# Patient Record
Sex: FEMALE | Race: AMERICAN INDIAN OR ALASKA NATIVE | NOT HISPANIC OR LATINO | Employment: OTHER | URBAN - METROPOLITAN AREA
[De-identification: names, ages, dates, MRNs, and addresses within clinical notes are randomized per-mention and may not be internally consistent; named-entity substitution may affect disease eponyms.]

---

## 2017-11-17 ENCOUNTER — HOSPITAL ENCOUNTER (OUTPATIENT)
Dept: RADIOLOGY | Facility: MEDICAL CENTER | Age: 62
End: 2017-11-17
Attending: INTERNAL MEDICINE
Payer: MEDICAID

## 2017-11-17 DIAGNOSIS — R13.14 DYSPHAGIA, PHARYNGOESOPHAGEAL PHASE: ICD-10-CM

## 2017-11-17 PROCEDURE — 76700 US EXAM ABDOM COMPLETE: CPT

## 2018-01-17 DIAGNOSIS — Z01.812 PRE-OPERATIVE LABORATORY EXAMINATION: ICD-10-CM

## 2018-01-17 DIAGNOSIS — Z01.810 PRE-OPERATIVE CARDIOVASCULAR EXAMINATION: ICD-10-CM

## 2018-01-17 LAB
ANION GAP SERPL CALC-SCNC: 8 MMOL/L (ref 0–11.9)
BASOPHILS # BLD AUTO: 0.8 % (ref 0–1.8)
BASOPHILS # BLD: 0.05 K/UL (ref 0–0.12)
BUN SERPL-MCNC: 11 MG/DL (ref 8–22)
CALCIUM SERPL-MCNC: 9.6 MG/DL (ref 8.5–10.5)
CHLORIDE SERPL-SCNC: 102 MMOL/L (ref 96–112)
CO2 SERPL-SCNC: 26 MMOL/L (ref 20–33)
CREAT SERPL-MCNC: 0.71 MG/DL (ref 0.5–1.4)
EKG IMPRESSION: NORMAL
EOSINOPHIL # BLD AUTO: 0.38 K/UL (ref 0–0.51)
EOSINOPHIL NFR BLD: 5.9 % (ref 0–6.9)
ERYTHROCYTE [DISTWIDTH] IN BLOOD BY AUTOMATED COUNT: 42.6 FL (ref 35.9–50)
GLUCOSE SERPL-MCNC: 104 MG/DL (ref 65–99)
HCT VFR BLD AUTO: 43.8 % (ref 37–47)
HGB BLD-MCNC: 14.5 G/DL (ref 12–16)
IMM GRANULOCYTES # BLD AUTO: 0.02 K/UL (ref 0–0.11)
IMM GRANULOCYTES NFR BLD AUTO: 0.3 % (ref 0–0.9)
LYMPHOCYTES # BLD AUTO: 2.88 K/UL (ref 1–4.8)
LYMPHOCYTES NFR BLD: 44.9 % (ref 22–41)
MCH RBC QN AUTO: 30.7 PG (ref 27–33)
MCHC RBC AUTO-ENTMCNC: 33.1 G/DL (ref 33.6–35)
MCV RBC AUTO: 92.6 FL (ref 81.4–97.8)
MONOCYTES # BLD AUTO: 0.49 K/UL (ref 0–0.85)
MONOCYTES NFR BLD AUTO: 7.6 % (ref 0–13.4)
NEUTROPHILS # BLD AUTO: 2.6 K/UL (ref 2–7.15)
NEUTROPHILS NFR BLD: 40.5 % (ref 44–72)
NRBC # BLD AUTO: 0 K/UL
NRBC BLD-RTO: 0 /100 WBC
PLATELET # BLD AUTO: 303 K/UL (ref 164–446)
PMV BLD AUTO: 9.1 FL (ref 9–12.9)
POTASSIUM SERPL-SCNC: 4.5 MMOL/L (ref 3.6–5.5)
RBC # BLD AUTO: 4.73 M/UL (ref 4.2–5.4)
SODIUM SERPL-SCNC: 136 MMOL/L (ref 135–145)
WBC # BLD AUTO: 6.4 K/UL (ref 4.8–10.8)

## 2018-01-17 PROCEDURE — 93010 ELECTROCARDIOGRAM REPORT: CPT | Performed by: INTERNAL MEDICINE

## 2018-01-17 PROCEDURE — 36415 COLL VENOUS BLD VENIPUNCTURE: CPT

## 2018-01-17 PROCEDURE — 85025 COMPLETE CBC W/AUTO DIFF WBC: CPT

## 2018-01-17 PROCEDURE — 80048 BASIC METABOLIC PNL TOTAL CA: CPT

## 2018-01-17 PROCEDURE — 93005 ELECTROCARDIOGRAM TRACING: CPT

## 2018-01-17 RX ORDER — HYDROCODONE BITARTRATE AND ACETAMINOPHEN 5; 325 MG/1; MG/1
1-2 TABLET ORAL 2 TIMES DAILY
Status: ON HOLD | COMMUNITY
End: 2018-01-19

## 2018-01-19 ENCOUNTER — HOSPITAL ENCOUNTER (OUTPATIENT)
Facility: MEDICAL CENTER | Age: 63
End: 2018-01-19
Attending: SURGERY | Admitting: SURGERY
Payer: MEDICAID

## 2018-01-19 VITALS
DIASTOLIC BLOOD PRESSURE: 70 MMHG | BODY MASS INDEX: 25.83 KG/M2 | TEMPERATURE: 97.2 F | WEIGHT: 170.42 LBS | OXYGEN SATURATION: 91 % | HEART RATE: 80 BPM | SYSTOLIC BLOOD PRESSURE: 106 MMHG | RESPIRATION RATE: 16 BRPM | HEIGHT: 68 IN

## 2018-01-19 DIAGNOSIS — G89.18 POST-OPERATIVE PAIN: ICD-10-CM

## 2018-01-19 PROCEDURE — 160046 HCHG PACU - 1ST 60 MINS PHASE II: Performed by: SURGERY

## 2018-01-19 PROCEDURE — 160028 HCHG SURGERY MINUTES - 1ST 30 MINS LEVEL 3: Performed by: SURGERY

## 2018-01-19 PROCEDURE — A9270 NON-COVERED ITEM OR SERVICE: HCPCS

## 2018-01-19 PROCEDURE — 502571 HCHG PACK, LAP CHOLE: Performed by: SURGERY

## 2018-01-19 PROCEDURE — 501583 HCHG TROCAR, THRD CAN&SEAL 5X100: Performed by: SURGERY

## 2018-01-19 PROCEDURE — A6402 STERILE GAUZE <= 16 SQ IN: HCPCS | Performed by: SURGERY

## 2018-01-19 PROCEDURE — 700102 HCHG RX REV CODE 250 W/ 637 OVERRIDE(OP)

## 2018-01-19 PROCEDURE — 160025 RECOVERY II MINUTES (STATS): Performed by: SURGERY

## 2018-01-19 PROCEDURE — 160048 HCHG OR STATISTICAL LEVEL 1-5: Performed by: SURGERY

## 2018-01-19 PROCEDURE — 500697 HCHG HEMOCLIP, LARGE (ORANGE): Performed by: SURGERY

## 2018-01-19 PROCEDURE — 700111 HCHG RX REV CODE 636 W/ 250 OVERRIDE (IP)

## 2018-01-19 PROCEDURE — 160047 HCHG PACU  - EA ADDL 30 MINS PHASE II: Performed by: SURGERY

## 2018-01-19 PROCEDURE — 88304 TISSUE EXAM BY PATHOLOGIST: CPT

## 2018-01-19 PROCEDURE — 500854 HCHG NEEDLE, INSUFFLATION FOR STEP: Performed by: SURGERY

## 2018-01-19 PROCEDURE — 501582 HCHG TROCAR, THRD BLADED: Performed by: SURGERY

## 2018-01-19 PROCEDURE — 501399 HCHG SPECIMAN BAG, ENDO CATC: Performed by: SURGERY

## 2018-01-19 PROCEDURE — 160036 HCHG PACU - EA ADDL 30 MINS PHASE I: Performed by: SURGERY

## 2018-01-19 PROCEDURE — 700101 HCHG RX REV CODE 250

## 2018-01-19 PROCEDURE — 160009 HCHG ANES TIME/MIN: Performed by: SURGERY

## 2018-01-19 PROCEDURE — 160039 HCHG SURGERY MINUTES - EA ADDL 1 MIN LEVEL 3: Performed by: SURGERY

## 2018-01-19 PROCEDURE — 501838 HCHG SUTURE GENERAL: Performed by: SURGERY

## 2018-01-19 PROCEDURE — 500868 HCHG NEEDLE, SURGI(VARES): Performed by: SURGERY

## 2018-01-19 PROCEDURE — 501338 HCHG SHEARS, ENDO: Performed by: SURGERY

## 2018-01-19 PROCEDURE — 501572 HCHG TROCAR, SHIELD OBTU 5X100: Performed by: SURGERY

## 2018-01-19 PROCEDURE — 160035 HCHG PACU - 1ST 60 MINS PHASE I: Performed by: SURGERY

## 2018-01-19 PROCEDURE — 160002 HCHG RECOVERY MINUTES (STAT): Performed by: SURGERY

## 2018-01-19 RX ORDER — ESCITALOPRAM OXALATE 20 MG/1
20 TABLET ORAL DAILY
COMMUNITY
End: 2019-01-30

## 2018-01-19 RX ORDER — HYDROMORPHONE HYDROCHLORIDE 2 MG/ML
INJECTION, SOLUTION INTRAMUSCULAR; INTRAVENOUS; SUBCUTANEOUS
Status: COMPLETED
Start: 2018-01-19 | End: 2018-01-19

## 2018-01-19 RX ORDER — OXYCODONE HCL 5 MG/5 ML
SOLUTION, ORAL ORAL
Status: COMPLETED
Start: 2018-01-19 | End: 2018-01-19

## 2018-01-19 RX ORDER — SODIUM CHLORIDE, SODIUM LACTATE, POTASSIUM CHLORIDE, CALCIUM CHLORIDE 600; 310; 30; 20 MG/100ML; MG/100ML; MG/100ML; MG/100ML
INJECTION, SOLUTION INTRAVENOUS CONTINUOUS
Status: DISCONTINUED | OUTPATIENT
Start: 2018-01-19 | End: 2018-01-19 | Stop reason: HOSPADM

## 2018-01-19 RX ORDER — FAMOTIDINE 10 MG
10 TABLET ORAL DAILY
COMMUNITY
End: 2019-01-30

## 2018-01-19 RX ORDER — LIDOCAINE AND PRILOCAINE 25; 25 MG/G; MG/G
1 CREAM TOPICAL
Status: DISCONTINUED | OUTPATIENT
Start: 2018-01-19 | End: 2018-01-19 | Stop reason: HOSPADM

## 2018-01-19 RX ORDER — MEPERIDINE HYDROCHLORIDE 25 MG/ML
INJECTION INTRAMUSCULAR; INTRAVENOUS; SUBCUTANEOUS
Status: COMPLETED
Start: 2018-01-19 | End: 2018-01-19

## 2018-01-19 RX ORDER — QUETIAPINE FUMARATE 25 MG/1
25 TABLET, FILM COATED ORAL DAILY
COMMUNITY
End: 2019-01-30

## 2018-01-19 RX ORDER — HYDROCODONE BITARTRATE AND ACETAMINOPHEN 5; 325 MG/1; MG/1
1-2 TABLET ORAL EVERY 4 HOURS PRN
Qty: 30 TAB | Refills: 0 | Status: SHIPPED | OUTPATIENT
Start: 2018-01-19 | End: 2018-01-26

## 2018-01-19 RX ORDER — LIDOCAINE HYDROCHLORIDE 10 MG/ML
0.5 INJECTION, SOLUTION INFILTRATION; PERINEURAL
Status: DISCONTINUED | OUTPATIENT
Start: 2018-01-19 | End: 2018-01-19 | Stop reason: HOSPADM

## 2018-01-19 RX ORDER — LIDOCAINE HYDROCHLORIDE 10 MG/ML
INJECTION, SOLUTION INFILTRATION; PERINEURAL
Status: COMPLETED
Start: 2018-01-19 | End: 2018-01-19

## 2018-01-19 RX ORDER — BUPIVACAINE HYDROCHLORIDE AND EPINEPHRINE 5; 5 MG/ML; UG/ML
INJECTION, SOLUTION EPIDURAL; INTRACAUDAL; PERINEURAL
Status: DISCONTINUED | OUTPATIENT
Start: 2018-01-19 | End: 2018-01-19 | Stop reason: HOSPADM

## 2018-01-19 RX ADMIN — MEPERIDINE HYDROCHLORIDE 12.5 MG: 25 INJECTION INTRAMUSCULAR; INTRAVENOUS; SUBCUTANEOUS at 12:54

## 2018-01-19 RX ADMIN — HYDROMORPHONE HYDROCHLORIDE 0.5 MG: 2 INJECTION INTRAMUSCULAR; INTRAVENOUS; SUBCUTANEOUS at 13:02

## 2018-01-19 RX ADMIN — HYDROMORPHONE HYDROCHLORIDE 0.2 MG: 2 INJECTION INTRAMUSCULAR; INTRAVENOUS; SUBCUTANEOUS at 13:58

## 2018-01-19 RX ADMIN — FENTANYL CITRATE 50 MCG: 50 INJECTION, SOLUTION INTRAMUSCULAR; INTRAVENOUS at 12:50

## 2018-01-19 RX ADMIN — FENTANYL CITRATE 50 MCG: 50 INJECTION, SOLUTION INTRAMUSCULAR; INTRAVENOUS at 12:57

## 2018-01-19 RX ADMIN — OXYCODONE HYDROCHLORIDE 10 MG: 5 SOLUTION ORAL at 12:48

## 2018-01-19 RX ADMIN — FENTANYL CITRATE 50 MCG: 50 INJECTION, SOLUTION INTRAMUSCULAR; INTRAVENOUS at 13:07

## 2018-01-19 RX ADMIN — LIDOCAINE HYDROCHLORIDE 0.5 ML: 10 INJECTION, SOLUTION INFILTRATION; PERINEURAL at 10:15

## 2018-01-19 RX ADMIN — SODIUM CHLORIDE, SODIUM LACTATE, POTASSIUM CHLORIDE, CALCIUM CHLORIDE: 600; 310; 30; 20 INJECTION, SOLUTION INTRAVENOUS at 10:30

## 2018-01-19 RX ADMIN — FENTANYL CITRATE 50 MCG: 50 INJECTION, SOLUTION INTRAMUSCULAR; INTRAVENOUS at 13:20

## 2018-01-19 RX ADMIN — HYDROMORPHONE HYDROCHLORIDE 0.2 MG: 2 INJECTION INTRAMUSCULAR; INTRAVENOUS; SUBCUTANEOUS at 12:55

## 2018-01-19 RX ADMIN — FENTANYL CITRATE 50 MCG: 50 INJECTION, SOLUTION INTRAMUSCULAR; INTRAVENOUS at 12:45

## 2018-01-19 ASSESSMENT — PAIN SCALES - GENERAL
PAINLEVEL_OUTOF10: 8
PAINLEVEL_OUTOF10: 7
PAINLEVEL_OUTOF10: 10
PAINLEVEL_OUTOF10: 7
PAINLEVEL_OUTOF10: 8
PAINLEVEL_OUTOF10: 6
PAINLEVEL_OUTOF10: 10
PAINLEVEL_OUTOF10: 8
PAINLEVEL_OUTOF10: 10
PAINLEVEL_OUTOF10: 7
PAINLEVEL_OUTOF10: 3
PAINLEVEL_OUTOF10: 10
PAINLEVEL_OUTOF10: 6
PAINLEVEL_OUTOF10: 8
PAINLEVEL_OUTOF10: 10

## 2018-01-19 NOTE — DISCHARGE INSTRUCTIONS
ACTIVITY: Rest and take it easy for the first 24 hours.  A responsible adult is recommended to remain with you during that time.  It is normal to feel sleepy.  We encourage you to not do anything that requires balance, judgment or coordination.    MILD FLU-LIKE SYMPTOMS ARE NORMAL. YOU MAY EXPERIENCE GENERALIZED MUSCLE ACHES, THROAT IRRITATION, HEADACHE AND/OR SOME NAUSEA.    FOR 24 HOURS DO NOT:  Drive, operate machinery or run household appliances.  Drink beer or alcoholic beverages.   Make important decisions or sign legal documents.    SPECIAL INSTRUCTIONS:   *Laparoscopic Cholecystectomy D/C instructions:     1. DIET: Upon discharge from the hospital you may resume your normal preoperative diet. Depending on how you are feeling and whether you have nausea or not, you may wish to stay with a bland diet for the first few days. However, you can advance this as quickly as you feel ready.     2. ACTIVITIES: After discharge from the hospital, you may resume full routine activities. However, there should be no heavy lifting (greater than 15 pounds) and no strenuous activities until after your follow-up visit. Otherwise, routine activities of daily living are acceptable.     3. DRIVING: You may drive whenever you are off pain medications and are able to perform the activities needed to drive, i.e. turning, bending, twisting, etc.     4. BATHING: You may get the wound wet at any time after leaving the hospital. You may shower, but do not submerge in a bath for at least a week. Dressings may come off after 48 hours.     5. BOWEL FUNCTION: A few patients, after this operation, will develop either frequent or loose stools after meals. This usually corrects itself after a few days, to a few weeks. If this occurs, do not worry; it is not unusual and will resolve. Much more common than loose stools, is constipation. The combination of pain medication and decreased activity level can cause constipation in otherwise normal  patients. If you feel this is occurring, take a laxative (Milk of Magnesia, Ex-Lax, Senokot, etc.) until the problem has resolved.     6. PAIN MEDICATION: You will be given a prescription for pain medication at discharge. Please take these as directed. It is important to remember not to take medications on an empty stomach as this may cause nausea.      It is also helpful to take other non-narcotic over the counter medications to help with pain control and to decrease the need for narcotic medications. I recommend ibuprofen, taken every 8 hours, dosing as directed on the medication bottle.     It is useful to slowly decrease the number of narcotic pain medications you take starting the first day after surgery, as you are able. If you need a refill, Dr. Henderson's office will provide you with one refill at your post-operative visit. After this, no more narcotic pain medications will be given.      7.CALL IF YOU HAVE: (1) Fevers to more than 1010 F, (2) Unusual chest or leg pain, (3) Drainage or fluid from incision that may be foul smelling, increased tenderness or soreness at the wound or the wound edges are no longer together, redness or swelling at the incision site. Please do not hesitate to call with any other questions.      8. APPOINTMENT: Contact our office at [phone] for a follow-up appointment in 1 to 2 weeks following your procedure.     If you have any additional questions, please do not hesitate to call the office and speak to either myself or the physician on call.     Office address:  [address] [place] [address] [place] [address]     Keren Henderson M.D.  [facility]  [phone]   **    DIET: To avoid nausea, slowly advance diet as tolerated, avoiding spicy or greasy foods for the first day.  Add more substantial food to your diet according to your physician's instructions.  Babies can be fed formula or breast milk as soon as they are hungry.  INCREASE FLUIDS AND FIBER TO AVOID  CONSTIPATION.    SURGICAL DRESSING/BATHING: **see above instructions    FOLLOW-UP APPOINTMENT:  A follow-up appointment should be arranged with your doctor.  Call to schedule.    You should CALL YOUR PHYSICIAN if you develop:  Fever greater than 101 degrees F.  Pain not relieved by medication, or persistent nausea or vomiting.  Excessive bleeding (blood soaking through dressing) or unexpected drainage from the wound.  Extreme redness or swelling around the incision site, drainage of pus or foul smelling drainage.  Inability to urinate or empty your bladder within 8 hours.  Problems with breathing or chest pain.    You should call 911 if you develop problems with breathing or chest pain.  If you are unable to contact your doctor or surgical center, you should go to the nearest emergency room or urgent care center.  Physician's telephone #: **Dr. Henderson [phone]*    If any questions arise, call your doctor.  If your doctor is not available, please feel free to call the Surgical Center at [phone].  The Center is open Monday through Friday from 7AM to 7PM.  You can also call the [facility] HOTLINE open 24 hours/day, 7 days/week and speak to a nurse at [phone], or toll free at [phone].    A registered nurse may call you a few days after your surgery to see how you are doing after your procedure.    MEDICATIONS: Resume taking daily medication.  Take prescribed pain medication with food.  If no medication is prescribed, you may take non-aspirin pain medication if needed.  PAIN MEDICATION CAN BE VERY CONSTIPATING.  Take a stool softener or laxative such as senokot, pericolace, or milk of magnesia if needed.    Prescription given for Norco.  Last pain medication given at *12:48 pm.    If your physician has prescribed pain medication that includes Acetaminophen (Tylenol), do not take additional Acetaminophen (Tylenol) while taking the prescribed medication.    Depression / Suicide Risk    As you are  discharged from this [facility] facility, it is important to learn how to keep safe from harming yourself.    Recognize the warning signs:  · Abrupt changes in personality, positive or negative- including increase in energy   · Giving away possessions  · Change in eating patterns- significant weight changes-  positive or negative  · Change in sleeping patterns- unable to sleep or sleeping all the time   · Unwillingness or inability to communicate  · Depression  · Unusual sadness, discouragement and loneliness  · Talk of wanting to die  · Neglect of personal appearance   · Rebelliousness- reckless behavior  · Withdrawal from people/activities they love  · Confusion- inability to concentrate     If you or a loved one observes any of these behaviors or has concerns about self-harm, here's what you can do:  · Talk about it- your feelings and reasons for harming yourself  · Remove any means that you might use to hurt yourself (examples: pills, rope, extension cords, firearm)  · Get professional help from the community (Mental Health, Substance Abuse, psychological counseling)  · Do not be alone:Call your Safe Contact- someone whom you trust who will be there for you.  · Call your local CRISIS HOTLINE 679-7030 or 880-973-0966  · Call your local Children's Mobile Crisis Response Team [place] (056) 173-6153 or www.[employer]  · Call the toll free National Suicide Prevention Hotlines   · National Suicide Prevention Lifeline 472-680-JWXY (6545)  · National Hope Line Network 800-SUICIDE (534-7090)

## 2018-01-19 NOTE — OR NURSING
Multiple attempts to call pt's . No answer. Voicemail left with update.   Pt's  not in lobby either, per .

## 2018-01-19 NOTE — PROGRESS NOTES
An extensive informed consent was undertaken with the patient, in regard to the risks and benefits of the use of narcotics for post-operative pain. The patient was counseled regarding the benefits of narcotics for post-operative pain in the short term period. The patient was made aware of the alternatives, including heating pads, ice, and NSAID medication.    The risks of addiction, overdose, respiratory depression, and risks during pregnancy (if applicable), were discussed.  We discussed taking the medications as prescribed. We discussed other medications the patient is taking, and how these can interact. The patient was notified not to share the medications with others. We discussed warning signs of addiction.    She has a rx for norco from Dr. Tapia for elbow pain. She states she is out. I notified her I will give her an rx for same for post-operative pain. I am not able to access her  as the website is done.    The patient understands that these medications are intended to be used in the short term for post-operative pain only.    The patient was given a chance to ask questions, and all her questions were answered. Discussion was undertaken with Layman's terms. The patient demonstrated adequate understanding. Consent was given in clear state of mind.  Consent was signed.     Keren Henderson M.D.  [facility]  [phone]

## 2018-01-19 NOTE — OP REPORT
Operative Report    Date: 1/19/2018    Surgeon: Keren Henderson M.D.    Assistant: BRIAN Snow    Anesthesiologist: Jhony LOZA    Preoperative Diagnosis: K80.10 Calculus of gallbladder with chronic cholecystitis without obstruction    Postoperative Diagnosis: same    Procedure Performed: 48149 Laparoscopy, surgical; cholecystectomy    Indications: This is a 62 y.o. female who presented with abdominal pain, nausea, vomiting, heartburn. History, physical and diagnostic studies are consistent with chronic cholecystitis.    An extensive procedures, alternative, risks and questions conference was held with the patient and her family, in regard to the surgical treatment of cholecystitis. The patient was counseled regarding the benefits of the operation, namely, removal of gallbladder and alleviation of her infection and symptoms. The patient was made aware of the alternatives, including operative and non-operative management. The risks of bleeding, infection, damage to surrounding structures, need for reoperation, need for open operation, bile duct injury, stroke, MI, and death were discussed with the patient. The patient was given a chance to ask questions, and all her questions were answered. Discussion was undertaken with Layman's terms. The patient and family demonstrated adequate understanding, seemed pleased with the plan, and wish to proceed. Consent was given in clear state of mind.  Consent was signed.     Findings: chronic cholecystitis.    Procedure in detail: The patient was brought to the operating room and was placed in the supine position where general endotracheal anesthesia was induced. SCDs were in place and functioning. Preoperative antibiotics of ancef were given before incision time. The patient's abdomen was prepped and draped in the usual sterile fashion.    After infiltration of local anesthetic, a skin incision was made to accommodate a 5 mm port infra-umbilically. We then used the Veress  needle to access the peritoneum, and after saline drop test, we insufflated to 15 mmHg. We then placed the 5mm 30 degree camera and inspected the abdomen for evidence of trauma secondary to Veress needle or port placement, and found none.    Utilizing the 30-degree laparoscope with the patient in the reverse Trendelenburg position, and after infiltration of local anesthetic, an additional 11-mm port was inserted into the epigastrium just to the right of the midline. Then two 5-mm ports were inserted in the midclavicular and anterior axillary lines, in the right upper quadrant, all under direct visualization.    The gallbladder was identified, it appeared chronically inflamed with stones. The gallbladder was retracted cephalad and caudally using gallbladder graspers. Using the [place], we were able to peel the overlying peritoneum off the cystic duct, and circumferentially dissect it. We used electrocautery to futher remove the peritoneum off the gallbladder. We then were able to further dissect Calot's triangle until we identified the cystic artery, which was circumferrentially dissected.     We then doubly clipped the cystic duct distally and divided it. We then doubly clipped the cystic artery  and divided it.Then, using electrocautery, we removed the gallbladder from the liver bed. After ensuring gallbladder bed hemostasis with cautery, we removed the gallbladder and placed it in an endocatch bag, and removed it from the epigastric port site.    The right upper quadrant was irrigated copiously with normal saline solution. We inspected the cystic duct and artery stumps, and found them to be intact. The liver bed was hemostatic.    The trocars were removed under direct visualization.    The fascia on the all port sites >10 mm were closed with Vicryl sutures. The skin of all incisions was closed with running subcuticular suture.    All sponge, needle, and instrument counts were reported as correct at the end of the  procedure. The patient tolerated the procedure well and left the operating room for the recovery room in stable and satisfactory condition.    Estimated Blood Loss: minimal     Specimens: gallbladder for permanent pathology    Complications: none apparent     Drains: none     Disposition: stable, extubated, to PACU    Keren Henderson M.D.  [facility]  [phone]

## 2018-01-19 NOTE — OR NURSING
Call back from pt's , David. David given update and pt's script for Norco. David will drop off script at pt's pharmacy and return to pick her up. Pt aware.

## 2018-01-19 NOTE — PROGRESS NOTES
Laparoscopic Cholecystectomy D/C instructions:    1. DIET: Upon discharge from the hospital you may resume your normal preoperative diet. Depending on how you are feeling and whether you have nausea or not, you may wish to stay with a bland diet for the first few days. However, you can advance this as quickly as you feel ready.    2. ACTIVITIES: After discharge from the hospital, you may resume full routine activities. However, there should be no heavy lifting (greater than 15 pounds) and no strenuous activities until after your follow-up visit. Otherwise, routine activities of daily living are acceptable.    3. DRIVING: You may drive whenever you are off pain medications and are able to perform the activities needed to drive, i.e. turning, bending, twisting, etc.    4. BATHING: You may get the wound wet at any time after leaving the hospital. You may shower, but do not submerge in a bath for at least a week. Dressings may come off after 48 hours.    5. BOWEL FUNCTION: A few patients, after this operation, will develop either frequent or loose stools after meals. This usually corrects itself after a few days, to a few weeks. If this occurs, do not worry; it is not unusual and will resolve. Much more common than loose stools, is constipation. The combination of pain medication and decreased activity level can cause constipation in otherwise normal patients. If you feel this is occurring, take a laxative (Milk of Magnesia, Ex-Lax, Senokot, etc.) until the problem has resolved.    6. PAIN MEDICATION: You will be given a prescription for pain medication at discharge. Please take these as directed. It is important to remember not to take medications on an empty stomach as this may cause nausea.     It is also helpful to take other non-narcotic over the counter medications to help with pain control and to decrease the need for narcotic medications. I recommend ibuprofen, taken every 8 hours, dosing as directed on the  medication bottle.    It is useful to slowly decrease the number of narcotic pain medications you take starting the first day after surgery, as you are able. If you need a refill, Dr. Henderson's office will provide you with one refill at your post-operative visit. After this, no more narcotic pain medications will be given.     7.CALL IF YOU HAVE: (1) Fevers to more than 1010 F, (2) Unusual chest or leg pain, (3) Drainage or fluid from incision that may be foul smelling, increased tenderness or soreness at the wound or the wound edges are no longer together, redness or swelling at the incision site. Please do not hesitate to call with any other questions.     8. APPOINTMENT: Contact our office at [phone] for a follow-up appointment in 1 to 2 weeks following your procedure.    If you have any additional questions, please do not hesitate to call the office and speak to either myself or the physician on call.    Office address:  [facility] [address] [place] [address]    Keren Henderson M.D.  [facility]  [phone]

## 2018-01-20 NOTE — OR NURSING
Pt A&OX4. VSS. Unable to wean pt off oxygen without dropping oxygen saturation into the 80's. Titrated oxygen to 1 L via nasal cannula and pt sitting up in bed, able to use IS correctly with strong effort and self motivated. Oxygen sat's in the 90's on 1 L and trevon Lieberman RN notified. Pt states pain tolerable 6/10 post pain medication administration. Pt denies nausea, tolerated sips of water and ginger ale. Four lap sites to abd, dressings CDI. Ice pack in place. Report called to SARA Lieberman. Pt transported on 1 L of oxygen to phase II via [place].

## 2019-01-30 ENCOUNTER — OFFICE VISIT (OUTPATIENT)
Dept: INTERNAL MEDICINE | Facility: MEDICAL CENTER | Age: 64
End: 2019-01-30
Payer: MEDICAID

## 2019-01-30 VITALS
TEMPERATURE: 99 F | SYSTOLIC BLOOD PRESSURE: 120 MMHG | OXYGEN SATURATION: 94 % | HEIGHT: 67 IN | WEIGHT: 172.8 LBS | BODY MASS INDEX: 27.12 KG/M2 | DIASTOLIC BLOOD PRESSURE: 60 MMHG | HEART RATE: 88 BPM

## 2019-01-30 DIAGNOSIS — F41.9 ANXIETY: ICD-10-CM

## 2019-01-30 DIAGNOSIS — Z12.31 ENCOUNTER FOR SCREENING MAMMOGRAM FOR BREAST CANCER: ICD-10-CM

## 2019-01-30 DIAGNOSIS — F41.9 ANXIETY AND DEPRESSION: ICD-10-CM

## 2019-01-30 DIAGNOSIS — E78.5 DYSLIPIDEMIA: ICD-10-CM

## 2019-01-30 DIAGNOSIS — F32.A ANXIETY AND DEPRESSION: ICD-10-CM

## 2019-01-30 DIAGNOSIS — K22.2 SCHATZKI'S RING: ICD-10-CM

## 2019-01-30 DIAGNOSIS — Z00.00 HEALTH CARE MAINTENANCE: ICD-10-CM

## 2019-01-30 DIAGNOSIS — G47.09 OTHER INSOMNIA: ICD-10-CM

## 2019-01-30 PROCEDURE — 99204 OFFICE O/P NEW MOD 45 MIN: CPT | Mod: GC | Performed by: INTERNAL MEDICINE

## 2019-01-30 RX ORDER — ATENOLOL 50 MG/1
50 TABLET ORAL
Refills: 2 | COMMUNITY
Start: 2018-12-28 | End: 2019-01-30

## 2019-01-30 RX ORDER — NICOTINE POLACRILEX 4 MG/1
GUM, CHEWING ORAL
Refills: 5 | COMMUNITY
Start: 2018-12-27 | End: 2019-04-10 | Stop reason: SDUPTHER

## 2019-01-30 RX ORDER — ALPRAZOLAM 0.25 MG/1
0.25 TABLET ORAL 3 TIMES DAILY PRN
Qty: 40 TAB | Refills: 0 | Status: SHIPPED | OUTPATIENT
Start: 2019-01-30 | End: 2019-02-13

## 2019-01-30 RX ORDER — HYDROXYZINE HYDROCHLORIDE 25 MG/1
25 TABLET, FILM COATED ORAL 3 TIMES DAILY PRN
Qty: 60 TAB | Refills: 3 | Status: SHIPPED | OUTPATIENT
Start: 2019-01-30 | End: 2019-04-10 | Stop reason: SDUPTHER

## 2019-01-30 RX ORDER — NORTRIPTYLINE HYDROCHLORIDE 25 MG/1
CAPSULE ORAL
Refills: 5 | COMMUNITY
Start: 2018-12-29 | End: 2019-01-30

## 2019-01-30 RX ORDER — FAMOTIDINE 40 MG/1
40 TABLET, FILM COATED ORAL
Refills: 3 | COMMUNITY
Start: 2018-12-29 | End: 2019-01-30

## 2019-01-30 RX ORDER — PAROXETINE 10 MG/1
10 TABLET, FILM COATED ORAL DAILY
Qty: 60 TAB | Refills: 1 | Status: SHIPPED | OUTPATIENT
Start: 2019-01-30 | End: 2019-03-06

## 2019-01-30 ASSESSMENT — ENCOUNTER SYMPTOMS
BLURRED VISION: 0
WEIGHT LOSS: 0
CONSTIPATION: 0
FOCAL WEAKNESS: 0
ORTHOPNEA: 0
MYALGIAS: 0
DEPRESSION: 1
ABDOMINAL PAIN: 0
DIARRHEA: 0
WEAKNESS: 0
NERVOUS/ANXIOUS: 1
LOSS OF CONSCIOUSNESS: 0
CHILLS: 0
COUGH: 0
HALLUCINATIONS: 0
SHORTNESS OF BREATH: 0
INSOMNIA: 1
SEIZURES: 0
NAUSEA: 0
HEADACHES: 1
DIZZINESS: 0
PALPITATIONS: 0
FEVER: 0
SENSORY CHANGE: 0
HEARTBURN: 0
MEMORY LOSS: 0

## 2019-01-30 ASSESSMENT — PATIENT HEALTH QUESTIONNAIRE - PHQ9
5. POOR APPETITE OR OVEREATING: 1 - SEVERAL DAYS
CLINICAL INTERPRETATION OF PHQ2 SCORE: 5
SUM OF ALL RESPONSES TO PHQ QUESTIONS 1-9: 16

## 2019-01-30 ASSESSMENT — LIFESTYLE VARIABLES: SUBSTANCE_ABUSE: 0

## 2019-01-30 NOTE — PROGRESS NOTES
New Patient to Establish    Reason to establish: New patient to establish    CC: wean off xanax    HPI: Patient is a pleasant 63-year-old lady who comes in to establish care.  She is also wishing to wean off of her Xanax.  She states that she tried to wean off a couple weeks ago and stopped her Xanax for 3 days, but restarted it due increased anxiety.    Anxiety/insomnia  Xanax 1.5 mg started for anxiety and insomnia approx 20 yrs ago >> now on 0.5mg bid    Wants to wean off   She has also been taking nortriptyline.     GERD: Has history of Schatzki's ring.  Stable on omeprazole.    Patient Active Problem List    Diagnosis Date Noted   • Impaired fasting blood sugar 07/09/2014   • Cigarette smoker 01/11/2013   • Allergic rhinitis 05/18/2009   • Schatzki's ring    • Hiatal hernia    • Dyslipidemia    • Fear of travel with panic attacks        Past Medical History:   Diagnosis Date   • Agoraphobia with panic disorder     anxiety   • Allergic rhinitis    • Arthritis     all joints   • Bowel habit changes    • Dental disorder    • Dyslipidemia    • Fear of travel with panic attacks    • Heart burn    • Hiatal hernia    • Schatzki's ring        Current Outpatient Prescriptions   Medication Sig Dispense Refill   • nortriptyline (PAMELOR) 25 MG Cap TAKE 1 CAPSULE BY MOUTH EVERY DAY AT NIGHT  5   • omeprazole (PRILOSEC) 20 MG Tablet Delayed Response delayed-release tablet TAKE 1 TABLET BY MOUTH ONCE A DAY 30 MIN BEFORE FIRST MEAL FOR 30 DAYS  5   • atenolol (TENORMIN) 50 MG Tab Take 50 mg by mouth every day.  2   • famotidine (PEPCID) 40 MG Tab Take 40 mg by mouth every day.  3   • escitalopram (LEXAPRO) 20 MG tablet Take 20 mg by mouth every day.     • famotidine (PEPCID) 10 MG tablet Take 10 mg by mouth every day.     • quetiapine (SEROQUEL) 25 MG Tab Take 25 mg by mouth every day.     • alprazolam (XANAX) 1 MG Tab TAKE 1 TABLET BY MOUTH EVERY DAY AT BEDTIME AS NEEDED FOR SLEEP (Patient not taking: Reported on 1/30/2019)  30 Tab 0     No current facility-administered medications for this visit.        Allergies as of 01/30/2019 - Reviewed 01/30/2019   Allergen Reaction Noted   • Other environmental  01/17/2018       Social History     Social History   • Marital status: Single     Spouse name: N/A   • Number of children: N/A   • Years of education: N/A     Occupational History   •  Other     Social History Main Topics   • Smoking status: Former Smoker     Packs/day: 0.10     Years: 44.00     Types: Cigarettes     Quit date: 3/17/2014   • Smokeless tobacco: Current User     Last attempt to quit: 3/1/2011   • Alcohol use No      Comment: 10 drinks/week quit 11/11, quit 2/13   • Drug use: No      Comment: used marijuana and cocaine in the past, no IVDU   • Sexual activity: Yes     Birth control/ protection: Surgical     Other Topics Concern   • Not on file     Social History Narrative   • No narrative on file       Family History   Problem Relation Age of Onset   • Cancer Brother         pancreatic cancer   • Arthritis Mother    • Cancer Mother         lung cancer with mets    • Arthritis Father    • Alcohol/Drug Brother         drug abuse       Past Surgical History:   Procedure Laterality Date   • ALEX BY LAPAROSCOPY  1/19/2018    Procedure: ALEX BY LAPAROSCOPY/SURGICAL;  Surgeon: Keren Henderson M.D.;  Location: SURGERY [facility];  Service: General   • EGD WITH ASP/BX  11/26/12    distal esophageal stricture secondary to reflux status post dilation to 51 Chinese, mild erosive esophagitis, mild gastric erythema, small sliding hiatal hernia, mild chronic gastritis, negative H. pylori, no dysplasia or malignancy   • COLONOSCOPY WITH BIOPSY  7/08    hemorrhoids, no malignancy   • EGD WITH ASP/BX  7/08    hiatal hernia, schatzki's ring   • ABDOMINAL HYSTERECTOMY TOTAL      with BSO due to infection   • OTHER       I &D rectal abscess       ROS: As per HPI. Additional pertinent symptoms as noted below.    Review of  "Systems   Constitutional: Positive for malaise/fatigue. Negative for chills, fever and weight loss.   HENT: Negative for hearing loss.    Eyes: Negative for blurred vision.   Respiratory: Negative for cough and shortness of breath.    Cardiovascular: Negative for chest pain, palpitations, orthopnea and leg swelling.   Gastrointestinal: Negative for abdominal pain, constipation, diarrhea, heartburn and nausea.   Genitourinary: Negative for dysuria and frequency.   Musculoskeletal: Negative for joint pain and myalgias.   Skin: Negative for rash.   Neurological: Positive for headaches. Negative for dizziness, sensory change, focal weakness, seizures, loss of consciousness and weakness.   Psychiatric/Behavioral: Positive for depression. Negative for hallucinations, memory loss, substance abuse and suicidal ideas. The patient is nervous/anxious and has insomnia.        /60 (BP Location: Right arm, Patient Position: Sitting, BP Cuff Size: Adult)   Pulse 88   Temp 37.2 °C (99 °F) (Temporal)   Ht 1.702 m (5' 7\")   Wt 78.4 kg (172 lb 12.8 oz)   SpO2 94%   BMI 27.06 kg/m²      Physical Exam  General:  Alert and oriented, No apparent distress.  Healthy-appearing, well-groomed.    Eyes: Pupils equal and reactive. No scleral icterus.    Throat: Clear no erythema or exudates noted.    Neck: Supple. No lymphadenopathy noted. Thyroid not enlarged.    Lungs: Clear to auscultation, no crackles or wheezes.    Cardiovascular: Regular rate and rhythm. No murmurs, rubs or gallops.    Abdomen:  Benign. No rebound or guarding noted.    Extremities: No clubbing, cyanosis, edema.    Skin: Clear. No rash or suspicious skin lesions noted.    Psych: Anxious.  Normal affect.  Normal speech and thought process.      Assessment and Plan    Anxiety/depression/insomnia  PHQ 9 of 16  Stop nortriptyline and start paroxetine - increase in about 2 weeks  Gave patient a plan on how to wean off Xanax in a few weeks  Hydroxyzine PRN " anxiety  Discussed sleep hygiene  RTC 5 weeks    Hyperlipidemia  4/15 , HDL 49, triglycerides 160, total cholesterol 271  Not on meds  No history of heart disease or strokes.  Repeat lipid panel    GERD  -Continue omeprazole    Preventive care  Pending CBC, CMP, lipid panel, TSH  TDaP - discuss on next visit  Colonoscopy -discuss on next visit  Shingrix - advised to go to pharmacy  Pap - Hysterectomy  Mammogram - ordered today    Followup: Return in about 5 weeks (around 3/6/2019).    Signed by: Lelia Blackwell M.D.

## 2019-01-30 NOTE — PATIENT INSTRUCTIONS
Get shingles vaccine at pharmacy.   Stop nortriptyline.     XANAX wean off:  0.5 mg once a day for 1 week then  0.5 mg every other day for 1 week.   Then 0.25mg every other day for 1 week.  Then 0.25 mg every 2 days 1week - then stop.    OK to take hydroxyzine  When feeling anxious.

## 2019-02-12 ENCOUNTER — HOSPITAL ENCOUNTER (OUTPATIENT)
Dept: RADIOLOGY | Facility: MEDICAL CENTER | Age: 64
End: 2019-02-12
Attending: STUDENT IN AN ORGANIZED HEALTH CARE EDUCATION/TRAINING PROGRAM
Payer: MEDICAID

## 2019-02-12 ENCOUNTER — HOSPITAL ENCOUNTER (OUTPATIENT)
Dept: LAB | Facility: MEDICAL CENTER | Age: 64
End: 2019-02-12
Attending: STUDENT IN AN ORGANIZED HEALTH CARE EDUCATION/TRAINING PROGRAM
Payer: MEDICAID

## 2019-02-12 DIAGNOSIS — Z00.00 HEALTH CARE MAINTENANCE: ICD-10-CM

## 2019-02-12 DIAGNOSIS — F41.9 ANXIETY AND DEPRESSION: ICD-10-CM

## 2019-02-12 DIAGNOSIS — F32.A ANXIETY AND DEPRESSION: ICD-10-CM

## 2019-02-12 LAB
ALBUMIN SERPL BCP-MCNC: 4.3 G/DL (ref 3.2–4.9)
ALBUMIN/GLOB SERPL: 1.3 G/DL
ALP SERPL-CCNC: 61 U/L (ref 30–99)
ALT SERPL-CCNC: 23 U/L (ref 2–50)
ANION GAP SERPL CALC-SCNC: 8 MMOL/L (ref 0–11.9)
AST SERPL-CCNC: 23 U/L (ref 12–45)
BASOPHILS # BLD AUTO: 0.6 % (ref 0–1.8)
BASOPHILS # BLD: 0.05 K/UL (ref 0–0.12)
BILIRUB SERPL-MCNC: 0.7 MG/DL (ref 0.1–1.5)
BUN SERPL-MCNC: 16 MG/DL (ref 8–22)
CALCIUM SERPL-MCNC: 10.6 MG/DL (ref 8.5–10.5)
CHLORIDE SERPL-SCNC: 103 MMOL/L (ref 96–112)
CO2 SERPL-SCNC: 27 MMOL/L (ref 20–33)
CREAT SERPL-MCNC: 0.75 MG/DL (ref 0.5–1.4)
EOSINOPHIL # BLD AUTO: 0.22 K/UL (ref 0–0.51)
EOSINOPHIL NFR BLD: 2.7 % (ref 0–6.9)
ERYTHROCYTE [DISTWIDTH] IN BLOOD BY AUTOMATED COUNT: 44.7 FL (ref 35.9–50)
GLOBULIN SER CALC-MCNC: 3.4 G/DL (ref 1.9–3.5)
GLUCOSE SERPL-MCNC: 91 MG/DL (ref 65–99)
HCT VFR BLD AUTO: 46.9 % (ref 37–47)
HGB BLD-MCNC: 15.2 G/DL (ref 12–16)
IMM GRANULOCYTES # BLD AUTO: 0.02 K/UL (ref 0–0.11)
IMM GRANULOCYTES NFR BLD AUTO: 0.2 % (ref 0–0.9)
LYMPHOCYTES # BLD AUTO: 3.59 K/UL (ref 1–4.8)
LYMPHOCYTES NFR BLD: 43.7 % (ref 22–41)
MCH RBC QN AUTO: 30.3 PG (ref 27–33)
MCHC RBC AUTO-ENTMCNC: 32.4 G/DL (ref 33.6–35)
MCV RBC AUTO: 93.6 FL (ref 81.4–97.8)
MONOCYTES # BLD AUTO: 0.48 K/UL (ref 0–0.85)
MONOCYTES NFR BLD AUTO: 5.8 % (ref 0–13.4)
NEUTROPHILS # BLD AUTO: 3.85 K/UL (ref 2–7.15)
NEUTROPHILS NFR BLD: 47 % (ref 44–72)
NRBC # BLD AUTO: 0 K/UL
NRBC BLD-RTO: 0 /100 WBC
PLATELET # BLD AUTO: 352 K/UL (ref 164–446)
PMV BLD AUTO: 9.2 FL (ref 9–12.9)
POTASSIUM SERPL-SCNC: 3.9 MMOL/L (ref 3.6–5.5)
PROT SERPL-MCNC: 7.7 G/DL (ref 6–8.2)
RBC # BLD AUTO: 5.01 M/UL (ref 4.2–5.4)
SODIUM SERPL-SCNC: 138 MMOL/L (ref 135–145)
TSH SERPL DL<=0.005 MIU/L-ACNC: 2.68 UIU/ML (ref 0.38–5.33)
WBC # BLD AUTO: 8.2 K/UL (ref 4.8–10.8)

## 2019-02-12 PROCEDURE — 80053 COMPREHEN METABOLIC PANEL: CPT

## 2019-02-12 PROCEDURE — 36415 COLL VENOUS BLD VENIPUNCTURE: CPT

## 2019-02-12 PROCEDURE — 77063 BREAST TOMOSYNTHESIS BI: CPT

## 2019-02-12 PROCEDURE — 85025 COMPLETE CBC W/AUTO DIFF WBC: CPT

## 2019-02-12 PROCEDURE — 84443 ASSAY THYROID STIM HORMONE: CPT

## 2019-02-25 ENCOUNTER — OFFICE VISIT (OUTPATIENT)
Dept: INTERNAL MEDICINE | Facility: MEDICAL CENTER | Age: 64
End: 2019-02-25
Payer: MEDICAID

## 2019-02-25 VITALS
BODY MASS INDEX: 27.78 KG/M2 | SYSTOLIC BLOOD PRESSURE: 156 MMHG | DIASTOLIC BLOOD PRESSURE: 86 MMHG | TEMPERATURE: 97.3 F | HEART RATE: 92 BPM | OXYGEN SATURATION: 96 % | WEIGHT: 177 LBS | HEIGHT: 67 IN

## 2019-02-25 DIAGNOSIS — F32.A ANXIETY AND DEPRESSION: ICD-10-CM

## 2019-02-25 DIAGNOSIS — F41.9 ANXIETY AND DEPRESSION: ICD-10-CM

## 2019-02-25 DIAGNOSIS — E83.52 HYPERCALCEMIA: ICD-10-CM

## 2019-02-25 DIAGNOSIS — Z81.1 FAMILY HISTORY OF ALCOHOLISM: ICD-10-CM

## 2019-02-25 PROCEDURE — 99214 OFFICE O/P EST MOD 30 MIN: CPT | Mod: GC | Performed by: INTERNAL MEDICINE

## 2019-02-25 RX ORDER — ALPRAZOLAM 0.25 MG/1
0.25 TABLET ORAL 2 TIMES DAILY PRN
Qty: 20 TAB | Refills: 0 | Status: SHIPPED | OUTPATIENT
Start: 2019-02-25 | End: 2019-03-06 | Stop reason: SDUPTHER

## 2019-02-25 RX ORDER — NORTRIPTYLINE HYDROCHLORIDE 25 MG/1
CAPSULE ORAL
Refills: 5 | COMMUNITY
Start: 2019-01-30 | End: 2019-03-06

## 2019-02-25 RX ORDER — ATENOLOL 50 MG/1
50 TABLET ORAL
Refills: 2 | COMMUNITY
Start: 2019-01-31 | End: 2019-03-06

## 2019-02-25 ASSESSMENT — PATIENT HEALTH QUESTIONNAIRE - PHQ9: CLINICAL INTERPRETATION OF PHQ2 SCORE: 0

## 2019-02-25 ASSESSMENT — PAIN SCALES - GENERAL: PAINLEVEL: NO PAIN

## 2019-02-25 NOTE — PATIENT INSTRUCTIONS
Panic Attacks  Panic attacks are sudden, short feelings of great fear or discomfort. You may have them for no reason when you are relaxed, when you are uneasy (anxious), or when you are sleeping.  Follow these instructions at home:  · Take all your medicines as told.  · Check with your doctor before starting new medicines.  · Keep all doctor visits.  Contact a doctor if:  · You are not able to take your medicines as told.  · Your symptoms do not get better.  · Your symptoms get worse.  Get help right away if:  · Your attacks seem different than your normal attacks.  · You have thoughts about hurting yourself or others.  · You take panic attack medicine and you have a side effect.  This information is not intended to replace advice given to you by your health care provider. Make sure you discuss any questions you have with your health care provider.  Document Released: 01/20/2012 Document Revised: 05/25/2017 Document Reviewed: 08/01/2014  [employer] Interactive Patient Education © 2017 [employer]

## 2019-02-25 NOTE — PROGRESS NOTES
Established Patient    García presents today with the following:    CC:   Chief Complaint   Patient presents with   • Anxiety     folow up, pt seems to be having an anxiety attack at the start           HPI:   This is 63-year-old female anxiety, depression, insomnia who is here for follow-up of anxiety.  Patient has been on for over 20 years, used to be about 1.5 mg daily, but now on 0.5 mg twice a day.  Patient decided to sulfa wound of Xanax, was seen in clinic last month, and was instructed on how to wean off medication.  She decided to stop medication, but about 2 weeks ago she became more anxious, and could not do anything.  Decided to restart.  During last visit she was given 14 days worth of medication.  She presents today with severe anxiety with CR 7 of 21.  Denies suicidality.  Unable to function  Last time she took Xanax was about 3 days ago.  She is anxious, feeling of impending doom, palpitations.  Denies chest pain, shortness of breath,  Patient was started on hydroxyzine previously, but she states she gets a lot of dry mouth hence will not take it anymore.  She is currently on Paxil which she states does not work      Patient Active Problem List    Diagnosis Date Noted   • Hypercalcemia 02/25/2019   • Anxiety and depression 01/30/2019   • Other insomnia 01/30/2019   • Impaired fasting blood sugar 07/09/2014   • Cigarette smoker 01/11/2013   • Allergic rhinitis 05/18/2009   • Schatzki's ring    • Hiatal hernia    • Dyslipidemia    • Fear of travel with panic attacks        Current Outpatient Prescriptions   Medication Sig Dispense Refill   • ALPRAZolam (XANAX) 0.25 MG Tab Take 1 Tab by mouth 2 times a day as needed for Sleep for up to 14 days. 20 Tab 0   • hydrOXYzine HCl (ATARAX) 25 MG Tab Take 1 Tab by mouth 3 times a day as needed for Anxiety. 60 Tab 3   • atenolol (TENORMIN) 50 MG Tab Take 50 mg by mouth every day.  2   • nortriptyline (PAMELOR) 25 MG Cap TAKE 1 CAPSULE BY MOUTH EVERY DAY  "AT NIGHT  5   • omeprazole (PRILOSEC) 20 MG Tablet Delayed Response delayed-release tablet TAKE 1 TABLET BY MOUTH ONCE A DAY 30 MIN BEFORE FIRST MEAL FOR 30 DAYS  5   • PARoxetine (PAXIL) 10 MG Tab Take 1 Tab by mouth every day. (Patient not taking: Reported on 2/25/2019) 60 Tab 1     No current facility-administered medications for this visit.        ROS: As per HPI.   /86 (BP Location: Right arm, Patient Position: Sitting, BP Cuff Size: Adult)   Pulse 92   Temp 36.3 °C (97.3 °F) (Temporal)   Ht 1.702 m (5' 7\")   Wt 80.3 kg (177 lb)   SpO2 96%   Breastfeeding? No   BMI 27.72 kg/m²     Physical Exam   Constitutional:  oriented to person, place, and time. No distress.   Eyes: Pupils are equal, round, and reactive to light. No scleral icterus.  Neck: Neck supple. No thyromegaly present.   Cardiovascular: Normal rate, regular rhythm and normal heart sounds.  Exam reveals no gallop and no friction rub.  No murmur heard.  Pulmonary/Chest: Breath sounds normal. Chest wall is not dull to percussion.   Musculoskeletal:   no edema.   Lymphadenopathy: no cervical adenopathy  Neurological: alert and oriented to person, place, and time.   Skin: No cyanosis. Nails show no clubbing.      Note: I have reviewed all pertinent labs and diagnostic tests associated with this visit with specific comments listed under the assessment and plan below    Assessment and Plan    1.  Generalized anxiety disorder   2. Depression  Anxious, palpitations, feeling of impending doom,   Denies suicidal ideations  Psychotherapy in the past, no  benefit   reviewed, last Xanax prescription was on 1/30  Will do urine drug screen today  Consent for controlled substance signed  Xanax 0.25 mg twice daily as needed, 20 pills given with no refill  Patient has an appointment with PCP in 2 weeks  Referral to psychiatry given    2. Hypercalcemia  Serum calcium level noted to be 10.6 on 2/12 baseline 9.6 in January 2018.  She has had calcium " level of 10.6 in 2014.  Denies any urinary symptoms, no bone pain,   PCP follow-up    Followup: No Follow-up on file.  Has an appointment with PCP on 3/6      Signed by: Braden Pringle M.D.

## 2019-03-03 NOTE — PROGRESS NOTES
Established Patient    García presents today with the following:    CC: Anxiety    HPI: Patient is a pleasant 63-year-old lady who comes in for follow-up of her anxiety and lab work.    Anxiety/insomnia  Xanax 1.5 mg started for anxiety and insomnia approx 20 yrs ago >> now on lower dose, but wishing to wean off.    -We stopped her nortriptyline and started paxil.   -She failed a 3 week attempt at weaning off (GAD7 of 21) and was restarted on xanax on last visit, as well as referred to psychiatry (appt is soon).  Of note, she states that she stopped the Paxil after 4 weeks, because of unseen benefits.  -Today: patient is feeling much better now that she is back on Xanax 0.25 mg twice daily.  She does well with hydroxyzine PRN nightly.      GERD: Has history of Schatzki's ring.  Stable on omeprazole.       Patient Active Problem List    Diagnosis Date Noted   • Hypercalcemia 02/25/2019   • Family history of alcoholism 02/25/2019   • Anxiety and depression 01/30/2019   • Other insomnia 01/30/2019   • Impaired fasting blood sugar 07/09/2014   • Cigarette smoker 01/11/2013   • Allergic rhinitis 05/18/2009   • Schatzki's ring    • Hiatal hernia    • Dyslipidemia    • Fear of travel with panic attacks        Current Outpatient Prescriptions   Medication Sig Dispense Refill   • atenolol (TENORMIN) 50 MG Tab Take 50 mg by mouth every day.  2   • nortriptyline (PAMELOR) 25 MG Cap TAKE 1 CAPSULE BY MOUTH EVERY DAY AT NIGHT  5   • ALPRAZolam (XANAX) 0.25 MG Tab Take 1 Tab by mouth 2 times a day as needed for Sleep for up to 14 days. 20 Tab 0   • omeprazole (PRILOSEC) 20 MG Tablet Delayed Response delayed-release tablet TAKE 1 TABLET BY MOUTH ONCE A DAY 30 MIN BEFORE FIRST MEAL FOR 30 DAYS  5   • PARoxetine (PAXIL) 10 MG Tab Take 1 Tab by mouth every day. (Patient not taking: Reported on 2/25/2019) 60 Tab 1   • hydrOXYzine HCl (ATARAX) 25 MG Tab Take 1 Tab by mouth 3 times a day as needed for Anxiety. 60 Tab 3     No current  "facility-administered medications for this visit.        ROS: As per HPI. Additional pertinent symptoms as noted below.    Review of Systems   Constitutional: Negative for chills, fever and malaise/fatigue.   HENT: Negative for hearing loss.    Eyes: Negative for blurred vision.   Respiratory: Negative for cough and shortness of breath.    Cardiovascular: Negative for chest pain.   Gastrointestinal: Negative for abdominal pain and nausea.        Oral ulcers and dry mouth that have been improving now back on Xanax   Genitourinary: Negative for dysuria and frequency.   Musculoskeletal: Negative for joint pain and myalgias.   Skin: Negative for rash.   Neurological: Negative for dizziness, weakness and headaches.   Psychiatric/Behavioral: Negative for depression, hallucinations, memory loss, substance abuse and suicidal ideas. The patient is nervous/anxious and has insomnia.      /88 (BP Location: Left arm, Patient Position: Sitting, BP Cuff Size: Adult)   Pulse 78   Temp 36.6 °C (97.9 °F) (Temporal)   Ht 1.702 m (5' 7\")   Wt 81.6 kg (180 lb)   SpO2 95%   BMI 28.19 kg/m²     Physical Exam   Constitutional:  oriented to person, place, and time. No distress.   Eyes: Pupils are equal, round, and reactive to light. No scleral icterus.  Neck: Neck supple. No thyromegaly present.   Cardiovascular: Normal rate, regular rhythm and normal heart sounds.  Exam reveals no gallop and no friction rub.  No murmur heard.  Pulmonary/Chest: Breath sounds normal. Chest wall is not dull to percussion.   Musculoskeletal:   no edema.   Lymphadenopathy: no cervical adenopathy  Neurological: alert and oriented to person, place, and time.  Ambulatory.  Grossly nonfocal.  Skin: No cyanosis. Nails show no clubbing.  Psych: Anxious mood and affect      Assessment and Plan    Anxiety/depression/insomnia  PHQ 9 of 16, CR 7 21>> now 15  TSH 2.6  Stop nortriptyline and restart paroxetine - increase in about 2 weeks  Has appointment with " psychiatry soon  -I feel patient needs a longer period of time to wean off.  She is highly motivated to wean off, but feels extreme anxiety with thoughts of weaning off.  -Hydroxyzine PRN anxiety/sleep  -Discussed sleep hygiene    -Refilled Xanax - ORT 1 - patient signed consent for for controlled substance - new [employer] screen ordered.      Hyperlipidemia  4/15 , HDL 49, triglycerides 160, total cholesterol 271  Not on meds  No history of heart disease or strokes.  Repeat lipid panel still pending    Hypercalcemia  H/o increased levels in between normal values on labs over the years.  Denies any urinary symptoms, no bone pain or fractures.   Pending Vit D and PTH     GERD  -Continue omeprazole    Elevated blood pressure  /88  Advised to keep a blood pressure log at home and bring on next visit  She has never been told she has high blood pressure in the past     Preventive care  2/2019 CBC wnl, CMP with slightly elevated ca, lipid panel pending, TSH 2.6  TDaP - on next visit  Colonoscopy - Dr. Wray - obtain records   Shingrix - pharmacy  Pap - Hysterectomy  Mammogram - 2/2019 wnl    Signed by: Lelia Blackwell M.D.

## 2019-03-06 ENCOUNTER — OFFICE VISIT (OUTPATIENT)
Dept: INTERNAL MEDICINE | Facility: MEDICAL CENTER | Age: 64
End: 2019-03-06
Payer: MEDICAID

## 2019-03-06 VITALS
TEMPERATURE: 97.9 F | BODY MASS INDEX: 28.25 KG/M2 | HEIGHT: 67 IN | WEIGHT: 180 LBS | HEART RATE: 78 BPM | DIASTOLIC BLOOD PRESSURE: 88 MMHG | OXYGEN SATURATION: 95 % | SYSTOLIC BLOOD PRESSURE: 141 MMHG

## 2019-03-06 DIAGNOSIS — Z12.11 ENCOUNTER FOR SCREENING COLONOSCOPY: ICD-10-CM

## 2019-03-06 DIAGNOSIS — F32.A ANXIETY AND DEPRESSION: ICD-10-CM

## 2019-03-06 DIAGNOSIS — F41.9 ANXIETY AND DEPRESSION: ICD-10-CM

## 2019-03-06 DIAGNOSIS — Z23 ENCOUNTER FOR VACCINATION: ICD-10-CM

## 2019-03-06 DIAGNOSIS — E83.52 HYPERCALCEMIA: ICD-10-CM

## 2019-03-06 DIAGNOSIS — R03.0 ELEVATED BLOOD PRESSURE READING: ICD-10-CM

## 2019-03-06 DIAGNOSIS — E78.5 DYSLIPIDEMIA: ICD-10-CM

## 2019-03-06 PROCEDURE — 99214 OFFICE O/P EST MOD 30 MIN: CPT | Mod: GC | Performed by: INTERNAL MEDICINE

## 2019-03-06 RX ORDER — ALPRAZOLAM 0.25 MG/1
0.25 TABLET ORAL 2 TIMES DAILY PRN
Qty: 35 TAB | Refills: 0 | Status: SHIPPED | OUTPATIENT
Start: 2019-03-06 | End: 2019-04-10 | Stop reason: SDUPTHER

## 2019-03-06 RX ORDER — PAROXETINE 10 MG/1
10 TABLET, FILM COATED ORAL DAILY
Qty: 35 TAB | Refills: 0 | Status: SHIPPED | OUTPATIENT
Start: 2019-03-06 | End: 2019-04-10 | Stop reason: SDUPTHER

## 2019-03-06 ASSESSMENT — ENCOUNTER SYMPTOMS
ABDOMINAL PAIN: 0
DIZZINESS: 0
SHORTNESS OF BREATH: 0
COUGH: 0
HALLUCINATIONS: 0
DEPRESSION: 0
INSOMNIA: 1
CHILLS: 0
MYALGIAS: 0
NAUSEA: 0
NERVOUS/ANXIOUS: 1
FEVER: 0
HEADACHES: 0
BLURRED VISION: 0
MEMORY LOSS: 0
WEAKNESS: 0

## 2019-03-06 ASSESSMENT — LIFESTYLE VARIABLES: SUBSTANCE_ABUSE: 0

## 2019-03-06 NOTE — ADDENDUM NOTE
Addended by: SALIMA MICHELE on: 3/6/2019 01:26 PM     Modules accepted: Orders, Level of Service

## 2019-03-06 NOTE — PATIENT INSTRUCTIONS
Generalized Anxiety Disorder  Generalized anxiety disorder (CR) is a mental disorder. It interferes with life functions, including relationships, work, and school.  CR is different from normal anxiety, which everyone experiences at some point in their lives in response to specific life events and activities. Normal anxiety actually helps us prepare for and get through these life events and activities. Normal anxiety goes away after the event or activity is over.   CR causes anxiety that is not necessarily related to specific events or activities. It also causes excess anxiety in proportion to specific events or activities. The anxiety associated with CR is also difficult to control. CR can vary from mild to severe. People with severe CR can have intense waves of anxiety with physical symptoms (panic attacks).   SYMPTOMS  The anxiety and worry associated with CR are difficult to control. This anxiety and worry are related to many life events and activities and also occur more days than not for 6 months or longer. People with CR also have three or more of the following symptoms (one or more in children):  · Restlessness.    · Fatigue.  · Difficulty concentrating.    · Irritability.  · Muscle tension.  · Difficulty sleeping or unsatisfying sleep.  DIAGNOSIS  CR is diagnosed through an assessment by your health care provider. Your health care provider will ask you questions about your mood, physical symptoms, and events in your life. Your health care provider may ask you about your medical history and use of alcohol or drugs, including prescription medicines. Your health care provider may also do a physical exam and blood tests. Certain medical conditions and the use of certain substances can cause symptoms similar to those associated with CR. Your health care provider may refer you to a mental health specialist for further evaluation.  TREATMENT  The following therapies are usually used to treat CR:    · Medication. Antidepressant medication usually is prescribed for long-term daily control. Antianxiety medicines may be added in severe cases, especially when panic attacks occur.    · Talk therapy (psychotherapy). Certain types of talk therapy can be helpful in treating CR by providing support, education, and guidance. A form of talk therapy called cognitive behavioral therapy can teach you healthy ways to think about and react to daily life events and activities.  · Stress management techniques. These include yoga, meditation, and exercise and can be very helpful when they are practiced regularly.  A mental health specialist can help determine which treatment is best for you. Some people see improvement with one therapy. However, other people require a combination of therapies.  This information is not intended to replace advice given to you by your health care provider. Make sure you discuss any questions you have with your health care provider.  Document Released: 04/14/2014 Document Revised: 01/08/2016 Document Reviewed: 04/14/2014  [employer] Interactive Patient Education © 2017 [employer]    Take blood pressure once every day or every other day until you see me again.

## 2019-03-15 DIAGNOSIS — F41.9 ANXIETY AND DEPRESSION: ICD-10-CM

## 2019-03-15 DIAGNOSIS — F32.A ANXIETY AND DEPRESSION: ICD-10-CM

## 2019-04-09 NOTE — PROGRESS NOTES
Established Patient    García presents today with the following:    CC: med refills    HPI: Patient is a 64 yo F here for med refills.    Anxiety/insomnia  Xanax 1.5 mg started for anxiety and insomnia approx 20 yrs ago >> now on lower dose, but wishing to wean off.    -We stopped her nortriptyline and started Paxil a few visits ago.   -She failed a 3 week attempt at weaning off (GAD7 of 21) and was restarted on xanax, as well as referred to psychology.   -Today: Patient is feeling much better. Still on Xanax 0.25 mg twice daily.  She does well with hydroxyzine PRN nightly a few days a month.   -She is now seeing a therapist and feels she is doing much better     GERD: Has history of Schatzki's ring.  Stable on omeprazole.    Patient Active Problem List    Diagnosis Date Noted   • Hypercalcemia 02/25/2019   • Family history of alcoholism 02/25/2019   • Anxiety and depression 01/30/2019   • Other insomnia 01/30/2019   • Impaired fasting blood sugar 07/09/2014   • Cigarette smoker 01/11/2013   • Allergic rhinitis 05/18/2009   • Schatzki's ring    • Hiatal hernia    • Dyslipidemia    • Fear of travel with panic attacks        Current Outpatient Prescriptions   Medication Sig Dispense Refill   • ALPRAZolam (XANAX) 0.25 MG Tab Take 1 Tab by mouth 2 times a day as needed for Sleep for up to 35 days. 35 Tab 0   • PARoxetine (PAXIL) 10 MG Tab Take 1 Tab by mouth every day. 35 Tab 0   • omeprazole (PRILOSEC) 20 MG Tablet Delayed Response delayed-release tablet TAKE 1 TABLET BY MOUTH ONCE A DAY 30 MIN BEFORE FIRST MEAL FOR 30 DAYS  5   • hydrOXYzine HCl (ATARAX) 25 MG Tab Take 1 Tab by mouth 3 times a day as needed for Anxiety. 60 Tab 3     No current facility-administered medications for this visit.        ROS: As per HPI. Additional pertinent symptoms as noted below.    Review of Systems   Constitutional: Negative for chills, fever and malaise/fatigue.   HENT: Negative for hearing loss.    Eyes: Negative for blurred  "vision.   Respiratory: Negative for cough and shortness of breath.    Cardiovascular: Negative for chest pain, palpitations and leg swelling.   Gastrointestinal: Negative for abdominal pain, heartburn and nausea.   Genitourinary: Negative.    Musculoskeletal: Negative.    Skin: Negative for rash.   Neurological: Negative for dizziness, sensory change, focal weakness, weakness and headaches.   Psychiatric/Behavioral: Negative for depression, memory loss and suicidal ideas. The patient is nervous/anxious.        /60 (BP Location: Left arm, Patient Position: Sitting, BP Cuff Size: Adult)   Pulse 78   Temp 36.3 °C (97.3 °F) (Temporal)   Ht 1.702 m (5' 7\")   Wt 80.7 kg (178 lb)   SpO2 97%   BMI 27.88 kg/m²     Physical Exam   Constitutional:  oriented to person, place, and time. No distress.   Eyes: Pupils are equal, round, and reactive to light. No scleral icterus.  Neck: Neck supple. No thyromegaly present.   Cardiovascular: Normal rate, regular rhythm and normal heart sounds.  Exam reveals no gallop and no friction rub.  No murmur heard.  Pulmonary/Chest: Breath sounds normal. Chest wall is not dull to percussion.   Musculoskeletal:   no edema.   Neurological: alert and oriented to person, place, and time.  Grossly nonfocal.  Skin: No cyanosis. Nails show no clubbing.  Psych: mildly anxious mood, normal affect.       Assessment and Plan    #Anxiety/depression/insomnia  PHQ 9 of 16 >> 17 , CR 7 21>> now 17   TSH 2.6  Increase paroxetine from 10 mg to 20 mg  Continue to see psychology/behavioral health - she is doing much better now that she has someone to talk to as well.   -I feel patient needs a longer period of time to wean off.  She is highly motivated to wean off, but feels extreme anxiety with thoughts of weaning off.  -I had lengthy discussion with patient today about taking only one a day if possible.  Patient said she would try to do that as much as she could this month.  -Hydroxyzine PRN " anxiety/sleep  -Discussed sleep hygiene     -Refilled Xanax - ORT 1 - patient signed consent for controlled substance - [employer] screen from last visit was negative (only showing Xanax).      #Hyperlipidemia  4/15 , HDL 49, triglycerides 160, total cholesterol 271  Not on meds  No history of heart disease or strokes.  Repeat lipid panel still pending     #Hypercalcemia  H/o increased levels in between normal values on labs over the years.  Denies any urinary symptoms, no bone pain or fractures.   Pending Vit D and PTH     #GERD  -Continue omeprazole     Preventive care  2/2019 CBC wnl, CMP with slightly elevated ca, lipid panel pending, TSH 2.6  TDaP - 2019  Colonoscopy - Dr. Wray - obtain records   Shingrix - pharmacy  Pap - Hysterectomy  Mammogram - 2/2019 wnl    Signed by: Lelia Blackwell M.D.

## 2019-04-10 ENCOUNTER — OFFICE VISIT (OUTPATIENT)
Dept: INTERNAL MEDICINE | Facility: MEDICAL CENTER | Age: 64
End: 2019-04-10
Payer: MEDICAID

## 2019-04-10 VITALS
TEMPERATURE: 97.3 F | DIASTOLIC BLOOD PRESSURE: 60 MMHG | WEIGHT: 178 LBS | SYSTOLIC BLOOD PRESSURE: 110 MMHG | OXYGEN SATURATION: 97 % | BODY MASS INDEX: 27.94 KG/M2 | HEIGHT: 67 IN | HEART RATE: 78 BPM

## 2019-04-10 DIAGNOSIS — F41.9 ANXIETY: ICD-10-CM

## 2019-04-10 DIAGNOSIS — F32.A ANXIETY AND DEPRESSION: ICD-10-CM

## 2019-04-10 DIAGNOSIS — F41.9 ANXIETY AND DEPRESSION: ICD-10-CM

## 2019-04-10 DIAGNOSIS — E78.5 DYSLIPIDEMIA: ICD-10-CM

## 2019-04-10 DIAGNOSIS — E83.52 HYPERCALCEMIA: ICD-10-CM

## 2019-04-10 DIAGNOSIS — Z23 ENCOUNTER FOR VACCINATION: ICD-10-CM

## 2019-04-10 DIAGNOSIS — K22.2 SCHATZKI'S RING: ICD-10-CM

## 2019-04-10 PROCEDURE — 90715 TDAP VACCINE 7 YRS/> IM: CPT | Performed by: INTERNAL MEDICINE

## 2019-04-10 PROCEDURE — 90471 IMMUNIZATION ADMIN: CPT | Performed by: INTERNAL MEDICINE

## 2019-04-10 PROCEDURE — 99213 OFFICE O/P EST LOW 20 MIN: CPT | Mod: GE,25 | Performed by: INTERNAL MEDICINE

## 2019-04-10 RX ORDER — NICOTINE POLACRILEX 4 MG/1
GUM, CHEWING ORAL
Qty: 90 TAB | Refills: 3 | Status: SHIPPED | OUTPATIENT
Start: 2019-04-10 | End: 2022-01-20

## 2019-04-10 RX ORDER — PAROXETINE HYDROCHLORIDE 20 MG/1
20 TABLET, FILM COATED ORAL DAILY
Qty: 40 TAB | Refills: 0 | Status: SHIPPED | OUTPATIENT
Start: 2019-04-10 | End: 2019-05-10 | Stop reason: SDUPTHER

## 2019-04-10 RX ORDER — HYDROXYZINE HYDROCHLORIDE 25 MG/1
25 TABLET, FILM COATED ORAL 3 TIMES DAILY PRN
Qty: 60 TAB | Refills: 3 | Status: SHIPPED | OUTPATIENT
Start: 2019-04-10 | End: 2022-01-20

## 2019-04-10 RX ORDER — ALPRAZOLAM 0.25 MG/1
0.25 TABLET ORAL 2 TIMES DAILY PRN
Qty: 60 TAB | Refills: 0 | Status: SHIPPED | OUTPATIENT
Start: 2019-04-10 | End: 2019-05-15 | Stop reason: SDUPTHER

## 2019-04-10 ASSESSMENT — ENCOUNTER SYMPTOMS
ABDOMINAL PAIN: 0
MEMORY LOSS: 0
WEAKNESS: 0
FEVER: 0
BLURRED VISION: 0
HEARTBURN: 0
SHORTNESS OF BREATH: 0
PALPITATIONS: 0
HEADACHES: 0
DEPRESSION: 0
NERVOUS/ANXIOUS: 1
NAUSEA: 0
CHILLS: 0
MUSCULOSKELETAL NEGATIVE: 1
COUGH: 0
FOCAL WEAKNESS: 0
DIZZINESS: 0
SENSORY CHANGE: 0

## 2019-04-10 NOTE — NON-PROVIDER
"García Cho is a 63 y.o. female here for a non-provider visit for:   TDAP    Reason for immunization: Overdue/Provider Recommended  Immunization records indicate need for vaccine: Yes, confirmed with Epic  Minimum interval has been met for this vaccine: Yes  ABN completed: Not Indicated    Order and dose verified by: Leonie Mobley  VIS Dated  02/24/19 was given to patient: Yes  All IAC Questionnaire questions were answered \"No.\"    Patient tolerated injection and no adverse effects were observed or reported: Yes    Pt scheduled for next dose in series: Not Indicated  "

## 2019-04-10 NOTE — PATIENT INSTRUCTIONS
PLEASE TRY TO TAKE XANAX JUST 1 A DAY WHEN POSSIBLE. THANK YOU    Generalized Anxiety Disorder  Generalized anxiety disorder (CR) is a mental disorder. It interferes with life functions, including relationships, work, and school.  CR is different from normal anxiety, which everyone experiences at some point in their lives in response to specific life events and activities. Normal anxiety actually helps us prepare for and get through these life events and activities. Normal anxiety goes away after the event or activity is over.   CR causes anxiety that is not necessarily related to specific events or activities. It also causes excess anxiety in proportion to specific events or activities. The anxiety associated with CR is also difficult to control. CR can vary from mild to severe. People with severe CR can have intense waves of anxiety with physical symptoms (panic attacks).   SYMPTOMS  The anxiety and worry associated with CR are difficult to control. This anxiety and worry are related to many life events and activities and also occur more days than not for 6 months or longer. People with CR also have three or more of the following symptoms (one or more in children):  · Restlessness.    · Fatigue.  · Difficulty concentrating.    · Irritability.  · Muscle tension.  · Difficulty sleeping or unsatisfying sleep.  DIAGNOSIS  CR is diagnosed through an assessment by your health care provider. Your health care provider will ask you questions about your mood, physical symptoms, and events in your life. Your health care provider may ask you about your medical history and use of alcohol or drugs, including prescription medicines. Your health care provider may also do a physical exam and blood tests. Certain medical conditions and the use of certain substances can cause symptoms similar to those associated with CR. Your health care provider may refer you to a mental health specialist for further  evaluation.  TREATMENT  The following therapies are usually used to treat CR:   · Medication. Antidepressant medication usually is prescribed for long-term daily control. Antianxiety medicines may be added in severe cases, especially when panic attacks occur.    · Talk therapy (psychotherapy). Certain types of talk therapy can be helpful in treating CR by providing support, education, and guidance. A form of talk therapy called cognitive behavioral therapy can teach you healthy ways to think about and react to daily life events and activities.  · Stress management techniques. These include yoga, meditation, and exercise and can be very helpful when they are practiced regularly.  A mental health specialist can help determine which treatment is best for you. Some people see improvement with one therapy. However, other people require a combination of therapies.  This information is not intended to replace advice given to you by your health care provider. Make sure you discuss any questions you have with your health care provider.  Document Released: 04/14/2014 Document Revised: 01/08/2016 Document Reviewed: 04/14/2014  [facility] Interactive Patient Education © 2017 [employer]

## 2019-04-12 ENCOUNTER — HOSPITAL ENCOUNTER (OUTPATIENT)
Dept: LAB | Facility: MEDICAL CENTER | Age: 64
End: 2019-04-12
Attending: STUDENT IN AN ORGANIZED HEALTH CARE EDUCATION/TRAINING PROGRAM
Payer: MEDICAID

## 2019-04-12 DIAGNOSIS — E83.52 HYPERCALCEMIA: ICD-10-CM

## 2019-04-12 PROCEDURE — 80061 LIPID PANEL: CPT

## 2019-04-12 PROCEDURE — 36415 COLL VENOUS BLD VENIPUNCTURE: CPT

## 2019-04-12 PROCEDURE — 82306 VITAMIN D 25 HYDROXY: CPT

## 2019-04-12 PROCEDURE — 83970 ASSAY OF PARATHORMONE: CPT

## 2019-04-13 LAB
25(OH)D3 SERPL-MCNC: 18 NG/ML (ref 30–100)
CHOLEST SERPL-MCNC: 276 MG/DL (ref 100–199)
FASTING STATUS PATIENT QL REPORTED: NORMAL
HDLC SERPL-MCNC: 56 MG/DL
LDLC SERPL CALC-MCNC: 189 MG/DL
PTH-INTACT SERPL-MCNC: 39.9 PG/ML (ref 14–72)
TRIGL SERPL-MCNC: 154 MG/DL (ref 0–149)

## 2019-05-13 NOTE — PROGRESS NOTES
Established Patient    García presents today with the following:    CC: anxiety, med refill    HPI: Patient is a 64 yo F here for xanax refills.      Anxiety/insomnia  Xanax 1.5 mg started for anxiety and insomnia approx 20 yrs ago >> now on lower dose, but wishing to wean off.    -We stopped her nortriptyline and started Paxil a few visits ago.   -She failed a 3 week attempt at weaning off (GAD7 of 21) and was restarted on xanax, as well as referred to psychology.   -Today: Still on Xanax 0.25 mg twice daily.  She does well with hydroxyzine PRN nightly a few days a month. Forgot to take less this month and had to borrow pills from her .   -She is now seeing a therapist and loves her - feels she is doing much better     GERD: Has history of Schatzki's ring.  Stable on omeprazole.    Patient Active Problem List    Diagnosis Date Noted   • Hypercalcemia 02/25/2019   • Family history of alcoholism 02/25/2019   • Anxiety and depression 01/30/2019   • Other insomnia 01/30/2019   • Allergic rhinitis 05/18/2009   • Schatzki's ring    • Hiatal hernia    • Dyslipidemia    • Fear of travel with panic attacks        Current Outpatient Prescriptions   Medication Sig Dispense Refill   • PARoxetine (PAXIL) 20 MG Tab TAKE 1 TABLET BY MOUTH EVERY DAY 90 Tab 0   • ALPRAZolam (XANAX) 0.25 MG Tab Take 1 Tab by mouth 2 times a day as needed for Sleep or Anxiety for up to 35 days. 60 Tab 0   • omeprazole (PRILOSEC) 20 MG Tablet Delayed Response delayed-release tablet TAKE 1 TABLET BY MOUTH ONCE A DAY 30 MIN BEFORE FIRST MEAL FOR 30 DAYS 90 Tab 3   • hydrOXYzine HCl (ATARAX) 25 MG Tab Take 1 Tab by mouth 3 times a day as needed for Anxiety. 60 Tab 3     No current facility-administered medications for this visit.        ROS: As per HPI. Additional pertinent symptoms as noted below.    Review of Systems   Constitutional: Negative for fever, malaise/fatigue and weight loss.   HENT: Negative for hearing loss.    Eyes: Negative for  "blurred vision.   Respiratory: Negative for cough and shortness of breath.    Cardiovascular: Negative for chest pain, palpitations and leg swelling.   Gastrointestinal: Negative for abdominal pain, heartburn and nausea.   Genitourinary: Negative for dysuria and frequency.   Musculoskeletal: Negative for joint pain and myalgias.   Skin: Negative for rash.   Neurological: Negative for dizziness, seizures and headaches.   Psychiatric/Behavioral: Positive for depression. Negative for substance abuse and suicidal ideas. The patient is nervous/anxious and has insomnia.      /72 (BP Location: Right arm, Patient Position: Sitting, BP Cuff Size: Adult)   Pulse 72   Temp 36.9 °C (98.4 °F) (Temporal)   Ht 1.702 m (5' 7\")   Wt 81.6 kg (180 lb)   SpO2 97%   BMI 28.19 kg/m²     Physical Exam   Constitutional:  oriented to person, place, and time. No distress.   Eyes: Pupils are equal, round, and reactive to light. No scleral icterus.  Neck: Neck supple. No thyromegaly present.   Cardiovascular: Normal rate, regular rhythm and normal heart sounds.  Exam reveals no gallop and no friction rub.  No murmur heard.  Pulmonary/Chest: Breath sounds normal. Chest wall is not dull to percussion.   Musculoskeletal:   no edema.   Lymphadenopathy: no cervical adenopathy  Neurological: alert and oriented to person, place, and time.   Skin: No cyanosis. Nails show no clubbing.  Psych: anxious and tearing easily      Assessment and Plan    #Hyperlipidemia  2015 to 4/2019: >>189, HDL 49>> 56, Trigs 160>> 154, TChol 271>> 276  Not on meds  No history of heart disease or strokes.  ASCVD: 5.9%    #Anxiety/depression/insomnia  PHQ 9 of 16 >> 17 , CR 7 21>> now 17   TSH 2.6  On paroxetine 20 mg  Continue to see psychology/behavioral health - she is doing much better now that she has someone to talk to as well.   -I feel patient needs a longer period of time to wean off.  She is highly motivated to wean off, but feels extreme " anxiety with thoughts of weaning off.  -I had lengthy discussion with patient about taking only half tablets every once in a while when possible. Patient said she would try to do that as much as she could this month. She may only be able to wean off half a tablet a month at this point.   -Hydroxyzine PRN anxiety/sleep     -Refilled Xanax - ORT 1 - patient signed consent for controlled substance - [facility] screen from last 2 visits was negative (only showing Xanax).      #Hypercalcemia  H/o intermittent increased Ca levels over the years.  Denies any urinary symptoms, no bone pain or fractures.   Vit D 18 and PTH 39.9  Monitor      #GERD  -Continue omeprazole     Preventive care  2/2019 CBC wnl, CMP with slightly elevated ca, lipid panel pending, TSH 2.6  TDaP - 2019  Colonoscopy - Dr. Wray - obtain records   Shingrix - pharmacy  Pap - Hysterectomy  Mammogram - 2/2019 wnl    Signed by: Lelia Blackewll M.D.

## 2019-05-15 ENCOUNTER — OFFICE VISIT (OUTPATIENT)
Dept: INTERNAL MEDICINE | Facility: MEDICAL CENTER | Age: 64
End: 2019-05-15
Payer: MEDICAID

## 2019-05-15 VITALS
TEMPERATURE: 98.4 F | DIASTOLIC BLOOD PRESSURE: 72 MMHG | HEART RATE: 72 BPM | BODY MASS INDEX: 28.25 KG/M2 | HEIGHT: 67 IN | SYSTOLIC BLOOD PRESSURE: 133 MMHG | WEIGHT: 180 LBS | OXYGEN SATURATION: 97 %

## 2019-05-15 DIAGNOSIS — F41.9 ANXIETY AND DEPRESSION: ICD-10-CM

## 2019-05-15 DIAGNOSIS — F32.A ANXIETY AND DEPRESSION: ICD-10-CM

## 2019-05-15 DIAGNOSIS — E78.5 DYSLIPIDEMIA: ICD-10-CM

## 2019-05-15 DIAGNOSIS — G47.09 OTHER INSOMNIA: ICD-10-CM

## 2019-05-15 DIAGNOSIS — E83.52 HYPERCALCEMIA: ICD-10-CM

## 2019-05-15 PROCEDURE — 99214 OFFICE O/P EST MOD 30 MIN: CPT | Mod: GC | Performed by: INTERNAL MEDICINE

## 2019-05-15 RX ORDER — ALPRAZOLAM 0.25 MG/1
0.25 TABLET ORAL 2 TIMES DAILY PRN
Qty: 70 TAB | Refills: 0 | Status: SHIPPED | OUTPATIENT
Start: 2019-05-15 | End: 2019-05-15 | Stop reason: SDUPTHER

## 2019-05-15 RX ORDER — ALPRAZOLAM 0.25 MG/1
0.25 TABLET ORAL 2 TIMES DAILY PRN
Qty: 70 TAB | Refills: 0 | Status: SHIPPED | OUTPATIENT
Start: 2019-05-15 | End: 2019-06-12 | Stop reason: SDUPTHER

## 2019-05-15 ASSESSMENT — ENCOUNTER SYMPTOMS
NAUSEA: 0
SEIZURES: 0
COUGH: 0
ABDOMINAL PAIN: 0
FEVER: 0
WEIGHT LOSS: 0
PALPITATIONS: 0
NERVOUS/ANXIOUS: 1
INSOMNIA: 1
HEADACHES: 0
SHORTNESS OF BREATH: 0
DEPRESSION: 1
BLURRED VISION: 0
MYALGIAS: 0
DIZZINESS: 0
HEARTBURN: 0

## 2019-05-15 ASSESSMENT — LIFESTYLE VARIABLES: SUBSTANCE_ABUSE: 0

## 2019-05-16 ENCOUNTER — TELEPHONE (OUTPATIENT)
Dept: INTERNAL MEDICINE | Facility: MEDICAL CENTER | Age: 64
End: 2019-05-16

## 2019-05-16 NOTE — TELEPHONE ENCOUNTER
----- Message from Azucena June sent at 5/16/2019  8:49 AM PDT -----  Regarding: Question  Contact: [phone]  Patient's next appt is not but until July 22nd. Patient asks: Could Dr. Blackwell order her med refills electronically until patient gets to come in to see Dr. Charles in July. Please call patient with advice. Thank you.

## 2019-05-17 ENCOUNTER — TELEPHONE (OUTPATIENT)
Dept: INTERNAL MEDICINE | Facility: MEDICAL CENTER | Age: 64
End: 2019-05-17

## 2019-05-17 NOTE — TELEPHONE ENCOUNTER
Called patient and advised her to make an appointment around the time that she would be running out of her Xanax.  She will be reestablishing with her new PCP July 22.  She will need refills before that.  Patient agreed and understood, and will make appointment.

## 2019-05-17 NOTE — TELEPHONE ENCOUNTER
----- Message from Azucena June sent at 5/17/2019  8:24 AM PDT -----  Regarding: RE: Question  Contact: [phone]  Thank you Dr. Blackwell.   Patient states she needs these medication refills before June 15th:    HIDROXIN (ATARAX) 0.25 MG  PAROXETINE (PAXIL) 20 MG  ALPRAZOLAM ( XANAX) 0.25 MG  ----- Message -----  From: Lelia Blackwell M.D.  Sent: 5/16/2019   1:17 PM  To: Azucena June  Subject: RE: Question                                     Which meds does she need? and Ill do it. Please send through med refill section. Thanks She can call the clinic and let me know which ones she needs.    ----- Message -----  From: Azucena June  Sent: 5/16/2019   8:49 AM  To: Ravi Xiao, #  Subject: Question                                         Patient's next appt is not but until July 22nd. Patient asks: Could Dr. Blackwell order her med refills electronically until patient gets to come in to see Dr. Charles in July. Please call patient with advice. Thank you.

## 2019-06-12 ENCOUNTER — OFFICE VISIT (OUTPATIENT)
Dept: INTERNAL MEDICINE | Facility: MEDICAL CENTER | Age: 64
End: 2019-06-12
Payer: MEDICAID

## 2019-06-12 VITALS
BODY MASS INDEX: 27.78 KG/M2 | HEIGHT: 67 IN | WEIGHT: 177 LBS | HEART RATE: 53 BPM | TEMPERATURE: 96.3 F | OXYGEN SATURATION: 96 % | SYSTOLIC BLOOD PRESSURE: 115 MMHG | DIASTOLIC BLOOD PRESSURE: 67 MMHG

## 2019-06-12 DIAGNOSIS — N39.0 URINARY TRACT INFECTION WITHOUT HEMATURIA, SITE UNSPECIFIED: ICD-10-CM

## 2019-06-12 DIAGNOSIS — Z79.899 CHRONIC USE OF BENZODIAZEPINE FOR THERAPEUTIC PURPOSE: Primary | ICD-10-CM

## 2019-06-12 DIAGNOSIS — E55.9 VITAMIN D DEFICIENCY: ICD-10-CM

## 2019-06-12 DIAGNOSIS — E78.5 DYSLIPIDEMIA: ICD-10-CM

## 2019-06-12 DIAGNOSIS — F41.9 ANXIETY AND DEPRESSION: ICD-10-CM

## 2019-06-12 DIAGNOSIS — F32.A ANXIETY AND DEPRESSION: ICD-10-CM

## 2019-06-12 PROCEDURE — 99214 OFFICE O/P EST MOD 30 MIN: CPT | Mod: GC | Performed by: INTERNAL MEDICINE

## 2019-06-12 RX ORDER — MULTIVIT-MIN/IRON/FOLIC ACID/K 18-600-40
1 CAPSULE ORAL DAILY
Qty: 90 CAP | Refills: 2 | Status: SHIPPED | OUTPATIENT
Start: 2019-06-12 | End: 2023-01-31

## 2019-06-12 RX ORDER — ATORVASTATIN CALCIUM 40 MG/1
40 TABLET, FILM COATED ORAL DAILY
Qty: 90 TAB | Refills: 2 | Status: SHIPPED | OUTPATIENT
Start: 2019-06-12 | End: 2020-11-05

## 2019-06-12 RX ORDER — ALPRAZOLAM 0.25 MG/1
0.25 TABLET ORAL 2 TIMES DAILY PRN
Qty: 70 TAB | Refills: 0 | Status: SHIPPED | OUTPATIENT
Start: 2019-06-12 | End: 2019-07-17

## 2019-06-12 RX ORDER — NITROFURANTOIN 25; 75 MG/1; MG/1
100 CAPSULE ORAL 2 TIMES DAILY
Qty: 10 CAP | Refills: 0 | Status: SHIPPED | OUTPATIENT
Start: 2019-06-12 | End: 2020-11-05

## 2019-06-12 ASSESSMENT — ENCOUNTER SYMPTOMS
SHORTNESS OF BREATH: 0
MYALGIAS: 0
HEADACHES: 0
WEAKNESS: 0
SORE THROAT: 0
DIZZINESS: 0
CHILLS: 0
COUGH: 0
NAUSEA: 0
FEVER: 0
PND: 0
DEPRESSION: 1
NERVOUS/ANXIOUS: 1
EYE DISCHARGE: 0
SPEECH CHANGE: 0
VOMITING: 0
FLANK PAIN: 0
ABDOMINAL PAIN: 0
BLURRED VISION: 0
DOUBLE VISION: 0
WHEEZING: 0

## 2019-06-12 ASSESSMENT — LIFESTYLE VARIABLES: SUBSTANCE_ABUSE: 0

## 2019-06-12 NOTE — PROGRESS NOTES
Established Patient    García presents today with the following:    CC:   Prescription refill-Xanax      HPI:   63-year-old female past medical history of anxiety, allergic rhinitis, dyslipidemia who presents today for prescription refill.  Patient has a history of anxiety that is fairly controlled on Xanax and Paxil.  States that she has tried to wean down next in the past 1.5 mg twice a day to her current dose now, 0.25 mg twice a day.  States that she has been off Xanax in the past but is got severe anxiety, panic attacks and overall feeling of uneasiness.  Patient does follow with psychiatry but states that she does not get her prescriptions from them.  Patient also complains of urinary symptoms.  She states that she has dysuria, decreased urine frequency for a few days in duration.  She has tried cranberry juice without any notable benefit.  Patient also had recent labs done which showed significantly elevated cholesterol and LDL to 189 and vitamin D level of 18.      Patient Active Problem List    Diagnosis Date Noted   • Hypercalcemia 02/25/2019   • Family history of alcoholism 02/25/2019   • Anxiety and depression 01/30/2019   • Other insomnia 01/30/2019   • Allergic rhinitis 05/18/2009   • Schatzki's ring    • Hiatal hernia    • Dyslipidemia    • Fear of travel with panic attacks        Current Outpatient Prescriptions   Medication Sig Dispense Refill   • nitrofurantoin monohyd macro (MACROBID) 100 MG Cap Take 1 Cap by mouth 2 times a day. 10 Cap 0   • ALPRAZolam (XANAX) 0.25 MG Tab Take 1 Tab by mouth 2 times a day as needed for Sleep or Anxiety for up to 35 days. 70 Tab 0   • atorvastatin (LIPITOR) 40 MG Tab Take 1 Tab by mouth every day. 90 Tab 2   • Cholecalciferol (VITAMIN D) 2000 units Cap Take 1 Cap by mouth every day. 90 Cap 2   • PARoxetine (PAXIL) 20 MG Tab TAKE 1 TABLET BY MOUTH EVERY DAY 90 Tab 0   • omeprazole (PRILOSEC) 20 MG Tablet Delayed Response delayed-release tablet TAKE 1 TABLET BY  "MOUTH ONCE A DAY 30 MIN BEFORE FIRST MEAL FOR 30 DAYS 90 Tab 3   • hydrOXYzine HCl (ATARAX) 25 MG Tab Take 1 Tab by mouth 3 times a day as needed for Anxiety. 60 Tab 3     No current facility-administered medications for this visit.        ROS: As per HPI. Additional pertinent symptoms as noted below.  Review of Systems   Constitutional: Negative for chills and fever.   HENT: Negative for hearing loss, sore throat and tinnitus.    Eyes: Negative for blurred vision, double vision and discharge.   Respiratory: Negative for cough, shortness of breath and wheezing.    Cardiovascular: Negative for chest pain, leg swelling and PND.   Gastrointestinal: Negative for abdominal pain, nausea and vomiting.   Genitourinary: Negative for dysuria, flank pain and urgency.   Musculoskeletal: Negative for joint pain and myalgias.   Skin: Negative for itching and rash.   Neurological: Negative for dizziness, speech change, weakness and headaches.   Psychiatric/Behavioral: Positive for depression. Negative for substance abuse. The patient is nervous/anxious.        Physical Exam    /67 (BP Location: Right arm, Patient Position: Sitting, BP Cuff Size: Adult)   Pulse (!) 53   Temp (!) 35.7 °C (96.3 °F) (Temporal)   Ht 1.702 m (5' 7\")   Wt 80.3 kg (177 lb)   SpO2 96%   BMI 27.72 kg/m²     Physical Exam   Constitutional: She is oriented to person, place, and time and well-developed, well-nourished, and in no distress.   Obese   HENT:   Head: Normocephalic and atraumatic.   Mouth/Throat: No oropharyngeal exudate.   Eyes: Pupils are equal, round, and reactive to light. Conjunctivae are normal. No scleral icterus.   Neck: Normal range of motion. Neck supple. No JVD present. No thyromegaly present.   Cardiovascular: Normal rate, regular rhythm and normal heart sounds.    No murmur heard.  Pulmonary/Chest: Effort normal and breath sounds normal. No respiratory distress. She has no wheezes.   Abdominal: Soft. Bowel sounds are normal. " She exhibits no distension. There is no tenderness.   Musculoskeletal: Normal range of motion. She exhibits no edema.   Neurological: She is alert and oriented to person, place, and time. No cranial nerve deficit.   Skin: No rash noted. No erythema.   Psychiatric: Affect normal.         Narxs check:   Narcotics: 210 (Value from NarxCheck System.)    Sedatives: 301 (Value from NarxCheck System.)    Stimulants: 0 (Value from NarxCheck System.)             ORT score:  Opioid Risk Score: 1    Interpretation of Opioid Risk Score   Score 0-3 = Low risk of abuse. Do UDS at least once per year.  Score 4-7 = Moderate risk of abuse. Do UDS 1-4 times per year.  Score 8+ = High risk of abuse. Refer to specialist.          Assessment and Plan    1. Chronic use of benzodiazepine for therapeutic purpose  2. Anxiety and depression  -Patient presents with history of anxiety, depression  -CR score elevated in the past -most recent 17, PHQ 9 score elevated in the past  -Patient is previously tried stopping Xanax and had significant withdrawal anxiety symptoms  -Continue to wean Xanax as tolerated  -Negative malignant drug screens in the past  - ALPRAZolam (XANAX) 0.25 MG Tab; Take 1 Tab by mouth 2 times a day as needed for Sleep or Anxiety for up to 35 days.  Dispense: 70 Tab; Refill: 0  - Consent for Opiate Prescription      3. Urinary tract infection without hematuria, site unspecified  -Patient complains of urinary symptoms for few days in duration  -Tried cranberry juice with no notable benefit  -No previous urine cultures on record  -Trial of Macrobid, if symptoms did not improve consider urine culture and escalating antibiotics  - nitrofurantoin monohyd macro (MACROBID) 100 MG Cap; Take 1 Cap by mouth 2 times a day.  Dispense: 10 Cap; Refill: 0      4. Dyslipidemia  -Patient has significant dyslipidemia, cholesterol 276,   -Discussed indication for treatment, patient would like to try diet modification but is amenable to  starting Lipitor at this time and assess for response  - atorvastatin (LIPITOR) 40 MG Tab; Take 1 Tab by mouth every day.  Dispense: 90 Tab; Refill: 2      5. Vitamin D deficiency  -Vitamin D level of 18 in April 2019  -Will prescribe vitamin D, 2000 IU daily  - Cholecalciferol (VITAMIN D) 2000 units Cap; Take 1 Cap by mouth every day.  Dispense: 90 Cap; Refill: 2        Follow up: Return if symptoms worsen or fail to improve.        Follow-up with next appointment as scheduled      Signed by: Britney Martin M.D.

## 2019-06-12 NOTE — PATIENT INSTRUCTIONS
Fat and Cholesterol Restricted Diet  High levels of fat and cholesterol in your blood may lead to various health problems, such as diseases of the heart, blood vessels, gallbladder, liver, and pancreas. Fats are concentrated sources of energy that come in various forms. Certain types of fat, including saturated fat, may be harmful in excess. Cholesterol is a substance needed by your body in small amounts. Your body makes all the cholesterol it needs. Excess cholesterol comes from the food you eat.  When you have high levels of cholesterol and saturated fat in your blood, health problems can develop because the excess fat and cholesterol will gather along the walls of your blood vessels, causing them to narrow. Choosing the right foods will help you control your intake of fat and cholesterol. This will help keep the levels of these substances in your blood within normal limits and reduce your risk of disease.  WHAT IS MY PLAN?  Your health care provider recommends that you:  · Get no more than __________ % of the total calories in your daily diet from fat.  · Limit your intake of saturated fat to less than ______% of your total calories each day.  · Limit the amount of cholesterol in your diet to less than _________mg per day.  WHAT TYPES OF FAT SHOULD I CHOOSE?  · Choose healthy fats more often. Choose monounsaturated and polyunsaturated fats, such as olive and canola oil, flaxseeds, walnuts, almonds, and seeds.  · Eat more omega-3 fats. Good choices include salmon, mackerel, sardines, tuna, flaxseed oil, and ground flaxseeds. Aim to eat fish at least two times a week.  · Limit saturated fats. Saturated fats are primarily found in animal products, such as meats, butter, and cream. Plant sources of saturated fats include palm oil, palm kernel oil, and coconut oil.  · Avoid foods with partially hydrogenated oils in them. These contain trans fats. Examples of foods that contain trans fats are stick margarine, some tub  "margarines, cookies, crackers, and other baked goods.  WHAT GENERAL GUIDELINES DO I NEED TO FOLLOW?  These guidelines for healthy eating will help you control your intake of fat and cholesterol:  · Check food labels carefully to identify foods with trans fats or high amounts of saturated fat.  · Fill one half of your plate with vegetables and green salads.  · Fill one fourth of your plate with whole grains. Look for the word \"whole\" as the first word in the ingredient list.  · Fill one fourth of your plate with lean protein foods.  · Limit fruit to two servings a day. Choose fruit instead of juice.  · Eat more foods that contain soluble fiber. Examples of foods that contain this type of fiber are apples, broccoli, carrots, beans, peas, and barley. Aim to get 20-30 g of fiber per day.  · Eat more home-cooked food and less restaurant, buffet, and fast food.  · Limit or avoid alcohol.  · Limit foods high in starch and sugar.  · Limit fried foods.  · Cook foods using methods other than frying. Baking, boiling, grilling, and broiling are all great options.  · Lose weight if you are overweight. Losing just 5-10% of your initial body weight can help your overall health and prevent diseases such as diabetes and heart disease.  WHAT FOODS CAN I EAT?  Grains  Whole grains, such as whole wheat or whole grain breads, crackers, cereals, and pasta. Unsweetened oatmeal, bulgur, barley, quinoa, or brown rice. Corn or whole wheat flour tortillas.  Vegetables  Fresh or frozen vegetables (raw, steamed, roasted, or grilled). Green salads.  Fruits  All fresh, canned (in natural juice), or frozen fruits.  Meat and Other Protein Products  Ground beef (85% or leaner), grass-fed beef, or beef trimmed of fat. Skinless chicken or turkey. Ground chicken or turkey. Pork trimmed of fat. All fish and seafood. Eggs. Dried beans, peas, or lentils. Unsalted nuts or seeds. Unsalted canned or dry beans.  Dairy  Low-fat dairy products, such as skim or " 1% milk, 2% or reduced-fat cheeses, low-fat ricotta or cottage cheese, or plain low-fat yogurt.  Fats and Oils  Tub margarines without trans fats. Light or reduced-fat mayonnaise and salad dressings. Avocado. Olive, canola, sesame, or safflower oils. Natural peanut or almond butter (choose ones without added sugar and oil).  The items listed above may not be a complete list of recommended foods or beverages. Contact your dietitian for more options.  WHAT FOODS ARE NOT RECOMMENDED?  Grains  White bread. White pasta. White rice. Cornbread. Bagels, pastries, and croissants. Crackers that contain trans fat.  Vegetables  White potatoes. Corn. Creamed or fried vegetables. Vegetables in a cheese sauce.  Fruits  Dried fruits. Canned fruit in light or heavy syrup. Fruit juice.  Meat and Other Protein Products  Fatty cuts of meat. Ribs, chicken wings, mcnamara, sausage, bologna, salami, chitterlings, fatback, hot dogs, bratwurst, and packaged luncheon meats. Liver and organ meats.  Dairy  Whole or 2% milk, cream, half-and-half, and cream cheese. Whole milk cheeses. Whole-fat or sweetened yogurt. Full-fat cheeses. Nondairy creamers and whipped toppings. Processed cheese, cheese spreads, or cheese curds.  Sweets and Desserts  Corn syrup, sugars, honey, and molasses. Candy. Jam and jelly. Syrup. Sweetened cereals. Cookies, pies, cakes, donuts, muffins, and ice cream.  Fats and Oils  Butter, stick margarine, lard, shortening, ghee, or mcnamara fat. Coconut, palm kernel, or palm oils.  Beverages  Alcohol. Sweetened drinks (such as sodas, lemonade, and fruit drinks or punches).  The items listed above may not be a complete list of foods and beverages to avoid. Contact your dietitian for more information.     This information is not intended to replace advice given to you by your health care provider. Make sure you discuss any questions you have with your health care provider.     Document Released: 12/18/2006 Document Revised: 01/08/2016  Document Reviewed: 03/18/2015  [employer] Patient Education ©2016 [employer]

## 2019-06-18 ENCOUNTER — TELEPHONE (OUTPATIENT)
Dept: SCHEDULING | Facility: IMAGING CENTER | Age: 64
End: 2019-06-18

## 2019-06-18 ENCOUNTER — OFFICE VISIT (OUTPATIENT)
Dept: INTERNAL MEDICINE | Facility: MEDICAL CENTER | Age: 64
End: 2019-06-18
Payer: MEDICAID

## 2019-06-18 ENCOUNTER — HOSPITAL ENCOUNTER (OUTPATIENT)
Dept: RADIOLOGY | Facility: MEDICAL CENTER | Age: 64
End: 2019-06-18
Attending: INTERNAL MEDICINE
Payer: MEDICAID

## 2019-06-18 VITALS
HEIGHT: 68 IN | DIASTOLIC BLOOD PRESSURE: 62 MMHG | HEART RATE: 64 BPM | RESPIRATION RATE: 18 BRPM | OXYGEN SATURATION: 94 % | BODY MASS INDEX: 27.31 KG/M2 | SYSTOLIC BLOOD PRESSURE: 127 MMHG | TEMPERATURE: 97.3 F | WEIGHT: 180.2 LBS

## 2019-06-18 DIAGNOSIS — M53.3 COCCYDYNIA: ICD-10-CM
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
## 2019-06-18 PROCEDURE — 72220 X-RAY EXAM SACRUM TAILBONE: CPT

## 2019-06-18 PROCEDURE — 99213 OFFICE O/P EST LOW 20 MIN: CPT | Mod: GE | Performed by: INTERNAL MEDICINE

## 2019-06-18 RX ORDER — NAPROXEN 500 MG/1
500 TABLET ORAL
Qty: 60 TAB | Refills: 0 | Status: SHIPPED | OUTPATIENT
Start: 2019-06-18 | End: 2020-11-05

## 2019-06-18 ASSESSMENT — PAIN SCALES - GENERAL: PAINLEVEL: 10=SEVERE PAIN

## 2019-06-18 NOTE — PROGRESS NOTES
Established Patient    García presents today with the following:    CC: Fall/Hip and back Pain    HPI: 63 year old female with history of anxiety, allergic rhinitis, DLD, Vit D deficiency, GERD, Hiatal hernia with schatzki's ring is presented today with a fall that she suffered this past Sunday.    Patient was on vacation in a motor home 15 miles away from [place] and in her motor home ended up falling while coming down from her bed which was attached up around the ceiling area in her RV.  While she was coming down she ended up landing on her buttock in the middle between the seat and small hump. She landed on her buttock area and denies any kind of head trauma loss of consciousness or seizure-like activity or prodromal symptoms such as lightheadedness dizziness neck pain neck stiffness loss of consciousness or focal neurological weakness or numbness or tingling sensations.    She denies having any kind of pain while ambulating but does report the pain is excruciating and severe during seating position especially when she is sitting down and/or sitting on a toilet seat to urinate or defecate.  She describes the pain as localized sharp around her tailbone area and as well as  noticed to have bruising around the right buttock cheek area.  Denies any saddle anesthesia urinary or stool incontinence or retention or red flag associated signs or symptoms concerning for spinal cord injuries.            Patient Active Problem List    Diagnosis Date Noted   • Hypercalcemia 02/25/2019   • Family history of alcoholism 02/25/2019   • Anxiety and depression 01/30/2019   • Other insomnia 01/30/2019   • Allergic rhinitis 05/18/2009   • Schatzki's ring    • Hiatal hernia    • Dyslipidemia    • Fear of travel with panic attacks        Current Outpatient Prescriptions   Medication Sig Dispense Refill   • naproxen (NAPROSYN) 500 MG Tab Take 1 Tab by mouth 2 times daily with meals as needed. 60 Tab 0   • ALPRAZolam (XANAX) 0.25  MG Tab Take 1 Tab by mouth 2 times a day as needed for Sleep or Anxiety for up to 35 days. 70 Tab 0   • atorvastatin (LIPITOR) 40 MG Tab Take 1 Tab by mouth every day. 90 Tab 2   • Cholecalciferol (VITAMIN D) 2000 units Cap Take 1 Cap by mouth every day. 90 Cap 2   • PARoxetine (PAXIL) 20 MG Tab TAKE 1 TABLET BY MOUTH EVERY DAY 90 Tab 0   • omeprazole (PRILOSEC) 20 MG Tablet Delayed Response delayed-release tablet TAKE 1 TABLET BY MOUTH ONCE A DAY 30 MIN BEFORE FIRST MEAL FOR 30 DAYS 90 Tab 3   • hydrOXYzine HCl (ATARAX) 25 MG Tab Take 1 Tab by mouth 3 times a day as needed for Anxiety. 60 Tab 3   • nitrofurantoin monohyd macro (MACROBID) 100 MG Cap Take 1 Cap by mouth 2 times a day. (Patient not taking: Reported on 6/18/2019) 10 Cap 0     No current facility-administered medications for this visit.          Health Maintenance        Hep C: Screening for those born between 6660-8941    Diabetes: All non-Caucasians; All Caucasians with sustained blood pressure greater than 135/80, or BMI greater than or equal to 25, or history of gestational diabetes, or family history of diabetes.    Colorectal Cancer (Options): Colonoscopy every 10 years; Fecal Occult Blood Testing every 3 years with sigmoidoscopy every 5 years; or Annual Fecal Occult Blood testing.    HIV Screening: Between ages 15-65    Alcohol Misuse:     Influenza: Yearly    Tdap/Td: Adults under 65 who have never received Tdap should get a Tdap booster, regardless of when a prior Td was given. After this, Td is required every 10 years.    Zoster (Shingles): At age 60    Pneumococcal Vaccine: Series beginning at age 65    Men's Health  Lipid Test: Every 5 years  Prostate Specific Antigen (PSA): Current evidence does not recommend routine PSA screening for average risk men.    Women's Health  Cervical Cancer Screening: Pap only every 3 years for ages 21-29, Pap test with HPV screening every 5 years from ages 30-65  Mammogram: Every 2 years  Bone Density: At age  "65          ROS: As per HPI. Additional pertinent symptoms as noted below.      /62 (BP Location: Left arm, Patient Position: Sitting, BP Cuff Size: Large adult long)   Pulse 64   Temp 36.3 °C (97.3 °F) (Temporal)   Resp 18   Ht 1.727 m (5' 8\")   Wt 81.7 kg (180 lb 3.2 oz)   SpO2 94%   Breastfeeding? No   BMI 27.40 kg/m²     Physical Exam     Constitutional:  Alert and oriented, No acute distress   HEENT: Normocephalic, Atraumatic, Bilateral external ears normal, Oropharynx moist mucous membranes, No oral exudates, Nose normal. No thyromegaly.   Eyes: PERRLA, EOMI, Conjunctiva normal, No discharge.   Neck: Normal range of motion, No stridor, no JVD.   Cardiovascular: Normal heart rate, Normal rhythm, No murmurs, No rubs, No gallops.   Lungs: Clear to auscultation bilaterally   Abdomen: Bowel sounds normal, Soft, , No guarding   Skin:+bruising around the Right buttock cheek area and tail bone.  Neurologic: Normal motor function, No focal deficits noted, cranial nerves II through XII are normal   Psychiatric: Affect normal, Judgment normal, Mood normal.   Extremities: B/L Peripheral Pulses +, no pitting edema.  Musk: +Tenderness along the tail bone area/right buttock area near the bruise. No misalignment of the spine, no spinal tenderness, able to flex/extend spine, no focal neurological deficits or sensory or motor deficits. Ambulating well with no assistance and/or gait disturbance and normal balance and proprioception. Pain worse during prolonged seated position.           Assessment and Plan    1) Mechanical Fall  # Coccydynia  -Right gluteal pain/tail bone pain after sustaining a mechanical fall this past Sunday while getting down from the ceiling bed onto the ground with no prodorme of symptoms.  -Noticed to have pain worse with prolonged seated position especially during bowel movements/painful defecation/straining.  -On physical examination noted to have bruise around the right gluteal " area/severe localized pain around the tail bone area.   -X ray of the sacrum/tail bone area ordered.  -In the meantime, patient encourage to use supportive measures ice/hot compresses 4 times a day, avoid sitting down for long periods of time, avoid sitting on hard surfaces and alternate sitting on each side of the buttocks. Also lean forward and direct your weight away from the tail bone area.  -Trial of Naproxen with meals 500 BID prn and patient explained to minimize if possible use of NSAIDs given her history of GERD/Hiatal hernia/GI side effects.   -Try doughnut cushion or pillow to sit on it.   -Eat foods high in fiber to soften stools and avoid constipation.       Signed by: David Pruett M.D.

## 2019-06-18 NOTE — TELEPHONE ENCOUNTER
Fernando Elizabeth   [facility]; Deann Van; Jade Marshall, Antony Ass't 13 minutes ago (11:09 AM)      This patient is reporting the active symptoms listed below and has declined emergency care.   Please follow up with this patient’s PCP (or another provider in the clinic) and contact the patient with further direction.     1. Caller Name: García Cho Call Back Number: [phone]     2. What are the patient's symptoms (location & severity)? FALL THAT CAUSE INJURY / HIP & BACK PAIN     3. Is this a new symptom {YES (DEF)/NO:[id number]:: yes     4. When did it start? 6-16-19     5. Patient DECLINED the following recommended action per Active Symptom Guide: Emergency Department     6. Patient was advised again of recommendation and verbalized understanding.  (Routing comment)

## 2019-06-18 NOTE — PATIENT INSTRUCTIONS
Fall Prevention in the Home  Introduction  Falls can cause injuries. They can happen to people of all ages. There are many things you can do to make your home safe and to help prevent falls.  What can I do on the outside of my home?  · Regularly fix the edges of walkways and driveways and fix any cracks.  · Remove anything that might make you trip as you walk through a door, such as a raised step or threshold.  · Trim any bushes or trees on the path to your home.  · Use bright outdoor lighting.  · Clear any walking paths of anything that might make someone trip, such as rocks or tools.  · Regularly check to see if handrails are loose or broken. Make sure that both sides of any steps have handrails.  · Any raised decks and porches should have guardrails on the edges.  · Have any leaves, snow, or ice cleared regularly.  · Use sand or salt on walking paths during winter.  · Clean up any spills in your garage right away. This includes oil or grease spills.  What can I do in the bathroom?  · Use night lights.  · Install grab bars by the toilet and in the tub and shower. Do not use towel bars as grab bars.  · Use non-skid mats or decals in the tub or shower.  · If you need to sit down in the shower, use a plastic, non-slip stool.  · Keep the floor dry. Clean up any water that spills on the floor as soon as it happens.  · Remove soap buildup in the tub or shower regularly.  · Attach bath mats securely with double-sided non-slip rug tape.  · Do not have throw rugs and other things on the floor that can make you trip.  What can I do in the bedroom?  · Use night lights.  · Make sure that you have a light by your bed that is easy to reach.  · Do not use any sheets or blankets that are too big for your bed. They should not hang down onto the floor.  · Have a firm chair that has side arms. You can use this for support while you get dressed.  · Do not have throw rugs and other things on the floor that can make you trip.  What can  I do in the kitchen?  · Clean up any spills right away.  · Avoid walking on wet floors.  · Keep items that you use a lot in easy-to-reach places.  · If you need to reach something above you, use a strong step stool that has a grab bar.  · Keep electrical cords out of the way.  · Do not use floor polish or wax that makes floors slippery. If you must use wax, use non-skid floor wax.  · Do not have throw rugs and other things on the floor that can make you trip.  What can I do with my stairs?  · Do not leave any items on the stairs.  · Make sure that there are handrails on both sides of the stairs and use them. Fix handrails that are broken or loose. Make sure that handrails are as long as the stairways.  · Check any carpeting to make sure that it is firmly attached to the stairs. Fix any carpet that is loose or worn.  · Avoid having throw rugs at the top or bottom of the stairs. If you do have throw rugs, attach them to the floor with carpet tape.  · Make sure that you have a light switch at the top of the stairs and the bottom of the stairs. If you do not have them, ask someone to add them for you.  What else can I do to help prevent falls?  · Wear shoes that:  ¨ Do not have high heels.  ¨ Have rubber bottoms.  ¨ Are comfortable and fit you well.  ¨ Are closed at the toe. Do not wear sandals.  · If you use a stepladder:  ¨ Make sure that it is fully opened. Do not climb a closed stepladder.  ¨ Make sure that both sides of the stepladder are locked into place.  ¨ Ask someone to hold it for you, if possible.  · Clearly varsha and make sure that you can see:  ¨ Any grab bars or handrails.  ¨ First and last steps.  ¨ Where the edge of each step is.  · Use tools that help you move around (mobility aids) if they are needed. These include:  ¨ Canes.  ¨ Walkers.  ¨ Scooters.  ¨ Crutches.  · Turn on the lights when you go into a dark area. Replace any light bulbs as soon as they burn out.  · Set up your furniture so you have a  clear path. Avoid moving your furniture around.  · If any of your floors are uneven, fix them.  · If there are any pets around you, be aware of where they are.  · Review your medicines with your doctor. Some medicines can make you feel dizzy. This can increase your chance of falling.  Ask your doctor what other things that you can do to help prevent falls.  This information is not intended to replace advice given to you by your health care provider. Make sure you discuss any questions you have with your health care provider.  Document Released: 10/14/2010 Document Revised: 05/25/2017 Document Reviewed: 01/22/2016  © 2017 [employer]

## 2019-07-09 ENCOUNTER — HOSPITAL ENCOUNTER (OUTPATIENT)
Facility: MEDICAL CENTER | Age: 64
End: 2019-07-09
Attending: NURSE PRACTITIONER
Payer: MEDICAID

## 2019-07-09 LAB
BODY FLD TYPE: ABNORMAL
C DIFF DNA SPEC QL NAA+PROBE: NEGATIVE
C DIFF TOX GENS STL QL NAA+PROBE: NEGATIVE
FAT FLD QL: PRESENT

## 2019-07-09 PROCEDURE — 87045 FECES CULTURE AEROBIC BACT: CPT

## 2019-07-09 PROCEDURE — 87493 C DIFF AMPLIFIED PROBE: CPT

## 2019-07-09 PROCEDURE — 87899 AGENT NOS ASSAY W/OPTIC: CPT

## 2019-07-09 PROCEDURE — 89125 SPECIMEN FAT STAIN: CPT

## 2019-07-09 PROCEDURE — 87209 SMEAR COMPLEX STAIN: CPT

## 2019-07-09 PROCEDURE — 87177 OVA AND PARASITES SMEARS: CPT

## 2019-07-09 PROCEDURE — 87046 STOOL CULTR AEROBIC BACT EA: CPT

## 2019-07-10 LAB
E COLI SXT1+2 STL IA: NORMAL
SIGNIFICANT IND 70042: NORMAL
SITE SITE: NORMAL
SOURCE SOURCE: NORMAL

## 2019-07-11 LAB
O+P STL MICRO: NEGATIVE
O+P STL TRI STN: NEGATIVE

## 2019-07-12 LAB
BACTERIA STL CULT: NORMAL
E COLI SXT1+2 STL IA: NORMAL
SIGNIFICANT IND 70042: NORMAL
SITE SITE: NORMAL
SOURCE SOURCE: NORMAL

## 2019-07-22 ENCOUNTER — HOSPITAL ENCOUNTER (OUTPATIENT)
Dept: LAB | Facility: MEDICAL CENTER | Age: 64
End: 2019-07-22
Attending: STUDENT IN AN ORGANIZED HEALTH CARE EDUCATION/TRAINING PROGRAM
Payer: MEDICAID

## 2019-07-22 PROCEDURE — 83516 IMMUNOASSAY NONANTIBODY: CPT

## 2019-07-22 PROCEDURE — 36415 COLL VENOUS BLD VENIPUNCTURE: CPT

## 2019-07-22 PROCEDURE — 82784 ASSAY IGA/IGD/IGG/IGM EACH: CPT

## 2019-07-24 LAB
IGA SERPL-MCNC: 273 MG/DL (ref 68–408)
TTG IGA SER IA-ACNC: 0 U/ML (ref 0–3)

## 2019-10-15 ENCOUNTER — HOSPITAL ENCOUNTER (OUTPATIENT)
Dept: PULMONOLOGY | Facility: MEDICAL CENTER | Age: 64
End: 2019-10-15
Attending: STUDENT IN AN ORGANIZED HEALTH CARE EDUCATION/TRAINING PROGRAM
Payer: MEDICAID

## 2019-10-15 PROCEDURE — 94726 PLETHYSMOGRAPHY LUNG VOLUMES: CPT

## 2019-10-15 PROCEDURE — 94060 EVALUATION OF WHEEZING: CPT

## 2019-10-15 PROCEDURE — 94729 DIFFUSING CAPACITY: CPT

## 2019-10-15 ASSESSMENT — PULMONARY FUNCTION TESTS
FEV1_PERCENT_PREDICTED: 75
FEV1/FVC: 73.05
FVC_PREDICTED: 3.49
FVC: 3.08
FVC_LLN: 2.91
FVC_PERCENT_PREDICTED: 88
FEV1/FVC: 73
FEV1_PREDICTED: 2.71
FEV1/FVC_PERCENT_PREDICTED: 94
FEV1/FVC_PERCENT_LLN: 65
FVC: 2.79
FEV1/FVC_PERCENT_PREDICTED: 95
FEV1_PERCENT_CHANGE: 10
FVC_LLN: 2.91
FEV1/FVC_PERCENT_PREDICTED: 93
FEV1/FVC: 73
FEV1/FVC_PERCENT_PREDICTED: 93
FEV1: 2.25
FEV1: 2.05
FEV1/FVC_PERCENT_CHANGE: 0
FEV1/FVC_PREDICTED: 78
FVC_PERCENT_PREDICTED: 79
FEV1_PERCENT_CHANGE: 9
FEV1/FVC_PERCENT_CHANGE: 90
FEV1_PERCENT_PREDICTED: 82
FEV1/FVC_PERCENT_LLN: 65
FEV1_LLN: 2.26
FEV1_LLN: 2.26
FEV1/FVC_PERCENT_PREDICTED: 78
FEV1/FVC: 73

## 2019-10-20 PROCEDURE — 94060 EVALUATION OF WHEEZING: CPT | Mod: 26 | Performed by: INTERNAL MEDICINE

## 2019-10-20 PROCEDURE — 94729 DIFFUSING CAPACITY: CPT | Mod: 26 | Performed by: INTERNAL MEDICINE

## 2019-10-20 PROCEDURE — 94726 PLETHYSMOGRAPHY LUNG VOLUMES: CPT | Mod: 26 | Performed by: INTERNAL MEDICINE

## 2019-10-20 NOTE — PROCEDURES
PULMONARY FUNCTION TEST INTERPRETATION    DATE OF TEST:  10/15/2019    SPIROMETRY:  Showed FVC of 3.08 liters, 88% of predicted.  FEV1 was 2.25   liters, 82% of predicted.  FEV1/FVC ratio was normal at 73%.    LUNG VOLUMES:  Showed air trapping with residual volume of 142% of predicted.    Total lung capacity was normal at 107% of predicted.    DIFFUSION CAPACITY:  Normal at 102% of predicted.    IMPRESSION:  The patient has slight decrease in FEV1 with borderline response   to bronchodilators with FEV1 increased by 9%.  The patient's FEV1/FVC ratio   was low normal, which precludes the diagnosis of chronic obstructive pulmonary   disease given the patient has extensive smoking history.  Decreased FEV1   could be secondary to reactive airway disease like asthma.  Clinical   correlation is required.       ____________________________________     MD DIXON Lieberman / GUILLE    DD:  10/20/2019 09:14:49  DT:  10/20/2019 09:46:32    D#:  [id number]  Job#:  [id number]

## 2019-11-18 ENCOUNTER — HOSPITAL ENCOUNTER (OUTPATIENT)
Dept: LAB | Facility: MEDICAL CENTER | Age: 64
End: 2019-11-18
Attending: STUDENT IN AN ORGANIZED HEALTH CARE EDUCATION/TRAINING PROGRAM
Payer: MEDICAID

## 2019-11-18 LAB
ALBUMIN SERPL BCP-MCNC: 4.2 G/DL (ref 3.2–4.9)
ALBUMIN/GLOB SERPL: 1.4 G/DL
ALP SERPL-CCNC: 54 U/L (ref 30–99)
ALT SERPL-CCNC: 15 U/L (ref 2–50)
ANION GAP SERPL CALC-SCNC: 6 MMOL/L (ref 0–11.9)
AST SERPL-CCNC: 17 U/L (ref 12–45)
BILIRUB SERPL-MCNC: 0.6 MG/DL (ref 0.1–1.5)
BUN SERPL-MCNC: 20 MG/DL (ref 8–22)
CALCIUM SERPL-MCNC: 9.2 MG/DL (ref 8.5–10.5)
CHLORIDE SERPL-SCNC: 109 MMOL/L (ref 96–112)
CHOLEST SERPL-MCNC: 215 MG/DL (ref 100–199)
CO2 SERPL-SCNC: 24 MMOL/L (ref 20–33)
CREAT SERPL-MCNC: 0.67 MG/DL (ref 0.5–1.4)
GLOBULIN SER CALC-MCNC: 3 G/DL (ref 1.9–3.5)
GLUCOSE SERPL-MCNC: 110 MG/DL (ref 65–99)
HDLC SERPL-MCNC: 48 MG/DL
LDLC SERPL CALC-MCNC: 151 MG/DL
POTASSIUM SERPL-SCNC: 4.2 MMOL/L (ref 3.6–5.5)
PROT SERPL-MCNC: 7.2 G/DL (ref 6–8.2)
SODIUM SERPL-SCNC: 139 MMOL/L (ref 135–145)
TRIGL SERPL-MCNC: 81 MG/DL (ref 0–149)

## 2019-11-18 PROCEDURE — 80053 COMPREHEN METABOLIC PANEL: CPT

## 2019-11-18 PROCEDURE — 36415 COLL VENOUS BLD VENIPUNCTURE: CPT

## 2019-11-18 PROCEDURE — 80061 LIPID PANEL: CPT

## 2020-04-30 ENCOUNTER — APPOINTMENT (OUTPATIENT)
Dept: RADIOLOGY | Facility: MEDICAL CENTER | Age: 65
End: 2020-04-30
Attending: STUDENT IN AN ORGANIZED HEALTH CARE EDUCATION/TRAINING PROGRAM
Payer: MEDICAID

## 2020-05-01 ENCOUNTER — HOSPITAL ENCOUNTER (OUTPATIENT)
Dept: RADIOLOGY | Facility: MEDICAL CENTER | Age: 65
End: 2020-05-01
Attending: STUDENT IN AN ORGANIZED HEALTH CARE EDUCATION/TRAINING PROGRAM
Payer: MEDICAID

## 2020-05-01 DIAGNOSIS — M54.40 ACUTE BILATERAL LOW BACK PAIN WITH SCIATICA, SCIATICA LATERALITY UNSPECIFIED: ICD-10-CM

## 2020-05-01 PROCEDURE — 72100 X-RAY EXAM L-S SPINE 2/3 VWS: CPT

## 2020-06-11 ENCOUNTER — HOSPITAL ENCOUNTER (OUTPATIENT)
Dept: RADIOLOGY | Facility: MEDICAL CENTER | Age: 65
End: 2020-06-11
Attending: STUDENT IN AN ORGANIZED HEALTH CARE EDUCATION/TRAINING PROGRAM
Payer: MEDICAID

## 2020-06-11 DIAGNOSIS — Z12.31 VISIT FOR SCREENING MAMMOGRAM: ICD-10-CM

## 2020-06-11 PROCEDURE — 77067 SCR MAMMO BI INCL CAD: CPT

## 2020-11-05 ENCOUNTER — OFFICE VISIT (OUTPATIENT)
Dept: CARDIOLOGY | Facility: MEDICAL CENTER | Age: 65
End: 2020-11-05
Payer: MEDICARE

## 2020-11-05 VITALS
HEIGHT: 68 IN | OXYGEN SATURATION: 94 % | SYSTOLIC BLOOD PRESSURE: 120 MMHG | WEIGHT: 195 LBS | BODY MASS INDEX: 29.55 KG/M2 | HEART RATE: 88 BPM | DIASTOLIC BLOOD PRESSURE: 78 MMHG

## 2020-11-05 DIAGNOSIS — R93.1 AGATSTON CAC SCORE 200-399: ICD-10-CM

## 2020-11-05 DIAGNOSIS — E78.49 FAMILIAL HYPERLIPIDEMIA, HIGH LDL: ICD-10-CM

## 2020-11-05 DIAGNOSIS — E78.5 DYSLIPIDEMIA: ICD-10-CM

## 2020-11-05 DIAGNOSIS — I25.83 CORONARY ATHEROSCLEROSIS DUE TO LIPID RICH PLAQUE: ICD-10-CM

## 2020-11-05 DIAGNOSIS — I25.10 CORONARY ATHEROSCLEROSIS DUE TO LIPID RICH PLAQUE: ICD-10-CM

## 2020-11-05 DIAGNOSIS — I70.213 ATHEROSCLEROSIS OF NATIVE ARTERY OF BOTH LOWER EXTREMITIES WITH INTERMITTENT CLAUDICATION (HCC): ICD-10-CM

## 2020-11-05 PROBLEM — J45.909 ASTHMA: Status: ACTIVE | Noted: 2019-11-05

## 2020-11-05 PROBLEM — K52.9 CHRONIC DIARRHEA: Status: ACTIVE | Noted: 2019-07-22

## 2020-11-05 LAB — EKG IMPRESSION: NORMAL

## 2020-11-05 PROCEDURE — 99204 OFFICE O/P NEW MOD 45 MIN: CPT | Performed by: INTERNAL MEDICINE

## 2020-11-05 PROCEDURE — 93000 ELECTROCARDIOGRAM COMPLETE: CPT | Performed by: INTERNAL MEDICINE

## 2020-11-05 RX ORDER — FAMOTIDINE 20 MG/1
40 TABLET, FILM COATED ORAL
COMMUNITY
Start: 2020-10-13

## 2020-11-05 RX ORDER — FLUTICASONE PROPIONATE 44 MCG
2 AEROSOL WITH ADAPTER (GRAM) INHALATION
COMMUNITY
Start: 2020-10-16

## 2020-11-05 RX ORDER — ALPRAZOLAM 0.25 MG/1
TABLET ORAL
COMMUNITY
Start: 2020-10-09

## 2020-11-05 RX ORDER — ARIPIPRAZOLE 30 MG/1
30 TABLET ORAL
COMMUNITY
Start: 2020-10-18

## 2020-11-05 RX ORDER — DEXTROAMPHETAMINE SACCHARATE, AMPHETAMINE ASPARTATE, DEXTROAMPHETAMINE SULFATE AND AMPHETAMINE SULFATE 5; 5; 5; 5 MG/1; MG/1; MG/1; MG/1
TABLET ORAL
COMMUNITY
Start: 2020-10-13

## 2020-11-05 RX ORDER — ROSUVASTATIN CALCIUM 20 MG/1
20 TABLET, COATED ORAL EVERY EVENING
Qty: 90 TAB | Refills: 3 | Status: SHIPPED | OUTPATIENT
Start: 2020-11-05 | End: 2020-12-11 | Stop reason: SDUPTHER

## 2020-11-05 ASSESSMENT — ENCOUNTER SYMPTOMS
VOMITING: 1
BLURRED VISION: 0
CLAUDICATION: 1
SORE THROAT: 0
PALPITATIONS: 1
DIARRHEA: 1
CHILLS: 0
DEPRESSION: 1
HEARTBURN: 1
BRUISES/BLEEDS EASILY: 0
FOCAL WEAKNESS: 0
INSOMNIA: 1
ORTHOPNEA: 1
DIZZINESS: 0
FEVER: 0
COUGH: 0
SHORTNESS OF BREATH: 1
WEAKNESS: 1
NERVOUS/ANXIOUS: 1
NAUSEA: 0
ABDOMINAL PAIN: 0
PND: 0
FALLS: 0

## 2020-11-05 ASSESSMENT — FIBROSIS 4 INDEX: FIB4 SCORE: 0.8

## 2020-11-05 NOTE — PROGRESS NOTES
Chief Complaint   Patient presents with   • Coronary Artery Disease   • Hyperlipidemia   • Shortness of Breath       Subjective:   García Cho is a 64 y.o. female who presents today in consultation from Arnulfo Charles M.D.  For evaluation of her history of familial hyperlipidemia based on LDL and coronary calcification based on recent coronary calcium score    She reports a remote cardiac evaluation with Dr. Small results unknown.  Long-term she has had significant dyslipidemia probably best characterized as familial hyperlipidemia when she first found out about this in 2011 she was on extensive diet was able to actually reduce her LDL by 40% which is impressive but she struggles long-term with desire for sweets and weight struggles last year was noted again that she had very high cholesterol was tried on atorvastatin and pravastatin but had developed diarrhea it seems with both of these medications and so she was hesitant to take them long-term understandably more recently she has had chest discomfort and shortness of breath also describes symptoms concerning for claudication she had PFTs last year that did not show significant COPD perhaps mild asthma with her long-term smoking history having quit smoking many years ago now      Past Medical History:   Diagnosis Date   • Agoraphobia with panic disorder     anxiety   • Allergic rhinitis    • Arthritis     all joints   • Bowel habit changes    • Dental disorder    • Dyslipidemia    • Fear of travel with panic attacks    • Heart burn    • Hiatal hernia    • Schatzki's ring      Past Surgical History:   Procedure Laterality Date   • ALEX BY LAPAROSCOPY  1/19/2018    Procedure: ALEX BY LAPAROSCOPY/SURGICAL;  Surgeon: Keren Henderson M.D.;  Location: SURGERY [facility];  Service: General   • EGD WITH ASP/BX  11/26/12    distal esophageal stricture secondary to reflux status post dilation to [address], mild erosive esophagitis, mild gastric  erythema, small sliding hiatal hernia, mild chronic gastritis, negative H. pylori, no dysplasia or malignancy   • COLONOSCOPY WITH BIOPSY      hemorrhoids, no malignancy   • EGD WITH ASP/BX      hiatal hernia, schatzki's ring   • ABDOMINAL HYSTERECTOMY TOTAL      with BSO due to infection   • OTHER       I &D rectal abscess     Family History   Problem Relation Age of Onset   • Cancer Brother         pancreatic cancer   • Arthritis Mother    • Cancer Mother         lung cancer with mets    • Arthritis Father    • Alcohol/Drug Brother         drug abuse     Social History     Socioeconomic History   • Marital status: Single     Spouse name: Not on file   • Number of children: Not on file   • Years of education: Not on file   • Highest education level: Not on file   Occupational History   • Occupation:      Employer: OTHER   Social Needs   • Financial resource strain: Not on file   • Food insecurity     Worry: Not on file     Inability: Not on file   • Transportation needs     Medical: Not on file     Non-medical: Not on file   Tobacco Use   • Smoking status: Former Smoker     Packs/day: 0.10     Years: 44.00     Pack years: 4.40     Types: Cigarettes     Quit date: 3/17/2014     Years since quittin.6   • Smokeless tobacco: Never Used   Substance and Sexual Activity   • Alcohol use: No     Comment: 10 drinks/week quit , quit    • Drug use: No     Comment: used marijuana and cocaine in the past, no IVDU   • Sexual activity: Yes     Partners: Male     Birth control/protection: Surgical   Lifestyle   • Physical activity     Days per week: Not on file     Minutes per session: Not on file   • Stress: Not on file   Relationships   • Social connections     Talks on phone: Not on file     Gets together: Not on file     Attends Hinduism service: Not on file     Active member of club or organization: Not on file     Attends meetings of clubs or organizations: Not on file     Relationship  status: Not on file   • Intimate partner violence     Fear of current or ex partner: Not on file     Emotionally abused: Not on file     Physically abused: Not on file     Forced sexual activity: Not on file   Other Topics Concern   • Not on file   Social History Narrative   • Not on file     Allergies   Allergen Reactions   • Other Environmental      Pollen       Outpatient Encounter Medications as of 11/5/2020   Medication Sig Dispense Refill   • famotidine (PEPCID) 20 MG Tab Take 40 mg by mouth.     • ALPRAZolam (XANAX) 0.25 MG Tab TAKE 1 TABLET BY MOUTH TWICE A DAY AS NEEDED FOR ANXIETY F41.9     • amphetamine-dextroamphetamine (ADDERALL) 20 MG Tab TAKE 1 TABLET BY MOUTH TWICE A DAY FOR 30 DAYS F90     • aripiprazole (ABILIFY) 30 MG tablet Take 30 mg by mouth every day.     • FLOVENT HFA 44 MCG/ACT Aerosol Inhale 2 Puffs by mouth.     • PARoxetine (PAXIL) 20 MG Tab TAKE 1 TABLET BY MOUTH EVERY DAY 90 Tab 0   • omeprazole (PRILOSEC) 20 MG Tablet Delayed Response delayed-release tablet TAKE 1 TABLET BY MOUTH ONCE A DAY 30 MIN BEFORE FIRST MEAL FOR 30 DAYS 90 Tab 3   • hydrOXYzine HCl (ATARAX) 25 MG Tab Take 1 Tab by mouth 3 times a day as needed for Anxiety. 60 Tab 3   • [DISCONTINUED] naproxen (NAPROSYN) 500 MG Tab Take 1 Tab by mouth 2 times daily with meals as needed. 60 Tab 0   • Cholecalciferol (VITAMIN D) 2000 units Cap Take 1 Cap by mouth every day. 90 Cap 2   • [DISCONTINUED] nitrofurantoin monohyd macro (MACROBID) 100 MG Cap Take 1 Cap by mouth 2 times a day. (Patient not taking: Reported on 6/18/2019) 10 Cap 0   • [DISCONTINUED] atorvastatin (LIPITOR) 40 MG Tab Take 1 Tab by mouth every day. 90 Tab 2     No facility-administered encounter medications on file as of 11/5/2020.      Review of Systems   Constitutional: Positive for malaise/fatigue. Negative for chills and fever.   HENT: Positive for hearing loss. Negative for sore throat.    Eyes: Negative for blurred vision.   Respiratory: Positive for  "shortness of breath. Negative for cough.    Cardiovascular: Positive for chest pain, palpitations, orthopnea and claudication. Negative for leg swelling and PND.   Gastrointestinal: Positive for diarrhea, heartburn and vomiting. Negative for abdominal pain and nausea.   Musculoskeletal: Negative for falls and joint pain.   Skin: Negative for rash.   Neurological: Positive for weakness. Negative for dizziness and focal weakness.   Endo/Heme/Allergies: Positive for environmental allergies. Does not bruise/bleed easily.   Psychiatric/Behavioral: Positive for depression. The patient is nervous/anxious and has insomnia.         Objective:   /78 (BP Location: Left arm, Patient Position: Sitting)   Pulse 88   Ht 1.727 m (5' 8\")   Wt 88.5 kg (195 lb)   SpO2 94%   BMI 29.65 kg/m²     Physical Exam   Constitutional: No distress.   HENT:   Patient wearing a mask due to COVID precautions   Eyes: No scleral icterus.   Neck: No JVD present.   Cardiovascular: Normal rate and normal heart sounds. Exam reveals no gallop and no friction rub.   No murmur heard.  Pulmonary/Chest: No respiratory distress. She has no wheezes. She has no rales.   Abdominal: Soft. Bowel sounds are normal.   Musculoskeletal:         General: No edema.   Neurological: She is alert.   Skin: No rash noted. She is not diaphoretic.   Psychiatric: She has a normal mood and affect.     We reviewed in person the most recent labs  Recent Results (from the past 4032 hour(s))   EKG    Collection Time: 20 11:25 AM   Result Value Ref Range    Report       [facility]    Test Date:  2020  Pt Name:    PAPO RODAS            Department: [facility]  MRN:        [id number]                      Room:  Gender:     Female                       Technician: TOM  :        [id number]                   Requested By:HOPE RUBI  Order #:    [id number]                    Reading MD: Hope Rubi MD    Measurements  Intervals          "                       Axis  Rate:       79                           P:  RI:                                      QRS:        -40  QRSD:       72                           T:          76  QT:         400  QTc:        459    Interpretive Statements  SINUS RHYTHM  LEFT AXIS DEVIATION  BORDERLINE T ABNORMALITIES, ANT-LAT LEADS  Compared to ECG 01/17/2018 10:23:09  Left-axis deviation now present  Electronically Signed On 11-5-2020 11:31:16 PST by Cabrera Mendoza MD           Assessment:     1. Dyslipidemia  EKG   2. Coronary atherosclerosis due to lipid rich plaque     3. Familial hyperlipidemia, high LDL         Medical Decision Making:  Today's Assessment / Status / Plan:     It was my pleasure to meet with Ms. Cho.    Blood pressure is well controlled.  We specifically assessed the labs on hypertension treatment    She would benefit from moderate to high intensity statin given her coronary calcium and lipids we will have her try rosuvastatin hopefully this is well-tolerated if not she could try simvastatin other than that were on newer medications she will also focus on diet as in the past she has had dramatic improvement with her back cholesterol with dietary modification.    Given her chest discomfort shortness of breath and known coronary artery calcifications we will get an ischemic evaluation with a stress echo will also get an JUNIOR she describes symptoms of claudication    I will see Ms. Cho back in 1 month time and encouraged her to follow up with us over the phone or electronically using my MyChart as issues arise.    It is my pleasure to participate in the care of Ms. Cho.  Please do not hesitate to contact me with questions or concerns.    Cabrera Mendoza MD PhD FACC  Cardiologist [facility]    Please note that this dictation was created using voice recognition software. There may be errors I did not discover before finalizing the note.

## 2020-11-05 NOTE — PATIENT INSTRUCTIONS
Work on at least 1.5 - 5 hours a week of moderate exercise (typical brisk walking or similar activity)    Please look into the following diets and incorporate them into your diet    LOW SALT DIET   KEEP YOUR SODIUM EQUAL TO CALORIES AND NO MORE THAN DOUBLE THE CALORIES FOR A LOW SALT DIET    Cardiosmart.org - great resource for American College of Cardiology on heart disease prevention and treatment    FOR TREATMENT OR PREVENTION OF CORONARY ARTERY DISEASE  These three programs are approved by Medicare/Insurers for those with heart disease  [place] - [facility]  Dr. Duncan's Program for Reversing Heart Disease - Ellis Abdul's Cardiologist vegetarian-based  [facility] - [place]-based mind-body Program    This is a commonly referenced Program  Dr Hernandes - [place] (book and documentary) - vegetarian-based    FOR TREATMENT OF BLOOD PRESSURE  DASH DIET - American Heart Association for treatment of HYPERTENSION    FOR TREATMENT OF BAD CHOLESTEROL/FATS  REDUCE PROCESSED SUGAR AS MUCH AS POSSIBLE  INCREASE WHOLE GRAINS/VEGETABLES    Lowering total cholesterol and LDL (bad) cholesterol:  - Eat leaner cuts of meat, or eliminate altogether if possible red meat, and frequently substitute fish or chicken.  - Limit saturated fat to no more than 7-10% of total calories no more than 10 g per day is recommended. Some sources of saturated fat include butter, animal fats, hydrogenated vegetable fats and oils, many desserts, whole milk dairy products.  - Replaced saturated fats with polyunsaturated fats and monounsaturated fats. Foods high in monounsaturated fat include nuts, although well, canola oil, avocados, and olives.  - Limit trans fat (processed foods) and replaced with fresh fruits and vegetables  - Recommend nonfat dairy products  - Increase substantially the amount of soluble fiber intake (legumes such as beans, fruit, whole grains).  - Consider  nutritional supplements: plant sterile spreads such as Benecol, fish oil,  flaxseed oil, omega-3 acids capsules 1000 mg twice a day, or viscous fiber such as Metamucil  - Attain ideal weight and regular exercise (at least 30 minutes per day of walking)    Lowering triglycerides:  - Reduce intake of simple sugar: Desserts, candy, pastries, honey, sodas, sugared cereals, yogurt, Gatorade, sports bars, canned fruit, smoothies, fruit juice, coffee drinks  - Reduced intake of refined starches: Refined Pasta, most bread  - Reduce or abstain from alcohol  - Increase omega-3 fatty acids: [place], Trout, Mackerel, Herring, Albacore tuna and supplements  - Attain ideal weight and regular exercise (at least 30 minutes per day of walking)    Elevating HDL (good) cholesterol:  - Increase physical activity  - Increase omega-3 fatty acids and supplements as listed above  - Incorporating appropriate amounts of monounsaturated fats such as nuts, olive oil, canola oil, avocados, olives  - Stop smoking  - Attain ideal weight and regular exercise (at least 30 minutes per day of walking)

## 2020-12-10 ENCOUNTER — HOSPITAL ENCOUNTER (OUTPATIENT)
Dept: CARDIOLOGY | Facility: MEDICAL CENTER | Age: 65
End: 2020-12-10
Attending: INTERNAL MEDICINE
Payer: MEDICARE

## 2020-12-10 ENCOUNTER — HOSPITAL ENCOUNTER (OUTPATIENT)
Dept: RADIOLOGY | Facility: MEDICAL CENTER | Age: 65
End: 2020-12-10
Attending: INTERNAL MEDICINE
Payer: MEDICARE

## 2020-12-10 DIAGNOSIS — E78.49 FAMILIAL HYPERLIPIDEMIA, HIGH LDL: ICD-10-CM

## 2020-12-10 DIAGNOSIS — I25.83 CORONARY ATHEROSCLEROSIS DUE TO LIPID RICH PLAQUE: ICD-10-CM

## 2020-12-10 DIAGNOSIS — I25.10 CORONARY ATHEROSCLEROSIS DUE TO LIPID RICH PLAQUE: ICD-10-CM

## 2020-12-10 DIAGNOSIS — I70.213 ATHEROSCLEROSIS OF NATIVE ARTERY OF BOTH LOWER EXTREMITIES WITH INTERMITTENT CLAUDICATION (HCC): ICD-10-CM

## 2020-12-10 LAB — LV EJECT FRACT  99904: 60

## 2020-12-10 PROCEDURE — 93922 UPR/L XTREMITY ART 2 LEVELS: CPT

## 2020-12-10 PROCEDURE — 93350 STRESS TTE ONLY: CPT | Mod: 26 | Performed by: INTERNAL MEDICINE

## 2020-12-10 PROCEDURE — 93018 CV STRESS TEST I&R ONLY: CPT | Performed by: INTERNAL MEDICINE

## 2020-12-10 PROCEDURE — 93017 CV STRESS TEST TRACING ONLY: CPT

## 2020-12-11 ENCOUNTER — OFFICE VISIT (OUTPATIENT)
Dept: CARDIOLOGY | Facility: MEDICAL CENTER | Age: 65
End: 2020-12-11
Payer: MEDICARE

## 2020-12-11 VITALS
SYSTOLIC BLOOD PRESSURE: 122 MMHG | OXYGEN SATURATION: 94 % | WEIGHT: 195 LBS | BODY MASS INDEX: 29.55 KG/M2 | DIASTOLIC BLOOD PRESSURE: 76 MMHG | HEIGHT: 68 IN | HEART RATE: 82 BPM

## 2020-12-11 DIAGNOSIS — R93.1 AGATSTON CORONARY ARTERY CALCIUM SCORE BETWEEN 200 AND 399: Chronic | ICD-10-CM

## 2020-12-11 DIAGNOSIS — I25.10 CORONARY ATHEROSCLEROSIS DUE TO LIPID RICH PLAQUE: ICD-10-CM

## 2020-12-11 DIAGNOSIS — I47.29 NSVT (NONSUSTAINED VENTRICULAR TACHYCARDIA) (HCC): Chronic | ICD-10-CM

## 2020-12-11 DIAGNOSIS — I25.83 CORONARY ATHEROSCLEROSIS DUE TO LIPID RICH PLAQUE: ICD-10-CM

## 2020-12-11 DIAGNOSIS — R94.39 ABNORMAL FINDING ON CARDIOVASCULAR STRESS TEST: Chronic | ICD-10-CM

## 2020-12-11 DIAGNOSIS — E78.5 DYSLIPIDEMIA: ICD-10-CM

## 2020-12-11 DIAGNOSIS — E78.49 FAMILIAL HYPERLIPIDEMIA, HIGH LDL: ICD-10-CM

## 2020-12-11 PROCEDURE — 99214 OFFICE O/P EST MOD 30 MIN: CPT | Performed by: INTERNAL MEDICINE

## 2020-12-11 RX ORDER — ROSUVASTATIN CALCIUM 20 MG/1
20 TABLET, COATED ORAL EVERY EVENING
Qty: 90 TAB | Refills: 3 | Status: SHIPPED | OUTPATIENT
Start: 2020-12-11 | End: 2020-12-11 | Stop reason: SDUPTHER

## 2020-12-11 RX ORDER — METOPROLOL SUCCINATE 50 MG/1
50 TABLET, EXTENDED RELEASE ORAL DAILY
Qty: 30 TAB | Refills: 11 | Status: SHIPPED | OUTPATIENT
Start: 2020-12-11 | End: 2022-01-03

## 2020-12-11 RX ORDER — METOPROLOL SUCCINATE 50 MG/1
50 TABLET, EXTENDED RELEASE ORAL DAILY
Qty: 30 TAB | Refills: 11 | Status: SHIPPED | OUTPATIENT
Start: 2020-12-11 | End: 2020-12-11 | Stop reason: SDUPTHER

## 2020-12-11 RX ORDER — ROSUVASTATIN CALCIUM 20 MG/1
20 TABLET, COATED ORAL EVERY EVENING
Qty: 90 TAB | Refills: 3 | Status: SHIPPED | OUTPATIENT
Start: 2020-12-11 | End: 2022-01-20

## 2020-12-11 ASSESSMENT — ENCOUNTER SYMPTOMS
FEVER: 0
DIZZINESS: 0
FOCAL WEAKNESS: 0
ABDOMINAL PAIN: 0
BLURRED VISION: 0
SHORTNESS OF BREATH: 0
PND: 0
SORE THROAT: 0
PALPITATIONS: 0
COUGH: 0
BRUISES/BLEEDS EASILY: 0
FALLS: 0
WEAKNESS: 0
CLAUDICATION: 0
NAUSEA: 0
CHILLS: 0

## 2020-12-11 ASSESSMENT — FIBROSIS 4 INDEX: FIB4 SCORE: 0.81

## 2020-12-11 NOTE — PATIENT INSTRUCTIONS
A - Aspirin - Aspirin 81 mg daily or other antiplatelets (clopidogrel (PLAVIX), prasrugrel (EFFIENT), ticagrelor (BRILINTA)) reduce your risk   B - Blood Pressure Control - reduces your risk  C - Cholesterol Management - statins reduce your risk, for those that are intolerant to statins there are alternatives  D - Diet - Cardiac rehab diets, Cardiosmart.org  E - Exercise - at least 5 hours of moderate exercise weekly  (typical brisk walking or similar activity)  F - Fats - VASCEPA,  or Fish oil (if Vascepa too expensive) for elevated triglycerides (REDUCE IT trial showed reduction from 22% 5 year MACE to 17%).  G - Good Vibes - meditation, exercise, yoga, mindfulness, stress reduction  H - Heart Failure - betablockers, sucubatril (ENTRESTO) 16% less risk of dying over 3 years, spironolactone, dapagliflozin (FARXIGA) 17% less risk of dying over 2 years, CRT +/- ICD  R - Rehab - Cardiac rehab reduces risk of dying by 13-24% and need to go to the hospital by 30% within the first year  S - Smoking - never smoke, if you do smoke ask for help to quit for good  T - Type II Diabetes - pills empagliflozin (JARDIANCE) 38% less risk of dying over 4 years, and/or weekly injections liraglutide (Victoza), semaglutide (Ozempic), dulaglutide (Trulicity) ~26% less risk of MACE in 2 years  W - Weight - maintain a healthy weight    Work on at least 1.5 - 5 hours a week of moderate exercise    Please look into the following diets and incorporate them into your diet  LOW SALT DIET   KEEP YOUR SODIUM EQUAL TO CALORIES AND NO MORE THAN DOUBLE THE CALORIES FOR A LOW SALT DIET    Cardiosmart.org - great resource for American College of Cardiology on heart disease prevention and treatment    FOR TREATMENT OR PREVENTION OF CORONARY ARTERY DISEASE  These three programs are approved by Medicare/Insurers for those with heart disease  [place] - [facility]  Dr. Duncan[facility] Program for Reversing Heart Disease - Ellis Abdul's  Cardiologist vegetarian-based  [facility] - [facility]    This is a commonly referenced Program  Dr Greta Richardson (book and documentary) - vegetarian-based    FOR TREATMENT OF BLOOD PRESSURE  DASH DIET - American Heart Association for treatment of HYPERTENSION    FOR TREATMENT OF BAD CHOLESTEROL/FATS  REDUCE PROCESSED SUGAR AS MUCH AS POSSIBLE  INCREASE WHOLE GRAINS/VEGETABLES  INCREASE FIBER    Lowering total cholesterol and LDL (bad) cholesterol:  - Eat leaner cuts of meat, or eliminate altogether if possible red meat, and frequently substitute fish or chicken.  - Limit saturated fat to no more than 7-10% of total calories no more than 10 g per day is recommended. Some sources of saturated fat include butter, animal fats, hydrogenated vegetable fats and oils, many desserts, whole milk dairy products.  - Replaced saturated fats with polyunsaturated fats and monounsaturated fats. Foods high in monounsaturated fat include nuts, canola oil, avocados, and olives.  - Limit trans fat (processed foods) and replaced with fresh fruits and vegetables  - Recommend nonfat dairy products  - Increase substantially the amount of soluble fiber intake (legumes such as beans, fruit, whole grains).  - Consider nutritional supplements: plant sterile spreads such as Benecol, fish oil,  flaxseed oil, omega-3 acids capsules 1000 mg twice a day, or viscous fiber such as Metamucil  - Attain ideal weight and regular exercise (at least 30 minutes per day of moderate exercise)  ASK ABOUT STATIN OR NON STATIN MEDICATION TO REDUCE YOUR LDL AND HEART RISK    Lowering triglycerides:  - Reduce intake of simple sugar: Desserts, candy, pastries, honey, sodas, sugared cereals, yogurt, Gatorade, sports bars, canned fruit, smoothies, fruit juice, coffee drinks  - Reduced intake of refined starches: Refined Pasta, most bread  - Reduce or abstain from alcohol  - Increase omega-3  fatty acids: [place], Trout, Mackerel, [place]ring, [place] tuna and supplements  - Attain ideal weight and regular exercise (at least 30 minutes per day of moderate exercise)  ASK ABOUT PURIFIED OMEGA 3 EPA or FISH OIL TO REDUCE YOUR TG AND HEART RISK    Elevating HDL (good) cholesterol:  - Increase physical activity  - Increase omega-3 fatty acids and supplements as listed above  - Incorporating appropriate amounts of monounsaturated fats such as nuts, olive oil, canola oil, avocados, olives  - Stop smoking  - Attain ideal weight and regular exercise (at least 30 minutes per day of moderate exercise)

## 2020-12-11 NOTE — PROGRESS NOTES
Chief Complaint   Patient presents with   • Dyslipidemia     5 Week Follow Up       Subjective:   García Cho is a 65 y.o. female who presents today for follow-up of her recent consultation for elevated coronary calcium score and FH based on lipid profile    She just did the stress echo she had low exercise tolerance only 4 minutes on the treadmill adequate exercise response but hypertensive response to exercise and she was noted by the nurse to have 7 beats of NSVT in recovery this unfortunate was not captured on EKG    JUNIOR was normal    Past Medical History:   Diagnosis Date   • Abnormal finding on cardiovascular stress test - hypertensive response to low level of exercise - normal stress imaging 12/10/2020   • Agatston coronary artery calcium score between 200 and 399 - 355 2020    • Agoraphobia with panic disorder     anxiety   • Allergic rhinitis    • Arthritis     all joints   • Bowel habit changes    • Dental disorder    • Dyslipidemia    • Fear of travel with panic attacks    • Heart burn    • Hiatal hernia    • NSVT (nonsustained ventricular tachycardia) (HCC) - seen in recovery from stress Echo 12/10/2020   • Schatzki's ring      Past Surgical History:   Procedure Laterality Date   • ALEX BY LAPAROSCOPY  1/19/2018    Procedure: ALEX BY LAPAROSCOPY/SURGICAL;  Surgeon: Keren Henderson M.D.;  Location: SURGERY [facility];  Service: General   • EGD WITH ASP/BX  11/26/12    distal esophageal stricture secondary to reflux status post dilation to 51 [place], mild erosive esophagitis, mild gastric erythema, small sliding hiatal hernia, mild chronic gastritis, negative H. pylori, no dysplasia or malignancy   • COLONOSCOPY WITH BIOPSY  7/08    hemorrhoids, no malignancy   • EGD WITH ASP/BX  7/08    hiatal hernia, schatzki's ring   • ABDOMINAL HYSTERECTOMY TOTAL      with BSO due to infection   • OTHER       I &D rectal abscess     Family History   Problem Relation Age of Onset   • Cancer Brother          pancreatic cancer   • Arthritis Mother    • Cancer Mother         lung cancer with mets    • Arthritis Father    • Alcohol/Drug Brother         drug abuse     Social History     Socioeconomic History   • Marital status: Single     Spouse name: Not on file   • Number of children: Not on file   • Years of education: Not on file   • Highest education level: Not on file   Occupational History   • Occupation:      Employer: OTHER   Social Needs   • Financial resource strain: Not on file   • Food insecurity     Worry: Not on file     Inability: Not on file   • Transportation needs     Medical: Not on file     Non-medical: Not on file   Tobacco Use   • Smoking status: Former Smoker     Packs/day: 0.10     Years: 44.00     Pack years: 4.40     Types: Cigarettes     Quit date: 3/17/2014     Years since quittin.7   • Smokeless tobacco: Never Used   Substance and Sexual Activity   • Alcohol use: No     Comment: 10 drinks/week quit , quit    • Drug use: No     Comment: used marijuana and cocaine in the past, no IVDU   • Sexual activity: Yes     Partners: Male     Birth control/protection: Surgical   Lifestyle   • Physical activity     Days per week: Not on file     Minutes per session: Not on file   • Stress: Not on file   Relationships   • Social connections     Talks on phone: Not on file     Gets together: Not on file     Attends [employer] service: Not on file     Active member of club or organization: Not on file     Attends meetings of clubs or organizations: Not on file     Relationship status: Not on file   • Intimate partner violence     Fear of current or ex partner: Not on file     Emotionally abused: Not on file     Physically abused: Not on file     Forced sexual activity: Not on file   Other Topics Concern   • Not on file   Social History Narrative   • Not on file     Allergies   Allergen Reactions   • Atorvastatin      diarrhea   • Pravastatin      diarrhea   • Other Environmental   "    Pollen       Outpatient Encounter Medications as of 12/11/2020   Medication Sig Dispense Refill   • famotidine (PEPCID) 20 MG Tab Take 40 mg by mouth.     • ALPRAZolam (XANAX) 0.25 MG Tab TAKE 1 TABLET BY MOUTH TWICE A DAY AS NEEDED FOR ANXIETY F41.9     • amphetamine-dextroamphetamine (ADDERALL) 20 MG Tab TAKE 1 TABLET BY MOUTH TWICE A DAY FOR 30 DAYS F90     • aripiprazole (ABILIFY) 30 MG tablet Take 30 mg by mouth every day.     • FLOVENT HFA 44 MCG/ACT Aerosol Inhale 2 Puffs by mouth.     • rosuvastatin (CRESTOR) 20 MG Tab Take 1 Tab by mouth every evening. 90 Tab 3   • Cholecalciferol (VITAMIN D) 2000 units Cap Take 1 Cap by mouth every day. 90 Cap 2   • PARoxetine (PAXIL) 20 MG Tab TAKE 1 TABLET BY MOUTH EVERY DAY 90 Tab 0   • omeprazole (PRILOSEC) 20 MG Tablet Delayed Response delayed-release tablet TAKE 1 TABLET BY MOUTH ONCE A DAY 30 MIN BEFORE FIRST MEAL FOR 30 DAYS 90 Tab 3   • hydrOXYzine HCl (ATARAX) 25 MG Tab Take 1 Tab by mouth 3 times a day as needed for Anxiety. 60 Tab 3     No facility-administered encounter medications on file as of 12/11/2020.      Review of Systems   Constitutional: Negative for chills and fever.   HENT: Negative for sore throat.    Eyes: Negative for blurred vision.   Respiratory: Negative for cough and shortness of breath.    Cardiovascular: Negative for chest pain, palpitations, claudication, leg swelling and PND.   Gastrointestinal: Negative for abdominal pain and nausea.   Musculoskeletal: Negative for falls and joint pain.   Skin: Negative for rash.   Neurological: Negative for dizziness, focal weakness and weakness.   Endo/Heme/Allergies: Does not bruise/bleed easily.        Objective:   /76 (BP Location: Left arm, Patient Position: Sitting, BP Cuff Size: Adult)   Pulse 82   Ht 1.727 m (5' 8\")   Wt 88.5 kg (195 lb)   SpO2 94%   BMI 29.65 kg/m²     Physical Exam   Constitutional: No distress.   HENT:   Patient wearing a mask due to COVID precautions   Eyes: " No scleral icterus.   Neck: No JVD present.   Cardiovascular: Normal rate and normal heart sounds. Exam reveals no gallop and no friction rub.   No murmur heard.  Pulmonary/Chest: No respiratory distress. She has no wheezes. She has no rales.   Abdominal: Soft. Bowel sounds are normal.   Musculoskeletal:         General: No edema.   Neurological: She is alert.   Skin: No rash noted. She is not diaphoretic.   Psychiatric: She has a normal mood and affect.     We reviewed in person the most recent labs  Recent Results (from the past 4032 hour(s))   EKG    Collection Time: 20 11:25 AM   Result Value Ref Range    Report       [facility]    Test Date:  2020  Pt Name:    PAPO RODAS            Department: [facility]  MRN:        [id number]                      Room:  Gender:     Female                       Technician: TOM  :        [id number]                   Requested By:HOPE RUBI  Order #:    [id number]                    Reading MD: Hope Rubi MD    Measurements  Intervals                                Axis  Rate:       79                           P:  FL:                                      QRS:        -40  QRSD:       72                           T:          76  QT:         400  QTc:        459    Interpretive Statements  SINUS RHYTHM  LEFT AXIS DEVIATION  BORDERLINE T ABNORMALITIES, ANT-LAT LEADS  Compared to ECG 2018 10:23:09  Left-axis deviation now present  Electronically Signed On 2020 11:31:16 PST by Hope Rubi MD     EC-ECHOCARDIOGRAM REST/STRESS W/O CONT    Collection Time: 12/10/20  3:35 PM   Result Value Ref Range    Left Ventrical Ejection Fraction 60        Assessment:     1. Coronary atherosclerosis due to lipid rich plaque     2. Agatston coronary artery calcium score between 200 and 399 - 355      3. Familial hyperlipidemia, high LDL     4. Dyslipidemia     5. NSVT (nonsustained ventricular tachycardia) (HCC) - seen in recovery  from stress Echo     6. Abnormal finding on cardiovascular stress test - hypertensive response to low level of exercise - normal stress imaging         Medical Decision Making:  Today's Assessment / Status / Plan:     It was my pleasure to meet with Ms. Cho.    Blood pressure is well controlled.  We specifically assessed the labs on hypertension treatment    She is on appropriate statin. She hadn't yet tried rosuvastatin    I will see Ms. Cho back in 1 year time and encouraged her to follow up with us over the phone or electronically using my [employer] as issues arise.    It is my pleasure to participate in the care of Ms. Cho.  Please do not hesitate to contact me with questions or concerns.    Cabrera Mendoza MD PhD [facility]  Cardiologist [facility]    Please note that this dictation was created using voice recognition software. There may be errors I did not discover before finalizing the note.

## 2021-03-03 DIAGNOSIS — Z23 NEED FOR VACCINATION: ICD-10-CM

## 2021-12-31 DIAGNOSIS — I47.29 NSVT (NONSUSTAINED VENTRICULAR TACHYCARDIA) (HCC): ICD-10-CM

## 2022-01-03 ENCOUNTER — HOSPITAL ENCOUNTER (OUTPATIENT)
Dept: LAB | Facility: MEDICAL CENTER | Age: 67
End: 2022-01-03
Attending: STUDENT IN AN ORGANIZED HEALTH CARE EDUCATION/TRAINING PROGRAM
Payer: MEDICARE

## 2022-01-03 ENCOUNTER — OFFICE VISIT (OUTPATIENT)
Dept: URGENT CARE | Facility: PHYSICIAN GROUP | Age: 67
End: 2022-01-03
Payer: MEDICARE

## 2022-01-03 ENCOUNTER — HOSPITAL ENCOUNTER (EMERGENCY)
Facility: MEDICAL CENTER | Age: 67
End: 2022-01-03
Attending: EMERGENCY MEDICINE
Payer: MEDICARE

## 2022-01-03 ENCOUNTER — APPOINTMENT (OUTPATIENT)
Dept: RADIOLOGY | Facility: MEDICAL CENTER | Age: 67
End: 2022-01-03
Attending: EMERGENCY MEDICINE
Payer: MEDICARE

## 2022-01-03 VITALS
RESPIRATION RATE: 16 BRPM | SYSTOLIC BLOOD PRESSURE: 147 MMHG | TEMPERATURE: 98.4 F | WEIGHT: 170.64 LBS | BODY MASS INDEX: 26.73 KG/M2 | OXYGEN SATURATION: 98 % | DIASTOLIC BLOOD PRESSURE: 80 MMHG | HEART RATE: 87 BPM

## 2022-01-03 VITALS
WEIGHT: 190 LBS | TEMPERATURE: 97.9 F | OXYGEN SATURATION: 96 % | HEIGHT: 67 IN | RESPIRATION RATE: 16 BRPM | DIASTOLIC BLOOD PRESSURE: 82 MMHG | HEART RATE: 72 BPM | BODY MASS INDEX: 29.82 KG/M2 | SYSTOLIC BLOOD PRESSURE: 120 MMHG

## 2022-01-03 DIAGNOSIS — R30.0 DYSURIA: ICD-10-CM

## 2022-01-03 DIAGNOSIS — N30.01 ACUTE CYSTITIS WITH HEMATURIA: ICD-10-CM

## 2022-01-03 DIAGNOSIS — E78.5 DYSLIPIDEMIA: ICD-10-CM

## 2022-01-03 DIAGNOSIS — R31.9 HEMATURIA, UNSPECIFIED TYPE: ICD-10-CM

## 2022-01-03 DIAGNOSIS — H91.93 DECREASED HEARING OF BOTH EARS: ICD-10-CM

## 2022-01-03 DIAGNOSIS — Z86.79 HISTORY OF CARDIAC ARRHYTHMIA: ICD-10-CM

## 2022-01-03 DIAGNOSIS — H93.13 TINNITUS OF BOTH EARS: ICD-10-CM

## 2022-01-03 DIAGNOSIS — E78.49 FAMILIAL HYPERLIPIDEMIA, HIGH LDL: ICD-10-CM

## 2022-01-03 DIAGNOSIS — R42 VERTIGO: ICD-10-CM

## 2022-01-03 DIAGNOSIS — N39.0 ACUTE UTI: ICD-10-CM

## 2022-01-03 DIAGNOSIS — R42 DIZZINESS: ICD-10-CM

## 2022-01-03 LAB
ALBUMIN SERPL BCP-MCNC: 4.8 G/DL (ref 3.2–4.9)
ALBUMIN/GLOB SERPL: 1.5 G/DL
ALP SERPL-CCNC: 79 U/L (ref 30–99)
ALT SERPL-CCNC: 12 U/L (ref 2–50)
ANION GAP SERPL CALC-SCNC: 15 MMOL/L (ref 7–16)
APPEARANCE UR: CLEAR
APPEARANCE UR: CLEAR
AST SERPL-CCNC: 18 U/L (ref 12–45)
BACTERIA #/AREA URNS HPF: NEGATIVE /HPF
BASOPHILS # BLD AUTO: 0.5 % (ref 0–1.8)
BASOPHILS # BLD: 0.05 K/UL (ref 0–0.12)
BILIRUB SERPL-MCNC: 0.9 MG/DL (ref 0.1–1.5)
BILIRUB UR QL STRIP.AUTO: NEGATIVE
BILIRUB UR STRIP-MCNC: NEGATIVE MG/DL
BUN SERPL-MCNC: 11 MG/DL (ref 8–22)
CALCIUM SERPL-MCNC: 9.9 MG/DL (ref 8.5–10.5)
CHLORIDE SERPL-SCNC: 100 MMOL/L (ref 96–112)
CHOLEST SERPL-MCNC: 267 MG/DL (ref 100–199)
CO2 SERPL-SCNC: 23 MMOL/L (ref 20–33)
COLOR UR AUTO: NORMAL
COLOR UR: YELLOW
CREAT SERPL-MCNC: 0.7 MG/DL (ref 0.5–1.4)
EOSINOPHIL # BLD AUTO: 0.15 K/UL (ref 0–0.51)
EOSINOPHIL NFR BLD: 1.6 % (ref 0–6.9)
EPI CELLS #/AREA URNS HPF: ABNORMAL /HPF
ERYTHROCYTE [DISTWIDTH] IN BLOOD BY AUTOMATED COUNT: 43.3 FL (ref 35.9–50)
FASTING STATUS PATIENT QL REPORTED: NORMAL
GLOBULIN SER CALC-MCNC: 3.2 G/DL (ref 1.9–3.5)
GLUCOSE SERPL-MCNC: 95 MG/DL (ref 65–99)
GLUCOSE UR STRIP.AUTO-MCNC: NEGATIVE MG/DL
GLUCOSE UR STRIP.AUTO-MCNC: NEGATIVE MG/DL
HCT VFR BLD AUTO: 49.1 % (ref 37–47)
HDLC SERPL-MCNC: 55 MG/DL
HGB BLD-MCNC: 16.4 G/DL (ref 12–16)
HYALINE CASTS #/AREA URNS LPF: ABNORMAL /LPF
IMM GRANULOCYTES # BLD AUTO: 0.04 K/UL (ref 0–0.11)
IMM GRANULOCYTES NFR BLD AUTO: 0.4 % (ref 0–0.9)
KETONES UR STRIP.AUTO-MCNC: NEGATIVE MG/DL
KETONES UR STRIP.AUTO-MCNC: NEGATIVE MG/DL
LDLC SERPL CALC-MCNC: 179 MG/DL
LEUKOCYTE ESTERASE UR QL STRIP.AUTO: ABNORMAL
LEUKOCYTE ESTERASE UR QL STRIP.AUTO: NEGATIVE
LYMPHOCYTES # BLD AUTO: 3.8 K/UL (ref 1–4.8)
LYMPHOCYTES NFR BLD: 41 % (ref 22–41)
MCH RBC QN AUTO: 30.8 PG (ref 27–33)
MCHC RBC AUTO-ENTMCNC: 33.4 G/DL (ref 33.6–35)
MCV RBC AUTO: 92.3 FL (ref 81.4–97.8)
MICRO URNS: ABNORMAL
MONOCYTES # BLD AUTO: 0.55 K/UL (ref 0–0.85)
MONOCYTES NFR BLD AUTO: 5.9 % (ref 0–13.4)
NEUTROPHILS # BLD AUTO: 4.68 K/UL (ref 2–7.15)
NEUTROPHILS NFR BLD: 50.6 % (ref 44–72)
NITRITE UR QL STRIP.AUTO: NEGATIVE
NITRITE UR QL STRIP.AUTO: NEGATIVE
NRBC # BLD AUTO: 0 K/UL
NRBC BLD-RTO: 0 /100 WBC
PH UR STRIP.AUTO: 6 [PH] (ref 5–8)
PH UR STRIP.AUTO: 7 [PH] (ref 5–8)
PLATELET # BLD AUTO: 333 K/UL (ref 164–446)
PMV BLD AUTO: 9 FL (ref 9–12.9)
POTASSIUM SERPL-SCNC: 3.6 MMOL/L (ref 3.6–5.5)
PROT SERPL-MCNC: 8 G/DL (ref 6–8.2)
PROT UR QL STRIP: 30 MG/DL
PROT UR QL STRIP: 30 MG/DL
RBC # BLD AUTO: 5.32 M/UL (ref 4.2–5.4)
RBC # URNS HPF: ABNORMAL /HPF
RBC UR QL AUTO: ABNORMAL
RBC UR QL AUTO: NORMAL
SODIUM SERPL-SCNC: 138 MMOL/L (ref 135–145)
SP GR UR STRIP.AUTO: 1.01
SP GR UR STRIP.AUTO: 1.02
TRIGL SERPL-MCNC: 164 MG/DL (ref 0–149)
UROBILINOGEN UR STRIP-MCNC: 0.2 MG/DL
UROBILINOGEN UR STRIP.AUTO-MCNC: 0.2 MG/DL
WBC # BLD AUTO: 9.3 K/UL (ref 4.8–10.8)
WBC #/AREA URNS HPF: ABNORMAL /HPF

## 2022-01-03 PROCEDURE — 99284 EMERGENCY DEPT VISIT MOD MDM: CPT | Mod: 25

## 2022-01-03 PROCEDURE — 99214 OFFICE O/P EST MOD 30 MIN: CPT | Performed by: NURSE PRACTITIONER

## 2022-01-03 PROCEDURE — 80061 LIPID PANEL: CPT

## 2022-01-03 PROCEDURE — 81001 URINALYSIS AUTO W/SCOPE: CPT

## 2022-01-03 PROCEDURE — 85025 COMPLETE CBC W/AUTO DIFF WBC: CPT

## 2022-01-03 PROCEDURE — 80053 COMPREHEN METABOLIC PANEL: CPT

## 2022-01-03 PROCEDURE — 70450 CT HEAD/BRAIN W/O DYE: CPT | Mod: MG

## 2022-01-03 PROCEDURE — 36415 COLL VENOUS BLD VENIPUNCTURE: CPT

## 2022-01-03 PROCEDURE — 81002 URINALYSIS NONAUTO W/O SCOPE: CPT | Performed by: NURSE PRACTITIONER

## 2022-01-03 RX ORDER — MECLIZINE HYDROCHLORIDE 25 MG/1
25 TABLET ORAL 3 TIMES DAILY PRN
Qty: 20 TABLET | Refills: 0 | Status: SHIPPED | OUTPATIENT
Start: 2022-01-03 | End: 2022-01-20

## 2022-01-03 RX ORDER — NITROFURANTOIN 25; 75 MG/1; MG/1
100 CAPSULE ORAL 2 TIMES DAILY
Qty: 14 CAPSULE | Refills: 0 | Status: SHIPPED | OUTPATIENT
Start: 2022-01-03 | End: 2022-01-20

## 2022-01-03 ASSESSMENT — ENCOUNTER SYMPTOMS
MYALGIAS: 0
SORE THROAT: 0
TINGLING: 0
DIARRHEA: 0
HEADACHES: 1
DIZZINESS: 1
EYE REDNESS: 0
SHORTNESS OF BREATH: 0
PALPITATIONS: 0
FEVER: 0
SENSORY CHANGE: 0
FOCAL WEAKNESS: 0
ABDOMINAL PAIN: 0
EYE DISCHARGE: 0
DOUBLE VISION: 0
PHOTOPHOBIA: 0
SINUS PAIN: 0
COUGH: 0
VOMITING: 0
BRUISES/BLEEDS EASILY: 0
DIAPHORESIS: 0
WEAKNESS: 0
EYE PAIN: 0
WEIGHT LOSS: 0
BLURRED VISION: 0
FLANK PAIN: 0
NAUSEA: 0

## 2022-01-03 ASSESSMENT — LIFESTYLE VARIABLES: SUBSTANCE_ABUSE: 0

## 2022-01-03 ASSESSMENT — VISUAL ACUITY: OU: 1

## 2022-01-03 NOTE — PROGRESS NOTES
"García Cho is a 66 y.o. female who presents for UTI (x1week, ) and Cerumen Impaction (causing dizzyness)      HPI  This is a new problem.  García is a 67 y/o female with c/o the following:   #1) Dizziness started suddenly 4 days ago room is spinning at times. Having ringing in her ears- has never had this before . Wears hearing aides. Feels like she may have ear wax in her ears again. Went to hearing aid place and they removed ear wax - 3 weeks ago.  Ears and hearing immediately improved at that time. Now hearing worsening even with hearing aides.   Her  had to drive her to urgent care today because she cannot turn her head. Dizziness worse with movement. Intermittent but becoming constant.    Vision is \" really bad\" always. No change. Wears glasses.    \" Im really, really scared\".   No anticoagulants.   Denies c/p, sob, leg swelling, sinus pain, cough, runny nose, Nausea, vomiting, fever, weakness, numbness or tingling sensation, blurred vision.     #2) Bladder infection about 1 1/2 months of dysuria. Drinking cranberry juice. Darker urine for a few months with burning with every urination. Has never had that before.  Believes she has a bladder infection that just is not going away. Mild discomfort in left side of back. Achy.  Getting worse. No odor or vaginal discharge. Not sexually active.   Treatment tried: none.   Is not on anticoagulant therapy. No hx renal stones.     Review of Systems   Constitutional: Negative for diaphoresis, fever, malaise/fatigue and weight loss.   HENT: Positive for hearing loss and tinnitus. Negative for congestion, ear discharge, sinus pain and sore throat.    Eyes: Negative for blurred vision, double vision, photophobia, pain, discharge and redness.   Respiratory: Negative for cough and shortness of breath.    Cardiovascular: Negative for chest pain, palpitations and leg swelling.   Gastrointestinal: Negative for abdominal pain, diarrhea, nausea and vomiting. "   Genitourinary: Positive for dysuria. Negative for flank pain, frequency, hematuria and urgency.   Musculoskeletal: Negative for myalgias.   Neurological: Positive for dizziness and headaches. Negative for tingling, sensory change, focal weakness and weakness.   Endo/Heme/Allergies: Negative for environmental allergies. Does not bruise/bleed easily.   Psychiatric/Behavioral: Negative for substance abuse.       Allergies:       Allergies   Allergen Reactions   • Atorvastatin      diarrhea   • Pravastatin      diarrhea   • Other Environmental      Pollen         PMSFS Hx:  Past Medical History:   Diagnosis Date   • Abnormal finding on cardiovascular stress test - hypertensive response to low level of exercise - normal stress imaging 12/10/2020   • Agatston coronary artery calcium score between 200 and 399 - 355 2020    • Agoraphobia with panic disorder     anxiety   • Allergic rhinitis    • Arthritis     all joints   • Bowel habit changes    • Dental disorder    • Dyslipidemia    • Fear of travel with panic attacks    • Heart burn    • Hiatal hernia    • NSVT (nonsustained ventricular tachycardia) (HCC) - seen in recovery from stress Echo 12/10/2020   • Schatzki's ring      Past Surgical History:   Procedure Laterality Date   • ALEX BY LAPAROSCOPY  1/19/2018    Procedure: ALEX BY LAPAROSCOPY/SURGICAL;  Surgeon: Keren Henderson M.D.;  Location: SURGERY [facility];  Service: General   • EGD WITH ASP/BX  11/26/12    distal esophageal stricture secondary to reflux status post dilation to 51 [place], mild erosive esophagitis, mild gastric erythema, small sliding hiatal hernia, mild chronic gastritis, negative H. pylori, no dysplasia or malignancy   • COLONOSCOPY WITH BIOPSY  7/08    hemorrhoids, no malignancy   • EGD WITH ASP/BX  7/08    hiatal hernia, schatzki's ring   • ABDOMINAL HYSTERECTOMY TOTAL      with BSO due to infection   • OTHER       I &D rectal abscess     Family History   Problem Relation Age of  Onset   • Cancer Brother         pancreatic cancer   • Arthritis Mother    • Cancer Mother         lung cancer with mets    • Arthritis Father    • Alcohol/Drug Brother         drug abuse     Social History     Tobacco Use   • Smoking status: Former Smoker     Packs/day: 0.10     Years: 44.00     Pack years: 4.40     Types: Cigarettes     Quit date: 3/17/2014     Years since quittin.8   • Smokeless tobacco: Never Used   Substance Use Topics   • Alcohol use: No     Comment: 10 drinks/week quit , quit        Problems:   Patient Active Problem List   Diagnosis   • Schatzki's ring   • Hiatal hernia   • Dyslipidemia   • Fear of travel with panic attacks   • Allergic rhinitis   • Anxiety and depression   • Other insomnia   • Hypercalcemia   • Family history of alcoholism   • Chronic diarrhea   • Asthma   • Coronary atherosclerosis due to lipid rich plaque   • Familial hyperlipidemia, high LDL   • Agatston coronary artery calcium score between 200 and 399 - 355    • NSVT (nonsustained ventricular tachycardia) (HCC) - seen in recovery from stress Echo   • Abnormal finding on cardiovascular stress test - hypertensive response to low level of exercise - normal stress imaging       Medications:   Current Outpatient Medications on File Prior to Visit   Medication Sig Dispense Refill   • metoprolol SR (TOPROL XL) 50 MG TABLET SR 24 HR Take 1 Tab by mouth every day. 30 Tab 11   • rosuvastatin (CRESTOR) 20 MG Tab Take 1 Tab by mouth every evening. 90 Tab 3   • famotidine (PEPCID) 20 MG Tab Take 40 mg by mouth.     • ALPRAZolam (XANAX) 0.25 MG Tab TAKE 1 TABLET BY MOUTH TWICE A DAY AS NEEDED FOR ANXIETY F41.9     • amphetamine-dextroamphetamine (ADDERALL) 20 MG Tab TAKE 1 TABLET BY MOUTH TWICE A DAY FOR 30 DAYS F90     • aripiprazole (ABILIFY) 30 MG tablet Take 30 mg by mouth every day.     • FLOVENT HFA 44 MCG/ACT Aerosol Inhale 2 Puffs by mouth.     • Cholecalciferol (VITAMIN D) 2000 units Cap Take 1 Cap by mouth  "every day. 90 Cap 2   • PARoxetine (PAXIL) 20 MG Tab TAKE 1 TABLET BY MOUTH EVERY DAY 90 Tab 0   • omeprazole (PRILOSEC) 20 MG Tablet Delayed Response delayed-release tablet TAKE 1 TABLET BY MOUTH ONCE A DAY 30 MIN BEFORE FIRST MEAL FOR 30 DAYS 90 Tab 3   • hydrOXYzine HCl (ATARAX) 25 MG Tab Take 1 Tab by mouth 3 times a day as needed for Anxiety. 60 Tab 3     No current facility-administered medications on file prior to visit.          Objective:     /82   Pulse 72   Temp 36.6 °C (97.9 °F) (Temporal)   Resp 16   Ht 1.702 m (5' 7\")   Wt 86.2 kg (190 lb)   SpO2 96%   BMI 29.76 kg/m²     Physical Exam  Vitals and nursing note reviewed.   Constitutional:       General: She is not in acute distress.     Appearance: Normal appearance. She is well-developed and well-groomed. She is not ill-appearing or toxic-appearing.   HENT:      Head: Normocephalic.      Comments: Wears bilateral hearing aids       Right Ear: Hearing, tympanic membrane, ear canal and external ear normal. No middle ear effusion.      Left Ear: Hearing, tympanic membrane, ear canal and external ear normal.  No middle ear effusion.      Nose: Nose normal.      Right Sinus: No maxillary sinus tenderness or frontal sinus tenderness.      Left Sinus: No maxillary sinus tenderness or frontal sinus tenderness.      Mouth/Throat:      Pharynx: Uvula midline.   Eyes:      General: Lids are normal. Vision grossly intact.      Conjunctiva/sclera: Conjunctivae normal.      Pupils: Pupils are equal, round, and reactive to light.      Comments: Wears glasses    Neck:      Trachea: Trachea and phonation normal.   Cardiovascular:      Rate and Rhythm: Normal rate and regular rhythm.      Chest Wall: PMI is not displaced.      Pulses: Normal pulses.      Heart sounds: Normal heart sounds.   Pulmonary:      Effort: Pulmonary effort is normal. No respiratory distress.      Breath sounds: Normal breath sounds. No wheezing or rales.   Chest:      Chest wall: No " tenderness.   Musculoskeletal:         General: Normal range of motion.      Cervical back: Full passive range of motion without pain and neck supple. No erythema. No spinous process tenderness. Normal range of motion.   Lymphadenopathy:      Head:      Right side of head: No tonsillar adenopathy.      Left side of head: No tonsillar adenopathy.      Cervical: No cervical adenopathy.   Skin:     General: Skin is warm and dry.   Neurological:      General: No focal deficit present.      Mental Status: She is alert.      Sensory: Sensation is intact.      Motor: Motor function is intact.      Coordination: Coordination is intact.      Gait: Gait is intact.      Comments: Strong equal handgrips bilaterally.   Movements are symmetrical in all 4 extremities   No focal deficit   Speech clear      Psychiatric:         Mood and Affect: Mood normal.         Speech: Speech normal.         Behavior: Behavior normal. Behavior is cooperative.         Thought Content: Thought content normal.         Judgment: Judgment normal.       UA: pos blood, pos protein       Assessment /Associated Orders:      1. Dizziness     2. Dysuria  POCT Urinalysis   3. Hematuria, unspecified type     4. Decreased hearing of both ears     5. Tinnitus of both ears     6. History of cardiac arrhythmia           Medical Decision Making:    Additional diagnostics for further evaluation and management of her acute problems/ concerns.   Pt's clinical presentation and exam today indicate a need for higher level of care with further evaluation and/or diagnostics.   [facility] transfer center was  called to arrange transfer to higher level of care in ER.  Pt is to be transported via POV with her   I have reiterated to patient that although an Urgent Care to ER transfer was made this will not necessarily expedite the ER process

## 2022-01-04 NOTE — ED NOTES
Assist RN: Pt discharged home with . Pt is A/O x 4, ambulatory with a steady gait. Patient in possession of all belongings. Pt provided discharge education and information pertaining to medications and follow up appointments. Discussed signs and symptoms to return to the ER, patient verbalized understanding. Pt received copy of discharge instructions and verbalized understanding.     /80   Pulse 87   Temp 36.9 °C (98.4 °F) (Temporal)   Resp 16   Wt 77.4 kg (170 lb 10.2 oz)   SpO2 98%   BMI 26.73 kg/m²

## 2022-01-04 NOTE — ED PROVIDER NOTES
"ED Provider Note    Scribed for Dr. Josué Barrow M.D. by Matthew Saeed. 1/3/2022  6:11 PM    Primary care provider: Pcp Pt States None  Means of arrival: walk in  History obtained from: patient  History limited by: none    CHIEF COMPLAINT  Chief Complaint   Patient presents with    Blood in Urine     \"dark urine\"   for a coupe weeks    Dizziness     \"room spins when turning hesd from side to side\" x4 days    Sent from Urgent Care       [facility]  García Cho is a 66 y.o. female who presents to the Emergency Department for hematuria onset 2-3 weeks ago. She describes her urine as dark brown in color. She reports associated \"irritation\" with urination and left-sided back pain. She denies any abdominal pain. She went to urgent care today for her symptoms and was advised to come to the ED due to having blood in her urine. She has tried drinking cranberry juice with moderate alleviation of her symptoms. The patient also reports experiencing intermittent dizziness since 8 days ago and describes feeling like the room is spinning. Her dizziness is exacerbated by moving her head. She has hearing aids in place and also reports experiencing hearing loss for the past few days. She denies any tinnitus.    REVIEW OF SYSTEMS  Pertinent positives include hematuria, dysuria, left-sided back pain, . Pertinent negatives include no abdominal pain, tinnitus. As above, all other systems reviewed and are negative.   See HPI for further details.     PAST MEDICAL HISTORY   has a past medical history of Abnormal finding on cardiovascular stress test - hypertensive response to low level of exercise - normal stress imaging (12/10/2020), Agatston coronary artery calcium score between 200 and 399 - 355 2020, Agoraphobia with panic disorder, Allergic rhinitis, Arthritis, Bowel habit changes, Dental disorder, Dyslipidemia, Fear of travel with panic attacks, Heart burn, Hiatal hernia, NSVT (nonsustained ventricular tachycardia) (HCC) - " seen in recovery from stress Echo (12/10/2020), and Schatzki's ring.    SURGICAL HISTORY   has a past surgical history that includes colonoscopy with biopsy (); egd with asp/bx (); abdominal hysterectomy total; egd with asp/bx (12); other; and shannon by laparoscopy (2018).    SOCIAL HISTORY  Social History     Tobacco Use    Smoking status: Former Smoker     Packs/day: 0.10     Years: 44.00     Pack years: 4.40     Types: Cigarettes     Quit date: 3/17/2014     Years since quittin.8    Smokeless tobacco: Never Used   Vaping Use    Vaping Use: Never used   Substance Use Topics    Alcohol use: No     Comment: 10 drinks/week quit , quit     Drug use: No     Comment: used marijuana and cocaine in the past, no IVDU      Social History     Substance and Sexual Activity   Drug Use No    Comment: used marijuana and cocaine in the past, no IVDU       FAMILY HISTORY  Family History   Problem Relation Age of Onset    Cancer Brother         pancreatic cancer    Arthritis Mother     Cancer Mother         lung cancer with mets     Arthritis Father     Alcohol/Drug Brother         drug abuse       CURRENT MEDICATIONS  Home Medications       Reviewed by Emi Ramsey R.N. (Registered Nurse) on 22 at 1207  Med List Status: Partial     Medication Last Dose Status   ALPRAZolam (XANAX) 0.25 MG Tab  Active   amphetamine-dextroamphetamine (ADDERALL) 20 MG Tab  Active   aripiprazole (ABILIFY) 30 MG tablet  Active   Cholecalciferol (VITAMIN D) 2000 units Cap  Active   famotidine (PEPCID) 20 MG Tab  Active   FLOVENT HFA 44 MCG/ACT Aerosol  Active   hydrOXYzine HCl (ATARAX) 25 MG Tab  Active   metoprolol SR (TOPROL XL) 50 MG TABLET SR 24 HR  Active   omeprazole (PRILOSEC) 20 MG Tablet Delayed Response delayed-release tablet  Active   PARoxetine (PAXIL) 20 MG Tab  Active   rosuvastatin (CRESTOR) 20 MG Tab  Active                    ALLERGIES  Allergies   Allergen Reactions    Atorvastatin       diarrhea    Pravastatin      diarrhea    Other Environmental      Pollen         PHYSICAL EXAM  VITAL SIGNS: /80   Pulse 73   Temp 36.7 °C (98 °F) (Temporal)   Resp 18   Wt 77.4 kg (170 lb 10.2 oz)   SpO2 96%   BMI 26.73 kg/m²     Constitutional: Well developed, Well nourished, no acute distress, Non-toxic appearance.   HENT: Normocephalic, Atraumatic, Bilateral external ears normal without cerumen, Oropharynx moist, No oral exudates.  Eyes: PERRLA, EOMI, Conjunctiva normal, No discharge. No nysytagmus   Neck: No tenderness, Supple, No stridor.   Lymphatic: No lymphadenopathy noted.   Cardiovascular: Normal heart rate, Normal rhythm.   Thorax & Lungs: Clear to auscultation bilaterally, No respiratory distress, No wheezing, No crackles.   Abdomen: Soft, No tenderness, No masses, No pulsatile masses.   Skin: Warm, Dry, No erythema, No rash.   Extremities:, No edema No cyanosis.   Musculoskeletal: No tenderness to palpation or major deformities noted. Intact distal pulses. No CVA tenderness  Neurologic: Awake, alert. Moves all extremities spontaneously.  Psychiatric: Affect normal, Judgment normal, Mood normal.     LABS  Results for orders placed or performed during the hospital encounter of 01/03/22   URINALYSIS (UA)    Specimen: Urine   Result Value Ref Range    Color Yellow     Character Clear     Specific Gravity 1.011 <1.035    Ph 6.0 5.0 - 8.0    Glucose Negative Negative mg/dL    Ketones Negative Negative mg/dL    Protein 30 (A) Negative mg/dL    Bilirubin Negative Negative    Urobilinogen, Urine 0.2 Negative    Nitrite Negative Negative    Leukocyte Esterase Trace (A) Negative    Occult Blood Moderate (A) Negative    Micro Urine Req Microscopic    CBC WITH DIFFERENTIAL   Result Value Ref Range    WBC 9.3 4.8 - 10.8 K/uL    RBC 5.32 4.20 - 5.40 M/uL    Hemoglobin 16.4 (H) 12.0 - 16.0 g/dL    Hematocrit 49.1 (H) 37.0 - 47.0 %    MCV 92.3 81.4 - 97.8 fL    MCH 30.8 27.0 - 33.0 pg    MCHC 33.4 (L) 33.6  - 35.0 g/dL    RDW 43.3 35.9 - 50.0 fL    Platelet Count 333 164 - 446 K/uL    MPV 9.0 9.0 - 12.9 fL    Neutrophils-Polys 50.60 44.00 - 72.00 %    Lymphocytes 41.00 22.00 - 41.00 %    Monocytes 5.90 0.00 - 13.40 %    Eosinophils 1.60 0.00 - 6.90 %    Basophils 0.50 0.00 - 1.80 %    Immature Granulocytes 0.40 0.00 - 0.90 %    Nucleated RBC 0.00 /100 WBC    Neutrophils (Absolute) 4.68 2.00 - 7.15 K/uL    Lymphs (Absolute) 3.80 1.00 - 4.80 K/uL    Monos (Absolute) 0.55 0.00 - 0.85 K/uL    Eos (Absolute) 0.15 0.00 - 0.51 K/uL    Baso (Absolute) 0.05 0.00 - 0.12 K/uL    Immature Granulocytes (abs) 0.04 0.00 - 0.11 K/uL    NRBC (Absolute) 0.00 K/uL   COMP METABOLIC PANEL   Result Value Ref Range    Sodium 138 135 - 145 mmol/L    Potassium 3.6 3.6 - 5.5 mmol/L    Chloride 100 96 - 112 mmol/L    Co2 23 20 - 33 mmol/L    Anion Gap 15.0 7.0 - 16.0    Glucose 95 65 - 99 mg/dL    Bun 11 8 - 22 mg/dL    Creatinine 0.70 0.50 - 1.40 mg/dL    Calcium 9.9 8.5 - 10.5 mg/dL    AST(SGOT) 18 12 - 45 U/L    ALT(SGPT) 12 2 - 50 U/L    Alkaline Phosphatase 79 30 - 99 U/L    Total Bilirubin 0.9 0.1 - 1.5 mg/dL    Albumin 4.8 3.2 - 4.9 g/dL    Total Protein 8.0 6.0 - 8.2 g/dL    Globulin 3.2 1.9 - 3.5 g/dL    A-G Ratio 1.5 g/dL   URINE MICROSCOPIC (W/UA)   Result Value Ref Range    WBC 5-10 (A) /hpf    RBC  (A) /hpf    Bacteria Negative None /hpf    Epithelial Cells Few /hpf    Hyaline Cast 3-5 (A) /lpf   ESTIMATED GFR   Result Value Ref Range    GFR If African American >60 >60 mL/min/1.73 m 2    GFR If Non African American >60 >60 mL/min/1.73 m 2       All labs reviewed by me.    RADIOLOGY  CT-HEAD W/O   Final Result      No acute intracranial abnormality.           The radiologist's interpretation of all radiological studies have been reviewed by me.    COURSE & MEDICAL DECISION MAKING  Pertinent Labs & Imaging studies reviewed. (See chart for details)    6:11 PM - Patient seen and examined at bedside. Ordered CBC w/, CMP, UA, CT  head to evaluate her symptoms. The differential diagnoses include but are not limited to: urinary tract infection, CVA, benign vertigo, labyrinthitis.     8:07 PM - Patient seen at bedside. Discussed lab and imaging results with the patient and updated them on the plan of care, including discharge and follow up with a PCP. I answered all questions regarding their care. Patient verbalizes understanding and agreement to this plan of care.     Decision Making:  Patient with dysuria and now some hematuria likely secondary to urinary infection will treat with antibiotics and will need follow-up.  Patient also has some vertigo type symptoms this is been present for the last week to 10 days and is improving I did go ahead and do a CT scan that is negative abdomen treat with some Antivert.  The patient was under the impression this might be due to cerumen impaction there is no wax seen, and she is improving already I do not think further treatment necessarily indicated although follow-up certainly necessary if not continue to improve    The patient will return for new or worsening symptoms and is stable at the time of discharge.    The patient is referred to a primary physician for blood pressure management, diabetic screening, and for all other preventative health concerns.    DISPOSITION:  Patient will be discharged home in stable condition.    FOLLOW UP:  Rochelle Meng M.D.  [address]  [address]  [facility] [address]  [phone]            OUTPATIENT MEDICATIONS:  New Prescriptions    MECLIZINE (ANTIVERT) 25 MG TAB    Take 1 Tablet by mouth 3 times a day as needed.    NITROFURANTOIN (MACROBID) 100 MG CAP    Take 1 Capsule by mouth 2 times a day.         FINAL IMPRESSION  1. Hematuria, unspecified type    2. Acute cystitis with hematuria    3. Vertigo    4. Acute UTI          I, Matthew Saeed (Shady), am scribing for, and in the presence of, Josué Barrow M.D..    Electronically signed by: Matthew Saeed (Shady),  1/3/2022    IJosué M.D. personally performed the services described in this documentation, as scribed by Matthew Saeed in my presence, and it is both accurate and complete.    The note accurately reflects work and decisions made by me.  Josué Barrow M.D.  1/3/2022  10:59 PM

## 2022-01-05 ENCOUNTER — TELEPHONE (OUTPATIENT)
Dept: CARDIOLOGY | Facility: MEDICAL CENTER | Age: 67
End: 2022-01-05

## 2022-01-05 RX ORDER — METOPROLOL SUCCINATE 50 MG/1
50 TABLET, EXTENDED RELEASE ORAL
Qty: 90 TABLET | Refills: 0 | Status: SHIPPED | OUTPATIENT
Start: 2022-01-05 | End: 2022-03-01

## 2022-01-05 NOTE — TELEPHONE ENCOUNTER
----- Message from Brittney Garcia, Med Ass't sent at 1/5/2022  9:24 AM PST -----  Regarding: Need Follow Appointment  Please contact patient to schedule follow up appointment. Thank you

## 2022-01-07 ENCOUNTER — TELEPHONE (OUTPATIENT)
Dept: CARDIOLOGY | Facility: MEDICAL CENTER | Age: 67
End: 2022-01-07

## 2022-01-07 NOTE — LETTER
January 7, 2022        García Cho  [address]  [address]  [facility] [address]          Dear García,    We have received the results of your recent: Lipid Panel    Your test came back and Dr. Mendoza states your lipids are stable but suggest you aren't taking your Rosuvastatin, which is important to take.  Please follow up as previously discussed with your physician.      Feel free to call us with any questions.        Sincerely,          Maricarmen CALLE  Electronically Signed

## 2022-01-07 NOTE — TELEPHONE ENCOUNTER
----- Message from Cabrera Mendoza M.D. sent at 1/4/2022 12:08 PM PST -----  Lipids stable suggests she isn't taking her rosuvastatin which is important to take     please let her know     Thank you

## 2022-01-20 ENCOUNTER — HOSPITAL ENCOUNTER (OUTPATIENT)
Facility: MEDICAL CENTER | Age: 67
End: 2022-01-20
Attending: STUDENT IN AN ORGANIZED HEALTH CARE EDUCATION/TRAINING PROGRAM
Payer: MEDICARE

## 2022-01-20 ENCOUNTER — OFFICE VISIT (OUTPATIENT)
Dept: MEDICAL GROUP | Facility: CLINIC | Age: 67
End: 2022-01-20
Payer: MEDICARE

## 2022-01-20 VITALS — WEIGHT: 164.7 LBS | HEART RATE: 75 BPM | HEIGHT: 68 IN | BODY MASS INDEX: 24.96 KG/M2 | OXYGEN SATURATION: 95 %

## 2022-01-20 DIAGNOSIS — N30.01 ACUTE CYSTITIS WITH HEMATURIA: ICD-10-CM

## 2022-01-20 DIAGNOSIS — R42 VERTIGO: ICD-10-CM

## 2022-01-20 PROCEDURE — 99213 OFFICE O/P EST LOW 20 MIN: CPT | Mod: GE | Performed by: STUDENT IN AN ORGANIZED HEALTH CARE EDUCATION/TRAINING PROGRAM

## 2022-01-20 PROCEDURE — 81001 URINALYSIS AUTO W/SCOPE: CPT

## 2022-01-20 RX ORDER — OXYBUTYNIN CHLORIDE 10 MG/1
10 TABLET, EXTENDED RELEASE ORAL DAILY
Qty: 5 TABLET | Refills: 0 | Status: SHIPPED | OUTPATIENT
Start: 2022-01-20 | End: 2023-01-31

## 2022-01-20 RX ORDER — PREDNISONE 20 MG/1
TABLET ORAL
COMMUNITY
Start: 2021-02-16 | End: 2022-01-20

## 2022-01-20 RX ORDER — LIDOCAINE HYDROCHLORIDE 20 MG/ML
SOLUTION OROPHARYNGEAL
COMMUNITY
Start: 2021-05-28 | End: 2022-01-20

## 2022-01-20 RX ORDER — MECLIZINE HCL 12.5 MG/1
12.5 TABLET ORAL 3 TIMES DAILY PRN
Qty: 30 TABLET | Refills: 1 | Status: SHIPPED | OUTPATIENT
Start: 2022-01-20 | End: 2023-01-31

## 2022-01-20 RX ORDER — BUDESONIDE AND FORMOTEROL FUMARATE DIHYDRATE 80; 4.5 UG/1; UG/1
AEROSOL RESPIRATORY (INHALATION)
COMMUNITY
Start: 2021-02-16

## 2022-01-20 RX ORDER — PHENAZOPYRIDINE HYDROCHLORIDE 200 MG/1
200 TABLET, FILM COATED ORAL 3 TIMES DAILY PRN
Qty: 6 TABLET | Refills: 0 | Status: CANCELLED | OUTPATIENT
Start: 2022-01-20

## 2022-01-20 RX ORDER — OMEPRAZOLE 20 MG/1
CAPSULE, DELAYED RELEASE ORAL
COMMUNITY
Start: 2021-07-15 | End: 2022-01-20

## 2022-01-20 RX ORDER — ESZOPICLONE 3 MG/1
TABLET, FILM COATED ORAL
COMMUNITY
Start: 2022-01-18 | End: 2023-01-31

## 2022-01-20 ASSESSMENT — FIBROSIS 4 INDEX: FIB4 SCORE: 1.03

## 2022-01-21 LAB
APPEARANCE UR: CLEAR
BACTERIA #/AREA URNS HPF: NEGATIVE /HPF
BILIRUB UR QL STRIP.AUTO: NEGATIVE
COLOR UR: YELLOW
EPI CELLS #/AREA URNS HPF: NEGATIVE /HPF
GLUCOSE UR STRIP.AUTO-MCNC: NEGATIVE MG/DL
HYALINE CASTS #/AREA URNS LPF: NORMAL /LPF
KETONES UR STRIP.AUTO-MCNC: NEGATIVE MG/DL
LEUKOCYTE ESTERASE UR QL STRIP.AUTO: ABNORMAL
MICRO URNS: ABNORMAL
NITRITE UR QL STRIP.AUTO: NEGATIVE
PH UR STRIP.AUTO: 6.5 [PH] (ref 5–8)
PROT UR QL STRIP: NEGATIVE MG/DL
RBC # URNS HPF: NORMAL /HPF
RBC UR QL AUTO: ABNORMAL
SP GR UR STRIP.AUTO: 1.01
UROBILINOGEN UR STRIP.AUTO-MCNC: 0.2 MG/DL
WBC #/AREA URNS HPF: NORMAL /HPF

## 2022-01-21 NOTE — ASSESSMENT & PLAN NOTE
[place]-Hallpike maneuver in the office was unsuccessful. Physical exam with there fluid fine bilateral tympanic membranes. Recommend that patient start Flonase twice a day for the next month. Consider ENT referral in the future if patient continues to have little improvement in her vertigo.

## 2022-01-21 NOTE — ASSESSMENT & PLAN NOTE
Unfortunately urine culture was not performed in the emergency room. We will repeat UA with urine culture. For bladder spasms we will do a 5-day trial of oxybutynin. I will call patient with results and appropriate antibiotics as indicated. Return to the emergency room or clinic for worsening symptoms.

## 2022-01-21 NOTE — PROGRESS NOTES
Subjective:     CC: vertigo and UTI    HPI:   García presents today with :    Problem   Vertigo    Started a couple of months ago. Has progressively gotten worse. It is worse with lifting of her head. The room is spinning. Associated with changes in hearing in her left ear. Patient normally wear a hearing aid. The hearing issues have since resolved.      Acute Cystitis With Hematuria    ED visit from 1/3/2022 significant for UTI with hematuria. Patient continues to have mild symptoms of urgency and frequency. Is associated with back spasms and bladder spasms. Fevers or chills at home.         Current Outpatient Medications Ordered in Epic   Medication Sig Dispense Refill   • budesonide-formoterol (SYMBICORT) 80-4.5 MCG/ACT Aerosol SYMBICORT 80-4.5 MCG/ACT AERO     • Eszopiclone 3 MG Tab      • meclizine (ANTIVERT) 12.5 MG Tab Take 1 Tablet by mouth 3 times a day as needed. 30 Tablet 1   • oxybutynin SR (DITROPAN-XL) 10 MG CR tablet Take 1 Tablet by mouth every day. 5 Tablet 0   • metoprolol SR (TOPROL XL) 50 MG TABLET SR 24 HR Take 1 Tablet by mouth every day. **LAST SEEN 12/2020.  TO ENSURE FURTHER REFILLS, KEEP SCHEDULED FOLLOW UP VISIT 3/1/2022.** 90 Tablet 0   • famotidine (PEPCID) 20 MG Tab Take 40 mg by mouth.     • ALPRAZolam (XANAX) 0.25 MG Tab TAKE 1 TABLET BY MOUTH TWICE A DAY AS NEEDED FOR ANXIETY F41.9     • amphetamine-dextroamphetamine (ADDERALL) 20 MG Tab TAKE 1 TABLET BY MOUTH TWICE A DAY FOR 30 DAYS F90     • aripiprazole (ABILIFY) 30 MG tablet Take 30 mg by mouth every day.     • FLOVENT HFA 44 MCG/ACT Aerosol Inhale 2 Puffs by mouth.     • Cholecalciferol (VITAMIN D) 2000 units Cap Take 1 Cap by mouth every day. 90 Cap 2   • PARoxetine (PAXIL) 20 MG Tab TAKE 1 TABLET BY MOUTH EVERY DAY 90 Tab 0     No current Epic-ordered facility-administered medications on file.         ROS:  Gen: no fevers/chills, no changes in weight  Eyes: no changes in vision  ENT: no changes in hearing  Pulm: no sob, no  "cough  CV: no chest pain, no palpitations  GI: no nausea/vomiting, no diarrhea  MSk: no myalgias  Skin: no rash  Neuro: no headaches, no numbness/tingling      Objective:     Exam:  Pulse 75   Ht 1.727 m (5' 8\")   Wt 74.7 kg (164 lb 11.2 oz)   SpO2 95%   BMI 25.04 kg/m²  Body mass index is 25.04 kg/m².    Gen: Alert and oriented, No apparent distress.  HEENT: Bilateral tympanic membranes with clear fluid behind. External canals clear.  Neck: Neck is supple without lymphadenopathy.  Lungs: Normal effort, CTA bilaterally, no wheezes, rhonchi, or rales  CV: Regular rate and rhythm. No murmurs, rubs, or gallops.  Ext: No clubbing, cyanosis, edema.    Labs: reviewed and ordered     Assessment & Plan:     66 y.o. female with the following -     Problem List Items Addressed This Visit     Vertigo     Woodburn-Hallpike maneuver in the office was unsuccessful. Physical exam with there fluid fine bilateral tympanic membranes. Recommend that patient start Flonase twice a day for the next month. Consider ENT referral in the future if patient continues to have little improvement in her vertigo.         Acute cystitis with hematuria     Unfortunately urine culture was not performed in the emergency room. We will repeat UA with urine culture. For bladder spasms we will do a 5-day trial of oxybutynin. I will call patient with results and appropriate antibiotics as indicated. Return to the emergency room or clinic for worsening symptoms.         Relevant Orders    URINALYSIS,CULTURE IF INDICATED            Return if symptoms worsen or fail to improve.    Lizzeth Diaz MD   PGY2    Please note that this dictation was created using voice recognition software. I have made every reasonable attempt to correct obvious errors, but I expect that there are errors of grammar and possibly content that I did not discover before finalizing the note.      "

## 2022-02-15 DIAGNOSIS — E78.49 FAMILIAL HYPERLIPIDEMIA, HIGH LDL: ICD-10-CM

## 2022-02-15 DIAGNOSIS — E78.5 DYSLIPIDEMIA: ICD-10-CM

## 2022-02-15 NOTE — TELEPHONE ENCOUNTER
Upon chart review, pt PCP d/c statin 1/20/2022, see last OV note.  Unable to locate documentation as to why it was d/c.     Medication request relayed to covering APRN

## 2022-02-16 RX ORDER — ROSUVASTATIN CALCIUM 20 MG/1
TABLET, COATED ORAL
Qty: 90 TABLET | Refills: 3 | Status: SHIPPED | OUTPATIENT
Start: 2022-02-16 | End: 2022-03-01

## 2022-03-01 ENCOUNTER — OFFICE VISIT (OUTPATIENT)
Dept: CARDIOLOGY | Facility: MEDICAL CENTER | Age: 67
End: 2022-03-01
Payer: MEDICARE

## 2022-03-01 VITALS
DIASTOLIC BLOOD PRESSURE: 72 MMHG | OXYGEN SATURATION: 96 % | HEIGHT: 68 IN | HEART RATE: 66 BPM | BODY MASS INDEX: 25.01 KG/M2 | RESPIRATION RATE: 14 BRPM | WEIGHT: 165 LBS | SYSTOLIC BLOOD PRESSURE: 114 MMHG

## 2022-03-01 DIAGNOSIS — I47.29 NSVT (NONSUSTAINED VENTRICULAR TACHYCARDIA) (HCC): Chronic | ICD-10-CM

## 2022-03-01 DIAGNOSIS — E78.5 DYSLIPIDEMIA: ICD-10-CM

## 2022-03-01 DIAGNOSIS — R94.39 ABNORMAL FINDING ON CARDIOVASCULAR STRESS TEST: Chronic | ICD-10-CM

## 2022-03-01 DIAGNOSIS — I25.10 CORONARY ATHEROSCLEROSIS DUE TO LIPID RICH PLAQUE: ICD-10-CM

## 2022-03-01 DIAGNOSIS — I25.83 CORONARY ATHEROSCLEROSIS DUE TO LIPID RICH PLAQUE: ICD-10-CM

## 2022-03-01 DIAGNOSIS — E78.49 FAMILIAL HYPERLIPIDEMIA, HIGH LDL: ICD-10-CM

## 2022-03-01 DIAGNOSIS — R93.1 AGATSTON CORONARY ARTERY CALCIUM SCORE BETWEEN 200 AND 399: Chronic | ICD-10-CM

## 2022-03-01 PROCEDURE — 99214 OFFICE O/P EST MOD 30 MIN: CPT | Performed by: INTERNAL MEDICINE

## 2022-03-01 ASSESSMENT — FIBROSIS 4 INDEX: FIB4 SCORE: 1.03

## 2022-03-01 NOTE — PROGRESS NOTES
Chief Complaint   Patient presents with   • Supraventricular Tachycardia (SVT)     F/V Dx: NSVT (nonsustained ventricular tachycardia) (HCC) - seen in recovery from stress Echo       Subjective     García Cho is a 66 y.o. female who presents today for follow-up of her history of elevated calcium score and hyperlipidemia with NSVT as well as low exercise tolerance    She is doing well over the last year recently took antibiotics    Past Medical History:   Diagnosis Date   • Abnormal finding on cardiovascular stress test - hypertensive response to low level of exercise - normal stress imaging 12/10/2020   • Agatston coronary artery calcium score between 200 and 399 - 355 2020    • Agoraphobia with panic disorder     anxiety   • Allergic rhinitis    • Arthritis     all joints   • Bowel habit changes    • Dental disorder    • Dyslipidemia    • Fear of travel with panic attacks    • Heart burn    • Hiatal hernia    • NSVT (nonsustained ventricular tachycardia) (HCC) - seen in recovery from stress Echo 12/10/2020   • Schatzki's ring      Past Surgical History:   Procedure Laterality Date   • ALEX BY LAPAROSCOPY  1/19/2018    Procedure: ALEX BY LAPAROSCOPY/SURGICAL;  Surgeon: Keren Henderson M.D.;  Location: SURGERY [facility];  Service: General   • EGD WITH ASP/BX  11/26/12    distal esophageal stricture secondary to reflux status post dilation to 51 [address], mild erosive esophagitis, mild gastric erythema, small sliding hiatal hernia, mild chronic gastritis, negative H. pylori, no dysplasia or malignancy   • COLONOSCOPY WITH BIOPSY  7/08    hemorrhoids, no malignancy   • EGD WITH ASP/BX  7/08    hiatal hernia, schatzki's ring   • ABDOMINAL HYSTERECTOMY TOTAL      with BSO due to infection   • OTHER       I &D rectal abscess     Family History   Problem Relation Age of Onset   • Cancer Brother         pancreatic cancer   • Arthritis Mother    • Cancer Mother         lung cancer with mets    •  Arthritis Father    • Alcohol/Drug Brother         drug abuse     Social History     Socioeconomic History   • Marital status: Single     Spouse name: Not on file   • Number of children: Not on file   • Years of education: Not on file   • Highest education level: Not on file   Occupational History   • Occupation:      Employer: OTHER   Tobacco Use   • Smoking status: Former Smoker     Packs/day: 0.10     Years: 44.00     Pack years: 4.40     Types: Cigarettes     Quit date: 3/17/2014     Years since quittin.9   • Smokeless tobacco: Never Used   Vaping Use   • Vaping Use: Never used   Substance and Sexual Activity   • Alcohol use: No     Comment: 10 drinks/week quit , quit    • Drug use: No     Comment: used marijuana and cocaine in the past, no IVDU   • Sexual activity: Yes     Partners: Male     Birth control/protection: Surgical   Other Topics Concern   • Not on file   Social History Narrative   • Not on file     Social Determinants of Health     Financial Resource Strain: Not on file   Food Insecurity: Not on file   Transportation Needs: Not on file   Physical Activity: Not on file   Stress: Not on file   Social Connections: Not on file   Intimate Partner Violence: Not on file   Housing Stability: Not on file     Allergies   Allergen Reactions   • Atorvastatin      diarrhea   • Pravastatin      diarrhea   • Other Environmental      Pollen       Outpatient Encounter Medications as of 3/1/2022   Medication Sig Dispense Refill   • rosuvastatin (CRESTOR) 20 MG Tab TAKE 1 TABLET BY MOUTH EVERY DAY IN THE EVENING (Patient not taking: Reported on 3/1/2022) 90 Tablet 3   • budesonide-formoterol (SYMBICORT) 80-4.5 MCG/ACT Aerosol SYMBICORT 80-4.5 MCG/ACT AERO     • Eszopiclone 3 MG Tab      • famotidine (PEPCID) 20 MG Tab Take 40 mg by mouth.     • ALPRAZolam (XANAX) 0.25 MG Tab TAKE 1 TABLET BY MOUTH TWICE A DAY AS NEEDED FOR ANXIETY F41.9     • amphetamine-dextroamphetamine (ADDERALL) 20 MG Tab  "TAKE 1 TABLET BY MOUTH TWICE A DAY FOR 30 DAYS F90     • aripiprazole (ABILIFY) 30 MG tablet Take 30 mg by mouth every day.     • FLOVENT HFA 44 MCG/ACT Aerosol Inhale 2 Puffs by mouth.     • Cholecalciferol (VITAMIN D) 2000 units Cap Take 1 Cap by mouth every day. 90 Cap 2   • PARoxetine (PAXIL) 20 MG Tab TAKE 1 TABLET BY MOUTH EVERY DAY 90 Tab 0   • meclizine (ANTIVERT) 12.5 MG Tab Take 1 Tablet by mouth 3 times a day as needed. (Patient not taking: Reported on 3/1/2022) 30 Tablet 1   • oxybutynin SR (DITROPAN-XL) 10 MG CR tablet Take 1 Tablet by mouth every day. (Patient not taking: Reported on 3/1/2022) 5 Tablet 0   • metoprolol SR (TOPROL XL) 50 MG TABLET SR 24 HR Take 1 Tablet by mouth every day. **LAST SEEN 12/2020.  TO ENSURE FURTHER REFILLS, KEEP SCHEDULED FOLLOW UP VISIT 3/1/2022.** (Patient not taking: Reported on 3/1/2022) 90 Tablet 0     No facility-administered encounter medications on file as of 3/1/2022.     ROS           Objective     /72 (BP Location: Left arm, Patient Position: Sitting, BP Cuff Size: Adult)   Pulse 66   Resp 14   Ht 1.727 m (5' 8\")   Wt 74.8 kg (165 lb)   SpO2 96%   BMI 25.09 kg/m²     Physical Exam  Constitutional:       General: She is not in acute distress.     Appearance: She is not diaphoretic.   Eyes:      General: No scleral icterus.  Neck:      Vascular: No JVD.   Cardiovascular:      Rate and Rhythm: Normal rate.      Heart sounds: Normal heart sounds. No murmur heard.    No friction rub. No gallop.   Pulmonary:      Effort: No respiratory distress.      Breath sounds: No wheezing or rales.   Abdominal:      General: Bowel sounds are normal.      Palpations: Abdomen is soft.   Skin:     Findings: No rash.   Neurological:      Mental Status: She is alert.            We reviewed in person the most recent labs  Recent Results (from the past 5040 hour(s))   Lipid Profile    Collection Time: 01/03/22 10:40 AM   Result Value Ref Range    Cholesterol,Tot 267 (H) 100 " - 199 mg/dL    Triglycerides 164 (H) 0 - 149 mg/dL    HDL 55 >=40 mg/dL     (H) <100 mg/dL   FASTING STATUS    Collection Time: 01/03/22 10:40 AM   Result Value Ref Range    Fasting Status Fasting    POCT Urinalysis    Collection Time: 01/03/22 11:32 AM   Result Value Ref Range    POC Color dark yellow Negative    POC Appearance clear Negative    POC Leukocyte Esterase negative Negative    POC Nitrites negative Negative    POC Urobiligen 0.2 Negative (0.2) mg/dL    POC Protein 30 Negative mg/dL    POC Urine PH 7.0 5.0 - 8.0    POC Blood large Negative    POC Specific Gravity 1.020 <1.005 - >1.030    POC Ketones negative Negative mg/dL    POC Bilirubin negative Negative mg/dL    POC Glucose negative Negative mg/dL   URINALYSIS (UA)    Collection Time: 01/03/22  6:30 PM    Specimen: Urine   Result Value Ref Range    Color Yellow     Character Clear     Specific Gravity 1.011 <1.035    Ph 6.0 5.0 - 8.0    Glucose Negative Negative mg/dL    Ketones Negative Negative mg/dL    Protein 30 (A) Negative mg/dL    Bilirubin Negative Negative    Urobilinogen, Urine 0.2 Negative    Nitrite Negative Negative    Leukocyte Esterase Trace (A) Negative    Occult Blood Moderate (A) Negative    Micro Urine Req Microscopic    URINE MICROSCOPIC (W/UA)    Collection Time: 01/03/22  6:30 PM   Result Value Ref Range    WBC 5-10 (A) /hpf    RBC  (A) /hpf    Bacteria Negative None /hpf    Epithelial Cells Few /hpf    Hyaline Cast 3-5 (A) /lpf   CBC WITH DIFFERENTIAL    Collection Time: 01/03/22  6:38 PM   Result Value Ref Range    WBC 9.3 4.8 - 10.8 K/uL    RBC 5.32 4.20 - 5.40 M/uL    Hemoglobin 16.4 (H) 12.0 - 16.0 g/dL    Hematocrit 49.1 (H) 37.0 - 47.0 %    MCV 92.3 81.4 - 97.8 fL    MCH 30.8 27.0 - 33.0 pg    MCHC 33.4 (L) 33.6 - 35.0 g/dL    RDW 43.3 35.9 - 50.0 fL    Platelet Count 333 164 - 446 K/uL    MPV 9.0 9.0 - 12.9 fL    Neutrophils-Polys 50.60 44.00 - 72.00 %    Lymphocytes 41.00 22.00 - 41.00 %    Monocytes 5.90  0.00 - 13.40 %    Eosinophils 1.60 0.00 - 6.90 %    Basophils 0.50 0.00 - 1.80 %    Immature Granulocytes 0.40 0.00 - 0.90 %    Nucleated RBC 0.00 /100 WBC    Neutrophils (Absolute) 4.68 2.00 - 7.15 K/uL    Lymphs (Absolute) 3.80 1.00 - 4.80 K/uL    Monos (Absolute) 0.55 0.00 - 0.85 K/uL    Eos (Absolute) 0.15 0.00 - 0.51 K/uL    Baso (Absolute) 0.05 0.00 - 0.12 K/uL    Immature Granulocytes (abs) 0.04 0.00 - 0.11 K/uL    NRBC (Absolute) 0.00 K/uL   COMP METABOLIC PANEL    Collection Time: 01/03/22  6:38 PM   Result Value Ref Range    Sodium 138 135 - 145 mmol/L    Potassium 3.6 3.6 - 5.5 mmol/L    Chloride 100 96 - 112 mmol/L    Co2 23 20 - 33 mmol/L    Anion Gap 15.0 7.0 - 16.0    Glucose 95 65 - 99 mg/dL    Bun 11 8 - 22 mg/dL    Creatinine 0.70 0.50 - 1.40 mg/dL    Calcium 9.9 8.5 - 10.5 mg/dL    AST(SGOT) 18 12 - 45 U/L    ALT(SGPT) 12 2 - 50 U/L    Alkaline Phosphatase 79 30 - 99 U/L    Total Bilirubin 0.9 0.1 - 1.5 mg/dL    Albumin 4.8 3.2 - 4.9 g/dL    Total Protein 8.0 6.0 - 8.2 g/dL    Globulin 3.2 1.9 - 3.5 g/dL    A-G Ratio 1.5 g/dL   ESTIMATED GFR    Collection Time: 01/03/22  6:38 PM   Result Value Ref Range    GFR If African American >60 >60 mL/min/1.73 m 2    GFR If Non African American >60 >60 mL/min/1.73 m 2   URINALYSIS,CULTURE IF INDICATED    Collection Time: 01/20/22  3:53 PM    Specimen: Urine   Result Value Ref Range    Color Yellow     Character Clear     Specific Gravity 1.007 <1.035    Ph 6.5 5.0 - 8.0    Glucose Negative Negative mg/dL    Ketones Negative Negative mg/dL    Protein Negative Negative mg/dL    Bilirubin Negative Negative    Urobilinogen, Urine 0.2 Negative    Nitrite Negative Negative    Leukocyte Esterase Trace (A) Negative    Occult Blood Small (A) Negative    Micro Urine Req Microscopic    URINE MICROSCOPIC (W/UA)    Collection Time: 01/20/22  3:53 PM   Result Value Ref Range    WBC 0-2 /hpf    RBC 0-2 /hpf    Bacteria Negative None /hpf    Epithelial Cells Negative /hpf     Hyaline Cast 0-2 /lpf       Assessment & Plan     1. Abnormal finding on cardiovascular stress test - hypertensive response to low level of exercise - normal stress imaging     2. Agatston coronary artery calcium score between 200 and 399 - 355 2020     3. Coronary atherosclerosis due to lipid rich plaque     4. Dyslipidemia     5. Familial hyperlipidemia, high LDL     6. NSVT (nonsustained ventricular tachycardia) (HCC) - seen in recovery from stress Echo         Medical Decision Making: Today's Assessment/Status/Plan:        It was my pleasure to meet with Ms. Cho.    We addressed the management of hypertension at today's visit. Blood pressure is well controlled.  We specifically assessed the labs on hypertension treatment    She declines statin risk is actually low      I will see Ms. Cho back in 1 year time and encouraged her to follow up with us over the phone or electronically using my [employer] as issues arise.    It is my pleasure to participate in the care of Ms. Cho.  Please do not hesitate to contact me with questions or concerns.    Cabrera Mendoza MD PhD FAC  Cardiologist [facility]    Please note that this dictation was created using voice recognition software. There may be errors I did not discover before finalizing the note.

## 2022-03-01 NOTE — PATIENT INSTRUCTIONS
Work on at least 1.5 - 5 hours a week of moderate exercise (typical brisk walking or similar activity)    If you have had a heart attack, stent, bypass or reduced heart strength (EF <35%): cardiac rehab may reduce your risk of dying by 13-24% and need to go to the hospital by 30% within the first year (1)    Please look into the following diets and incorporate them into your diet    LOW SALT DIET   KEEP YOUR SODIUM EQUAL TO CALORIES AND NO MORE THAN DOUBLE THE CALORIES FOR A LOW SALT DIET    Cardiosmart.org - great resource for American College of Cardiology on heart disease prevention and treatment    FOR TREATMENT OR PREVENTION OF CORONARY ARTERY DISEASE  These three programs are approved by Medicare/Insurers for those with heart disease  [place] - [facility]  Dr. Duncan's Program for Reversing Heart Disease - Ellis Abdul's Cardiologist vegetarian-based  [facility] - [place]-based mind-body Program    This is a commonly referenced Program  Dr Hernandes - [employer] [facility] [employer] (book and documentary) - vegetarian-based    FOR TREATMENT OF BLOOD PRESSURE  DASH DIET - American Heart Association for treatment of HYPERTENSION    FOR TREATMENT OF BAD CHOLESTEROL/FATS  REDUCE PROCESSED SUGAR AS MUCH AS POSSIBLE  INCREASE WHOLE GRAINS/VEGETABLES  INCREASE FIBER    Lowering total cholesterol and LDL (bad) cholesterol:  - Eat leaner cuts of meat, or eliminate altogether if possible red meat, and frequently substitute fish or chicken.  - Limit saturated fat to no more than 7-10% of total calories no more than 10 g per day is recommended. Some sources of saturated fat include butter, animal fats, hydrogenated vegetable fats and oils, many desserts, whole milk dairy products.  - Replaced saturated fats with polyunsaturated fats and monounsaturated fats. Foods high in monounsaturated fat include nuts, canola oil, avocados, and olives.  - Limit trans fat (processed  foods) and replaced with fresh fruits and vegetables  - Recommend nonfat dairy products  - Increase substantially the amount of soluble fiber intake (legumes such as beans, fruit, whole grains).  - Consider nutritional supplements: plant sterile spreads such as Benecol, fish oil,  flaxseed oil, omega-3 acids capsules 1000 mg twice a day, or viscous fiber such as Metamucil  - Attain ideal weight and regular exercise (at least 30 minutes per day of moderate exercise)  ASK ABOUT STATIN OR NON STATIN MEDICATION TO REDUCE YOUR LDL AND HEART RISK    Lowering triglycerides:  - Reduce intake of simple sugar: Desserts, candy, pastries, honey, sodas, sugared cereals, yogurt, Gatorade, sports bars, canned fruit, smoothies, fruit juice, coffee drinks  - Reduced intake of refined starches: Refined Pasta, most bread  - Reduce or abstain from alcohol  - Increase omega-3 fatty acids: [place], Trout, Mackerel, [place]ring, [place] tuna and supplements  - Attain ideal weight and regular exercise (at least 30 minutes per day of moderate exercise)  ASK ABOUT PURIFIED OMEGA 3 EPA or FISH OIL TO REDUCE YOUR TG AND HEART RISK    Elevating HDL (good) cholesterol:  - Increase physical activity  - Increase omega-3 fatty acids and supplements as listed above  - Incorporating appropriate amounts of monounsaturated fats such as nuts, olive oil, canola oil, avocados, olives  - Stop smoking  - Attain ideal weight and regular exercise (at least 30 minutes per day of moderate exercise)

## 2022-07-06 ENCOUNTER — HOSPITAL ENCOUNTER (OUTPATIENT)
Facility: MEDICAL CENTER | Age: 67
End: 2022-07-06
Attending: STUDENT IN AN ORGANIZED HEALTH CARE EDUCATION/TRAINING PROGRAM
Payer: MEDICARE

## 2022-07-06 PROCEDURE — 81001 URINALYSIS AUTO W/SCOPE: CPT

## 2022-07-06 PROCEDURE — 87086 URINE CULTURE/COLONY COUNT: CPT

## 2022-07-07 LAB
APPEARANCE UR: CLEAR
BACTERIA #/AREA URNS HPF: NEGATIVE /HPF
BILIRUB UR QL STRIP.AUTO: NEGATIVE
COLOR UR: ABNORMAL
EPI CELLS #/AREA URNS HPF: ABNORMAL /HPF
GLUCOSE UR STRIP.AUTO-MCNC: NEGATIVE MG/DL
HYALINE CASTS #/AREA URNS LPF: ABNORMAL /LPF
KETONES UR STRIP.AUTO-MCNC: NEGATIVE MG/DL
LEUKOCYTE ESTERASE UR QL STRIP.AUTO: ABNORMAL
MICRO URNS: ABNORMAL
NITRITE UR QL STRIP.AUTO: NEGATIVE
PH UR STRIP.AUTO: 6 [PH] (ref 5–8)
PROT UR QL STRIP: 30 MG/DL
RBC # URNS HPF: ABNORMAL /HPF
RBC UR QL AUTO: ABNORMAL
SP GR UR STRIP.AUTO: 1.01
UROBILINOGEN UR STRIP.AUTO-MCNC: 1 MG/DL
WBC #/AREA URNS HPF: ABNORMAL /HPF

## 2022-07-08 LAB
BACTERIA UR CULT: NORMAL
SIGNIFICANT IND 70042: NORMAL
SITE SITE: NORMAL
SOURCE SOURCE: NORMAL

## 2022-07-14 ENCOUNTER — HOSPITAL ENCOUNTER (OUTPATIENT)
Dept: LAB | Facility: MEDICAL CENTER | Age: 67
End: 2022-07-14
Attending: STUDENT IN AN ORGANIZED HEALTH CARE EDUCATION/TRAINING PROGRAM
Payer: MEDICARE

## 2022-07-14 LAB
APPEARANCE UR: ABNORMAL
BACTERIA #/AREA URNS HPF: NEGATIVE /HPF
BILIRUB UR QL STRIP.AUTO: ABNORMAL
COLOR UR: ABNORMAL
EPI CELLS #/AREA URNS HPF: ABNORMAL /HPF
GLUCOSE UR STRIP.AUTO-MCNC: NEGATIVE MG/DL
KETONES UR STRIP.AUTO-MCNC: 15 MG/DL
LEUKOCYTE ESTERASE UR QL STRIP.AUTO: ABNORMAL
MICRO URNS: ABNORMAL
NITRITE UR QL STRIP.AUTO: NEGATIVE
PH UR STRIP.AUTO: 5.5 [PH] (ref 5–8)
PROT UR QL STRIP: 300 MG/DL
RBC # URNS HPF: ABNORMAL /HPF
RBC UR QL AUTO: ABNORMAL
SP GR UR STRIP.AUTO: 1.02
UROBILINOGEN UR STRIP.AUTO-MCNC: 1 MG/DL
WBC #/AREA URNS HPF: ABNORMAL /HPF

## 2022-07-14 PROCEDURE — 81001 URINALYSIS AUTO W/SCOPE: CPT

## 2022-07-25 ENCOUNTER — HOSPITAL ENCOUNTER (OUTPATIENT)
Dept: LAB | Facility: MEDICAL CENTER | Age: 67
End: 2022-07-25
Attending: STUDENT IN AN ORGANIZED HEALTH CARE EDUCATION/TRAINING PROGRAM
Payer: MEDICARE

## 2022-07-25 ENCOUNTER — HOSPITAL ENCOUNTER (OUTPATIENT)
Dept: RADIOLOGY | Facility: MEDICAL CENTER | Age: 67
End: 2022-07-25
Attending: STUDENT IN AN ORGANIZED HEALTH CARE EDUCATION/TRAINING PROGRAM
Payer: MEDICARE

## 2022-07-25 DIAGNOSIS — R31.29 OTHER MICROSCOPIC HEMATURIA: ICD-10-CM

## 2022-07-25 LAB
BUN SERPL-MCNC: 13 MG/DL (ref 8–22)
CREAT SERPL-MCNC: 0.76 MG/DL (ref 0.5–1.4)
GFR SERPLBLD CREATININE-BSD FMLA CKD-EPI: 86 ML/MIN/1.73 M 2

## 2022-07-25 PROCEDURE — 36415 COLL VENOUS BLD VENIPUNCTURE: CPT

## 2022-07-25 PROCEDURE — 82565 ASSAY OF CREATININE: CPT

## 2022-07-25 PROCEDURE — 84520 ASSAY OF UREA NITROGEN: CPT

## 2022-07-25 PROCEDURE — 74178 CT ABD&PLV WO CNTR FLWD CNTR: CPT | Mod: MH

## 2022-07-25 PROCEDURE — 700117 HCHG RX CONTRAST REV CODE 255: Performed by: STUDENT IN AN ORGANIZED HEALTH CARE EDUCATION/TRAINING PROGRAM

## 2022-07-25 RX ADMIN — IOHEXOL 100 ML: 350 INJECTION, SOLUTION INTRAVENOUS at 13:15

## 2022-08-10 ENCOUNTER — TELEPHONE (OUTPATIENT)
Dept: CARDIOLOGY | Facility: MEDICAL CENTER | Age: 67
End: 2022-08-10
Payer: MEDICARE

## 2022-08-12 NOTE — TELEPHONE ENCOUNTER
She can proceed with the proposed procedure or surgery from a cardiac stanpoint, no modifiable cardiovascular risk, no further cardiac testing required.    It is my pleasure to participate in the care of Ms. Cho.  Please do not hesitate to contact me with questions or concerns. [facility] is available 24/7 for consultative services at [phone] in the perioperative period.    Electronically Signed    Cabrera Mendoza MD PhD [facility]  Cardiologist [facility]    Please note that this dictation was created using voice recognition software. There may be errors I did not discover before finalizing the note.

## 2022-08-12 NOTE — TELEPHONE ENCOUNTER
Telephone note signed by MD printed and faxed to [phone], completed status.    Fax sent to scanning for reference via "[employer] completed status received.

## 2022-08-26 ENCOUNTER — HOSPITAL ENCOUNTER (OUTPATIENT)
Facility: MEDICAL CENTER | Age: 67
End: 2022-08-26
Attending: UROLOGY
Payer: MEDICARE

## 2022-08-26 PROCEDURE — 87086 URINE CULTURE/COLONY COUNT: CPT

## 2022-08-28 LAB
BACTERIA UR CULT: NORMAL
SIGNIFICANT IND 70042: NORMAL
SITE SITE: NORMAL
SOURCE SOURCE: NORMAL

## 2022-10-10 ENCOUNTER — HOSPITAL ENCOUNTER (OUTPATIENT)
Facility: MEDICAL CENTER | Age: 67
End: 2022-10-10
Attending: UROLOGY
Payer: MEDICARE

## 2022-10-10 PROCEDURE — 87086 URINE CULTURE/COLONY COUNT: CPT

## 2022-10-14 LAB
BACTERIA UR CULT: NORMAL
SIGNIFICANT IND 70042: NORMAL
SITE SITE: NORMAL
SOURCE SOURCE: NORMAL

## 2022-11-11 ENCOUNTER — PATIENT MESSAGE (OUTPATIENT)
Dept: HEALTH INFORMATION MANAGEMENT | Facility: OTHER | Age: 67
End: 2022-11-11

## 2023-01-31 ENCOUNTER — OFFICE VISIT (OUTPATIENT)
Dept: CARDIOLOGY | Facility: MEDICAL CENTER | Age: 68
End: 2023-01-31
Payer: MEDICARE

## 2023-01-31 VITALS
HEART RATE: 82 BPM | OXYGEN SATURATION: 97 % | HEIGHT: 68 IN | WEIGHT: 165 LBS | DIASTOLIC BLOOD PRESSURE: 70 MMHG | RESPIRATION RATE: 14 BRPM | SYSTOLIC BLOOD PRESSURE: 120 MMHG | BODY MASS INDEX: 25.01 KG/M2

## 2023-01-31 DIAGNOSIS — E78.5 DYSLIPIDEMIA: ICD-10-CM

## 2023-01-31 DIAGNOSIS — R94.39 ABNORMAL FINDING ON CARDIOVASCULAR STRESS TEST: Chronic | ICD-10-CM

## 2023-01-31 DIAGNOSIS — I47.29 NSVT (NONSUSTAINED VENTRICULAR TACHYCARDIA) (HCC): Chronic | ICD-10-CM

## 2023-01-31 DIAGNOSIS — E78.49 FAMILIAL HYPERLIPIDEMIA, HIGH LDL: ICD-10-CM

## 2023-01-31 DIAGNOSIS — R93.1 AGATSTON CORONARY ARTERY CALCIUM SCORE BETWEEN 200 AND 399: Chronic | ICD-10-CM

## 2023-01-31 DIAGNOSIS — I25.10 CORONARY ATHEROSCLEROSIS DUE TO LIPID RICH PLAQUE: ICD-10-CM

## 2023-01-31 DIAGNOSIS — I25.83 CORONARY ATHEROSCLEROSIS DUE TO LIPID RICH PLAQUE: ICD-10-CM

## 2023-01-31 PROBLEM — C67.9 MALIGNANT TUMOR OF URINARY BLADDER (HCC): Status: ACTIVE | Noted: 2022-11-21

## 2023-01-31 PROCEDURE — 99214 OFFICE O/P EST MOD 30 MIN: CPT | Performed by: INTERNAL MEDICINE

## 2023-01-31 RX ORDER — NEBIVOLOL 5 MG/1
5 TABLET ORAL DAILY
Qty: 100 TABLET | Refills: 3 | Status: SHIPPED | OUTPATIENT
Start: 2023-01-31

## 2023-01-31 ASSESSMENT — FIBROSIS 4 INDEX: FIB4 SCORE: 1.2

## 2023-02-01 NOTE — PROGRESS NOTES
Chief Complaint   Patient presents with    Coronary Artery Disease    Dyslipidemia       Subjective     García Cho is a 67 y.o. female who presents today for follow-up of her history of hypertension with stress test and SVT    She is done over the past year tolerating medications well    Palpitations        Past Medical History:   Diagnosis Date    Abnormal finding on cardiovascular stress test - hypertensive response to low level of exercise - normal stress imaging 12/10/2020    Agatston coronary artery calcium score between 200 and 399 - 355 2020     Agoraphobia with panic disorder     anxiety    Allergic rhinitis     Arthritis     all joints    Bowel habit changes     Dental disorder     Dyslipidemia     Fear of travel with panic attacks     Heart burn     Hiatal hernia     NSVT (nonsustained ventricular tachycardia) (HCC) - seen in recovery from stress Echo 12/10/2020    Schatzki's ring      Past Surgical History:   Procedure Laterality Date    ALEX BY LAPAROSCOPY  1/19/2018    Procedure: ALEX BY LAPAROSCOPY/SURGICAL;  Surgeon: Keren Henderson M.D.;  Location: SURGERY [facility];  Service: General    EGD WITH ASP/BX  11/26/12    distal esophageal stricture secondary to reflux status post dilation to 51 [place], mild erosive esophagitis, mild gastric erythema, small sliding hiatal hernia, mild chronic gastritis, negative H. pylori, no dysplasia or malignancy    COLONOSCOPY WITH BIOPSY  7/08    hemorrhoids, no malignancy    EGD WITH ASP/BX  7/08    hiatal hernia, schatzki's ring    ABDOMINAL HYSTERECTOMY TOTAL      with BSO due to infection    OTHER       I &D rectal abscess     Family History   Problem Relation Age of Onset    Cancer Brother         pancreatic cancer    Arthritis Mother     Cancer Mother         lung cancer with mets     Arthritis Father     Alcohol/Drug Brother         drug abuse     Social History     Socioeconomic History    Marital status: Single     Spouse name: Not on  file    Number of children: Not on file    Years of education: Not on file    Highest education level: Not on file   Occupational History    Occupation:      Employer: OTHER   Tobacco Use    Smoking status: Former     Packs/day: 0.10     Years: 44.00     Pack years: 4.40     Types: Cigarettes     Quit date: 3/17/2014     Years since quittin.8    Smokeless tobacco: Never   Vaping Use    Vaping Use: Never used   Substance and Sexual Activity    Alcohol use: No     Comment: 10 drinks/week quit , quit     Drug use: No     Comment: used marijuana and cocaine in the past, no IVDU    Sexual activity: Yes     Partners: Male     Birth control/protection: Surgical   Other Topics Concern    Not on file   Social History Narrative    Not on file     Social Determinants of Health     Financial Resource Strain: Not on file   Food Insecurity: Not on file   Transportation Needs: Not on file   Physical Activity: Not on file   Stress: Not on file   Social Connections: Not on file   Intimate Partner Violence: Not on file   Housing Stability: Not on file     Allergies   Allergen Reactions    Atorvastatin      diarrhea    Bee Pollen Unspecified    Ibuprofen Nausea    Pravastatin      diarrhea    Other Environmental      Pollen       Outpatient Encounter Medications as of 2023   Medication Sig Dispense Refill    budesonide-formoterol (SYMBICORT) 80-4.5 MCG/ACT Aerosol SYMBICORT 80-4.5 MCG/ACT AERO      famotidine (PEPCID) 20 MG Tab Take 40 mg by mouth.      ALPRAZolam (XANAX) 0.25 MG Tab TAKE 1 TABLET BY MOUTH TWICE A DAY AS NEEDED FOR ANXIETY F41.9      amphetamine-dextroamphetamine (ADDERALL) 20 MG Tab TAKE 1 TABLET BY MOUTH TWICE A DAY FOR 30 DAYS F90      aripiprazole (ABILIFY) 30 MG tablet Take 30 mg by mouth every day.      FLOVENT HFA 44 MCG/ACT Aerosol Inhale 2 Puffs by mouth.      [DISCONTINUED] Eszopiclone 3 MG Tab  (Patient not taking: Reported on 2023)      [DISCONTINUED] meclizine (ANTIVERT)  "12.5 MG Tab Take 1 Tablet by mouth 3 times a day as needed. (Patient not taking: Reported on 3/1/2022) 30 Tablet 1    [DISCONTINUED] oxybutynin SR (DITROPAN-XL) 10 MG CR tablet Take 1 Tablet by mouth every day. (Patient not taking: Reported on 3/1/2022) 5 Tablet 0    [DISCONTINUED] Cholecalciferol (VITAMIN D) 2000 units Cap Take 1 Cap by mouth every day. (Patient not taking: Reported on 1/31/2023) 90 Cap 2    [DISCONTINUED] PARoxetine (PAXIL) 20 MG Tab TAKE 1 TABLET BY MOUTH EVERY DAY (Patient not taking: Reported on 1/31/2023) 90 Tab 0     No facility-administered encounter medications on file as of 1/31/2023.     ROS           Objective     /70 (BP Location: Left arm, Patient Position: Sitting, BP Cuff Size: Adult)   Pulse 82   Resp 14   Ht 1.727 m (5' 8\")   Wt 74.8 kg (165 lb)   SpO2 97%   BMI 25.09 kg/m²     Physical Exam  Constitutional:       General: She is not in acute distress.     Appearance: She is not diaphoretic.   HENT:      Mouth/Throat:      Comments: Wearing a mask for COVID protocol  Eyes:      General: No scleral icterus.  Neck:      Vascular: No JVD.   Cardiovascular:      Rate and Rhythm: Normal rate.      Heart sounds: Normal heart sounds. No murmur heard.    No friction rub. No gallop.   Pulmonary:      Effort: No respiratory distress.      Breath sounds: No wheezing or rales.   Abdominal:      General: Bowel sounds are normal.      Palpations: Abdomen is soft.   Musculoskeletal:      Right lower leg: No edema.      Left lower leg: No edema.   Skin:     Findings: No rash.   Neurological:      Mental Status: She is alert. Mental status is at baseline.   Psychiatric:         Mood and Affect: Mood normal.        We reviewed in person the most recent labs  Recent Results (from the past 5040 hour(s))   URINALYSIS    Collection Time: 07/06/22 11:00 AM   Result Value Ref Range    Color DK Yellow     Character Clear     Specific Gravity 1.014 <1.035    Ph 6.0 5.0 - 8.0    Glucose Negative " Negative mg/dL    Ketones Negative Negative mg/dL    Protein 30 (A) Negative mg/dL    Bilirubin Negative Negative    Urobilinogen, Urine 1.0 Negative    Nitrite Negative Negative    Leukocyte Esterase Moderate (A) Negative    Occult Blood Moderate (A) Negative    Micro Urine Req Microscopic    URINE CULTURE(NEW)    Collection Time: 07/06/22 11:00 AM    Specimen: Urine   Result Value Ref Range    Significant Indicator NEG     Source UR     Site -     Culture Result Usual urogenital arturo ,000 cfu/mL    URINE MICROSCOPIC (W/UA)    Collection Time: 07/06/22 11:00 AM   Result Value Ref Range    WBC 20-50 (A) /hpf    RBC 10-20 (A) /hpf    Bacteria Negative None /hpf    Epithelial Cells Few /hpf    Hyaline Cast 0-2 /lpf   URINALYSIS    Collection Time: 07/14/22  1:25 PM   Result Value Ref Range    Color DK Yellow     Character Cloudy (A)     Specific Gravity 1.024 <1.035    Ph 5.5 5.0 - 8.0    Glucose Negative Negative mg/dL    Ketones 15 (A) Negative mg/dL    Protein 300 (A) Negative mg/dL    Bilirubin Moderate (A) Negative    Urobilinogen, Urine 1.0 Negative    Nitrite Negative Negative    Leukocyte Esterase Moderate (A) Negative    Occult Blood Moderate (A) Negative    Micro Urine Req Microscopic    URINE MICROSCOPIC (W/UA)    Collection Time: 07/14/22  1:25 PM   Result Value Ref Range    WBC  (A) /hpf    RBC 5-10 (A) /hpf    Bacteria Negative None /hpf    Epithelial Cells Moderate (A) /hpf   BUN    Collection Time: 07/25/22 11:53 AM   Result Value Ref Range    Bun 13 8 - 22 mg/dL   CREATININE    Collection Time: 07/25/22 11:53 AM   Result Value Ref Range    Creatinine 0.76 0.50 - 1.40 mg/dL   ESTIMATED GFR    Collection Time: 07/25/22 11:53 AM   Result Value Ref Range    GFR (CKD-EPI) 86 >60 mL/min/1.73 m 2   BASIC METABOLIC PANEL    Collection Time: 08/19/22  2:32 PM   Result Value Ref Range    Sodium 139 136 - 145 mmol/L    Potassium 4.0 3.5 - 5.3 mmol/L    Chloride 107 98 - 110 mmol/L    Co2 22.0 20.0 -  31.0 mmol/L    Bun 12.0 6.0 - 24.0 mg/dL    Creatinine 0.8 0.5 - 1.0 mg/dL    Glucose, Bld 110 70 - 140 mg/dL    Calcium 10.3 8.6 - 10.4 mg/dL    eGFR >60.0 >60.0 mL/min/1.73m*2    Anion Gap 10.0 6 - 19 mmol/L    Bun-Creatinine Ratio 15.0    CBC WITH DIFFERENTIAL    Collection Time: 08/19/22  2:32 PM   Result Value Ref Range    WBC 7.70 3.40 - 10.00 10*3/uL    RBC 5.03 3.90 - 5.03 10*6/uL    Hemoglobin 15.6 (H) 12.0 - 15.5 g/dL    Hematocrit 45.8 (H) 34.9 - 44.5 %    MCV 91.0 81.6 - 98.3 fL    MCH 31.0 27.5 - 33.2 pg    MCHC 34.1 33.4 - 35.5 g/dL    RDW 13.8 11.8 - 15.6 %    Platelet Count 290 150 - 450 10*3/uL    MPV 7.4 7.4 - 11.0 fL    Neutrophils-Polys 54.9 %    Lymphocytes 35.8 %    Monocytes 6.8 %    Eosinophils 1.7 %    Basophils 0.8 %    Neutrophils (Absolute) 4.2 1.7 - 7.0 10*3/uL    Lymphs (Absolute) 2.8 0.9 - 2.9 10*3/uL    Monos (Absolute) 0.5 0.3 - 0.9 10*3/uL    Eos (Absolute) 0.1 0.0 - 0.5 10*3/uL    Baso (Absolute) 0.1 0.0 - 0.2 10*3/uL   URINE CULTURE(NEW)    Collection Time: 08/26/22 11:15 AM    Specimen: Urine   Result Value Ref Range    Significant Indicator NEG     Source UR     Site -     Culture Result No growth at 48 hours.    URINE CULTURE(NEW)    Collection Time: 10/10/22  1:15 PM    Specimen: Urine   Result Value Ref Range    Significant Indicator NEG     Source UR     Site -     Culture Result Usual urogenital arturo ,000 cfu/mL            Assessment & Plan     1. NSVT (nonsustained ventricular tachycardia) (HCC) - seen in recovery from stress Echo  Cardiac Event Monitor      2. Dyslipidemia        3. Coronary atherosclerosis due to lipid rich plaque        4. Agatston coronary artery calcium score between 200 and 399 - 355 2020        5. Abnormal finding on cardiovascular stress test - hypertensive response to low level of exercise - normal stress imaging        6. Familial hyperlipidemia, high LDL            Medical Decision Making: Today's Assessment/Status/Plan:          It was  my pleasure to meet with Ms. Cho.    We addressed the management of hypertension at today's visit. Blood pressure is well controlled.  We specifically assessed the labs on hypertension treatment    Declines statin    TRIAL BYSTOLCI FOR PALPITATIONS AND EVENT MONITOR    I will see Ms. Cho back in 1 year time and encouraged her to follow up with us over the phone or electronically using my MyChart as issues arise.    It is my pleasure to participate in the care of Ms. Cho.  Please do not hesitate to contact me with questions or concerns.    Cabrera Mendoza MD PhD FAC  Cardiologist [facility]    Please note that this dictation was created using voice recognition software. There may be errors I did not discover before finalizing the note.

## 2023-02-07 ENCOUNTER — NON-PROVIDER VISIT (OUTPATIENT)
Dept: CARDIOLOGY | Facility: MEDICAL CENTER | Age: 68
End: 2023-02-07
Attending: INTERNAL MEDICINE
Payer: MEDICARE

## 2023-02-07 DIAGNOSIS — I49.1 APC (ATRIAL PREMATURE CONTRACTIONS): ICD-10-CM

## 2023-02-07 DIAGNOSIS — I47.10 SVT (SUPRAVENTRICULAR TACHYCARDIA) (HCC): ICD-10-CM

## 2023-02-07 DIAGNOSIS — I49.3 PVC'S (PREMATURE VENTRICULAR CONTRACTIONS): ICD-10-CM

## 2023-02-07 DIAGNOSIS — I49.8 VENTRICULAR BIGEMINY: ICD-10-CM

## 2023-02-07 DIAGNOSIS — I47.29 NSVT (NONSUSTAINED VENTRICULAR TACHYCARDIA) (HCC): Chronic | ICD-10-CM

## 2023-02-07 DIAGNOSIS — I47.19 ATRIAL TACHYCARDIA (HCC): ICD-10-CM

## 2023-02-07 DIAGNOSIS — I49.8 VENTRICULAR TRIGEMINY: ICD-10-CM

## 2023-02-07 NOTE — PROGRESS NOTES
Home enrollment completed in the 14 day [employer] XT Holter monitoring program, per Cabrera Mendoza M.D.  Monitor will be shipped to patient via [employer].  >Pending EOS.

## 2023-03-06 ENCOUNTER — TELEPHONE (OUTPATIENT)
Dept: CARDIOLOGY | Facility: MEDICAL CENTER | Age: 68
End: 2023-03-06
Payer: MEDICARE

## 2023-03-08 PROCEDURE — 93248 EXT ECG>7D<15D REV&INTERPJ: CPT | Performed by: INTERNAL MEDICINE

## 2023-09-15 ENCOUNTER — HOSPITAL ENCOUNTER (OUTPATIENT)
Dept: RADIOLOGY | Facility: MEDICAL CENTER | Age: 68
End: 2023-09-15
Attending: UROLOGY
Payer: MEDICARE

## 2023-09-15 DIAGNOSIS — C67.9 MALIGNANT NEOPLASM OF URINARY BLADDER, UNSPECIFIED SITE (HCC): ICD-10-CM

## 2023-09-15 PROCEDURE — 74178 CT ABD&PLV WO CNTR FLWD CNTR: CPT

## 2023-09-15 PROCEDURE — 700117 HCHG RX CONTRAST REV CODE 255: Performed by: UROLOGY

## 2023-09-15 RX ADMIN — IOHEXOL 100 ML: 350 INJECTION, SOLUTION INTRAVENOUS at 09:38

## 2023-09-29 ENCOUNTER — HOSPITAL ENCOUNTER (OUTPATIENT)
Facility: MEDICAL CENTER | Age: 68
End: 2023-09-29
Attending: UROLOGY
Payer: MEDICARE

## 2023-09-29 PROCEDURE — 87086 URINE CULTURE/COLONY COUNT: CPT

## 2023-10-01 LAB
BACTERIA UR CULT: NORMAL
SIGNIFICANT IND 70042: NORMAL
SITE SITE: NORMAL
SOURCE SOURCE: NORMAL

## 2023-11-17 ENCOUNTER — HOSPITAL ENCOUNTER (OUTPATIENT)
Facility: MEDICAL CENTER | Age: 68
End: 2023-11-17
Attending: STUDENT IN AN ORGANIZED HEALTH CARE EDUCATION/TRAINING PROGRAM
Payer: MEDICARE

## 2023-11-17 PROCEDURE — 87086 URINE CULTURE/COLONY COUNT: CPT

## 2023-11-17 PROCEDURE — 87077 CULTURE AEROBIC IDENTIFY: CPT

## 2023-11-20 LAB
BACTERIA UR CULT: NORMAL
SIGNIFICANT IND 70042: NORMAL
SITE SITE: NORMAL
SOURCE SOURCE: NORMAL

## 2023-12-20 ENCOUNTER — HOSPITAL ENCOUNTER (OUTPATIENT)
Facility: MEDICAL CENTER | Age: 68
End: 2023-12-20
Attending: UROLOGY
Payer: MEDICARE

## 2023-12-20 PROCEDURE — 87086 URINE CULTURE/COLONY COUNT: CPT

## 2023-12-23 LAB
BACTERIA UR CULT: NORMAL
SIGNIFICANT IND 70042: NORMAL
SITE SITE: NORMAL
SOURCE SOURCE: NORMAL

## 2024-04-30 ENCOUNTER — OFFICE VISIT (OUTPATIENT)
Dept: CARDIOLOGY | Facility: MEDICAL CENTER | Age: 69
End: 2024-04-30
Attending: INTERNAL MEDICINE
Payer: MEDICARE

## 2024-04-30 VITALS
HEART RATE: 60 BPM | RESPIRATION RATE: 16 BRPM | WEIGHT: 169 LBS | BODY MASS INDEX: 25.61 KG/M2 | HEIGHT: 68 IN | DIASTOLIC BLOOD PRESSURE: 60 MMHG | SYSTOLIC BLOOD PRESSURE: 120 MMHG | OXYGEN SATURATION: 95 %

## 2024-04-30 DIAGNOSIS — R93.1 AGATSTON CORONARY ARTERY CALCIUM SCORE BETWEEN 200 AND 399: Chronic | ICD-10-CM

## 2024-04-30 DIAGNOSIS — Z78.9 STATIN INTOLERANCE: ICD-10-CM

## 2024-04-30 DIAGNOSIS — R94.39 ABNORMAL FINDING ON CARDIOVASCULAR STRESS TEST: Chronic | ICD-10-CM

## 2024-04-30 DIAGNOSIS — I25.83 CORONARY ATHEROSCLEROSIS DUE TO LIPID RICH PLAQUE: ICD-10-CM

## 2024-04-30 DIAGNOSIS — I47.29 NSVT (NONSUSTAINED VENTRICULAR TACHYCARDIA) (HCC): Chronic | ICD-10-CM

## 2024-04-30 DIAGNOSIS — I25.10 CORONARY ATHEROSCLEROSIS DUE TO LIPID RICH PLAQUE: ICD-10-CM

## 2024-04-30 RX ORDER — NEBIVOLOL 5 MG/1
5 TABLET ORAL DAILY
Qty: 90 TABLET | Refills: 3 | Status: SHIPPED | OUTPATIENT
Start: 2024-04-30 | End: 2024-04-30 | Stop reason: SDUPTHER

## 2024-04-30 RX ORDER — NEBIVOLOL 5 MG/1
5 TABLET ORAL
Qty: 180 TABLET | Refills: 3 | Status: SHIPPED | OUTPATIENT
Start: 2024-04-30

## 2024-04-30 RX ORDER — NITROGLYCERIN 0.4 MG/1
0.4 TABLET SUBLINGUAL PRN
Qty: 25 TABLET | Refills: 11 | Status: SHIPPED | OUTPATIENT
Start: 2024-04-30

## 2024-04-30 NOTE — PATIENT INSTRUCTIONS
Work on at least 2.5 - 5 hours a week of moderate exercise (typical brisk walking or similar activity)    If you have had a heart attack, stent, bypass or reduced heart strength (EF <35%): cardiac rehab may reduce your risk of dying by 13-24% and need to go to the hospital by 30% within the first year (1)    Please look into the following diets and incorporate them into your diet    LOW SALT DIET   KEEP YOUR SODIUM EQUAL TO CALORIES AND NO MORE THAN DOUBLE THE CALORIES FOR A LOW SALT DIET    Cardiosmart.org - great resource for American College of Cardiology on heart disease prevention and treatment    FOR TREATMENT OR PREVENTION OF CORONARY ARTERY DISEASE  These three programs are approved by Medicare/Insurers for those with heart disease  [place] [facility]  Dr. Duncan's Program for Reversing Heart Disease - Ellis Abdul's Cardiologist vegetarian-based  [facility] - [place]-based mind-body Program    Mediterranean Diet has been shown to be a hearty healthy diet.    This is a commonly referenced Program  Dr Hernandes - [place] [facility] [place] (book and documentary) - vegetarian-based    FOR TREATMENT OF BLOOD PRESSURE  DASH DIET - American Heart Association for treatment of HYPERTENSION    FOR TREATMENT OF BAD CHOLESTEROL/FATS  REDUCE PROCESSED SUGAR AS MUCH AS POSSIBLE  INCREASE WHOLE GRAINS/VEGETABLES  INCREASE FIBER    Lowering total cholesterol and LDL (bad) cholesterol:  - Eat leaner cuts of meat, or eliminate altogether if possible red meat, and frequently substitute fish or chicken.  - Limit saturated fat to no more than 7-10% of total calories no more than 10 g per day is recommended. Some sources of saturated fat include butter, animal fats, hydrogenated vegetable fats and oils, many desserts, whole milk dairy products.  - Replaced saturated fats with polyunsaturated fats and monounsaturated fats. Foods high in monounsaturated fat include nuts, canola  oil, avocados, and olives.  - Limit trans fat (processed foods) and replaced with fresh fruits and vegetables  - Recommend nonfat dairy products  - Increase substantially the amount of soluble fiber intake (legumes such as beans, fruit, whole grains).  - Consider nutritional supplements: plant sterile spreads such as Benecol, fish oil,  flaxseed oil, omega-3 acids capsules 1000 mg twice a day, or viscous fiber such as Metamucil  - Attain ideal weight and regular exercise (at least 30 minutes per day of moderate exercise)  ASK ABOUT STATIN OR NON STATIN MEDICATION TO REDUCE YOUR LDL AND HEART RISK    Lowering triglycerides:  - Reduce intake of simple sugar: Desserts, candy, pastries, honey, sodas, sugared cereals, yogurt, Gatorade, sports bars, canned fruit, smoothies, fruit juice, coffee drinks  - Reduced intake of refined starches: Refined Pasta, most bread  - Reduce or abstain from alcohol  - Increase omega-3 fatty acids: [place], Trout, Mackerel, Herring, Albacore tuna and supplements  - Attain ideal weight and regular exercise (at least 30 minutes per day of moderate exercise)  ASK ABOUT PURIFIED OMEGA 3 EPA or FISH OIL TO REDUCE YOUR TG AND HEART RISK    Elevating HDL (good) cholesterol:  - Increase physical activity  - Increase omega-3 fatty acids and supplements as listed above  - Incorporating appropriate amounts of monounsaturated fats such as nuts, olive oil, canola oil, avocados, olives  - Stop smoking  - Attain ideal weight and regular exercise (at least 30 minutes per day of moderate exercise)

## 2024-04-30 NOTE — PROGRESS NOTES
Chief Complaint   Patient presents with    Tachycardia     F/v dx: NSVT (nonsustained ventricular tachycardia) (HCC) - seen in recovery from stress Echo    Hyperlipidemia       Subjective     García Cho is a 68 y.o. female who presents today for follow-up of her history of hypertension with stress test and SVT     Has nightly palpitations and CP    Some vertigo    Takes intermittently because forgets but also feels she doesn't need daily    Past Medical History:   Diagnosis Date    Abnormal finding on cardiovascular stress test - hypertensive response to low level of exercise - normal stress imaging 12/10/2020    Agatston coronary artery calcium score between 200 and 399 - 355 2020     Agoraphobia with panic disorder     anxiety    Allergic rhinitis     Arthritis     all joints    Bowel habit changes     Dental disorder     Dyslipidemia     Fear of travel with panic attacks     Heart burn     Hiatal hernia     NSVT (nonsustained ventricular tachycardia) (HCC) - seen in recovery from stress Echo 12/10/2020    Schatzki's ring      Past Surgical History:   Procedure Laterality Date    ALEX BY LAPAROSCOPY  1/19/2018    Procedure: ALEX BY LAPAROSCOPY/SURGICAL;  Surgeon: Keren Henderson M.D.;  Location: SURGERY [facility];  Service: General    EGD WITH ASP/BX  11/26/12    distal esophageal stricture secondary to reflux status post dilation to 51 [place], mild erosive esophagitis, mild gastric erythema, small sliding hiatal hernia, mild chronic gastritis, negative H. pylori, no dysplasia or malignancy    COLONOSCOPY WITH BIOPSY  7/08    hemorrhoids, no malignancy    EGD WITH ASP/BX  7/08    hiatal hernia, schatzki's ring    ABDOMINAL HYSTERECTOMY TOTAL      with BSO due to infection    OTHER       I &D rectal abscess     Family History   Problem Relation Age of Onset    Cancer Brother         pancreatic cancer    Arthritis Mother     Cancer Mother         lung cancer with mets     Arthritis Father      Alcohol/Drug Brother         drug abuse     Social History     Socioeconomic History    Marital status: Single     Spouse name: Not on file    Number of children: Not on file    Years of education: Not on file    Highest education level: Not on file   Occupational History    Occupation:      Employer: OTHER   Tobacco Use    Smoking status: Former     Current packs/day: 0.00     Average packs/day: 0.1 packs/day for 44.0 years (4.4 ttl pk-yrs)     Types: Cigarettes     Start date: 3/17/1970     Quit date: 3/17/2014     Years since quitting: 10.1    Smokeless tobacco: Never   Vaping Use    Vaping Use: Never used   Substance and Sexual Activity    Alcohol use: No     Comment: 10 drinks/week quit 11/11, quit 2/13    Drug use: No     Comment: used marijuana and cocaine in the past, no IVDU    Sexual activity: Yes     Partners: Male     Birth control/protection: Surgical   Other Topics Concern    Not on file   Social History Narrative    Not on file     Social Determinants of Health     Financial Resource Strain: Not on file   Food Insecurity: Not on file   Transportation Needs: Not on file   Physical Activity: Not on file   Stress: Not on file   Social Connections: Not on file   Intimate Partner Violence: Not on file   Housing Stability: Not on file     Allergies   Allergen Reactions    Atorvastatin      diarrhea    Bee Pollen Unspecified    Ibuprofen Nausea    Pravastatin      diarrhea    Other Environmental      Pollen       Outpatient Encounter Medications as of 4/30/2024   Medication Sig Dispense Refill    nebivolol (BYSTOLIC) 5 MG Tab tablet Take 1 Tablet by mouth every day. 90 Tablet 3    nitroglycerin (NITROSTAT) 0.4 MG SL Tab Place 1 Tablet under the tongue as needed for Chest Pain. 25 Tablet 11    budesonide-formoterol (SYMBICORT) 80-4.5 MCG/ACT Aerosol SYMBICORT 80-4.5 MCG/ACT AERO      famotidine (PEPCID) 20 MG Tab Take 40 mg by mouth.      ALPRAZolam (XANAX) 0.25 MG Tab TAKE 1 TABLET BY MOUTH TWICE  "A DAY AS NEEDED FOR ANXIETY F41.9      amphetamine-dextroamphetamine (ADDERALL) 20 MG Tab TAKE 1 TABLET BY MOUTH TWICE A DAY FOR 30 DAYS F90      aripiprazole (ABILIFY) 30 MG tablet Take 30 mg by mouth every day.      FLOVENT HFA 44 MCG/ACT Aerosol Inhale 2 Puffs by mouth.      [DISCONTINUED] nebivolol (BYSTOLIC) 5 MG Tab tablet Take 1 Tablet by mouth every day. 100 Tablet 3     No facility-administered encounter medications on file as of 4/30/2024.     ROS           Objective     /60 (BP Location: Left arm, Patient Position: Sitting, BP Cuff Size: Adult)   Pulse 60   Resp 16   Ht 1.727 m (5' 8\")   Wt 76.7 kg (169 lb)   SpO2 95%   BMI 25.70 kg/m²     Physical Exam  Constitutional:       General: She is not in acute distress.     Appearance: She is not diaphoretic.   Eyes:      General: No scleral icterus.  Neck:      Vascular: No JVD.   Cardiovascular:      Rate and Rhythm: Normal rate.      Heart sounds: Normal heart sounds. No murmur heard.     No friction rub. No gallop.   Pulmonary:      Effort: No respiratory distress.      Breath sounds: No wheezing or rales.   Abdominal:      General: Bowel sounds are normal.      Palpations: Abdomen is soft.   Musculoskeletal:      Right lower leg: No edema.      Left lower leg: No edema.   Skin:     Findings: No rash.   Neurological:      Mental Status: She is alert. Mental status is at baseline.   Psychiatric:         Mood and Affect: Mood normal.            We reviewed in person the most recent labs  Recent Results (from the past 5040 hour(s))   URINE CULTURE(NEW)    Collection Time: 11/17/23  1:00 PM    Specimen: Urine   Result Value Ref Range    Significant Indicator NEG     Source UR     Site -     Culture Result Usual urogenital arturo 10-50,000 cfu/mL    COLOGUARD COLON CANCER SCREENING    Collection Time: 12/14/23  5:30 AM   Result Value Ref Range    Noninvasive colorectal cancer DNA and occult blood screening in Stool Negative Negative   URINE " CULTURE(NEW)    Collection Time: 12/20/23  3:30 PM    Specimen: Urine   Result Value Ref Range    Significant Indicator NEG     Source UR     Site -     Culture Result Usual urogenital arturo <10,000 cfu/mL          Assessment & Plan     1. Coronary atherosclerosis due to lipid rich plaque  Bempedoic Acid 180 MG Tab      2. Agatston coronary artery calcium score between 200 and 399 - 355 2020        3. NSVT (nonsustained ventricular tachycardia) (HCC) - seen in recovery from stress Echo  Cardiac Event Monitor    nebivolol (BYSTOLIC) 5 MG Tab tablet    DISCONTINUED: nebivolol (BYSTOLIC) 5 MG Tab tablet      4. Abnormal finding on cardiovascular stress test - hypertensive response to low level of exercise - normal stress imaging  nitroglycerin (NITROSTAT) 0.4 MG SL Tab      5. Statin intolerance            Medical Decision Making: Today's Assessment/Status/Plan:      It was my pleasure to meet with Ms. Cho.    We addressed the management of hypertension at today's visit. Blood pressure is well controlled.  We specifically assessed the labs on hypertension treatment    We addressed the management of dyslipidemia and atherosclerosis at today's visit. She is intolerant to statin.  TRIAL NEXETOL      We addressed the management of atherosclerotic artery disease.  She is on proper antiplatelet, cholesterol management and beta-blockers as appropriate.  We addressed the potential side effects and laboratory follow-up for these medications.    INCREASE BYSTOLIC AND DO A MONITOR    I will see Ms. Cho back in 1 year time and encouraged her to follow up with us over the phone or electronically using my MyChart as issues arise.    It is my pleasure to participate in the care of Ms. Cho.  Please do not hesitate to contact me with questions or concerns.    Cabrera Mendoza MD PhD FACC  Cardiologist [facility]    Please note that this dictation was created using voice  recognition software. There may be errors I did not discover before finalizing the note.     () Today's E/M visit is associated with medical care services that serve as the continuing focal point for all needed health care services and/or with medical care services that  are part of ongoing care related to a patient's single, serious condition, or a complex condition: This includes  furnishing services to patients on an ongoing basis that result in care that is personalized  to the patient. The services result in a comprehensive, longitudinal, and continuous  relationship with the patient and involve delivery of team-based care that is accessible, coordinated with other practitioners and providers, and integrated with the broader health  care landscape.

## 2024-05-02 ENCOUNTER — NON-PROVIDER VISIT (OUTPATIENT)
Dept: CARDIOLOGY | Facility: MEDICAL CENTER | Age: 69
End: 2024-05-02
Attending: INTERNAL MEDICINE
Payer: MEDICARE

## 2024-05-02 DIAGNOSIS — I47.29 NSVT (NONSUSTAINED VENTRICULAR TACHYCARDIA) (HCC): Chronic | ICD-10-CM

## 2024-05-02 NOTE — PROGRESS NOTES
Home enrollment completed in the 14 day o Holter monitor per Cabrera Mendoza MD.  Monitor will be shipped to patient via [email or web address], pending EOS.

## 2024-05-06 ENCOUNTER — TELEPHONE (OUTPATIENT)
Dept: CARDIOLOGY | Facility: MEDICAL CENTER | Age: 69
End: 2024-05-06
Payer: MEDICARE

## 2024-05-06 NOTE — TELEPHONE ENCOUNTER
Received EMR message in Cardiology clinic, ran test claim and rejected for Prior Authorization is required. Prior Authorization for Nexletol 180mg tabs (Quantity: 90, Days: 90) has been submitted via Cover My Meds: Key ([id number]) PA Case ID #: [id number]    Insurance: [employer] D    Will follow up in 24-48 business hours.

## 2024-05-06 NOTE — TELEPHONE ENCOUNTER
Prior Authorization for Nexletol 180mg tabs has been approved for a quantity of 90 , day supply 90    Prior Authorization reference number: PA Case ID #: [id number]  Key: [id number]  Insurance: [employer] D  Effective dates: 01/01/2024 - 12/31/2024  Copay: Unknown due to insurance lockout      Next Steps: [facility] has been notified of PA approval

## 2024-06-10 ENCOUNTER — TELEPHONE (OUTPATIENT)
Dept: CARDIOLOGY | Facility: MEDICAL CENTER | Age: 69
End: 2024-06-10
Payer: MEDICARE

## 2024-06-10 NOTE — TELEPHONE ENCOUNTER
Yariel EOS to 's nurse, Maricarmen, on 6/10/2024  Preliminary findings:  <1% AF/AFL,  with an avg rate of 103 bpm  76 episodes of SVT,  with an avg rate of 120 bpm  Sinus rhythm,  with an avg rate of 62 bpm  6.4% isolated VE burden  56 patient events corresponding to:  SR 51-98  Ventricular bigeminy and trigeminy  SVE(s)  VE(s)

## 2024-06-15 PROBLEM — I48.0 PAF (PAROXYSMAL ATRIAL FIBRILLATION) (HCC): Chronic | Status: ACTIVE | Noted: 2024-06-15

## 2024-07-01 ENCOUNTER — TELEPHONE (OUTPATIENT)
Dept: CARDIOLOGY | Facility: MEDICAL CENTER | Age: 69
End: 2024-07-01
Payer: MEDICARE

## 2024-07-25 ENCOUNTER — HOSPITAL ENCOUNTER (INPATIENT)
Facility: MEDICAL CENTER | Age: 69
LOS: 4 days | End: 2024-07-29
Attending: EMERGENCY MEDICINE | Admitting: STUDENT IN AN ORGANIZED HEALTH CARE EDUCATION/TRAINING PROGRAM
Payer: MEDICARE

## 2024-07-25 ENCOUNTER — APPOINTMENT (OUTPATIENT)
Dept: RADIOLOGY | Facility: MEDICAL CENTER | Age: 69
End: 2024-07-25
Attending: EMERGENCY MEDICINE
Payer: MEDICARE

## 2024-07-25 DIAGNOSIS — S82.001D: ICD-10-CM

## 2024-07-25 PROBLEM — D72.829 LEUKOCYTOSIS: Status: ACTIVE | Noted: 2024-07-25

## 2024-07-25 LAB
ALBUMIN SERPL BCP-MCNC: 3.9 G/DL (ref 3.2–4.9)
ALBUMIN/GLOB SERPL: 1.6 G/DL
ALP SERPL-CCNC: 47 U/L (ref 30–99)
ALT SERPL-CCNC: 14 U/L (ref 2–50)
ANION GAP SERPL CALC-SCNC: 10 MMOL/L (ref 7–16)
AST SERPL-CCNC: 28 U/L (ref 12–45)
BASOPHILS # BLD AUTO: 0.4 % (ref 0–1.8)
BASOPHILS # BLD: 0.05 K/UL (ref 0–0.12)
BILIRUB SERPL-MCNC: 0.7 MG/DL (ref 0.1–1.5)
BUN SERPL-MCNC: 13 MG/DL (ref 8–22)
CALCIUM ALBUM COR SERPL-MCNC: 9 MG/DL (ref 8.5–10.5)
CALCIUM SERPL-MCNC: 8.9 MG/DL (ref 8.5–10.5)
CHLORIDE SERPL-SCNC: 107 MMOL/L (ref 96–112)
CO2 SERPL-SCNC: 22 MMOL/L (ref 20–33)
CREAT SERPL-MCNC: 0.89 MG/DL (ref 0.5–1.4)
EKG IMPRESSION: NORMAL
EOSINOPHIL # BLD AUTO: 0.08 K/UL (ref 0–0.51)
EOSINOPHIL NFR BLD: 0.7 % (ref 0–6.9)
ERYTHROCYTE [DISTWIDTH] IN BLOOD BY AUTOMATED COUNT: 43.6 FL (ref 35.9–50)
GFR SERPLBLD CREATININE-BSD FMLA CKD-EPI: 70 ML/MIN/1.73 M 2
GLOBULIN SER CALC-MCNC: 2.5 G/DL (ref 1.9–3.5)
GLUCOSE SERPL-MCNC: 107 MG/DL (ref 65–99)
HCT VFR BLD AUTO: 39.3 % (ref 37–47)
HGB BLD-MCNC: 13.7 G/DL (ref 12–16)
IMM GRANULOCYTES # BLD AUTO: 0.04 K/UL (ref 0–0.11)
IMM GRANULOCYTES NFR BLD AUTO: 0.3 % (ref 0–0.9)
LYMPHOCYTES # BLD AUTO: 1.92 K/UL (ref 1–4.8)
LYMPHOCYTES NFR BLD: 16.8 % (ref 22–41)
MCH RBC QN AUTO: 32.4 PG (ref 27–33)
MCHC RBC AUTO-ENTMCNC: 34.9 G/DL (ref 32.2–35.5)
MCV RBC AUTO: 92.9 FL (ref 81.4–97.8)
MONOCYTES # BLD AUTO: 0.57 K/UL (ref 0–0.85)
MONOCYTES NFR BLD AUTO: 5 % (ref 0–13.4)
NEUTROPHILS # BLD AUTO: 8.8 K/UL (ref 1.82–7.42)
NEUTROPHILS NFR BLD: 76.8 % (ref 44–72)
NRBC # BLD AUTO: 0 K/UL
NRBC BLD-RTO: 0 /100 WBC (ref 0–0.2)
PLATELET # BLD AUTO: 271 K/UL (ref 164–446)
PMV BLD AUTO: 9.1 FL (ref 9–12.9)
POTASSIUM SERPL-SCNC: 4 MMOL/L (ref 3.6–5.5)
PROT SERPL-MCNC: 6.4 G/DL (ref 6–8.2)
RBC # BLD AUTO: 4.23 M/UL (ref 4.2–5.4)
SODIUM SERPL-SCNC: 139 MMOL/L (ref 135–145)
WBC # BLD AUTO: 11.5 K/UL (ref 4.8–10.8)

## 2024-07-25 PROCEDURE — 73130 X-RAY EXAM OF HAND: CPT | Mod: LT

## 2024-07-25 PROCEDURE — 700102 HCHG RX REV CODE 250 W/ 637 OVERRIDE(OP): Performed by: STUDENT IN AN ORGANIZED HEALTH CARE EDUCATION/TRAINING PROGRAM

## 2024-07-25 PROCEDURE — 96374 THER/PROPH/DIAG INJ IV PUSH: CPT

## 2024-07-25 PROCEDURE — 700111 HCHG RX REV CODE 636 W/ 250 OVERRIDE (IP): Mod: JZ | Performed by: STUDENT IN AN ORGANIZED HEALTH CARE EDUCATION/TRAINING PROGRAM

## 2024-07-25 PROCEDURE — 96376 TX/PRO/DX INJ SAME DRUG ADON: CPT

## 2024-07-25 PROCEDURE — 73564 X-RAY EXAM KNEE 4 OR MORE: CPT | Mod: RT

## 2024-07-25 PROCEDURE — 770001 HCHG ROOM/CARE - MED/SURG/GYN PRIV*

## 2024-07-25 PROCEDURE — A9270 NON-COVERED ITEM OR SERVICE: HCPCS

## 2024-07-25 PROCEDURE — 700111 HCHG RX REV CODE 636 W/ 250 OVERRIDE (IP): Mod: JZ,UD | Performed by: EMERGENCY MEDICINE

## 2024-07-25 PROCEDURE — 99223 1ST HOSP IP/OBS HIGH 75: CPT | Mod: AI | Performed by: STUDENT IN AN ORGANIZED HEALTH CARE EDUCATION/TRAINING PROGRAM

## 2024-07-25 PROCEDURE — 96375 TX/PRO/DX INJ NEW DRUG ADDON: CPT

## 2024-07-25 PROCEDURE — 85025 COMPLETE CBC W/AUTO DIFF WBC: CPT

## 2024-07-25 PROCEDURE — A9270 NON-COVERED ITEM OR SERVICE: HCPCS | Performed by: STUDENT IN AN ORGANIZED HEALTH CARE EDUCATION/TRAINING PROGRAM

## 2024-07-25 PROCEDURE — 93005 ELECTROCARDIOGRAM TRACING: CPT | Performed by: EMERGENCY MEDICINE

## 2024-07-25 PROCEDURE — 96372 THER/PROPH/DIAG INJ SC/IM: CPT

## 2024-07-25 PROCEDURE — 73630 X-RAY EXAM OF FOOT: CPT | Mod: LT

## 2024-07-25 PROCEDURE — 700102 HCHG RX REV CODE 250 W/ 637 OVERRIDE(OP)

## 2024-07-25 PROCEDURE — 99285 EMERGENCY DEPT VISIT HI MDM: CPT

## 2024-07-25 PROCEDURE — 80053 COMPREHEN METABOLIC PANEL: CPT

## 2024-07-25 PROCEDURE — 36415 COLL VENOUS BLD VENIPUNCTURE: CPT

## 2024-07-25 RX ORDER — METOPROLOL TARTRATE 25 MG/1
25 TABLET, FILM COATED ORAL 2 TIMES DAILY
Status: DISCONTINUED | OUTPATIENT
Start: 2024-07-26 | End: 2024-07-29 | Stop reason: HOSPADM

## 2024-07-25 RX ORDER — ALPRAZOLAM 0.25 MG
0.25 TABLET ORAL 2 TIMES DAILY PRN
Status: DISCONTINUED | OUTPATIENT
Start: 2024-07-25 | End: 2024-07-26

## 2024-07-25 RX ORDER — ENOXAPARIN SODIUM 100 MG/ML
40 INJECTION SUBCUTANEOUS DAILY
Status: DISCONTINUED | OUTPATIENT
Start: 2024-07-25 | End: 2024-07-29 | Stop reason: HOSPADM

## 2024-07-25 RX ORDER — MORPHINE SULFATE 4 MG/ML
4 INJECTION INTRAVENOUS ONCE
Status: COMPLETED | OUTPATIENT
Start: 2024-07-25 | End: 2024-07-25

## 2024-07-25 RX ORDER — POLYETHYLENE GLYCOL 3350 17 G/17G
1 POWDER, FOR SOLUTION ORAL
Status: DISCONTINUED | OUTPATIENT
Start: 2024-07-25 | End: 2024-07-27

## 2024-07-25 RX ORDER — OXYCODONE HYDROCHLORIDE 5 MG/1
5 TABLET ORAL EVERY 4 HOURS PRN
Status: DISCONTINUED | OUTPATIENT
Start: 2024-07-25 | End: 2024-07-27

## 2024-07-25 RX ORDER — ACETAMINOPHEN 325 MG/1
650 TABLET ORAL EVERY 6 HOURS PRN
Status: DISCONTINUED | OUTPATIENT
Start: 2024-07-25 | End: 2024-07-29 | Stop reason: HOSPADM

## 2024-07-25 RX ORDER — ARIPIPRAZOLE 10 MG/1
30 TABLET ORAL
Status: DISCONTINUED | OUTPATIENT
Start: 2024-07-26 | End: 2024-07-29 | Stop reason: HOSPADM

## 2024-07-25 RX ORDER — NEBIVOLOL 5 MG/1
5 TABLET ORAL DAILY
Status: DISCONTINUED | OUTPATIENT
Start: 2024-07-26 | End: 2024-07-25

## 2024-07-25 RX ORDER — AMOXICILLIN 250 MG
2 CAPSULE ORAL EVERY EVENING
Status: DISCONTINUED | OUTPATIENT
Start: 2024-07-25 | End: 2024-07-29 | Stop reason: HOSPADM

## 2024-07-25 RX ORDER — ONDANSETRON 4 MG/1
4 TABLET, ORALLY DISINTEGRATING ORAL EVERY 4 HOURS PRN
Status: DISCONTINUED | OUTPATIENT
Start: 2024-07-25 | End: 2024-07-29 | Stop reason: HOSPADM

## 2024-07-25 RX ORDER — ONDANSETRON 2 MG/ML
4 INJECTION INTRAMUSCULAR; INTRAVENOUS EVERY 4 HOURS PRN
Status: DISCONTINUED | OUTPATIENT
Start: 2024-07-25 | End: 2024-07-29 | Stop reason: HOSPADM

## 2024-07-25 RX ORDER — HYDRALAZINE HYDROCHLORIDE 20 MG/ML
10 INJECTION INTRAMUSCULAR; INTRAVENOUS EVERY 4 HOURS PRN
Status: DISCONTINUED | OUTPATIENT
Start: 2024-07-25 | End: 2024-07-29 | Stop reason: HOSPADM

## 2024-07-25 RX ORDER — MORPHINE SULFATE 4 MG/ML
4 INJECTION INTRAVENOUS EVERY 4 HOURS PRN
Status: DISCONTINUED | OUTPATIENT
Start: 2024-07-25 | End: 2024-07-27

## 2024-07-25 RX ADMIN — ONDANSETRON 4 MG: 2 INJECTION INTRAMUSCULAR; INTRAVENOUS at 21:20

## 2024-07-25 RX ADMIN — MORPHINE SULFATE 4 MG: 4 INJECTION, SOLUTION INTRAMUSCULAR; INTRAVENOUS at 16:49

## 2024-07-25 RX ADMIN — ENOXAPARIN SODIUM 40 MG: 100 INJECTION SUBCUTANEOUS at 20:30

## 2024-07-25 RX ADMIN — MORPHINE SULFATE 4 MG: 4 INJECTION, SOLUTION INTRAMUSCULAR; INTRAVENOUS at 20:29

## 2024-07-25 RX ADMIN — SENNOSIDES AND DOCUSATE SODIUM 2 TABLET: 50; 8.6 TABLET ORAL at 20:30

## 2024-07-25 ASSESSMENT — PAIN DESCRIPTION - PAIN TYPE
TYPE: ACUTE PAIN
TYPE: ACUTE PAIN

## 2024-07-25 ASSESSMENT — ENCOUNTER SYMPTOMS
INSOMNIA: 0
NERVOUS/ANXIOUS: 0
ABDOMINAL PAIN: 0
DOUBLE VISION: 0
SPUTUM PRODUCTION: 0
FALLS: 1
NECK PAIN: 0
EYE DISCHARGE: 0
BACK PAIN: 0
PHOTOPHOBIA: 0
ORTHOPNEA: 0
SINUS PAIN: 0
EYE PAIN: 0
HALLUCINATIONS: 0
PALPITATIONS: 0
NAUSEA: 0
DIZZINESS: 0
HEADACHES: 0
SHORTNESS OF BREATH: 0
FEVER: 0
FLANK PAIN: 0
CHILLS: 0
DIARRHEA: 0
VOMITING: 0
HEMOPTYSIS: 0

## 2024-07-25 ASSESSMENT — LIFESTYLE VARIABLES: SUBSTANCE_ABUSE: 0

## 2024-07-25 NOTE — ED PROVIDER NOTES
ED Provider Note    CHIEF COMPLAINT  Chief Complaint   Patient presents with    GLF     Pt was walking dog and tripped on sidewalk. Pt reports pain to R knee, L wrist, L foot. Pt in splint but EMS reports knee deformity to R knee. CSM intact.       HPI/ROS      García Cho is a 68 y.o. female who presents stating that she was walking her dog and tripped landing on her right knee, hit her left hand and left foot.  Is complaining of profound right knee pain and felt a crack and feels like she broke her kneecap.  She is unable to bear weight.  EMS arrived, started IV and gave her ketamine for pain control.  Patient denies loss of sensation or strength to arms or legs, neck pain, back pain, headache, use of anticoagulant.  She is right-hand dominant.    PAST MEDICAL HISTORY   has a past medical history of Abnormal finding on cardiovascular stress test - hypertensive response to low level of exercise - normal stress imaging (12/10/2020), Agatston coronary artery calcium score between 200 and 399 - 355 2020, Agoraphobia with panic disorder, Allergic rhinitis, Arthritis, Bowel habit changes, Dental disorder, Dyslipidemia, Fear of travel with panic attacks, Heart burn, Hiatal hernia, NSVT (nonsustained ventricular tachycardia) (HCC) - seen in recovery from stress Echo (12/10/2020), PAF (paroxysmal atrial fibrillation) (HCC) - 2 minutes on Zio (06/15/2024), and Schatzki's ring.    SURGICAL HISTORY   has a past surgical history that includes colonoscopy with biopsy (7/08); egd with asp/bx (7/08); abdominal hysterectomy total; egd with asp/bx (11/26/12); other; and shannon by laparoscopy (1/19/2018).    FAMILY HISTORY  Family History   Problem Relation Age of Onset    Cancer Brother         pancreatic cancer    Arthritis Mother     Cancer Mother         lung cancer with mets     Arthritis Father     Alcohol/Drug Brother         drug abuse       SOCIAL HISTORY  Social History     Tobacco Use    Smoking status:  "Former     Current packs/day: 0.00     Average packs/day: 0.1 packs/day for 44.0 years (4.4 ttl pk-yrs)     Types: Cigarettes     Start date: 3/17/1970     Quit date: 3/17/2014     Years since quitting: 10.3    Smokeless tobacco: Never   Vaping Use    Vaping status: Never Used   Substance and Sexual Activity    Alcohol use: No     Comment: 10 drinks/week quit 11/11, quit 2/13    Drug use: No     Comment: used marijuana and cocaine in the past, no IVDU    Sexual activity: Yes     Partners: Male     Birth control/protection: Surgical       CURRENT MEDICATIONS  Home Medications       Reviewed by Jose F Rod (Pharmacy Tech) on 07/25/24 at 1944  Med List Status: Complete     Medication Last Dose Status   ALPRAZolam (XANAX) 0.25 MG Tab 7/24/2024 Active   amphetamine-dextroamphetamine (ADDERALL) 20 MG Tab 7/25/2024 Active   aripiprazole (ABILIFY) 30 MG tablet 7/25/2024 Active   Bempedoic Acid 180 MG Tab 7/25/2024 Active   nebivolol (BYSTOLIC) 5 MG Tab tablet 7/25/2024 Active   nitroglycerin (NITROSTAT) 0.4 MG SL Tab prn Active                  Audit from Redirected Encounters    **Home medications have not yet been reviewed for this encounter**         ALLERGIES  Allergies   Allergen Reactions    Atorvastatin      diarrhea    Bee Pollen Unspecified    Ibuprofen Nausea    Pravastatin      diarrhea    Other Environmental      Pollen         PHYSICAL EXAM  VITAL SIGNS: /63   Pulse (!) 59   Temp 36.7 °C (98 °F) (Temporal)   Resp 16   Ht 1.727 m (5' 8\")   Wt 76.2 kg (168 lb)   SpO2 92%   BMI 25.54 kg/m²      Nursing notes and vitals reviewed.  Constitutional: Well developed, Well nourished, No acute distress, Non-toxic appearance.   Eyes: PERRLA, EOMI, Conjunctiva normal, No discharge.   Cardiovascular: Normal heart rate, Normal rhythm, No murmurs, No rubs, No gallops.   Thorax & Lungs: No respiratory distress, No rales, No rhonchi, No wheezing, No chest tenderness.   Abdomen: Bowel sounds normal, Soft, No " tenderness, No guarding, No rebound, No masses, No pulsatile masses.   Skin: Abrasion to right knee  Extremities: Significant tenderness and edema to the right knee especially the patella with obvious step-off deformity, tenderness to the left hand a medial aspect by the fifth metacarpal, tenderness to the left foot of the second and third metatarsal region no step-off deformity, distal pulses are brisk throughout, pelvis is stable.    Musculoskeletal: No cervical, thoracic or lumbar spine tenderness or step-off deformity pelvis is stable  Neurologic: strength and sensation intact upper lower extremities bilaterally        EKG/LABS  Results for orders placed or performed during the hospital encounter of 07/25/24   CBC WITH DIFFERENTIAL   Result Value Ref Range    WBC 11.5 (H) 4.8 - 10.8 K/uL    RBC 4.23 4.20 - 5.40 M/uL    Hemoglobin 13.7 12.0 - 16.0 g/dL    Hematocrit 39.3 37.0 - 47.0 %    MCV 92.9 81.4 - 97.8 fL    MCH 32.4 27.0 - 33.0 pg    MCHC 34.9 32.2 - 35.5 g/dL    RDW 43.6 35.9 - 50.0 fL    Platelet Count 271 164 - 446 K/uL    MPV 9.1 9.0 - 12.9 fL    Neutrophils-Polys 76.80 (H) 44.00 - 72.00 %    Lymphocytes 16.80 (L) 22.00 - 41.00 %    Monocytes 5.00 0.00 - 13.40 %    Eosinophils 0.70 0.00 - 6.90 %    Basophils 0.40 0.00 - 1.80 %    Immature Granulocytes 0.30 0.00 - 0.90 %    Nucleated RBC 0.00 0.00 - 0.20 /100 WBC    Neutrophils (Absolute) 8.80 (H) 1.82 - 7.42 K/uL    Lymphs (Absolute) 1.92 1.00 - 4.80 K/uL    Monos (Absolute) 0.57 0.00 - 0.85 K/uL    Eos (Absolute) 0.08 0.00 - 0.51 K/uL    Baso (Absolute) 0.05 0.00 - 0.12 K/uL    Immature Granulocytes (abs) 0.04 0.00 - 0.11 K/uL    NRBC (Absolute) 0.00 K/uL   COMP METABOLIC PANEL   Result Value Ref Range    Sodium 139 135 - 145 mmol/L    Potassium 4.0 3.6 - 5.5 mmol/L    Chloride 107 96 - 112 mmol/L    Co2 22 20 - 33 mmol/L    Anion Gap 10.0 7.0 - 16.0    Glucose 107 (H) 65 - 99 mg/dL    Bun 13 8 - 22 mg/dL    Creatinine 0.89 0.50 - 1.40 mg/dL     Calcium 8.9 8.5 - 10.5 mg/dL    Correct Calcium 9.0 8.5 - 10.5 mg/dL    AST(SGOT) 28 12 - 45 U/L    ALT(SGPT) 14 2 - 50 U/L    Alkaline Phosphatase 47 30 - 99 U/L    Total Bilirubin 0.7 0.1 - 1.5 mg/dL    Albumin 3.9 3.2 - 4.9 g/dL    Total Protein 6.4 6.0 - 8.2 g/dL    Globulin 2.5 1.9 - 3.5 g/dL    A-G Ratio 1.6 g/dL   ESTIMATED GFR   Result Value Ref Range    GFR (CKD-EPI) 70 >60 mL/min/1.73 m 2   EKG   Result Value Ref Range    Report       [facility]    Test Date:  2024  Pt Name:    PAPO RODAS            Department: ER  MRN:        [id number]                      Room:       [facility]  Gender:     Female                       Technician: [id number]  :        [id number]                   Requested By:BEAU FERNANDEZ  Order #:    [id number]                    Reading MD: BEAU FERNANDEZ DO    Measurements  Intervals                                Axis  Rate:       57                           P:          54  KS:         146                          QRS:        -38  QRSD:       92                           T:          47  QT:         470  QTc:        458    Interpretive Statements  Sinus bradycardia  Probable left atrial enlargement  Left axis deviation  Low voltage, precordial leads  RSR' in V1 or V2, probably normal variant  Borderline T abnormalities, anterior leads  Compared to ECG 2020 11:25:16  Low QRS voltage now present  RSR' in V1 or V2 now present  Sinus rhyt hm no longer present  T-wave abnormality still present  Electronically Signed On 2024 21:38:45 PDT by BEAU FERNANDEZ DO       I have independently interpreted this EKG    RADIOLOGY/PROCEDURES   I have independently interpreted the diagnostic imaging associated with this visit and am waiting the final reading from the radiologist.   My preliminary interpretation is as follows: Acute fracture of the patella    Radiologist interpretation:  DX-KNEE COMPLETE 4+ RIGHT   Final Result          Acute displaced fracture of the patella.      DX-FOOT-COMPLETE 3+ LEFT   Final Result      1.  No fracture or dislocation of LEFT foot.   2.  Mild to moderate osteoarthritis.      DX-HAND 3+ LEFT   Final Result         No acute osseous abnormality.          COURSE & MEDICAL DECISION MAKING    ASSESSMENT, COURSE AND PLAN  Care Narrative: This is a pleasant 60-year-old female presents with ground-level fall resulting in a right patellar fracture.  She has no evidence of evidence of fracture in her left hand or foot.  I did discuss the patient with on-call orthopedist, Dr. Holder who recommends hospitalization and patient be going to operating room tomorrow.  The patient has no evidence of endorgan damage or neurovascular compromise.  Patient received pain medication here.  Patient was discussed with  for hospitalization.      DISPOSITION AND DISCUSSIONS  I have discussed management of the patient with the following physicians and CHAI's: Dr. Spain for hospitalization      FINAL DIAGNOSIS  Right patellar fracture  Left hand contusion  Left foot contusion       Electronically signed by: Ki Vincent D.O., 7/25/2024 4:30 PM

## 2024-07-25 NOTE — ED TRIAGE NOTES
Pt BIBA via [facility]    Chief Complaint   Patient presents with    GLF     Pt was walking dog and tripped on sidewalk. Pt reports pain to R knee, L wrist, L foot. Pt in splint but EMS reports knee deformity to R knee. CSM intact.     ABCs intact. A+Ox4. Skin PWD. Pt denies CP/SOB.    Pt repots 7/10, worse pain is in knee

## 2024-07-26 ENCOUNTER — ANESTHESIA (OUTPATIENT)
Dept: SURGERY | Facility: MEDICAL CENTER | Age: 69
End: 2024-07-26
Payer: MEDICARE

## 2024-07-26 ENCOUNTER — ANESTHESIA EVENT (OUTPATIENT)
Dept: SURGERY | Facility: MEDICAL CENTER | Age: 69
End: 2024-07-26
Payer: MEDICARE

## 2024-07-26 ENCOUNTER — APPOINTMENT (OUTPATIENT)
Dept: RADIOLOGY | Facility: MEDICAL CENTER | Age: 69
End: 2024-07-26
Attending: STUDENT IN AN ORGANIZED HEALTH CARE EDUCATION/TRAINING PROGRAM
Payer: MEDICARE

## 2024-07-26 LAB
ALBUMIN SERPL BCP-MCNC: 4 G/DL (ref 3.2–4.9)
ALBUMIN/GLOB SERPL: 1.6 G/DL
ALP SERPL-CCNC: 64 U/L (ref 30–99)
ALT SERPL-CCNC: 51 U/L (ref 2–50)
ANION GAP SERPL CALC-SCNC: 11 MMOL/L (ref 7–16)
AST SERPL-CCNC: 84 U/L (ref 12–45)
BILIRUB SERPL-MCNC: 1.1 MG/DL (ref 0.1–1.5)
BUN SERPL-MCNC: 12 MG/DL (ref 8–22)
CALCIUM ALBUM COR SERPL-MCNC: 9.2 MG/DL (ref 8.5–10.5)
CALCIUM SERPL-MCNC: 9.2 MG/DL (ref 8.5–10.5)
CHLORIDE SERPL-SCNC: 102 MMOL/L (ref 96–112)
CO2 SERPL-SCNC: 25 MMOL/L (ref 20–33)
CREAT SERPL-MCNC: 0.83 MG/DL (ref 0.5–1.4)
ERYTHROCYTE [DISTWIDTH] IN BLOOD BY AUTOMATED COUNT: 43.8 FL (ref 35.9–50)
GFR SERPLBLD CREATININE-BSD FMLA CKD-EPI: 76 ML/MIN/1.73 M 2
GLOBULIN SER CALC-MCNC: 2.5 G/DL (ref 1.9–3.5)
GLUCOSE SERPL-MCNC: 117 MG/DL (ref 65–99)
HCT VFR BLD AUTO: 42.4 % (ref 37–47)
HGB BLD-MCNC: 14.2 G/DL (ref 12–16)
MCH RBC QN AUTO: 31.2 PG (ref 27–33)
MCHC RBC AUTO-ENTMCNC: 33.5 G/DL (ref 32.2–35.5)
MCV RBC AUTO: 93.2 FL (ref 81.4–97.8)
PLATELET # BLD AUTO: 248 K/UL (ref 164–446)
PMV BLD AUTO: 9.3 FL (ref 9–12.9)
POTASSIUM SERPL-SCNC: 3.9 MMOL/L (ref 3.6–5.5)
PROT SERPL-MCNC: 6.5 G/DL (ref 6–8.2)
RBC # BLD AUTO: 4.55 M/UL (ref 4.2–5.4)
SODIUM SERPL-SCNC: 138 MMOL/L (ref 135–145)
WBC # BLD AUTO: 8 K/UL (ref 4.8–10.8)

## 2024-07-26 PROCEDURE — 700101 HCHG RX REV CODE 250: Performed by: ANESTHESIOLOGY

## 2024-07-26 PROCEDURE — 700105 HCHG RX REV CODE 258: Performed by: STUDENT IN AN ORGANIZED HEALTH CARE EDUCATION/TRAINING PROGRAM

## 2024-07-26 PROCEDURE — 700102 HCHG RX REV CODE 250 W/ 637 OVERRIDE(OP)

## 2024-07-26 PROCEDURE — 160035 HCHG PACU - 1ST 60 MINS PHASE I: Performed by: STUDENT IN AN ORGANIZED HEALTH CARE EDUCATION/TRAINING PROGRAM

## 2024-07-26 PROCEDURE — A9270 NON-COVERED ITEM OR SERVICE: HCPCS

## 2024-07-26 PROCEDURE — 160009 HCHG ANES TIME/MIN: Performed by: STUDENT IN AN ORGANIZED HEALTH CARE EDUCATION/TRAINING PROGRAM

## 2024-07-26 PROCEDURE — 80053 COMPREHEN METABOLIC PANEL: CPT

## 2024-07-26 PROCEDURE — 3E0T3BZ INTRODUCTION OF ANESTHETIC AGENT INTO PERIPHERAL NERVES AND PLEXI, PERCUTANEOUS APPROACH: ICD-10-PCS | Performed by: ANESTHESIOLOGY

## 2024-07-26 PROCEDURE — 99222 1ST HOSP IP/OBS MODERATE 55: CPT | Mod: 57 | Performed by: STUDENT IN AN ORGANIZED HEALTH CARE EDUCATION/TRAINING PROGRAM

## 2024-07-26 PROCEDURE — A9270 NON-COVERED ITEM OR SERVICE: HCPCS | Performed by: STUDENT IN AN ORGANIZED HEALTH CARE EDUCATION/TRAINING PROGRAM

## 2024-07-26 PROCEDURE — 770001 HCHG ROOM/CARE - MED/SURG/GYN PRIV*

## 2024-07-26 PROCEDURE — 160002 HCHG RECOVERY MINUTES (STAT): Performed by: STUDENT IN AN ORGANIZED HEALTH CARE EDUCATION/TRAINING PROGRAM

## 2024-07-26 PROCEDURE — 700111 HCHG RX REV CODE 636 W/ 250 OVERRIDE (IP): Performed by: ANESTHESIOLOGY

## 2024-07-26 PROCEDURE — 700111 HCHG RX REV CODE 636 W/ 250 OVERRIDE (IP): Performed by: STUDENT IN AN ORGANIZED HEALTH CARE EDUCATION/TRAINING PROGRAM

## 2024-07-26 PROCEDURE — 36415 COLL VENOUS BLD VENIPUNCTURE: CPT

## 2024-07-26 PROCEDURE — 700111 HCHG RX REV CODE 636 W/ 250 OVERRIDE (IP): Mod: JZ | Performed by: STUDENT IN AN ORGANIZED HEALTH CARE EDUCATION/TRAINING PROGRAM

## 2024-07-26 PROCEDURE — 0QSD04Z REPOSITION RIGHT PATELLA WITH INTERNAL FIXATION DEVICE, OPEN APPROACH: ICD-10-PCS | Performed by: STUDENT IN AN ORGANIZED HEALTH CARE EDUCATION/TRAINING PROGRAM

## 2024-07-26 PROCEDURE — 160029 HCHG SURGERY MINUTES - 1ST 30 MINS LEVEL 4: Performed by: STUDENT IN AN ORGANIZED HEALTH CARE EDUCATION/TRAINING PROGRAM

## 2024-07-26 PROCEDURE — A9270 NON-COVERED ITEM OR SERVICE: HCPCS | Performed by: ANESTHESIOLOGY

## 2024-07-26 PROCEDURE — 700102 HCHG RX REV CODE 250 W/ 637 OVERRIDE(OP): Performed by: STUDENT IN AN ORGANIZED HEALTH CARE EDUCATION/TRAINING PROGRAM

## 2024-07-26 PROCEDURE — C1713 ANCHOR/SCREW BN/BN,TIS/BN: HCPCS | Performed by: STUDENT IN AN ORGANIZED HEALTH CARE EDUCATION/TRAINING PROGRAM

## 2024-07-26 PROCEDURE — 700102 HCHG RX REV CODE 250 W/ 637 OVERRIDE(OP): Performed by: ANESTHESIOLOGY

## 2024-07-26 PROCEDURE — 700105 HCHG RX REV CODE 258: Performed by: ANESTHESIOLOGY

## 2024-07-26 PROCEDURE — 73560 X-RAY EXAM OF KNEE 1 OR 2: CPT | Mod: RT

## 2024-07-26 PROCEDURE — 27524 TREAT KNEECAP FRACTURE: CPT | Mod: RT | Performed by: STUDENT IN AN ORGANIZED HEALTH CARE EDUCATION/TRAINING PROGRAM

## 2024-07-26 PROCEDURE — 64447 NJX AA&/STRD FEMORAL NRV IMG: CPT | Performed by: STUDENT IN AN ORGANIZED HEALTH CARE EDUCATION/TRAINING PROGRAM

## 2024-07-26 PROCEDURE — 160036 HCHG PACU - EA ADDL 30 MINS PHASE I: Performed by: STUDENT IN AN ORGANIZED HEALTH CARE EDUCATION/TRAINING PROGRAM

## 2024-07-26 PROCEDURE — 160048 HCHG OR STATISTICAL LEVEL 1-5: Performed by: STUDENT IN AN ORGANIZED HEALTH CARE EDUCATION/TRAINING PROGRAM

## 2024-07-26 PROCEDURE — 85027 COMPLETE CBC AUTOMATED: CPT

## 2024-07-26 PROCEDURE — 160041 HCHG SURGERY MINUTES - EA ADDL 1 MIN LEVEL 4: Performed by: STUDENT IN AN ORGANIZED HEALTH CARE EDUCATION/TRAINING PROGRAM

## 2024-07-26 DEVICE — SCREW CANN 4.0X34 SHORT OIC - (3TX3=9): Type: IMPLANTABLE DEVICE | Status: FUNCTIONAL

## 2024-07-26 DEVICE — SCREW CANN 4.0X40 LONG OIC (5TX2=10): Type: IMPLANTABLE DEVICE | Status: FUNCTIONAL

## 2024-07-26 RX ORDER — OXYCODONE HCL 5 MG/5 ML
5 SOLUTION, ORAL ORAL
Status: DISCONTINUED | OUTPATIENT
Start: 2024-07-26 | End: 2024-07-26 | Stop reason: HOSPADM

## 2024-07-26 RX ORDER — HYDRALAZINE HYDROCHLORIDE 20 MG/ML
5 INJECTION INTRAMUSCULAR; INTRAVENOUS
Status: DISCONTINUED | OUTPATIENT
Start: 2024-07-26 | End: 2024-07-26 | Stop reason: HOSPADM

## 2024-07-26 RX ORDER — ALPRAZOLAM 0.5 MG
0.5 TABLET ORAL 2 TIMES DAILY PRN
Status: DISCONTINUED | OUTPATIENT
Start: 2024-07-26 | End: 2024-07-26

## 2024-07-26 RX ORDER — ALPRAZOLAM 1 MG
1 TABLET ORAL 2 TIMES DAILY PRN
Status: DISCONTINUED | OUTPATIENT
Start: 2024-07-26 | End: 2024-07-29 | Stop reason: HOSPADM

## 2024-07-26 RX ORDER — ACETAMINOPHEN 500 MG
1000 TABLET ORAL ONCE
Status: COMPLETED | OUTPATIENT
Start: 2024-07-26 | End: 2024-07-26

## 2024-07-26 RX ORDER — EPHEDRINE SULFATE 50 MG/ML
INJECTION, SOLUTION INTRAVENOUS PRN
Status: DISCONTINUED | OUTPATIENT
Start: 2024-07-26 | End: 2024-07-26 | Stop reason: SURG

## 2024-07-26 RX ORDER — MIDAZOLAM HYDROCHLORIDE 1 MG/ML
INJECTION INTRAMUSCULAR; INTRAVENOUS PRN
Status: DISCONTINUED | OUTPATIENT
Start: 2024-07-26 | End: 2024-07-26 | Stop reason: SURG

## 2024-07-26 RX ORDER — ALBUTEROL SULFATE 5 MG/ML
2.5 SOLUTION RESPIRATORY (INHALATION)
Status: DISCONTINUED | OUTPATIENT
Start: 2024-07-26 | End: 2024-07-26 | Stop reason: HOSPADM

## 2024-07-26 RX ORDER — CEFAZOLIN SODIUM 1 G/3ML
INJECTION, POWDER, FOR SOLUTION INTRAMUSCULAR; INTRAVENOUS PRN
Status: DISCONTINUED | OUTPATIENT
Start: 2024-07-26 | End: 2024-07-26 | Stop reason: SURG

## 2024-07-26 RX ORDER — ONDANSETRON 2 MG/ML
4 INJECTION INTRAMUSCULAR; INTRAVENOUS
Status: COMPLETED | OUTPATIENT
Start: 2024-07-26 | End: 2024-07-26

## 2024-07-26 RX ORDER — DEXAMETHASONE SODIUM PHOSPHATE 4 MG/ML
INJECTION, SOLUTION INTRA-ARTICULAR; INTRALESIONAL; INTRAMUSCULAR; INTRAVENOUS; SOFT TISSUE
Status: COMPLETED | OUTPATIENT
Start: 2024-07-26 | End: 2024-07-26

## 2024-07-26 RX ORDER — DIPHENHYDRAMINE HYDROCHLORIDE 50 MG/ML
12.5 INJECTION INTRAMUSCULAR; INTRAVENOUS
Status: DISCONTINUED | OUTPATIENT
Start: 2024-07-26 | End: 2024-07-26 | Stop reason: HOSPADM

## 2024-07-26 RX ORDER — LIDOCAINE HYDROCHLORIDE 20 MG/ML
INJECTION, SOLUTION EPIDURAL; INFILTRATION; INTRACAUDAL; PERINEURAL PRN
Status: DISCONTINUED | OUTPATIENT
Start: 2024-07-26 | End: 2024-07-26 | Stop reason: SURG

## 2024-07-26 RX ORDER — HYDROMORPHONE HYDROCHLORIDE 1 MG/ML
0.1 INJECTION, SOLUTION INTRAMUSCULAR; INTRAVENOUS; SUBCUTANEOUS
Status: DISCONTINUED | OUTPATIENT
Start: 2024-07-26 | End: 2024-07-26 | Stop reason: HOSPADM

## 2024-07-26 RX ORDER — HYDRALAZINE HYDROCHLORIDE 20 MG/ML
INJECTION INTRAMUSCULAR; INTRAVENOUS PRN
Status: DISCONTINUED | OUTPATIENT
Start: 2024-07-26 | End: 2024-07-26 | Stop reason: SURG

## 2024-07-26 RX ORDER — HYDROMORPHONE HYDROCHLORIDE 1 MG/ML
0.2 INJECTION, SOLUTION INTRAMUSCULAR; INTRAVENOUS; SUBCUTANEOUS
Status: DISCONTINUED | OUTPATIENT
Start: 2024-07-26 | End: 2024-07-26 | Stop reason: HOSPADM

## 2024-07-26 RX ORDER — ONDANSETRON 2 MG/ML
INJECTION INTRAMUSCULAR; INTRAVENOUS PRN
Status: DISCONTINUED | OUTPATIENT
Start: 2024-07-26 | End: 2024-07-26 | Stop reason: SURG

## 2024-07-26 RX ORDER — EPHEDRINE SULFATE 50 MG/ML
5 INJECTION, SOLUTION INTRAVENOUS
Status: DISCONTINUED | OUTPATIENT
Start: 2024-07-26 | End: 2024-07-26 | Stop reason: HOSPADM

## 2024-07-26 RX ORDER — DEXAMETHASONE SODIUM PHOSPHATE 4 MG/ML
INJECTION, SOLUTION INTRA-ARTICULAR; INTRALESIONAL; INTRAMUSCULAR; INTRAVENOUS; SOFT TISSUE PRN
Status: DISCONTINUED | OUTPATIENT
Start: 2024-07-26 | End: 2024-07-26 | Stop reason: SURG

## 2024-07-26 RX ORDER — SODIUM CHLORIDE, SODIUM LACTATE, POTASSIUM CHLORIDE, CALCIUM CHLORIDE 600; 310; 30; 20 MG/100ML; MG/100ML; MG/100ML; MG/100ML
INJECTION, SOLUTION INTRAVENOUS
Status: DISCONTINUED | OUTPATIENT
Start: 2024-07-26 | End: 2024-07-26 | Stop reason: SURG

## 2024-07-26 RX ORDER — GLYCOPYRROLATE 0.2 MG/ML
INJECTION INTRAMUSCULAR; INTRAVENOUS PRN
Status: DISCONTINUED | OUTPATIENT
Start: 2024-07-26 | End: 2024-07-26 | Stop reason: SURG

## 2024-07-26 RX ORDER — HALOPERIDOL 5 MG/ML
1 INJECTION INTRAMUSCULAR
Status: COMPLETED | OUTPATIENT
Start: 2024-07-26 | End: 2024-07-26

## 2024-07-26 RX ORDER — OXYCODONE HCL 5 MG/5 ML
10 SOLUTION, ORAL ORAL
Status: DISCONTINUED | OUTPATIENT
Start: 2024-07-26 | End: 2024-07-26 | Stop reason: HOSPADM

## 2024-07-26 RX ORDER — ROPIVACAINE HYDROCHLORIDE 5 MG/ML
INJECTION, SOLUTION EPIDURAL; INFILTRATION; PERINEURAL
Status: COMPLETED | OUTPATIENT
Start: 2024-07-26 | End: 2024-07-26

## 2024-07-26 RX ORDER — SODIUM CHLORIDE, SODIUM LACTATE, POTASSIUM CHLORIDE, CALCIUM CHLORIDE 600; 310; 30; 20 MG/100ML; MG/100ML; MG/100ML; MG/100ML
INJECTION, SOLUTION INTRAVENOUS CONTINUOUS
Status: DISCONTINUED | OUTPATIENT
Start: 2024-07-26 | End: 2024-07-26 | Stop reason: HOSPADM

## 2024-07-26 RX ORDER — HYDROMORPHONE HYDROCHLORIDE 1 MG/ML
0.4 INJECTION, SOLUTION INTRAMUSCULAR; INTRAVENOUS; SUBCUTANEOUS
Status: DISCONTINUED | OUTPATIENT
Start: 2024-07-26 | End: 2024-07-26 | Stop reason: HOSPADM

## 2024-07-26 RX ADMIN — PROPOFOL 100 MG: 10 INJECTION, EMULSION INTRAVENOUS at 07:40

## 2024-07-26 RX ADMIN — ONDANSETRON 4 MG: 2 INJECTION INTRAMUSCULAR; INTRAVENOUS at 09:18

## 2024-07-26 RX ADMIN — GLYCOPYRROLATE 0.2 MG: 0.2 INJECTION INTRAMUSCULAR; INTRAVENOUS at 07:46

## 2024-07-26 RX ADMIN — EPHEDRINE SULFATE 10 MG: 50 INJECTION, SOLUTION INTRAVENOUS at 07:41

## 2024-07-26 RX ADMIN — FENTANYL CITRATE 50 MCG: 50 INJECTION, SOLUTION INTRAMUSCULAR; INTRAVENOUS at 07:28

## 2024-07-26 RX ADMIN — ACETAMINOPHEN 1000 MG: 500 TABLET ORAL at 07:30

## 2024-07-26 RX ADMIN — CEFAZOLIN 2 G: 1 INJECTION, POWDER, FOR SOLUTION INTRAMUSCULAR; INTRAVENOUS at 07:42

## 2024-07-26 RX ADMIN — FENTANYL CITRATE 25 MCG: 50 INJECTION, SOLUTION INTRAMUSCULAR; INTRAVENOUS at 10:07

## 2024-07-26 RX ADMIN — DEXAMETHASONE SODIUM PHOSPHATE 2 MG: 4 INJECTION INTRA-ARTICULAR; INTRALESIONAL; INTRAMUSCULAR; INTRAVENOUS; SOFT TISSUE at 07:31

## 2024-07-26 RX ADMIN — MIDAZOLAM HYDROCHLORIDE 1 MG: 2 INJECTION, SOLUTION INTRAMUSCULAR; INTRAVENOUS at 07:27

## 2024-07-26 RX ADMIN — ALPRAZOLAM 1 MG: 1 TABLET ORAL at 20:58

## 2024-07-26 RX ADMIN — ARIPIPRAZOLE 30 MG: 10 TABLET ORAL at 05:34

## 2024-07-26 RX ADMIN — SODIUM CHLORIDE, POTASSIUM CHLORIDE, SODIUM LACTATE AND CALCIUM CHLORIDE: 600; 310; 30; 20 INJECTION, SOLUTION INTRAVENOUS at 07:22

## 2024-07-26 RX ADMIN — MORPHINE SULFATE 4 MG: 4 INJECTION, SOLUTION INTRAMUSCULAR; INTRAVENOUS at 02:44

## 2024-07-26 RX ADMIN — FENTANYL CITRATE 25 MCG: 50 INJECTION, SOLUTION INTRAMUSCULAR; INTRAVENOUS at 07:37

## 2024-07-26 RX ADMIN — ONDANSETRON 4 MG: 2 INJECTION INTRAMUSCULAR; INTRAVENOUS at 08:39

## 2024-07-26 RX ADMIN — OXYCODONE 5 MG: 5 TABLET ORAL at 20:58

## 2024-07-26 RX ADMIN — ENOXAPARIN SODIUM 40 MG: 100 INJECTION SUBCUTANEOUS at 17:42

## 2024-07-26 RX ADMIN — HALOPERIDOL LACTATE 1 MG: 5 INJECTION, SOLUTION INTRAMUSCULAR at 09:22

## 2024-07-26 RX ADMIN — FENTANYL CITRATE 25 MCG: 50 INJECTION, SOLUTION INTRAMUSCULAR; INTRAVENOUS at 07:45

## 2024-07-26 RX ADMIN — ROPIVACAINE HYDROCHLORIDE 20 ML: 5 INJECTION EPIDURAL; INFILTRATION; PERINEURAL at 07:31

## 2024-07-26 RX ADMIN — FENTANYL CITRATE 25 MCG: 50 INJECTION, SOLUTION INTRAMUSCULAR; INTRAVENOUS at 09:37

## 2024-07-26 RX ADMIN — ONDANSETRON 4 MG: 2 INJECTION INTRAMUSCULAR; INTRAVENOUS at 19:32

## 2024-07-26 RX ADMIN — CEFAZOLIN 2 G: 2 INJECTION, POWDER, FOR SOLUTION INTRAMUSCULAR; INTRAVENOUS at 21:01

## 2024-07-26 RX ADMIN — MORPHINE SULFATE 4 MG: 4 INJECTION, SOLUTION INTRAMUSCULAR; INTRAVENOUS at 17:41

## 2024-07-26 RX ADMIN — ALPRAZOLAM 0.5 MG: 0.5 TABLET ORAL at 00:50

## 2024-07-26 RX ADMIN — HYDROMORPHONE HYDROCHLORIDE 0.2 MG: 1 INJECTION, SOLUTION INTRAMUSCULAR; INTRAVENOUS; SUBCUTANEOUS at 10:19

## 2024-07-26 RX ADMIN — MORPHINE SULFATE 4 MG: 4 INJECTION, SOLUTION INTRAMUSCULAR; INTRAVENOUS at 13:30

## 2024-07-26 RX ADMIN — CEFAZOLIN 2 G: 2 INJECTION, POWDER, FOR SOLUTION INTRAMUSCULAR; INTRAVENOUS at 16:23

## 2024-07-26 RX ADMIN — SENNOSIDES AND DOCUSATE SODIUM 2 TABLET: 50; 8.6 TABLET ORAL at 17:42

## 2024-07-26 RX ADMIN — HYDRALAZINE HYDROCHLORIDE 5 MG: 20 INJECTION, SOLUTION INTRAMUSCULAR; INTRAVENOUS at 08:38

## 2024-07-26 RX ADMIN — METOPROLOL TARTRATE 25 MG: 25 TABLET, FILM COATED ORAL at 05:34

## 2024-07-26 RX ADMIN — HYDRALAZINE HYDROCHLORIDE 5 MG: 20 INJECTION, SOLUTION INTRAMUSCULAR; INTRAVENOUS at 09:02

## 2024-07-26 RX ADMIN — DEXAMETHASONE SODIUM PHOSPHATE 4 MG: 4 INJECTION, SOLUTION INTRA-ARTICULAR; INTRALESIONAL; INTRAMUSCULAR; INTRAVENOUS; SOFT TISSUE at 07:53

## 2024-07-26 RX ADMIN — HALOPERIDOL LACTATE 1 MG: 5 INJECTION, SOLUTION INTRAMUSCULAR at 09:38

## 2024-07-26 RX ADMIN — LIDOCAINE HYDROCHLORIDE 70 MG: 20 INJECTION, SOLUTION EPIDURAL; INFILTRATION; INTRACAUDAL at 07:40

## 2024-07-26 SDOH — ECONOMIC STABILITY: TRANSPORTATION INSECURITY
IN THE PAST 12 MONTHS, HAS LACK OF RELIABLE TRANSPORTATION KEPT YOU FROM MEDICAL APPOINTMENTS, MEETINGS, WORK OR FROM GETTING THINGS NEEDED FOR DAILY LIVING?: NO

## 2024-07-26 SDOH — ECONOMIC STABILITY: TRANSPORTATION INSECURITY
IN THE PAST 12 MONTHS, HAS THE LACK OF TRANSPORTATION KEPT YOU FROM MEDICAL APPOINTMENTS OR FROM GETTING MEDICATIONS?: NO

## 2024-07-26 ASSESSMENT — PAIN DESCRIPTION - PAIN TYPE
TYPE: SURGICAL PAIN
TYPE: SURGICAL PAIN
TYPE: ACUTE PAIN
TYPE: SURGICAL PAIN
TYPE: SURGICAL PAIN
TYPE: ACUTE PAIN;SURGICAL PAIN
TYPE: ACUTE PAIN
TYPE: ACUTE PAIN;SURGICAL PAIN

## 2024-07-26 ASSESSMENT — PAIN SCALES - GENERAL: PAIN_LEVEL: 0

## 2024-07-26 ASSESSMENT — SOCIAL DETERMINANTS OF HEALTH (SDOH)
WITHIN THE LAST YEAR, HAVE YOU BEEN HUMILIATED OR EMOTIONALLY ABUSED IN OTHER WAYS BY YOUR PARTNER OR EX-PARTNER?: NO
WITHIN THE PAST 12 MONTHS, THE FOOD YOU BOUGHT JUST DIDN'T LAST AND YOU DIDN'T HAVE MONEY TO GET MORE: NEVER TRUE
WITHIN THE LAST YEAR, HAVE TO BEEN RAPED OR FORCED TO HAVE ANY KIND OF SEXUAL ACTIVITY BY YOUR PARTNER OR EX-PARTNER?: NO
WITHIN THE LAST YEAR, HAVE YOU BEEN KICKED, HIT, SLAPPED, OR OTHERWISE PHYSICALLY HURT BY YOUR PARTNER OR EX-PARTNER?: NO
IN THE PAST 12 MONTHS, HAS THE ELECTRIC, GAS, OIL, OR WATER COMPANY THREATENED TO SHUT OFF SERVICE IN YOUR HOME?: NO
WITHIN THE LAST YEAR, HAVE YOU BEEN AFRAID OF YOUR PARTNER OR EX-PARTNER?: NO
WITHIN THE PAST 12 MONTHS, YOU WORRIED THAT YOUR FOOD WOULD RUN OUT BEFORE YOU GOT THE MONEY TO BUY MORE: NEVER TRUE

## 2024-07-26 ASSESSMENT — LIFESTYLE VARIABLES
ON A TYPICAL DAY WHEN YOU DRINK ALCOHOL HOW MANY DRINKS DO YOU HAVE: 0
TOTAL SCORE: 0
ALCOHOL_USE: NO
HAVE PEOPLE ANNOYED YOU BY CRITICIZING YOUR DRINKING: NO
REASON UNABLE TO ASSESS: DOES NOT DRINK
HOW MANY TIMES IN THE PAST YEAR HAVE YOU HAD 5 OR MORE DRINKS IN A DAY: 0
EVER FELT BAD OR GUILTY ABOUT YOUR DRINKING: NO
EVER HAD A DRINK FIRST THING IN THE MORNING TO STEADY YOUR NERVES TO GET RID OF A HANGOVER: NO
CONSUMPTION TOTAL: NEGATIVE
HAVE YOU EVER FELT YOU SHOULD CUT DOWN ON YOUR DRINKING: NO
AVERAGE NUMBER OF DAYS PER WEEK YOU HAVE A DRINK CONTAINING ALCOHOL: 0
TOTAL SCORE: 0
TOTAL SCORE: 0
DOES PATIENT WANT TO STOP DRINKING: CANNOT ASSESS

## 2024-07-26 ASSESSMENT — PATIENT HEALTH QUESTIONNAIRE - PHQ9
3. TROUBLE FALLING OR STAYING ASLEEP OR SLEEPING TOO MUCH: MORE THAN HALF THE DAYS
2. FEELING DOWN, DEPRESSED, IRRITABLE, OR HOPELESS: MORE THAN HALF THE DAYS
7. TROUBLE CONCENTRATING ON THINGS, SUCH AS READING THE NEWSPAPER OR WATCHING TELEVISION: SEVERAL DAYS
6. FEELING BAD ABOUT YOURSELF - OR THAT YOU ARE A FAILURE OR HAVE LET YOURSELF OR YOUR FAMILY DOWN: NOT AL ALL
9. THOUGHTS THAT YOU WOULD BE BETTER OFF DEAD, OR OF HURTING YOURSELF: NOT AT ALL
4. FEELING TIRED OR HAVING LITTLE ENERGY: MORE THAN HALF THE DAYS
SUM OF ALL RESPONSES TO PHQ9 QUESTIONS 1 AND 2: 4
5. POOR APPETITE OR OVEREATING: NOT AT ALL
SUM OF ALL RESPONSES TO PHQ QUESTIONS 1-9: 10
1. LITTLE INTEREST OR PLEASURE IN DOING THINGS: MORE THAN HALF THE DAYS
8. MOVING OR SPEAKING SO SLOWLY THAT OTHER PEOPLE COULD HAVE NOTICED. OR THE OPPOSITE, BEING SO FIGETY OR RESTLESS THAT YOU HAVE BEEN MOVING AROUND A LOT MORE THAN USUAL: SEVERAL DAYS

## 2024-07-26 NOTE — OR NURSING
0910: Pt arrived via bed with anesthesia and RN. Report received. See flowsheet for VS. Knee immobilizer in place. Right pedal pulse 2+. Orders reviewed and initiated.   1058: Report called to SARA Boyd. All questions answered. VSS. Knee immobilizer in place. No patient distress. Alert and oriented x4. Pt transported to room in stable condition on 2L NC with hearing aid and glasses at bedside. Attempt to contact family unsuccessful.

## 2024-07-26 NOTE — H&P
Hospital Medicine History & Physical Note    Date of Service  7/25/2024    Primary Care Physician  Noah Alejandro M.D.    Consultants  orthopedics    Code Status  Full Code    Chief Complaint  Chief Complaint   Patient presents with    GLF     Pt was walking dog and tripped on sidewalk. Pt reports pain to R knee, L wrist, L foot. Pt in splint but EMS reports knee deformity to R knee. CSM intact.       History of Presenting Illness  García Cho is a 68 y.o. female who presented 7/25/2024 status post fall. Patient states that earlier today, she was walking her dog, and she ended up tripping over her sidewalk.  She fell onto her knees and wrist.  She states that she has extreme pain to her right knee along with swelling.  Along with left ankle pain as well.  Imaging was obtained in the emergency department showing an acute displaced fracture of the patella.  All other x-rays were unremarkable.  Orthopedic surgery consulted and recommended admission, n.p.o. at midnight, and surgery tomorrow.    I discussed the plan of care with patient.    Review of Systems  Review of Systems   Constitutional:  Negative for chills and fever.   HENT:  Negative for congestion, ear discharge, ear pain, nosebleeds and sinus pain.    Eyes:  Negative for double vision, photophobia, pain and discharge.   Respiratory:  Negative for hemoptysis, sputum production and shortness of breath.    Cardiovascular:  Negative for chest pain, palpitations and orthopnea.   Gastrointestinal:  Negative for abdominal pain, diarrhea, nausea and vomiting.   Genitourinary:  Negative for dysuria, flank pain, frequency, hematuria and urgency.   Musculoskeletal:  Positive for falls and joint pain. Negative for back pain and neck pain.   Neurological:  Negative for dizziness and headaches.   Psychiatric/Behavioral:  Negative for hallucinations, substance abuse and suicidal ideas. The patient is not nervous/anxious and does not have insomnia.         Past Medical History   has a past medical history of Abnormal finding on cardiovascular stress test - hypertensive response to low level of exercise - normal stress imaging (12/10/2020), Agatston coronary artery calcium score between 200 and 399 - 355 2020, Agoraphobia with panic disorder, Allergic rhinitis, Arthritis, Bowel habit changes, Dental disorder, Dyslipidemia, Fear of travel with panic attacks, Heart burn, Hiatal hernia, NSVT (nonsustained ventricular tachycardia) (HCC) - seen in recovery from stress Echo (12/10/2020), PAF (paroxysmal atrial fibrillation) (HCC) - 2 minutes on Zio (06/15/2024), and Schatzki's ring.    Surgical History   has a past surgical history that includes colonoscopy with biopsy (7/08); egd with asp/bx (7/08); abdominal hysterectomy total; egd with asp/bx (11/26/12); other; and shannon by laparoscopy (1/19/2018).     Family History  family history includes Alcohol/Drug in her brother; Arthritis in her father and mother; Cancer in her brother and mother.   Family history reviewed with patient. There is no family history that is pertinent to the chief complaint.     Social History   reports that she quit smoking about 10 years ago. Her smoking use included cigarettes. She started smoking about 54 years ago. She has a 4.4 pack-year smoking history. She has never used smokeless tobacco. She reports that she does not drink alcohol and does not use drugs.    Allergies  Allergies   Allergen Reactions    Atorvastatin      diarrhea    Bee Pollen Unspecified    Ibuprofen Nausea    Pravastatin      diarrhea    Other Environmental      Pollen         Medications  Prior to Admission Medications   Prescriptions Last Dose Informant Patient Reported? Taking?   ALPRAZolam (XANAX) 0.25 MG Tab 7/24/2024 at hs Patient Yes Yes   Sig: Take 0.25 mg by mouth 2 times a day as needed for Anxiety.   Bempedoic Acid 180 MG Tab 7/25/2024 at am Patient No Yes   Sig: Take 180 mg by mouth every day.    amphetamine-dextroamphetamine (ADDERALL) 20 MG Tab 7/25/2024 at 1400 Patient Yes Yes   Sig: Take 20 mg by mouth 2 times a day.   aripiprazole (ABILIFY) 30 MG tablet 7/25/2024 at am Patient Yes Yes   Sig: Take 30 mg by mouth every day.   nebivolol (BYSTOLIC) 5 MG Tab tablet 7/25/2024 at 1400 Patient No Yes   Sig: Take 1 Tablet by mouth 2 times a day.   nitroglycerin (NITROSTAT) 0.4 MG SL Tab prn at unk Patient No No   Sig: Place 1 Tablet under the tongue as needed for Chest Pain.      Facility-Administered Medications: None       Physical Exam  Temp:  [36.7 °C (98 °F)] 36.7 °C (98 °F)  Pulse:  [60-68] 60  Resp:  [16] 16  BP: (112-141)/(53-64) 112/53  SpO2:  [92 %-95 %] 92 %  Blood Pressure : 112/53   Temperature: 36.7 °C (98 °F)   Pulse: 60   Respiration: 16   Pulse Oximetry: 92 %       Physical Exam  Constitutional:       General: She is not in acute distress.     Appearance: Normal appearance. She is normal weight. She is not ill-appearing, toxic-appearing or diaphoretic.   HENT:      Head: Normocephalic and atraumatic.      Nose: Nose normal.      Mouth/Throat:      Mouth: Mucous membranes are moist.   Eyes:      Extraocular Movements: Extraocular movements intact.      Pupils: Pupils are equal, round, and reactive to light.   Cardiovascular:      Rate and Rhythm: Normal rate and regular rhythm.      Pulses: Normal pulses.      Heart sounds: Normal heart sounds. No murmur heard.     No friction rub. No gallop.   Pulmonary:      Effort: Pulmonary effort is normal. No respiratory distress.      Breath sounds: No stridor. No wheezing, rhonchi or rales.   Chest:      Chest wall: No tenderness.   Abdominal:      General: Abdomen is flat. There is no distension.      Palpations: Abdomen is soft. There is no mass.      Tenderness: There is no abdominal tenderness. There is no guarding or rebound.      Hernia: No hernia is present.   Musculoskeletal:         General: Swelling, tenderness and signs of injury present. No  "deformity.      Right lower leg: No edema.      Left lower leg: No edema.      Comments: abrasions present to bilateral knees.  There is extreme right knee swelling and tenderness to palpation.  Along with limited range of motion of the left ankle secondary to pain.   Skin:     General: Skin is warm and dry.      Capillary Refill: Capillary refill takes less than 2 seconds.      Coloration: Skin is not jaundiced or pale.      Findings: No bruising, erythema, lesion or rash.   Neurological:      General: No focal deficit present.      Mental Status: She is alert and oriented to person, place, and time. Mental status is at baseline.      Cranial Nerves: No cranial nerve deficit.      Sensory: No sensory deficit.      Motor: No weakness.      Coordination: Coordination normal.   Psychiatric:         Mood and Affect: Mood normal.         Behavior: Behavior normal.         Laboratory:  Recent Labs     07/25/24 1931   WBC 11.5*   RBC 4.23   HEMOGLOBIN 13.7   HEMATOCRIT 39.3   MCV 92.9   MCH 32.4   MCHC 34.9   RDW 43.6   PLATELETCT 271   MPV 9.1     Recent Labs     07/25/24 1931   SODIUM 139   POTASSIUM 4.0   CHLORIDE 107   CO2 22   GLUCOSE 107*   BUN 13   CREATININE 0.89   CALCIUM 8.9     Recent Labs     07/25/24 1931   ALTSGPT 14   ASTSGOT 28   ALKPHOSPHAT 47   TBILIRUBIN 0.7   GLUCOSE 107*         No results for input(s): \"NTPROBNP\" in the last 72 hours.      No results for input(s): \"TROPONINT\" in the last 72 hours.    Imaging:  DX-KNEE COMPLETE 4+ RIGHT   Final Result         Acute displaced fracture of the patella.      DX-FOOT-COMPLETE 3+ LEFT   Final Result      1.  No fracture or dislocation of LEFT foot.   2.  Mild to moderate osteoarthritis.      DX-HAND 3+ LEFT   Final Result         No acute osseous abnormality.          X-Ray:  I have personally reviewed the images and compared with prior images.  EKG:  I have personally reviewed the images and compared with prior images.    Assessment/Plan:  Justification " for Admission Status  I anticipate this patient will require at least two midnights for appropriate medical management, necessitating inpatient admission because patellar fracture     Patient will need a Med/Surg bed on MEDICAL service .  The need is secondary to patellar fracture .    * Fracture of patella with routine healing, right, closed- (present on admission)  Assessment & Plan  Fall from ground level.  Imaging personally reviewed and shows Acute displaced fracture of the patella  Ortho consulted by EDP and patient will be taken to OR tomorrow  Patient will be started on IV and p.o. narcotics.  She will need close hemodynamic monitoring  PT/OT      Leukocytosis  Assessment & Plan  Likely reactive.  No indication for antibiotics        VTE prophylaxis: enoxaparin ppx

## 2024-07-26 NOTE — ED NOTES
Medication history reviewed with patient at bedside.   Med rec is complete  Allergies reviewed.     Patient has not had any outpatient antibiotics in the last 30 days.   Anticoagulants: No    Jose F Rod

## 2024-07-26 NOTE — PROGRESS NOTES
Orthopedics Progress Note    Right patella fracture    -Imaging reviewed, displaced patella fracture  -Knee immobilizer  -Planning admit for ORIF and PT/OT  -NPO at midnight  -OR tomorrow       Law Holder MD  [place]

## 2024-07-26 NOTE — ASSESSMENT & PLAN NOTE
Fall from ground level.  Imaging personally reviewed and shows Acute displaced fracture of the patella  Ortho consulted by EDP and patient will be taken to OR tomorrow  Patient will be started on IV and p.o. narcotics.  She will need close hemodynamic monitoring  PT/OT

## 2024-07-26 NOTE — OP REPORT
Operative Report    DATE OF OPERATION: 7/26/2024     PREOPERATIVE DIAGNOSIS: right patella fracture    POSTOPERATIVE DIAGNOSIS: Same    PROCEDURE PERFORMED: Open treatment of right patella fracture    SURGEON: Stephen Holder M.D.     ASSISTANT: none    ANESTHESIOLOGIST: Casandra PRITCHETT    ANESTHESIA: General    ESTIMATED BLOOD LOSS: 30 mL    Implants: OIC 4.0 mm cannulated screws x 2, smooth wire tension band    INDICATIONS: The patient is a 68 y.o. female with a right patella fracture resulting from a fall while walking her dog.  The patient denies antecedent pain, and was found to have a normal neurovascular exam and skin envelope.  Radiographs reviewed by myself demonstrated the patella fracture.  Given these findings, surgical treatment of the patella was indicated.  I discussed the risks and benefits of the procedure, including the risks of infection, wound healing complication, neurovascular injury, pain, malunion, non-union, hardware failure and pullout, and the medical risks of anesthesia including DVT, PE, MI, stroke, and death.  Benefits include early mobilization, improved chance of union, and reduction in the risk of post-traumatic arthritis associated with patella fractures.  Alternatives to surgery were also discussed, including non-operative management.  The patient signed the informed consent and the operative extremity was marked.      PROCEDURE:  The patient underwent anesthesia, and was positioned supine on a radiolucent table and all bony prominences were well padded.  Preoperative antibiotics were administered. Sequential compression devices were employed. The correct operative site was prepped and draped into a sterile field. A procedural pause was conducted to verify correct patient, correct extremity, presence of the surgeons initials on the operative extremity.    A well padded tourniquet was inflated to 250 mmHg and a midline anterior incision was then made over the patella with care taken  to avoid all neurovascular structures. The medial and lateral retinacular tears were identified. Soft tissue stripping was avoided to preserve blood flow to bone and maximize healing potential. All fracture fragments were cleared of debris and hematoma and the knee joint was irrigated. The fracture was reduced using multiple pointed reduction clamps. Two OIC 4.0 cannulated screws were placed in a retrograde fashion and a 18- gauge figure of eight wire was placed through the cannulations of the screws to create a modified tension band construct. Anatomic reduction was obtained. All screws were checked and found to be of appropriate length and out of the joint. Medial and lateral retinacular tears were repaired with 1-0 vicyrl suture. Wounds were irrigated with normal saline, and closed in layers with 2-0 Vicryl in the subcutaneous tissue, and 3-0 nylon in the skin.  Sterile dressings were applied. Knee immobilizer was applied.    The patient tolerated the procedure well. There were no apparent complications. All sponge, needle, and instrument counts were correct on two separate occasions. She was awakened, extubated, and transferred to the recovery room in satisfactory condition.       Post-Operative Plan:    1.  The patient should remain weightbearing as tolerated in full extension with brace on their operative extremity.  Gait aids (crutch or crutches, cane, walker) may be used as needed, and may be discontinued when no longer required.  2.  IV antibiotics - may be continued for 24 hours  3.  DVT prophylaxis - SCD's and Lovenox 40 mg SQ daily while inpatient.  The patient may transition to Aspirin 325 mg PO BID as an outpatient  4.  Discharge planning, 2-week follow-up with [facility]  ___________________________________Anita Holder M.D.   DD: 7/26/2024  9:24 AM

## 2024-07-26 NOTE — CONSULTS
Orthopedics consult    7/26/2024    The patient was seen at the request of Dr. Vincent    HPI: García Cho is a 68 y.o. female who presents with complaints of pain to right knee, left ankle and left wrist..  This started yesterday after fall while walking her dogs.  The pain is 9/10 and is described as sharp.  The pain is made worse by palpation of the area and made better by rest and immobilization.    Also endorses pain to the left ankle and left wrist including right knee.  X-rays revealed right patella fracture and she was admitted for operative fixation and pain control as well is PT.    Past Medical History:   Diagnosis Date    Abnormal finding on cardiovascular stress test - hypertensive response to low level of exercise - normal stress imaging 12/10/2020    Agatston coronary artery calcium score between 200 and 399 - 355 2020     Agoraphobia with panic disorder     anxiety    Allergic rhinitis     Arthritis     all joints    Bowel habit changes     Dental disorder     Dyslipidemia     Fear of travel with panic attacks     Heart burn     Hiatal hernia     NSVT (nonsustained ventricular tachycardia) (HCC) - seen in recovery from stress Echo 12/10/2020    PAF (paroxysmal atrial fibrillation) (HCC) - 2 minutes on Zio 06/15/2024    Schatzki's ring        Past Surgical History:   Procedure Laterality Date    ALEX BY LAPAROSCOPY  1/19/2018    Procedure: ALEX BY LAPAROSCOPY/SURGICAL;  Surgeon: Keren Henderson M.D.;  Location: SURGERY [facility];  Service: General    EGD WITH ASP/BX  11/26/12    distal esophageal stricture secondary to reflux status post dilation to 51 Mongolian, mild erosive esophagitis, mild gastric erythema, small sliding hiatal hernia, mild chronic gastritis, negative H. pylori, no dysplasia or malignancy    COLONOSCOPY WITH BIOPSY  7/08    hemorrhoids, no malignancy    EGD WITH ASP/BX  7/08    hiatal hernia, schatzki's ring    ABDOMINAL HYSTERECTOMY TOTAL      with BSO due to  infection    OTHER       I &D rectal abscess       Medications  No current facility-administered medications on file prior to encounter.     Current Outpatient Medications on File Prior to Encounter   Medication Sig Dispense Refill    nebivolol (BYSTOLIC) 5 MG Tab tablet Take 1 Tablet by mouth 2 times a day. 180 Tablet 3    Bempedoic Acid 180 MG Tab Take 180 mg by mouth every day. 90 Tablet 3    ALPRAZolam (XANAX) 0.25 MG Tab Take 0.25 mg by mouth 2 times a day as needed for Anxiety.      amphetamine-dextroamphetamine (ADDERALL) 20 MG Tab Take 20 mg by mouth 2 times a day.      aripiprazole (ABILIFY) 30 MG tablet Take 30 mg by mouth every day.      nitroglycerin (NITROSTAT) 0.4 MG SL Tab Place 1 Tablet under the tongue as needed for Chest Pain. 25 Tablet 11       Allergies  Atorvastatin, Bee pollen, Ibuprofen, Pravastatin, and Other environmental    ROS  Right knee pain. All other systems were reviewed and found to be negative    Family History   Problem Relation Age of Onset    Cancer Brother         pancreatic cancer    Arthritis Mother     Cancer Mother         lung cancer with mets     Arthritis Father     Alcohol/Drug Brother         drug abuse       Social History     Socioeconomic History    Marital status: Single   Occupational History    Occupation:      Employer: OTHER   Tobacco Use    Smoking status: Former     Current packs/day: 0.00     Average packs/day: 0.1 packs/day for 44.0 years (4.4 ttl pk-yrs)     Types: Cigarettes     Start date: 3/17/1970     Quit date: 3/17/2014     Years since quitting: 10.3    Smokeless tobacco: Never   Vaping Use    Vaping status: Never Used   Substance and Sexual Activity    Alcohol use: No     Comment: 10 drinks/week quit 11/11, quit 2/13    Drug use: No     Comment: used marijuana and cocaine in the past, no IVDU    Sexual activity: Yes     Partners: Male     Birth control/protection: Surgical     Social Determinants of Health     Food Insecurity: No Food  "Insecurity (7/26/2024)    Hunger Vital Sign     Worried About Running Out of Food in the Last Year: Never true     Ran Out of Food in the Last Year: Never true   Transportation Needs: No Transportation Needs (7/26/2024)    PRAPARE - Transportation     Lack of Transportation (Medical): No     Lack of Transportation (Non-Medical): No   Intimate Partner Violence: Not At Risk (7/26/2024)    Humiliation, Afraid, Rape, and Kick questionnaire     Fear of Current or Ex-Partner: No     Emotionally Abused: No     Physically Abused: No     Sexually Abused: No   Housing Stability: Low Risk  (7/26/2024)    Housing Stability Vital Sign     Unable to Pay for Housing in the Last Year: No     Number of Places Lived in the Last Year: 1     Unstable Housing in the Last Year: No       Physical Exam  Vitals  /57   Pulse (!) 58   Temp 36.6 °C (97.9 °F) (Temporal)   Resp 16   Ht 1.727 m (5' 8\")   Wt 76.2 kg (168 lb)   SpO2 92%   General: Well Developed, Well Nourished, Age appropriate appearance  HEENT: Normocephalic, atraumatic  Psych: Normal mood and affect  Neck: Supple, nontender, no masses  Lungs: Breathing unlabored, No audible wheezing  Heart: Regular heart rate and rhythm  Abdomen: Soft, NT, ND  Neuro: Sensation grossly intact to BUE and BLE, moving all four extremities  Skin: Intact, no open wounds  Vascular: Right lower extremity palpable DP, Capillary refill <2 seconds  MSK:   Right lower extremity: Mild superficial abrasions to the anterior aspect of the knee.  If effusion appreciated.  Tenderness palpation over the patella.  No range of motion of the knee tolerated due to discomfort.  No tenderness palpation of the hip or distal to the knee.  Motor and sensory exam are intact all distal distributions    Left ankle: Mild swelling to the lateral aspect of the ankle no bony tenderness.  Range of motion of the ankle is normal motor and sensory exam to the lower extremity are normal    Left wrist: Mild swelling and " ecchymosis to the dorsal aspect of the wrist.  No bony tenderness.  Range of motion of the wrist is slightly reduced due to discomfort.  Motor and sensory exam are intact radial ulnar and median nerves      Radiographs:  DX-KNEE COMPLETE 4+ RIGHT   Final Result         Acute displaced fracture of the patella.      DX-FOOT-COMPLETE 3+ LEFT   Final Result      1.  No fracture or dislocation of LEFT foot.   2.  Mild to moderate osteoarthritis.      DX-HAND 3+ LEFT   Final Result         No acute osseous abnormality.      DX-PORTABLE FLUORO > 1 HOUR    (Results Pending)   DX-KNEE 2- RIGHT    (Results Pending)       Laboratory Values  Recent Labs     07/25/24 1931 07/26/24  0524   WBC 11.5* 8.0   RBC 4.23 4.55   HEMOGLOBIN 13.7 14.2   HEMATOCRIT 39.3 42.4   MCV 92.9 93.2   MCH 32.4 31.2   MCHC 34.9 33.5   RDW 43.6 43.8   PLATELETCT 271 248   MPV 9.1 9.3     Recent Labs     07/25/24 1931 07/26/24  0524   SODIUM 139 138   POTASSIUM 4.0 3.9   CHLORIDE 107 102   CO2 22 25   GLUCOSE 107* 117*   BUN 13 12             Impression:  Right patella fracture, displaced    Plan:We discussed the diagnosis and findings with the patient at length.  We reviewed possible non operative and operative interventions and the risks and benefits of each of these.  she had a chance to ask questions and all of these were answered to her satisfaction. The patient chose to proceed with operative intervention. Risks and benefits of surgery were discussed which include but are not limited to bleeding, infection, neurovascular damage, malunion, nonunion, instability, need for further surgery DVT, PE, MI, Stroke and death. They understand these risks and wish to proceed.      Plan:  N.p.o. 4 OR  Plan for ORIF right patella today  Pain control and PT/OT postop      Law Holder MD  [place]

## 2024-07-26 NOTE — PROGRESS NOTES
4 Eyes Skin Assessment Completed by SARA Muir and SARA Alan.    Head WDL  Ears WDL  Nose WDL  Mouth WDL  Neck WDL  Breast/Chest WDL  Shoulder Blades WDL  Spine WDL  (R) Arm/Elbow/Hand WDL  (L) Arm/Elbow/Hand WDL  Abdomen WDL  Groin WDL  Scrotum/Coccyx/Buttocks WDL  (R) Leg Abrasion- knee (Left)  (L) Leg INA with immobilizer (Right)  (R) Heel/Foot/Toe WDL  (L) Heel/Foot/Toe WDL          Devices In Places Blood Pressure Cuff, Pulse Ox, SCD's, and Leg Immobilizer      Interventions In Place Gray Ear Foams, NC W/Ear Foams, Pillows, and Q2 Turns    Possible Skin Injury No    Pictures Uploaded Into Epic No, needs to be completed  Wound Consult Placed N/A  RN Wound Prevention Protocol Ordered No

## 2024-07-27 PROBLEM — R73.9 HYPERGLYCEMIA: Status: ACTIVE | Noted: 2024-07-27

## 2024-07-27 PROCEDURE — 700102 HCHG RX REV CODE 250 W/ 637 OVERRIDE(OP)

## 2024-07-27 PROCEDURE — A9270 NON-COVERED ITEM OR SERVICE: HCPCS

## 2024-07-27 PROCEDURE — 700102 HCHG RX REV CODE 250 W/ 637 OVERRIDE(OP): Performed by: STUDENT IN AN ORGANIZED HEALTH CARE EDUCATION/TRAINING PROGRAM

## 2024-07-27 PROCEDURE — 97162 PT EVAL MOD COMPLEX 30 MIN: CPT

## 2024-07-27 PROCEDURE — A9270 NON-COVERED ITEM OR SERVICE: HCPCS | Performed by: STUDENT IN AN ORGANIZED HEALTH CARE EDUCATION/TRAINING PROGRAM

## 2024-07-27 PROCEDURE — 700111 HCHG RX REV CODE 636 W/ 250 OVERRIDE (IP): Mod: JZ | Performed by: STUDENT IN AN ORGANIZED HEALTH CARE EDUCATION/TRAINING PROGRAM

## 2024-07-27 PROCEDURE — 99233 SBSQ HOSP IP/OBS HIGH 50: CPT | Performed by: STUDENT IN AN ORGANIZED HEALTH CARE EDUCATION/TRAINING PROGRAM

## 2024-07-27 PROCEDURE — 97535 SELF CARE MNGMENT TRAINING: CPT

## 2024-07-27 PROCEDURE — 97166 OT EVAL MOD COMPLEX 45 MIN: CPT

## 2024-07-27 PROCEDURE — 700111 HCHG RX REV CODE 636 W/ 250 OVERRIDE (IP): Performed by: STUDENT IN AN ORGANIZED HEALTH CARE EDUCATION/TRAINING PROGRAM

## 2024-07-27 PROCEDURE — 770001 HCHG ROOM/CARE - MED/SURG/GYN PRIV*

## 2024-07-27 RX ORDER — POLYETHYLENE GLYCOL 3350 17 G/17G
1 POWDER, FOR SOLUTION ORAL DAILY
Status: DISCONTINUED | OUTPATIENT
Start: 2024-07-27 | End: 2024-07-29 | Stop reason: HOSPADM

## 2024-07-27 RX ORDER — HYDROMORPHONE HYDROCHLORIDE 1 MG/ML
1 INJECTION, SOLUTION INTRAMUSCULAR; INTRAVENOUS; SUBCUTANEOUS EVERY 4 HOURS PRN
Status: DISCONTINUED | OUTPATIENT
Start: 2024-07-27 | End: 2024-07-29 | Stop reason: HOSPADM

## 2024-07-27 RX ORDER — OXYCODONE HYDROCHLORIDE 10 MG/1
10 TABLET ORAL EVERY 4 HOURS PRN
Status: DISCONTINUED | OUTPATIENT
Start: 2024-07-27 | End: 2024-07-29 | Stop reason: HOSPADM

## 2024-07-27 RX ADMIN — ALPRAZOLAM 1 MG: 1 TABLET ORAL at 22:53

## 2024-07-27 RX ADMIN — SENNOSIDES AND DOCUSATE SODIUM 2 TABLET: 50; 8.6 TABLET ORAL at 17:53

## 2024-07-27 RX ADMIN — OXYCODONE HYDROCHLORIDE 10 MG: 10 TABLET ORAL at 17:53

## 2024-07-27 RX ADMIN — ARIPIPRAZOLE 30 MG: 10 TABLET ORAL at 06:39

## 2024-07-27 RX ADMIN — POLYETHYLENE GLYCOL 3350 1 PACKET: 17 POWDER, FOR SOLUTION ORAL at 15:13

## 2024-07-27 RX ADMIN — ACETAMINOPHEN 650 MG: 325 TABLET ORAL at 22:53

## 2024-07-27 RX ADMIN — MORPHINE SULFATE 4 MG: 4 INJECTION, SOLUTION INTRAMUSCULAR; INTRAVENOUS at 10:23

## 2024-07-27 RX ADMIN — ENOXAPARIN SODIUM 40 MG: 100 INJECTION SUBCUTANEOUS at 17:53

## 2024-07-27 RX ADMIN — OXYCODONE 5 MG: 5 TABLET ORAL at 07:59

## 2024-07-27 RX ADMIN — HYDROMORPHONE HYDROCHLORIDE 1 MG: 1 INJECTION, SOLUTION INTRAMUSCULAR; INTRAVENOUS; SUBCUTANEOUS at 19:36

## 2024-07-27 RX ADMIN — HYDROMORPHONE HYDROCHLORIDE 1 MG: 1 INJECTION, SOLUTION INTRAMUSCULAR; INTRAVENOUS; SUBCUTANEOUS at 15:14

## 2024-07-27 ASSESSMENT — COGNITIVE AND FUNCTIONAL STATUS - GENERAL
HELP NEEDED FOR BATHING: A LITTLE
TURNING FROM BACK TO SIDE WHILE IN FLAT BAD: A LITTLE
DRESSING REGULAR LOWER BODY CLOTHING: A LITTLE
TOILETING: A LITTLE
SUGGESTED CMS G CODE MODIFIER DAILY ACTIVITY: CJ
CLIMB 3 TO 5 STEPS WITH RAILING: A LOT
MOVING TO AND FROM BED TO CHAIR: A LITTLE
WALKING IN HOSPITAL ROOM: A LITTLE
MOVING FROM LYING ON BACK TO SITTING ON SIDE OF FLAT BED: A LITTLE
MOBILITY SCORE: 17
STANDING UP FROM CHAIR USING ARMS: A LITTLE
DAILY ACTIVITIY SCORE: 20
PERSONAL GROOMING: A LITTLE
SUGGESTED CMS G CODE MODIFIER MOBILITY: CK

## 2024-07-27 ASSESSMENT — PAIN DESCRIPTION - PAIN TYPE
TYPE: ACUTE PAIN

## 2024-07-27 ASSESSMENT — GAIT ASSESSMENTS
DISTANCE (FEET): 6
DEVIATION: ANTALGIC;STEP TO;BRADYKINETIC
GAIT LEVEL OF ASSIST: CONTACT GUARD ASSIST
ASSISTIVE DEVICE: FRONT WHEEL WALKER

## 2024-07-27 ASSESSMENT — ACTIVITIES OF DAILY LIVING (ADL): TOILETING: INDEPENDENT

## 2024-07-27 NOTE — THERAPY
"Occupational Therapy   Initial Evaluation     Patient Name: García Cho  Age:  68 y.o., Sex:  female  Medical Record #: [id number]  Today's Date: 7/27/2024     Precautions  Precautions: (P) Fall Risk, Weight Bearing As Tolerated Right Lower Extremity, Immobilizer Right Lower Extremity  Comments: (P) RLE immobilizer at all times    Assessment  Patient is a 68 y.o. female with a diagnosis of right patella fracture now s/p open treatment. Pt reported she fell while walking her dog but her dog was not the reason she fell. Additional factors influencing patient status / progress: PMHx includes HTN, arthritis, DLD, panic attacks, PAF.      During OT eval, pt presented with impaired activity tolerance, balance, functional mobility and pain. Pt needed Roxane for LBD, functional mobility with FWW to the bathroom, transferring onto the toilet with a BSC placed over for increased height. Pt was able to stand at the sink for g/h with CGA. Pt presents below her baseline, recommend post-acute placement and PM&R consult.     Plan    Occupational Therapy Initial Treatment Plan   Treatment Interventions: (P) Self Care / Activities of Daily Living, Adaptive Equipment, Neuro Re-Education / Balance, Therapeutic Exercises, Therapeutic Activity, Family / Caregiver Training  Treatment Frequency: (P) 4 Times per Week  Duration: (P) Until Therapy Goals Met    DC Equipment Recommendations: (P) Unable to determine at this time  Discharge Recommendations: (P) Recommend post-acute placement for additional occupational therapy services prior to discharge home (PM&R consult)     Subjective    \"My dog did not cause me to fall.\"     Objective     07/27/24 1445   Initial Contact Note    Initial Contact Note Order Received and Verified, Occupational Therapy Evaluation in Progress with Full Report to Follow.   Prior Living Situation   Prior Services Home-Independent   Housing / Facility Mobile Home  (RV)   Steps Into Home 9   Steps In Home " 2  (to access bedroom)   Bathroom Set up Walk In Shower;Shower Chair   Equipment Owned Tub / Shower Seat   Lives with - Patient's Self Care Capacity Spouse   Comments Pt reports her spouse can assist with IADLs but not provide physical assistance   Prior Level of ADL Function   Self Feeding Independent   Grooming / Hygiene Independent   Bathing Independent   Dressing Independent   Toileting Independent   Prior Level of IADL Function   Medication Management Independent   Laundry Independent   Kitchen Mobility Independent   Finances Independent   Home Management Independent   Shopping Independent   Prior Level Of Mobility Independent Without Device in Community   Driving / Transportation Driving Independent   Occupation (Pre-Hospital Vocational) Retired Due To Age   History of Falls   History of Falls Yes   Date of Last Fall   (reason for admission. Reports she was walking her dog but her dog was not the reason she fell)   Precautions   Precautions Fall Risk;Weight Bearing As Tolerated Right Lower Extremity;Immobilizer Right Lower Extremity   Comments RLE immobilizer at all times   Vitals   Pulse Oximetry 91 %   O2 (LPM) 0   O2 Delivery Device Room air w/o2 available   Pain   Intervention Repositioned;Rest   Pain 0 - 10 Group   Location Knee   Location Orientation Right   Pain Rating Scale (NPRS)   (did not quantify)   Description Aching;Constant   Comfort Goal Comfort with Movement;Perform Activity   Non Verbal Descriptors   Non Verbal Scale  Calm;Grimacing   Cognition    Cognition / Consciousness WDL   Comments Anxious but pleasant and cooperative   Active ROM Upper Body   Active ROM Upper Body  WDL   Strength Upper Body   Upper Body Strength  WDL   Sensation Upper Body   Upper Extremity Sensation  Not Tested   Upper Body Muscle Tone   Upper Body Muscle Tone  WDL   Neurological Concerns   Neurological Concerns No   Coordination Upper Body   Coordination WDL   Balance Assessment   Sitting Balance (Static) Fair +    Sitting Balance (Dynamic) Fair   Standing Balance (Static) Fair -   Standing Balance (Dynamic) Poor +   Weight Shift Sitting Fair   Weight Shift Standing Fair   Comments FWW   Bed Mobility    Supine to Sit Minimal Assist   Scooting Standby Assist   Comments HOB flat, able to bring self into long sitting   ADL Assessment   Grooming Contact Guard Assist;Standing  (hand hygiene, oral care)   Lower Body Dressing Minimal Assist  (assist for immobilizer mgmt, donned underwear in long sitting)   Toileting Contact Guard Assist  (BSC over toilet for increased height with knee immobilizer)   How much help from another person does the patient currently need...   Putting on and taking off regular lower body clothing? 3   Bathing (including washing, rinsing, and drying)? 3   Toileting, which includes using a toilet, bedpan, or urinal? 3   Putting on and taking off regular upper body clothing? 4   Taking care of personal grooming such as brushing teeth? 3   Eating meals? 4   6 Clicks Daily Activity Score 20   Functional Mobility   Sit to Stand Minimal Assist   Bed, Chair, Wheelchair Transfer Minimal Assist   Toilet Transfers Minimal Assist   Transfer Method Stand Step   Mobility EOB>toilet>sink>EOB with FWW   Activity Tolerance   Sitting Edge of Bed 2 min   Standing 2-3 min   Patient / Family Goals   Patient / Family Goal #1 To go home   Short Term Goals   Short Term Goal # 1 Pt will manage doffing/donning immobilizer for LBD/skin check purposes with SPV while maintaining knee extension   Short Term Goal # 2 Pt will perform ADL transfers with SPV   Short Term Goal # 3 Pt will stand at sink for g/h >4 min with SPV   Education Group   Education Provided Weight Bearing Precautions;Activities of Daily Living   Role of Occupational Therapist Patient Response Patient;Acceptance;Explanation;Verbal Demonstration   ADL Patient Response Patient;Acceptance;Explanation;Demonstration;Verbal Demonstration;Action Demonstration;Reinforcement  Needed  (education on immobilizer mgmt and ADL modifications to maintain precautions)   Weight Bearing Precautions Patient Response Patient;Acceptance;Explanation;Demonstration;Verbal Demonstration;Action Demonstration   Occupational Therapy Initial Treatment Plan    Treatment Interventions Self Care / Activities of Daily Living;Adaptive Equipment;Neuro Re-Education / Balance;Therapeutic Exercises;Therapeutic Activity;Family / Caregiver Training   Treatment Frequency 4 Times per Week   Duration Until Therapy Goals Met   Problem List   Problem List Decreased Active Daily Living Skills;Decreased Homemaking Skills;Decreased Functional Mobility;Decreased Activity Tolerance;Impaired Postural Control / Balance   Anticipated Discharge Equipment and Recommendations   DC Equipment Recommendations Unable to determine at this time   Discharge Recommendations Recommend post-acute placement for additional occupational therapy services prior to discharge home  (PM&R consult)   Interdisciplinary Plan of Care Collaboration   IDT Collaboration with  Nursing;Physical Therapist   Patient Position at End of Therapy Edge of Bed   Collaboration Comments RN aware, handoff to PT   Session Information   Date / Session Number  7/27 #1 (1/4, 8/2)

## 2024-07-27 NOTE — PROGRESS NOTES
"      Orthopaedic Progress Note    Interval changes:  Patient doing well post op  RLE in knee immobilizer brace, dressings CDI  Cleared for DC to home vs rehab by ortho pending medicine clearance    ROS - Patient denies any new issues.  Pain well controlled.    /57   Pulse (!) 56   Temp 36.6 °C (97.9 °F) (Temporal)   Resp 14   Ht 1.727 m (5' 8\")   Wt 76.2 kg (168 lb)   SpO2 95%     Patient seen and examined  No acute distress  Breathing non labored  RRR  RLE in knee immobilizer brace, dressings CDI, DNVI, moves all toes, cap refill <2 sec.     Recent Labs     07/25/24  1931 07/26/24  0524   WBC 11.5* 8.0   RBC 4.23 4.55   HEMOGLOBIN 13.7 14.2   HEMATOCRIT 39.3 42.4   MCV 92.9 93.2   MCH 32.4 31.2   MCHC 34.9 33.5   RDW 43.6 43.8   PLATELETCT 271 248   MPV 9.1 9.3       Active Hospital Problems    Diagnosis     Fracture of patella with routine healing, right, closed [S82.001D]     Leukocytosis [D72.829]        Assessment/Plan:  Patient doing well post op  RLE in knee immobilizer brace, dressings CDI  Cleared for DC to home vs rehab by ortho pending medicine clearance  POD#1 S/P Open treatment of right patella fracture  Wt bearing status - WBAT in knee immobilizer at all times  Wound care/Drains - Dressings to be changed every other day by nursing. Or PRN for saturation starting POD#2  Future Procedures - none planned   Lovenox: Start 7/25, Duration-until ambulatory > 150'  Sutures/Staples out- 14-21 days post operatively. Removal will completed by ortho mid levels only.  PT/OT-initiated  Antibiotics: Perioperative completed  DVT Prophylaxis- TEDS/SCDs/Foot pumps  [place]-not needed per ortho  Case Coordination for Discharge Planning - Disposition per therapy recs.    "

## 2024-07-27 NOTE — PROGRESS NOTES
1007: Received report from Evy RUIZ, all questions and concerns addressed at this time.    1103: Pt arrived to T319, Aox4 complaining of 8/10 pain in right knee. Pt verbalized needing to urinate, RN and CNA set up female wick to allow pt to urinate. Pt wearing knee immobilizer in bed upon arrival. Post-Op vitals initiated, educated to call staff using call light if needing to ambulate. Treaded socks in place, all questions and concerns addressed at this time. Call light placed in bed within reach of patient.

## 2024-07-27 NOTE — THERAPY
Physical Therapy   Initial Evaluation     Patient Name: García Cho  Age:  68 y.o., Sex:  female  Medical Record #: [id number]  Today's Date: 7/27/2024     Precautions  Precautions: Fall Risk;Weight Bearing As Tolerated Right Lower Extremity;Immobilizer Right Lower Extremity  Comments: weightbearing as tolerated in full extension with brace    Assessment  Patient is 68 y.o. female with a fall while walking her dog. Left hand contusion  Left foot contusion. Right patella fracture.   7/26: Open treatment of right patella fracture.  Hx of HTN, arthritis, DLD, panic attacks, PAF. Pt independent prior to admission.     Currently pt requiring assistance with all mobility and walker. Min anxious throughout eval. C/o a lot of knee pain which is limiting her activity tolerance. Patient is currently functioning below their level of baseline. Pt would benefit from PM&R consult. Pt can tolerate up to 15 hours of therapies a week, is motivated and willing to participate and has a good and safe discharge plan.   Will continue to follow.     Plan    Physical Therapy Initial Treatment Plan   Treatment Plan : Bed Mobility, Gait Training, Neuro Re-Education / Balance, Therapeutic Activities, Therapeutic Exercise  Treatment Frequency: 4 Times per Week  Duration: Until Therapy Goals Met    DC Equipment Recommendations: Front-Wheel Walker, Crutches          Subjective    Had attempted to assist earlier around 1000 and pt wanting pain meds prior to getting up. Notified RN. In the afternoon, pt up with OT in bathroom upon entry. Agreed to PT.      Objective       07/27/24 1453   Time In/Time Out   Therapy Start Time 1443   Therapy End Time 1453   Total Therapy Time 10   Initial Contact Note    Initial Contact Note Order Received and Verified, Physical Therapy Evaluation in Progress with Full Report to Follow.   Precautions   Precautions Fall Risk;Weight Bearing As Tolerated Right Lower Extremity;Immobilizer Right Lower  Extremity   Comments weightbearing as tolerated in full extension with brace   Pain 0 - 10 Group   Location Knee   Location Orientation Right   Therapist Pain Assessment During Activity;Post Activity Pain Same as Prior to Activity;Nurse Notified   Prior Living Situation   Prior Services Home-Independent   Housing / Facility Mobile Home  (RV)   Steps Into Home 9   Steps In Home 2  (to access bedroom)   Rail Left Rail  (Steps into Home)   Bathroom Set up Walk In Shower;Shower Curtain   Equipment Owned Tub / Shower Seat   Lives with - Patient's Self Care Capacity Spouse   Comments Pt's spouse cannot assist pt with mobility   Prior Level of Functional Mobility   Bed Mobility Independent   Transfer Status Independent   Ambulation Independent   Ambulation Distance community   Assistive Devices Used None   Stairs Independent   History of Falls   History of Falls Yes   Date of Last Fall 07/25/24  (fell into a cover hole while walking her dog)   Cognition    Cognition / Consciousness WDL   Comments pleasant, cooperative   Active ROM Lower Body    Active ROM Lower Body  X   Comments R LE in knee immobilizer, R hip, ankle WFL. L LE WFL   Strength Lower Body   Lower Body Strength  X   Comments required assistance with R LE into bed   Coordination Lower Body    Coordination Lower Body  WDL   Comments pt tends to pump R foot- pt stated min restless leg syndrome   Balance Assessment   Sitting Balance (Static) Fair +   Sitting Balance (Dynamic) Fair   Standing Balance (Static) Fair -   Standing Balance (Dynamic) Poor +   Weight Shift Sitting Fair   Weight Shift Standing Fair   Comments standing assessed with FWW   Bed Mobility    Sit to Supine Minimal Assist  (with R LE)   Scooting Standby Assist   Comments HOB flat, no bed rail   Gait Analysis   Gait Level Of Assist Contact Guard Assist   Assistive Device Front Wheel Walker   Distance (Feet) 6   # of Times Distance was Traveled 1   Deviation Antalgic;Step To;Bradykinetic   # of  Stairs Climbed 0   Functional Mobility   Bed, Chair, Wheelchair Transfer Minimal Assist   Transfer Method Stand Step   6 Clicks Assessment - How much HELP from from another person do you currently need... (If the patient hasn't done an activity recently, how much help from another person do you think he/she would need if he/she tried?)   Turning from your back to your side while in a flat bed without using bedrails? 3   Moving from lying on your back to sitting on the side of a flat bed without using bedrails? 3   Moving to and from a bed to a chair (including a wheelchair)? 3   Standing up from a chair using your arms (e.g., wheelchair, or bedside chair)? 3   Walking in hospital room? 3   Climbing 3-5 steps with a railing? 2   6 clicks Mobility Score 17   Activity Tolerance   Sitting in Chair pt wanted to return to bed   Sitting Edge of Bed 3 minutes   Standing 3 minutes total   Comments limited by pain and fatigue   Short Term Goals    Short Term Goal # 1 Pt will perform supine to/from sit with flat bed and no features supervised in 6 visits to progress bed mobility   Short Term Goal # 2 Pt will perform sit to/from stand with FWW supervised in 6 visits to progress transfers   Short Term Goal # 3 Pt will amb with FWW 100ft supervised in 6 visits to progress with functional household mobility.   Short Term Goal # 4 Pt will be able to ascend/descend 9 steps with L rail with supervision in 6 visits to access home   Short Term Goal # 5 Pt will be able to ascend/descend 2 steps with crutches in 6 visits to access her bedroom   Education Group   Education Provided Role of Physical Therapist   Role of Physical Therapist Patient Response Patient;Acceptance;Explanation;Verbal Demonstration   Physical Therapy Initial Treatment Plan    Treatment Plan  Bed Mobility;Gait Training;Neuro Re-Education / Balance;Therapeutic Activities;Therapeutic Exercise   Treatment Frequency 4 Times per Week   Duration Until Therapy Goals Met    Problem List    Problems Pain;Impaired Bed Mobility;Impaired Transfers;Impaired Ambulation;Decreased Activity Tolerance   Anticipated Discharge Equipment and Recommendations   DC Equipment Recommendations Front-Wheel Walker;Crutches   Interdisciplinary Plan of Care Collaboration   IDT Collaboration with  Nursing  Hand off from OT.    Patient Position at End of Therapy In Bed;Bed Alarm On;Call Light within Reach;Tray Table within Reach;Phone within Reach   Collaboration Comments RN updated

## 2024-07-28 LAB
ALBUMIN SERPL BCP-MCNC: 3.5 G/DL (ref 3.2–4.9)
ALBUMIN/GLOB SERPL: 1.3 G/DL
ALP SERPL-CCNC: 46 U/L (ref 30–99)
ALT SERPL-CCNC: 14 U/L (ref 2–50)
ANION GAP SERPL CALC-SCNC: 8 MMOL/L (ref 7–16)
AST SERPL-CCNC: 19 U/L (ref 12–45)
BASOPHILS # BLD AUTO: 0.4 % (ref 0–1.8)
BASOPHILS # BLD: 0.04 K/UL (ref 0–0.12)
BILIRUB SERPL-MCNC: 0.7 MG/DL (ref 0.1–1.5)
BUN SERPL-MCNC: 14 MG/DL (ref 8–22)
CALCIUM ALBUM COR SERPL-MCNC: 9.6 MG/DL (ref 8.5–10.5)
CALCIUM SERPL-MCNC: 9.2 MG/DL (ref 8.5–10.5)
CHLORIDE SERPL-SCNC: 100 MMOL/L (ref 96–112)
CO2 SERPL-SCNC: 29 MMOL/L (ref 20–33)
CREAT SERPL-MCNC: 0.72 MG/DL (ref 0.5–1.4)
EOSINOPHIL # BLD AUTO: 0.16 K/UL (ref 0–0.51)
EOSINOPHIL NFR BLD: 1.7 % (ref 0–6.9)
ERYTHROCYTE [DISTWIDTH] IN BLOOD BY AUTOMATED COUNT: 45.7 FL (ref 35.9–50)
GFR SERPLBLD CREATININE-BSD FMLA CKD-EPI: 91 ML/MIN/1.73 M 2
GLOBULIN SER CALC-MCNC: 2.7 G/DL (ref 1.9–3.5)
GLUCOSE SERPL-MCNC: 107 MG/DL (ref 65–99)
HCT VFR BLD AUTO: 36.7 % (ref 37–47)
HGB BLD-MCNC: 12.1 G/DL (ref 12–16)
IMM GRANULOCYTES # BLD AUTO: 0.03 K/UL (ref 0–0.11)
IMM GRANULOCYTES NFR BLD AUTO: 0.3 % (ref 0–0.9)
LYMPHOCYTES # BLD AUTO: 3.25 K/UL (ref 1–4.8)
LYMPHOCYTES NFR BLD: 34.4 % (ref 22–41)
MAGNESIUM SERPL-MCNC: 1.8 MG/DL (ref 1.5–2.5)
MCH RBC QN AUTO: 31.8 PG (ref 27–33)
MCHC RBC AUTO-ENTMCNC: 33 G/DL (ref 32.2–35.5)
MCV RBC AUTO: 96.6 FL (ref 81.4–97.8)
MONOCYTES # BLD AUTO: 0.75 K/UL (ref 0–0.85)
MONOCYTES NFR BLD AUTO: 7.9 % (ref 0–13.4)
NEUTROPHILS # BLD AUTO: 5.21 K/UL (ref 1.82–7.42)
NEUTROPHILS NFR BLD: 55.3 % (ref 44–72)
NRBC # BLD AUTO: 0 K/UL
NRBC BLD-RTO: 0 /100 WBC (ref 0–0.2)
PLATELET # BLD AUTO: 242 K/UL (ref 164–446)
PMV BLD AUTO: 9.7 FL (ref 9–12.9)
POTASSIUM SERPL-SCNC: 4.2 MMOL/L (ref 3.6–5.5)
PROT SERPL-MCNC: 6.2 G/DL (ref 6–8.2)
RBC # BLD AUTO: 3.8 M/UL (ref 4.2–5.4)
SODIUM SERPL-SCNC: 137 MMOL/L (ref 135–145)
WBC # BLD AUTO: 9.4 K/UL (ref 4.8–10.8)

## 2024-07-28 PROCEDURE — 770001 HCHG ROOM/CARE - MED/SURG/GYN PRIV*

## 2024-07-28 PROCEDURE — 85025 COMPLETE CBC W/AUTO DIFF WBC: CPT

## 2024-07-28 PROCEDURE — 80053 COMPREHEN METABOLIC PANEL: CPT

## 2024-07-28 PROCEDURE — 36415 COLL VENOUS BLD VENIPUNCTURE: CPT

## 2024-07-28 PROCEDURE — A9270 NON-COVERED ITEM OR SERVICE: HCPCS | Performed by: STUDENT IN AN ORGANIZED HEALTH CARE EDUCATION/TRAINING PROGRAM

## 2024-07-28 PROCEDURE — A9270 NON-COVERED ITEM OR SERVICE: HCPCS

## 2024-07-28 PROCEDURE — 99232 SBSQ HOSP IP/OBS MODERATE 35: CPT | Performed by: STUDENT IN AN ORGANIZED HEALTH CARE EDUCATION/TRAINING PROGRAM

## 2024-07-28 PROCEDURE — 700102 HCHG RX REV CODE 250 W/ 637 OVERRIDE(OP)

## 2024-07-28 PROCEDURE — 700102 HCHG RX REV CODE 250 W/ 637 OVERRIDE(OP): Performed by: STUDENT IN AN ORGANIZED HEALTH CARE EDUCATION/TRAINING PROGRAM

## 2024-07-28 PROCEDURE — 83735 ASSAY OF MAGNESIUM: CPT

## 2024-07-28 PROCEDURE — 700111 HCHG RX REV CODE 636 W/ 250 OVERRIDE (IP): Performed by: STUDENT IN AN ORGANIZED HEALTH CARE EDUCATION/TRAINING PROGRAM

## 2024-07-28 PROCEDURE — 700111 HCHG RX REV CODE 636 W/ 250 OVERRIDE (IP): Mod: JZ | Performed by: STUDENT IN AN ORGANIZED HEALTH CARE EDUCATION/TRAINING PROGRAM

## 2024-07-28 RX ADMIN — OXYCODONE HYDROCHLORIDE 10 MG: 10 TABLET ORAL at 09:23

## 2024-07-28 RX ADMIN — METOPROLOL TARTRATE 25 MG: 25 TABLET, FILM COATED ORAL at 04:16

## 2024-07-28 RX ADMIN — ARIPIPRAZOLE 30 MG: 10 TABLET ORAL at 04:16

## 2024-07-28 RX ADMIN — POLYETHYLENE GLYCOL 3350 1 PACKET: 17 POWDER, FOR SOLUTION ORAL at 04:16

## 2024-07-28 RX ADMIN — ENOXAPARIN SODIUM 40 MG: 100 INJECTION SUBCUTANEOUS at 17:01

## 2024-07-28 RX ADMIN — OXYCODONE HYDROCHLORIDE 10 MG: 10 TABLET ORAL at 19:27

## 2024-07-28 RX ADMIN — SENNOSIDES AND DOCUSATE SODIUM 2 TABLET: 50; 8.6 TABLET ORAL at 17:01

## 2024-07-28 RX ADMIN — OXYCODONE HYDROCHLORIDE 10 MG: 10 TABLET ORAL at 14:53

## 2024-07-28 RX ADMIN — HYDROMORPHONE HYDROCHLORIDE 1 MG: 1 INJECTION, SOLUTION INTRAMUSCULAR; INTRAVENOUS; SUBCUTANEOUS at 04:16

## 2024-07-28 RX ADMIN — HYDROMORPHONE HYDROCHLORIDE 1 MG: 1 INJECTION, SOLUTION INTRAMUSCULAR; INTRAVENOUS; SUBCUTANEOUS at 11:27

## 2024-07-28 RX ADMIN — ALPRAZOLAM 1 MG: 1 TABLET ORAL at 20:54

## 2024-07-28 ASSESSMENT — PAIN DESCRIPTION - PAIN TYPE
TYPE: ACUTE PAIN
TYPE: ACUTE PAIN;SURGICAL PAIN
TYPE: ACUTE PAIN
TYPE: ACUTE PAIN

## 2024-07-28 NOTE — PROGRESS NOTES
"      Orthopaedic Progress Note    Interval changes:  Patient doing well    RLE in knee immobilizer brace, dressings changed incision without issues   Cleared for DC to home vs rehab by ortho pending medicine clearance    ROS - Patient denies any new issues.  Pain well controlled.    /60   Pulse (!) 54   Temp 36.3 °C (97.3 °F) (Temporal)   Resp 17   Ht 1.727 m (5' 8\")   Wt 76.2 kg (168 lb)   SpO2 92%     Patient seen and examined  No acute distress  Breathing non labored  RRR  RLE in knee immobilizer brace, dressings changed incision without issues, DNVI, moves all toes, cap refill <2 sec.     Recent Labs     07/25/24  1931 07/26/24  0524 07/28/24  0512   WBC 11.5* 8.0 9.4   RBC 4.23 4.55 3.80*   HEMOGLOBIN 13.7 14.2 12.1   HEMATOCRIT 39.3 42.4 36.7*   MCV 92.9 93.2 96.6   MCH 32.4 31.2 31.8   MCHC 34.9 33.5 33.0   RDW 43.6 43.8 45.7   PLATELETCT 271 248 242   MPV 9.1 9.3 9.7       Active Hospital Problems    Diagnosis     Hyperglycemia [R73.9]     Fracture of patella with routine healing, right, closed [S82.001D]     Leukocytosis [D72.829]        Assessment/Plan:  Patient doing well   RLE in knee immobilizer brace, dressings changed incision without issues   Cleared for DC to home vs rehab by ortho pending medicine clearance  POD#2 S/P Open treatment of right patella fracture  Wt bearing status - WBAT in knee immobilizer at all times  Wound care/Drains - Dressings to be changed every other day by nursing. Or PRN for saturation starting POD#2  Future Procedures - none planned   Lovenox: Start 7/25, Duration-until ambulatory > 150'  Sutures/Staples out- 14-21 days post operatively. Removal will completed by ortho mid levels only.  PT/OT-initiated  Antibiotics: Perioperative completed  DVT Prophylaxis- TEDS/SCDs/Foot pumps  Guthrie-not needed per ortho  Case Coordination for Discharge Planning - Disposition per therapy recs.    "

## 2024-07-28 NOTE — PROGRESS NOTES
Hospital Medicine Daily Progress Note    Date of Service  7/27/2024    Chief Complaint  García Cho is a 68 y.o. female admitted 7/25/2024 with fractured patella after GLF    Hospital Course  García Cho is a 68 y.o. female who presented 7/25/2024 status post fall. Patient states that earlier today, she was walking her dog, and she ended up tripping over her sidewalk.  She fell onto her knees and wrist.  She states that she has extreme pain to her right knee along with swelling.  Along with left ankle pain as well.  Imaging was obtained in the emergency department showing an acute displaced fracture of the patella.  All other x-rays were unremarkable.  Orthopedic surgery consulted and recommended admission, n.p.o. at midnight, and surgery tomorrow.     Interval Problem Update  7/27  Evaluated in her inpatient room.   Afebrile pulse in the 50s to 60s normal respiratory rate and blood pressure, oxygen saturation 91 to 95% on 1 L nasal cannula.  Unremarkable CBC, CMP glucose 117 AST 84 ALT 51.  Went for surgical repair on 7/26, tolerated well, no apparent complications, pain is controlled, all limbs neurovascularly intact.  Cleared for discharge to home versus rehab by Ortho pending medicine clearance, weightbearing as tolerated in knee immobilizer at all times.    I have discussed this patient's plan of care a  nd discharge plan at IDT rounds today with Case Management, Nursing, Nursing leadership, and other members of the IDT team.    Consultants/Specialty  orthopedics    Code Status  Full Code    Disposition  The patient is not medically cleared for discharge to home or a post-acute facility.    I have placed the appropriate orders for post-discharge needs.    Review of Systems  ROS   Review of Systems   Constitutional:  Negative for chills and fever.   HENT:  Negative for congestion, ear discharge, ear pain, nosebleeds and sinus pain.    Eyes:  Negative for double vision, photophobia,  pain and discharge.   Respiratory:  Negative for hemoptysis, sputum production and shortness of breath.    Cardiovascular:  Negative for chest pain, palpitations and orthopnea.   Gastrointestinal:  Negative for abdominal pain, diarrhea, nausea and vomiting.   Genitourinary:  Negative for dysuria, flank pain, frequency, hematuria and urgency.   Musculoskeletal:  Positive for falls and joint pain. Negative for back pain and neck pain.   Neurological:  Negative for dizziness and headaches.   Psychiatric/Behavioral:  Negative for hallucinations, substance abuse and suicidal ideas. The patient is not nervous/anxious and does not have insomnia.    Physical Exam  Temp:  [36.4 °C (97.5 °F)-36.6 °C (97.9 °F)] 36.5 °C (97.7 °F)  Pulse:  [56-62] 62  Resp:  [14-16] 15  BP: ()/(46-71) 93/55  SpO2:  [91 %-95 %] 95 %    Physical Exam  Vitals and nursing note reviewed.   Constitutional:       Appearance: She is not toxic-appearing.   HENT:      Head: Normocephalic and atraumatic.      Nose: Nose normal. No rhinorrhea.      Mouth/Throat:      Mouth: Mucous membranes are moist.      Pharynx: Oropharynx is clear.   Eyes:      General: No scleral icterus.     Extraocular Movements: Extraocular movements intact.      Conjunctiva/sclera: Conjunctivae normal.   Cardiovascular:      Rate and Rhythm: Normal rate and regular rhythm.      Pulses: Normal pulses.   Pulmonary:      Effort: Pulmonary effort is normal. No respiratory distress.      Breath sounds: Normal breath sounds. No wheezing, rhonchi or rales.   Abdominal:      General: Bowel sounds are normal.      Palpations: Abdomen is soft.      Tenderness: There is no abdominal tenderness. There is no guarding or rebound.   Musculoskeletal:         General: Tenderness present. Normal range of motion.      Cervical back: Normal range of motion and neck supple. No rigidity.      Right lower leg: No edema.      Left lower leg: No edema.   Skin:     General: Skin is warm and dry.       Capillary Refill: Capillary refill takes less than 2 seconds.      Findings: No erythema or rash.   Neurological:      General: No focal deficit present.      Mental Status: She is alert and oriented to person, place, and time. Mental status is at baseline.      Cranial Nerves: No cranial nerve deficit.      Sensory: No sensory deficit.      Motor: No weakness.      Coordination: Coordination normal.   Psychiatric:         Mood and Affect: Mood normal.         Behavior: Behavior normal.         Thought Content: Thought content normal.         Judgment: Judgment normal.       Fluids    Intake/Output Summary (Last 24 hours) at 7/27/2024 1711  Last data filed at 7/27/2024 1300  Gross per 24 hour   Intake --   Output 1700 ml   Net -1700 ml       Laboratory  Recent Labs     07/25/24 1931 07/26/24  0524   WBC 11.5* 8.0   RBC 4.23 4.55   HEMOGLOBIN 13.7 14.2   HEMATOCRIT 39.3 42.4   MCV 92.9 93.2   MCH 32.4 31.2   MCHC 34.9 33.5   RDW 43.6 43.8   PLATELETCT 271 248   MPV 9.1 9.3     Recent Labs     07/25/24 1931 07/26/24  0524   SODIUM 139 138   POTASSIUM 4.0 3.9   CHLORIDE 107 102   CO2 22 25   GLUCOSE 107* 117*   BUN 13 12   CREATININE 0.89 0.83   CALCIUM 8.9 9.2                   Imaging  DX-PORTABLE FLUORO > 1 HOUR   Final Result      Portable fluoroscopy utilized for 55 seconds.      INTERPRETING LOCATION: [address] [place] [address], [address]      DX-KNEE 2- RIGHT   Final Result      Digitized intraoperative radiograph is submitted for review. This examination is not for diagnostic purpose but for guidance during a surgical procedure. Please see the patient's chart for full procedural details.         INTERPRETING LOCATION: [address] [place] [address], [address]      DX-KNEE COMPLETE 4+ RIGHT   Final Result         Acute displaced fracture of the patella.      DX-FOOT-COMPLETE 3+ LEFT   Final Result      1.  No fracture or dislocation of LEFT foot.   2.  Mild to moderate osteoarthritis.      DX-HAND 3+ LEFT   Final Result         No  acute osseous abnormality.           Assessment/Plan  * Fracture of patella with routine healing, right, closed- (present on admission)  Assessment & Plan  Fall from ground level.  Imaging personally reviewed and shows Acute displaced fracture of the patella  Ortho consulted by EDP and patient will be taken to OR tomorrow  Patient will be started on IV and p.o. narcotics.  She will need close hemodynamic monitoring  PT/OT      Hyperglycemia  Assessment & Plan  Monitor on BMP, mild, no need for ISS    Leukocytosis  Assessment & Plan  Likely reactive.  No indication for antibiotics         VTE prophylaxis:   SCDs/TEDs   enoxaparin ppx      I have performed a physical exam and reviewed and updated ROS and Plan today (7/27/2024). In review of yesterday's note (7/26/2024), there are no changes except as documented above.  Total time spent 55 minutes. I spent greater than 50% of the time for patient care, counseling, and coordination on this date, including unit/floor time, and face-to-face time with the patient as per interval events, my own review of patient's imaging and lab analysis and developing my assessment and plan above.         Detail Level: Detailed Size Of Lesion In Cm (Optional): 0

## 2024-07-28 NOTE — HOSPITAL COURSE
García Cho is a 68 y.o. female who presented 7/25/2024 status post fall. Patient states that earlier today, she was walking her dog, and she ended up tripping over her sidewalk.  She fell onto her knees and wrist.  She states that she has extreme pain to her right knee along with swelling.  Along with left ankle pain as well.  Imaging was obtained in the emergency department showing an acute displaced fracture of the patella.  All other x-rays were unremarkable.  Orthopedic surgery consulted and recommended admission, n.p.o. at midnight, and surgery tomorrow.

## 2024-07-29 ENCOUNTER — HOSPITAL ENCOUNTER (INPATIENT)
Facility: REHABILITATION | Age: 69
DRG: 561 | End: 2024-07-29
Attending: PHYSICAL MEDICINE & REHABILITATION | Admitting: PHYSICAL MEDICINE & REHABILITATION
Payer: MEDICARE

## 2024-07-29 VITALS
OXYGEN SATURATION: 95 % | TEMPERATURE: 98.2 F | RESPIRATION RATE: 16 BRPM | HEIGHT: 68 IN | BODY MASS INDEX: 25.46 KG/M2 | DIASTOLIC BLOOD PRESSURE: 62 MMHG | SYSTOLIC BLOOD PRESSURE: 147 MMHG | HEART RATE: 63 BPM | WEIGHT: 168 LBS

## 2024-07-29 DIAGNOSIS — I48.0 PAF (PAROXYSMAL ATRIAL FIBRILLATION) (HCC): Chronic | ICD-10-CM

## 2024-07-29 DIAGNOSIS — S82.091A OTHER FRACTURE OF RIGHT PATELLA, INITIAL ENCOUNTER FOR CLOSED FRACTURE: ICD-10-CM

## 2024-07-29 DIAGNOSIS — I25.83 CORONARY ATHEROSCLEROSIS DUE TO LIPID RICH PLAQUE: ICD-10-CM

## 2024-07-29 DIAGNOSIS — F32.A ANXIETY AND DEPRESSION: ICD-10-CM

## 2024-07-29 DIAGNOSIS — I25.10 CORONARY ATHEROSCLEROSIS DUE TO LIPID RICH PLAQUE: ICD-10-CM

## 2024-07-29 DIAGNOSIS — F41.9 ANXIETY AND DEPRESSION: ICD-10-CM

## 2024-07-29 DIAGNOSIS — S82.001D: ICD-10-CM

## 2024-07-29 LAB
APPEARANCE UR: CLEAR
BACTERIA #/AREA URNS HPF: NEGATIVE /HPF
BILIRUB UR QL STRIP.AUTO: NEGATIVE
COLOR UR: YELLOW
EPI CELLS #/AREA URNS HPF: NEGATIVE /HPF
GLUCOSE UR STRIP.AUTO-MCNC: NEGATIVE MG/DL
HYALINE CASTS #/AREA URNS LPF: ABNORMAL /LPF
KETONES UR STRIP.AUTO-MCNC: NEGATIVE MG/DL
LEUKOCYTE ESTERASE UR QL STRIP.AUTO: NEGATIVE
MICRO URNS: ABNORMAL
NITRITE UR QL STRIP.AUTO: NEGATIVE
PH UR STRIP.AUTO: 7.5 [PH] (ref 5–8)
PROT UR QL STRIP: 30 MG/DL
RBC # URNS HPF: ABNORMAL /HPF
RBC UR QL AUTO: ABNORMAL
SP GR UR STRIP.AUTO: 1.01
UROBILINOGEN UR STRIP.AUTO-MCNC: 0.2 MG/DL
WBC #/AREA URNS HPF: ABNORMAL /HPF

## 2024-07-29 PROCEDURE — 770010 HCHG ROOM/CARE - REHAB SEMI PRIVAT*

## 2024-07-29 PROCEDURE — A9270 NON-COVERED ITEM OR SERVICE: HCPCS | Performed by: STUDENT IN AN ORGANIZED HEALTH CARE EDUCATION/TRAINING PROGRAM

## 2024-07-29 PROCEDURE — 700111 HCHG RX REV CODE 636 W/ 250 OVERRIDE (IP): Mod: JZ | Performed by: PHYSICAL MEDICINE & REHABILITATION

## 2024-07-29 PROCEDURE — 700102 HCHG RX REV CODE 250 W/ 637 OVERRIDE(OP): Performed by: PHYSICAL MEDICINE & REHABILITATION

## 2024-07-29 PROCEDURE — 700102 HCHG RX REV CODE 250 W/ 637 OVERRIDE(OP): Performed by: STUDENT IN AN ORGANIZED HEALTH CARE EDUCATION/TRAINING PROGRAM

## 2024-07-29 PROCEDURE — 700102 HCHG RX REV CODE 250 W/ 637 OVERRIDE(OP)

## 2024-07-29 PROCEDURE — 81001 URINALYSIS AUTO W/SCOPE: CPT

## 2024-07-29 PROCEDURE — 99239 HOSP IP/OBS DSCHRG MGMT >30: CPT | Performed by: STUDENT IN AN ORGANIZED HEALTH CARE EDUCATION/TRAINING PROGRAM

## 2024-07-29 PROCEDURE — A9270 NON-COVERED ITEM OR SERVICE: HCPCS

## 2024-07-29 PROCEDURE — 99223 1ST HOSP IP/OBS HIGH 75: CPT | Performed by: PHYSICAL MEDICINE & REHABILITATION

## 2024-07-29 PROCEDURE — A9270 NON-COVERED ITEM OR SERVICE: HCPCS | Performed by: PHYSICAL MEDICINE & REHABILITATION

## 2024-07-29 PROCEDURE — 97530 THERAPEUTIC ACTIVITIES: CPT

## 2024-07-29 RX ORDER — OXYCODONE HYDROCHLORIDE 10 MG/1
10 TABLET ORAL
Status: DISCONTINUED | OUTPATIENT
Start: 2024-07-29 | End: 2024-08-07 | Stop reason: HOSPADM

## 2024-07-29 RX ORDER — TRAZODONE HYDROCHLORIDE 50 MG/1
50 TABLET, FILM COATED ORAL
Status: DISCONTINUED | OUTPATIENT
Start: 2024-07-29 | End: 2024-08-07 | Stop reason: HOSPADM

## 2024-07-29 RX ORDER — ARIPIPRAZOLE 10 MG/1
30 TABLET ORAL
Status: CANCELLED | OUTPATIENT
Start: 2024-07-30

## 2024-07-29 RX ORDER — METOPROLOL TARTRATE 25 MG/1
25 TABLET, FILM COATED ORAL 2 TIMES DAILY
Status: CANCELLED | OUTPATIENT
Start: 2024-07-29

## 2024-07-29 RX ORDER — ONDANSETRON 2 MG/ML
4 INJECTION INTRAMUSCULAR; INTRAVENOUS 4 TIMES DAILY PRN
Status: DISCONTINUED | OUTPATIENT
Start: 2024-07-29 | End: 2024-08-07 | Stop reason: HOSPADM

## 2024-07-29 RX ORDER — POLYETHYLENE GLYCOL 3350 17 G/17G
1 POWDER, FOR SOLUTION ORAL
Status: DISCONTINUED | OUTPATIENT
Start: 2024-07-29 | End: 2024-08-07 | Stop reason: HOSPADM

## 2024-07-29 RX ORDER — ECHINACEA PURPUREA EXTRACT 125 MG
2 TABLET ORAL PRN
Status: DISCONTINUED | OUTPATIENT
Start: 2024-07-29 | End: 2024-08-07 | Stop reason: HOSPADM

## 2024-07-29 RX ORDER — AMOXICILLIN 250 MG
2 CAPSULE ORAL 2 TIMES DAILY
Status: DISCONTINUED | OUTPATIENT
Start: 2024-07-29 | End: 2024-08-07 | Stop reason: HOSPADM

## 2024-07-29 RX ORDER — POLYETHYLENE GLYCOL 3350 17 G/17G
1 POWDER, FOR SOLUTION ORAL DAILY
Status: DISCONTINUED | OUTPATIENT
Start: 2024-07-30 | End: 2024-08-07 | Stop reason: HOSPADM

## 2024-07-29 RX ORDER — CARBOXYMETHYLCELLULOSE SODIUM 5 MG/ML
1 SOLUTION/ DROPS OPHTHALMIC PRN
Status: DISCONTINUED | OUTPATIENT
Start: 2024-07-29 | End: 2024-08-07 | Stop reason: HOSPADM

## 2024-07-29 RX ORDER — ALPRAZOLAM 0.5 MG
1 TABLET ORAL 2 TIMES DAILY PRN
Status: DISCONTINUED | OUTPATIENT
Start: 2024-07-29 | End: 2024-08-07 | Stop reason: HOSPADM

## 2024-07-29 RX ORDER — HYDRALAZINE HYDROCHLORIDE 25 MG/1
25 TABLET, FILM COATED ORAL EVERY 8 HOURS PRN
Status: DISCONTINUED | OUTPATIENT
Start: 2024-07-29 | End: 2024-08-07 | Stop reason: HOSPADM

## 2024-07-29 RX ORDER — OXYCODONE HYDROCHLORIDE 5 MG/1
5 TABLET ORAL
Status: DISCONTINUED | OUTPATIENT
Start: 2024-07-29 | End: 2024-08-07 | Stop reason: HOSPADM

## 2024-07-29 RX ORDER — ALUMINA, MAGNESIA, AND SIMETHICONE 2400; 2400; 240 MG/30ML; MG/30ML; MG/30ML
20 SUSPENSION ORAL
Status: DISCONTINUED | OUTPATIENT
Start: 2024-07-29 | End: 2024-08-07 | Stop reason: HOSPADM

## 2024-07-29 RX ORDER — ENOXAPARIN SODIUM 100 MG/ML
40 INJECTION SUBCUTANEOUS DAILY
Status: DISCONTINUED | OUTPATIENT
Start: 2024-07-29 | End: 2024-08-06

## 2024-07-29 RX ORDER — POLYETHYLENE GLYCOL 3350 17 G/17G
1 POWDER, FOR SOLUTION ORAL DAILY
Status: CANCELLED | OUTPATIENT
Start: 2024-07-30

## 2024-07-29 RX ORDER — ENOXAPARIN SODIUM 100 MG/ML
40 INJECTION SUBCUTANEOUS DAILY
Status: CANCELLED | OUTPATIENT
Start: 2024-07-29

## 2024-07-29 RX ORDER — ALPRAZOLAM 1 MG
1 TABLET ORAL 2 TIMES DAILY PRN
Status: CANCELLED | OUTPATIENT
Start: 2024-07-29

## 2024-07-29 RX ORDER — ACETAMINOPHEN 325 MG/1
650 TABLET ORAL EVERY 4 HOURS PRN
Status: DISCONTINUED | OUTPATIENT
Start: 2024-07-29 | End: 2024-08-07 | Stop reason: HOSPADM

## 2024-07-29 RX ORDER — METOPROLOL TARTRATE 25 MG/1
25 TABLET, FILM COATED ORAL 2 TIMES DAILY
Status: DISCONTINUED | OUTPATIENT
Start: 2024-07-29 | End: 2024-08-07 | Stop reason: HOSPADM

## 2024-07-29 RX ORDER — ONDANSETRON 4 MG/1
4 TABLET, ORALLY DISINTEGRATING ORAL 4 TIMES DAILY PRN
Status: DISCONTINUED | OUTPATIENT
Start: 2024-07-29 | End: 2024-08-07 | Stop reason: HOSPADM

## 2024-07-29 RX ORDER — ARIPIPRAZOLE 10 MG/1
30 TABLET ORAL
Status: DISCONTINUED | OUTPATIENT
Start: 2024-07-30 | End: 2024-08-07 | Stop reason: HOSPADM

## 2024-07-29 RX ADMIN — OXYCODONE HYDROCHLORIDE 10 MG: 10 TABLET ORAL at 22:58

## 2024-07-29 RX ADMIN — OXYCODONE HYDROCHLORIDE 10 MG: 10 TABLET ORAL at 07:39

## 2024-07-29 RX ADMIN — OXYCODONE HYDROCHLORIDE 10 MG: 10 TABLET ORAL at 00:58

## 2024-07-29 RX ADMIN — ARIPIPRAZOLE 30 MG: 10 TABLET ORAL at 04:37

## 2024-07-29 RX ADMIN — OXYCODONE HYDROCHLORIDE 10 MG: 10 TABLET ORAL at 15:37

## 2024-07-29 RX ADMIN — ALPRAZOLAM 1 MG: 0.5 TABLET ORAL at 19:45

## 2024-07-29 RX ADMIN — METOPROLOL TARTRATE 25 MG: 25 TABLET, FILM COATED ORAL at 17:06

## 2024-07-29 RX ADMIN — SENNOSIDES AND DOCUSATE SODIUM 2 TABLET: 50; 8.6 TABLET ORAL at 20:05

## 2024-07-29 RX ADMIN — ACETAMINOPHEN 650 MG: 325 TABLET ORAL at 04:36

## 2024-07-29 RX ADMIN — OXYCODONE HYDROCHLORIDE 10 MG: 10 TABLET ORAL at 12:26

## 2024-07-29 RX ADMIN — TRAZODONE HYDROCHLORIDE 50 MG: 50 TABLET ORAL at 22:58

## 2024-07-29 RX ADMIN — POLYETHYLENE GLYCOL 3350 1 PACKET: 17 POWDER, FOR SOLUTION ORAL at 04:37

## 2024-07-29 RX ADMIN — OXYCODONE HYDROCHLORIDE 10 MG: 10 TABLET ORAL at 19:46

## 2024-07-29 RX ADMIN — ENOXAPARIN SODIUM 40 MG: 100 INJECTION SUBCUTANEOUS at 17:06

## 2024-07-29 ASSESSMENT — COGNITIVE AND FUNCTIONAL STATUS - GENERAL
CLIMB 3 TO 5 STEPS WITH RAILING: A LOT
MOVING TO AND FROM BED TO CHAIR: A LITTLE
STANDING UP FROM CHAIR USING ARMS: A LITTLE
MOBILITY SCORE: 17
TURNING FROM BACK TO SIDE WHILE IN FLAT BAD: A LITTLE
MOVING FROM LYING ON BACK TO SITTING ON SIDE OF FLAT BED: A LITTLE
WALKING IN HOSPITAL ROOM: A LITTLE
SUGGESTED CMS G CODE MODIFIER MOBILITY: CK

## 2024-07-29 ASSESSMENT — LIFESTYLE VARIABLES
HOW MANY TIMES IN THE PAST YEAR HAVE YOU HAD 5 OR MORE DRINKS IN A DAY: 0
EVER HAD A DRINK FIRST THING IN THE MORNING TO STEADY YOUR NERVES TO GET RID OF A HANGOVER: NO
HAVE PEOPLE ANNOYED YOU BY CRITICIZING YOUR DRINKING: NO
ON A TYPICAL DAY WHEN YOU DRINK ALCOHOL HOW MANY DRINKS DO YOU HAVE: 0
TOTAL SCORE: 0
DOES PATIENT WANT TO STOP DRINKING: NO
TOTAL SCORE: 0
HAVE YOU EVER FELT YOU SHOULD CUT DOWN ON YOUR DRINKING: NO
TOTAL SCORE: 0
AVERAGE NUMBER OF DAYS PER WEEK YOU HAVE A DRINK CONTAINING ALCOHOL: 0
ALCOHOL_USE: NO
CONSUMPTION TOTAL: NEGATIVE
EVER FELT BAD OR GUILTY ABOUT YOUR DRINKING: NO

## 2024-07-29 ASSESSMENT — FIBROSIS 4 INDEX: FIB4 SCORE: 1.43

## 2024-07-29 ASSESSMENT — PATIENT HEALTH QUESTIONNAIRE - PHQ9
SUM OF ALL RESPONSES TO PHQ9 QUESTIONS 1 AND 2: 0
2. FEELING DOWN, DEPRESSED, IRRITABLE, OR HOPELESS: NOT AT ALL
1. LITTLE INTEREST OR PLEASURE IN DOING THINGS: NOT AT ALL
1. LITTLE INTEREST OR PLEASURE IN DOING THINGS: NOT AT ALL
2. FEELING DOWN, DEPRESSED, IRRITABLE, OR HOPELESS: NOT AT ALL
1. LITTLE INTEREST OR PLEASURE IN DOING THINGS: NOT AT ALL
2. FEELING DOWN, DEPRESSED, IRRITABLE, OR HOPELESS: NOT AT ALL

## 2024-07-29 ASSESSMENT — PAIN DESCRIPTION - PAIN TYPE
TYPE: ACUTE PAIN
TYPE: ACUTE PAIN;SURGICAL PAIN
TYPE: ACUTE PAIN
TYPE: ACUTE PAIN;SURGICAL PAIN

## 2024-07-29 ASSESSMENT — GAIT ASSESSMENTS
ASSISTIVE DEVICE: FRONT WHEEL WALKER
DEVIATION: BRADYKINETIC;STEP TO
GAIT LEVEL OF ASSIST: CONTACT GUARD ASSIST
DISTANCE (FEET): 50

## 2024-07-29 NOTE — DISCHARGE PLANNING
[facility] Transitional Care Coordination     Referral from: Dr Cosme  Insurance Provider on Facesheet: Medicare/Medicaid  Potential Rehab Diagnosis: Ortho     Chart review indicates patient may have on going medical management and may have therapy needs to possibly meet inpatient rehab facility criteria with the goal of returning to community.     D/C support: Spouse (cannot assist w/ mobility per notes)     Physiatry consultation forwarded per protocol.      Thank you for the referral.

## 2024-07-29 NOTE — PROGRESS NOTES
Pt arrived at [facility] from [facility] via [facility]. Dr Theodore to follow for diagnosis of R patella fx. Initial assessment initiated. Pt oriented to room and facility routine and safety measures; pt education binder provided and discussed. Pt A/O x 4, continent of bowel and bladder. SBA assist for transfers. All wounds photographed and documented; photos uploaded to . Pt complains of pain or discomfort at this time. Pt positioned for comfort in bed. Call light within reach, safety measures in place. Pt viewed fall prevention video.

## 2024-07-29 NOTE — PROGRESS NOTES
"      Orthopaedic Progress Note    Interval changes:  Patient doing well    RLE in knee immobilizer brace, dressings CDI  Cleared for DC to home vs rehab by ortho pending medicine clearance    ROS - Patient denies any new issues.  Pain well controlled.    BP (!) 147/62   Pulse 63   Temp 36.8 °C (98.2 °F) (Temporal)   Resp 16   Ht 1.727 m (5' 8\")   Wt 76.2 kg (168 lb)   SpO2 95%     Patient seen and examined  No acute distress  Breathing non labored  RRR  RLE in knee immobilizer brace, dressings CDI, DNVI, moves all toes, cap refill <2 sec.     Recent Labs     07/28/24  0512   WBC 9.4   RBC 3.80*   HEMOGLOBIN 12.1   HEMATOCRIT 36.7*   MCV 96.6   MCH 31.8   MCHC 33.0   RDW 45.7   PLATELETCT 242   MPV 9.7       Active Hospital Problems    Diagnosis     Hyperglycemia [R73.9]     Fracture of patella with routine healing, right, closed [S82.001D]     Leukocytosis [D72.829]        Assessment/Plan:  Patient doing well   RLE in knee immobilizer brace, dressings CDI  Cleared for DC to home vs rehab by ortho pending medicine clearance  POD#3 S/P Open treatment of right patella fracture  Wt bearing status - WBAT in knee immobilizer at all times  Wound care/Drains - Dressings to be changed every other day by nursing. Or PRN for saturation starting POD#2  Future Procedures - none planned   Lovenox: Start 7/25, Duration-until ambulatory > 150'  Sutures/Staples out- 14-21 days post operatively. Removal will completed by ortho mid levels only.  PT/OT-initiated  Antibiotics: Perioperative completed  DVT Prophylaxis- TEDS/SCDs/Foot pumps  Guthrie-not needed per ortho  Case Coordination for Discharge Planning - Disposition per therapy recs.    "

## 2024-07-29 NOTE — DISCHARGE PLANNING
Case Management Discharge Planning    Admission Date: 7/25/2024  GMLOS: 2.8  ALOS: 4    6-Clicks ADL Score: 20  6-Clicks Mobility Score: 17  PT and/or OT Eval ordered: Yes  Post-acute Referrals Ordered: Yes  Post-acute Choice Obtained: Yes  Has referral(s) been sent to post-acute provider:  Yes      Anticipated Discharge Dispo: Discharge Disposition: Disch to  rehab facility or distinct part unit (62)    DME Needed: No    Action(s) Taken: Pt is medically cleared to transfer to [facility].  Transport scheduled [id number] [facility].  Bedside RN given phone number to call report. Cobra packet and IMM signed by patient.  Verbal order received from MD for cobra packet.  Cobra packet with 2 facesheets in the CM office--updated bedside RN.      Escalations Completed: None    Medically Clear: Yes    Next Steps: RN CM to continue to follow for DC planning      Barriers to Discharge: None

## 2024-07-29 NOTE — PREADMISSION SCREENING NOTE
Pre-Admission Screening Form    Patient Information:   Name: García Cho     MRN: [id number]       : 1955      Age: 68 y.o.   Gender: female      Race:  or  [4]       Marital Status: Single [1]  Family Contact: David Pedersen        Relationship: Significant other [13]  Home Phone:            Cell Phone: [phone]  Advanced Directives:  Yes  Code Status:  FULL  Current Attending Provider: Abraham Cosme M.D.  Referring Physician: Dr. Cosme       Referral Date: 24  Primary Payor Source:  MEDICARE  Secondary Payor Source:  MEDICAID FFS    Medical Information:   Date of Admission to Acute Care [place]20[facility]  Room Number: T320/00  Rehabilitation Diagnosis: 0008.9 - Orthopaedic Disorders: Other Orthopaedic  Immunization History   Administered Date(s) Administered    [employer] SARS-CoV-2 Vaccine 2021    Tdap Vaccine 04/10/2019     Allergies   Allergen Reactions    Atorvastatin      diarrhea    Bee Pollen Unspecified    Ibuprofen Nausea    Pravastatin      diarrhea    Other Environmental      Pollen       Past Medical History:   Diagnosis Date    Abnormal finding on cardiovascular stress test - hypertensive response to low level of exercise - normal stress imaging 12/10/2020    Agatston coronary artery calcium score between 200 and 399 - 355      Agoraphobia with panic disorder     anxiety    Allergic rhinitis     Arthritis     all joints    Bowel habit changes     Dental disorder     Dyslipidemia     Fear of travel with panic attacks     Heart burn     Hiatal hernia     NSVT (nonsustained ventricular tachycardia) (HCC) - seen in recovery from stress Echo 12/10/2020    PAF (paroxysmal atrial fibrillation) (HCC) - 2 minutes on Zio 06/15/2024    Schatzki's ring      Past Surgical History:   Procedure Laterality Date    ORIF, PATELLA Right 2024    Procedure: ORIF, PATELLA;  Surgeon: Stephen Holder M.D.;  Location: SURGERY [facility];  Service:  Orthopedics    ALEX BY LAPAROSCOPY  1/19/2018    Procedure: ALEX BY LAPAROSCOPY/SURGICAL;  Surgeon: Keren Henderson M.D.;  Location: SURGERY [facility];  Service: General    EGD WITH ASP/BX  11/26/12    distal esophageal stricture secondary to reflux status post dilation to 51 [place]er, mild erosive esophagitis, mild gastric erythema, small sliding hiatal hernia, mild chronic gastritis, negative H. pylori, no dysplasia or malignancy    COLONOSCOPY WITH BIOPSY  7/08    hemorrhoids, no malignancy    EGD WITH ASP/BX  7/08    hiatal hernia, schatzki's ring    ABDOMINAL HYSTERECTOMY TOTAL      with BSO due to infection    OTHER       I &D rectal abscess       History Leading to Admission, Conditions that Caused the Need for Rehab (CMS):     Efraín Sims M.D.  Physician  [facility]     H&P     Signed     Date of Service: 7/25/2024  8:12 PM      Hospital Medicine History & Physical Note     Date of Service  7/25/2024     Primary Care Physician  Noah Alejandro M.D.     Consultants  orthopedics     Code Status  Full Code     Chief Complaint    Chief Complaint  Patient presents with   GLF      Pt was walking dog and tripped on sidewalk. Pt reports pain to R knee, L wrist, L foot. Pt in splint but EMS reports knee deformity to R knee. CSM intact.        History of Presenting Illness  García Cho is a 68 y.o. female who presented 7/25/2024 status post fall. Patient states that earlier today, she was walking her dog, and she ended up tripping over her sidewalk.  She fell onto her knees and wrist.  She states that she has extreme pain to her right knee along with swelling.  Along with left ankle pain as well.  Imaging was obtained in the emergency department showing an acute displaced fracture of the patella.  All other x-rays were unremarkable.  Orthopedic surgery consulted and recommended admission, n.p.o. at midnight, and surgery tomorrow.     I discussed the plan of care with patient.      Review of Systems  Review of Systems   Constitutional:  Negative for chills and fever.   HENT:  Negative for congestion, ear discharge, ear pain, nosebleeds and sinus pain.    Eyes:  Negative for double vision, photophobia, pain and discharge.   Respiratory:  Negative for hemoptysis, sputum production and shortness of breath.    Cardiovascular:  Negative for chest pain, palpitations and orthopnea.   Gastrointestinal:  Negative for abdominal pain, diarrhea, nausea and vomiting.   Genitourinary:  Negative for dysuria, flank pain, frequency, hematuria and urgency.   Musculoskeletal:  Positive for falls and joint pain. Negative for back pain and neck pain.   Neurological:  Negative for dizziness and headaches.   Psychiatric/Behavioral:  Negative for hallucinations, substance abuse and suicidal ideas. The patient is not nervous/anxious and does not have insomnia.          Past Medical History   has a past medical history of Abnormal finding on cardiovascular stress test - hypertensive response to low level of exercise - normal stress imaging (12/10/2020), Agatston coronary artery calcium score between 200 and 399 - 355 2020, Agoraphobia with panic disorder, Allergic rhinitis, Arthritis, Bowel habit changes, Dental disorder, Dyslipidemia, Fear of travel with panic attacks, Heart burn, Hiatal hernia, NSVT (nonsustained ventricular tachycardia) (HCC) - seen in recovery from stress Echo (12/10/2020), PAF (paroxysmal atrial fibrillation) (HCC) - 2 minutes on Zio (06/15/2024), and Schatzki's ring.     Surgical History   has a past surgical history that includes colonoscopy with biopsy (7/08); egd with asp/bx (7/08); abdominal hysterectomy total; egd with asp/bx (11/26/12); other; and shannon by laparoscopy (1/19/2018).      Family History  family history includes Alcohol/Drug in her brother; Arthritis in her father and mother; Cancer in her brother and mother.   Family history reviewed with patient. There is no family history  that is pertinent to the chief complaint.      Social History   reports that she quit smoking about 10 years ago. Her smoking use included cigarettes. She started smoking about 54 years ago. She has a 4.4 pack-year smoking history. She has never used smokeless tobacco. She reports that she does not drink alcohol and does not use drugs.     Allergies    Allergies  Allergen Reactions   Atorvastatin        diarrhea   Bee Pollen Unspecified   Ibuprofen Nausea   Pravastatin        diarrhea   Other Environmental        Pollen           Medications    Prior to Admission Medications  Prescriptions Last Dose Informant Patient Reported? Taking?  ALPRAZolam (XANAX) 0.25 MG Tab 7/24/2024 at hs Patient Yes Yes  Sig: Take 0.25 mg by mouth 2 times a day as needed for Anxiety.  Bempedoic Acid 180 MG Tab 7/25/2024 at am Patient No Yes  Sig: Take 180 mg by mouth every day.  amphetamine-dextroamphetamine (ADDERALL) 20 MG Tab 7/25/2024 at 1400 Patient Yes Yes  Sig: Take 20 mg by mouth 2 times a day.  aripiprazole (ABILIFY) 30 MG tablet 7/25/2024 at am Patient Yes Yes  Sig: Take 30 mg by mouth every day.  nebivolol (BYSTOLIC) 5 MG Tab tablet 7/25/2024 at 1400 Patient No Yes  Sig: Take 1 Tablet by mouth 2 times a day.  nitroglycerin (NITROSTAT) 0.4 MG SL Tab prn at unk Patient No No  Sig: Place 1 Tablet under the tongue as needed for Chest Pain.    Facility-Administered Medications: None        Physical Exam  Temp:  [36.7 °C (98 °F)] 36.7 °C (98 °F)  Pulse:  [60-68] 60  Resp:  [16] 16  BP: (112-141)/(53-64) 112/53  SpO2:  [92 %-95 %] 92 %    Blood Pressure : 112/53  Temperature: 36.7 °C (98 °F)  Pulse: 60  Respiration: 16  Pulse Oximetry: 92 %        Physical Exam  Constitutional:       General: She is not in acute distress.     Appearance: Normal appearance. She is normal weight. She is not ill-appearing, toxic-appearing or diaphoretic.   HENT:      Head: Normocephalic and atraumatic.      Nose: Nose normal.      Mouth/Throat:       Mouth: Mucous membranes are moist.   Eyes:      Extraocular Movements: Extraocular movements intact.      Pupils: Pupils are equal, round, and reactive to light.   Cardiovascular:      Rate and Rhythm: Normal rate and regular rhythm.      Pulses: Normal pulses.      Heart sounds: Normal heart sounds. No murmur heard.     No friction rub. No gallop.   Pulmonary:      Effort: Pulmonary effort is normal. No respiratory distress.      Breath sounds: No stridor. No wheezing, rhonchi or rales.   Chest:      Chest wall: No tenderness.   Abdominal:      General: Abdomen is flat. There is no distension.      Palpations: Abdomen is soft. There is no mass.      Tenderness: There is no abdominal tenderness. There is no guarding or rebound.      Hernia: No hernia is present.   Musculoskeletal:         General: Swelling, tenderness and signs of injury present. No deformity.      Right lower leg: No edema.      Left lower leg: No edema.      Comments: abrasions present to bilateral knees.  There is extreme right knee swelling and tenderness to palpation.  Along with limited range of motion of the left ankle secondary to pain.   Skin:     General: Skin is warm and dry.      Capillary Refill: Capillary refill takes less than 2 seconds.      Coloration: Skin is not jaundiced or pale.      Findings: No bruising, erythema, lesion or rash.   Neurological:      General: No focal deficit present.      Mental Status: She is alert and oriented to person, place, and time. Mental status is at baseline.      Cranial Nerves: No cranial nerve deficit.      Sensory: No sensory deficit.      Motor: No weakness.      Coordination: Coordination normal.   Psychiatric:         Mood and Affect: Mood normal.         Behavior: Behavior normal.            Laboratory:    Recent Labs    07/25/24  1931  WBC 11.5*  RBC 4.23  HEMOGLOBIN 13.7  HEMATOCRIT 39.3  MCV 92.9  MCH 32.4  MCHC 34.9  RDW 43.6  PLATELETCT 271  MPV 9.1       Recent Labs    " 07/25/24 1931  SODIUM 139  POTASSIUM 4.0  CHLORIDE 107  CO2 22  GLUCOSE 107*  BUN 13  CREATININE 0.89  CALCIUM 8.9       Recent Labs    07/25/24 1931  ALTSGPT 14  ASTSGOT 28  ALKPHOSPHAT 47  TBILIRUBIN 0.7  GLUCOSE 107*         No results for input(s): \"NTPROBNP\" in the last 72 hours.      No results for input(s): \"TROPONINT\" in the last 72 hours.     Imaging:    DX-KNEE COMPLETE 4+ RIGHT  Final Result        Acute displaced fracture of the patella.     DX-FOOT-COMPLETE 3+ LEFT  Final Result     1.  No fracture or dislocation of LEFT foot.  2.  Mild to moderate osteoarthritis.     DX-HAND 3+ LEFT  Final Result        No acute osseous abnormality.           X-Ray:  I have personally reviewed the images and compared with prior images.  EKG:  I have personally reviewed the images and compared with prior images.     Assessment/Plan:  Justification for Admission Status  I anticipate this patient will require at least two midnights for appropriate medical management, necessitating inpatient admission because patellar fracture      Patient will need a Med/Surg bed on MEDICAL service .  The need is secondary to patellar fracture .     * Fracture of patella with routine healing, right, closed- (present on admission)  Assessment & Plan  Fall from ground level.  Imaging personally reviewed and shows Acute displaced fracture of the patella  Ortho consulted by EDP and patient will be taken to OR tomorrow  Patient will be started on IV and p.o. narcotics.  She will need close hemodynamic monitoring  PT/OT        Leukocytosis  Assessment & Plan  Likely reactive.  No indication for antibiotics           VTE prophylaxis: enoxaparin ppx        Stephen Holder M.D.  Physician  [facility]     Consults     Signed     Date of Service: 7/26/2024  7:11 AM    Expand All Collapse All    Orthopedics consult     7/26/2024     The patient was seen at the request of Dr. Vincent     HPI: García Cho is a 68 y.o. " female who presents with complaints of pain to right knee, left ankle and left wrist..  This started yesterday after fall while walking her dogs.  The pain is 9/10 and is described as sharp.  The pain is made worse by palpation of the area and made better by rest and immobilization.     Also endorses pain to the left ankle and left wrist including right knee.  X-rays revealed right patella fracture and she was admitted for operative fixation and pain control as well is PT.       Past Medical History  Past Medical History:  Diagnosis Date   Abnormal finding on cardiovascular stress test - hypertensive response to low level of exercise - normal stress imaging 12/10/2020   Agatston coronary artery calcium score between 200 and 399 - 355 2020     Agoraphobia with panic disorder      anxiety   Allergic rhinitis     Arthritis      all joints   Bowel habit changes     Dental disorder     Dyslipidemia     Fear of travel with panic attacks     Heart burn     Hiatal hernia     NSVT (nonsustained ventricular tachycardia) (HCC) - seen in recovery from stress Echo 12/10/2020   PAF (paroxysmal atrial fibrillation) (HCC) - 2 minutes on Zio 06/15/2024   Schatzki's ring              Past Surgical History  Past Surgical History:  Procedure Laterality Date   ALEX BY LAPAROSCOPY   1/19/2018    Procedure: ALEX BY LAPAROSCOPY/SURGICAL;  Surgeon: Keren Henderson M.D.;  Location: SURGERY [facility];  Service: General   EGD WITH ASP/BX   11/26/12    distal esophageal stricture secondary to reflux status post dilation to 51 [place] Islander, mild erosive esophagitis, mild gastric erythema, small sliding hiatal hernia, mild chronic gastritis, negative H. pylori, no dysplasia or malignancy   COLONOSCOPY WITH BIOPSY   7/08    hemorrhoids, no malignancy   EGD WITH ASP/BX   7/08    hiatal hernia, schatzki's ring   ABDOMINAL HYSTERECTOMY TOTAL        with BSO due to infection   OTHER         I &D rectal abscess          Medications    No current  facility-administered medications on file prior to encounter.       Current Outpatient Medications on File Prior to Encounter  Medication Sig Dispense Refill   nebivolol (BYSTOLIC) 5 MG Tab tablet Take 1 Tablet by mouth 2 times a day. 180 Tablet 3   Bempedoic Acid 180 MG Tab Take 180 mg by mouth every day. 90 Tablet 3   ALPRAZolam (XANAX) 0.25 MG Tab Take 0.25 mg by mouth 2 times a day as needed for Anxiety.       amphetamine-dextroamphetamine (ADDERALL) 20 MG Tab Take 20 mg by mouth 2 times a day.       aripiprazole (ABILIFY) 30 MG tablet Take 30 mg by mouth every day.       nitroglycerin (NITROSTAT) 0.4 MG SL Tab Place 1 Tablet under the tongue as needed for Chest Pain. 25 Tablet 11        Allergies  Atorvastatin, Bee pollen, Ibuprofen, Pravastatin, and Other environmental     ROS  Right knee pain. All other systems were reviewed and found to be negative       Family History  Family History  Problem Relation Age of Onset   Cancer Brother          pancreatic cancer   Arthritis Mother     Cancer Mother          lung cancer with mets    Arthritis Father     Alcohol/Drug Brother          drug abuse            Social History         Socioeconomic History   Marital status: Single  Occupational History   Occupation:       Employer: OTHER  Tobacco Use   Smoking status: Former      Current packs/day: 0.00      Average packs/day: 0.1 packs/day for 44.0 years (4.4 ttl pk-yrs)      Types: Cigarettes      Start date: 3/17/1970      Quit date: 3/17/2014      Years since quitting: 10.3   Smokeless tobacco: Never  Vaping Use   Vaping status: Never Used  Substance and Sexual Activity   Alcohol use: No      Comment: 10 drinks/week quit 11/11, quit 2/13   Drug use: No      Comment: used marijuana and cocaine in the past, no IVDU   Sexual activity: Yes      Partners: Male      Birth control/protection: Surgical       Social Determinants of Health       Food Insecurity: No Food Insecurity (7/26/2024)    Hunger Vital Sign    "  Worried About Running Out of Food in the Last Year: Never true     Ran Out of Food in the Last Year: Never true  Transportation Needs: No Transportation Needs (7/26/2024)    PRAPARE - Transportation     Lack of Transportation (Medical): No     Lack of Transportation (Non-Medical): No  Intimate Partner Violence: Not At Risk (7/26/2024)    Humiliation, Afraid, Rape, and Kick questionnaire     Fear of Current or Ex-Partner: No     Emotionally Abused: No     Physically Abused: No     Sexually Abused: No  Housing Stability: Low Risk  (7/26/2024)    Housing Stability Vital Sign     Unable to Pay for Housing in the Last Year: No     Number of Places Lived in the Last Year: 1     Unstable Housing in the Last Year: No          Physical Exam  Vitals  /57   Pulse (!) 58   Temp 36.6 °C (97.9 °F) (Temporal)   Resp 16   Ht 1.727 m (5' 8\")   Wt 76.2 kg (168 lb)   SpO2 92%   General: Well Developed, Well Nourished, Age appropriate appearance  HEENT: Normocephalic, atraumatic  Psych: Normal mood and affect  Neck: Supple, nontender, no masses  Lungs: Breathing unlabored, No audible wheezing  Heart: Regular heart rate and rhythm  Abdomen: Soft, NT, ND  Neuro: Sensation grossly intact to BUE and BLE, moving all four extremities  Skin: Intact, no open wounds  Vascular: Right lower extremity palpable DP, Capillary refill <2 seconds  MSK:   Right lower extremity: Mild superficial abrasions to the anterior aspect of the knee.  If effusion appreciated.  Tenderness palpation over the patella.  No range of motion of the knee tolerated due to discomfort.  No tenderness palpation of the hip or distal to the knee.  Motor and sensory exam are intact all distal distributions     Left ankle: Mild swelling to the lateral aspect of the ankle no bony tenderness.  Range of motion of the ankle is normal motor and sensory exam to the lower extremity are normal     Left wrist: Mild swelling and ecchymosis to the dorsal aspect of the wrist.  " No bony tenderness.  Range of motion of the wrist is slightly reduced due to discomfort.  Motor and sensory exam are intact radial ulnar and median nerves        Radiographs:    DX-KNEE COMPLETE 4+ RIGHT  Final Result        Acute displaced fracture of the patella.     DX-FOOT-COMPLETE 3+ LEFT  Final Result     1.  No fracture or dislocation of LEFT foot.  2.  Mild to moderate osteoarthritis.     DX-HAND 3+ LEFT  Final Result        No acute osseous abnormality.     DX-PORTABLE FLUORO > 1 HOUR    (Results Pending)  DX-KNEE 2- RIGHT    (Results Pending)        Laboratory Values    Recent Labs    07/25/24 1931 07/26/24  0524  WBC 11.5* 8.0  RBC 4.23 4.55  HEMOGLOBIN 13.7 14.2  HEMATOCRIT 39.3 42.4  MCV 92.9 93.2  MCH 32.4 31.2  MCHC 34.9 33.5  RDW 43.6 43.8  PLATELETCT 271 248  MPV 9.1 9.3       Recent Labs    07/25/24 1931 07/26/24  0524  SODIUM 139 138  POTASSIUM 4.0 3.9  CHLORIDE 107 102  CO2 22 25  GLUCOSE 107* 117*  BUN 13 12               Impression:  Right patella fracture, displaced     Plan:We discussed the diagnosis and findings with the patient at length.  We reviewed possible non operative and operative interventions and the risks and benefits of each of these.  she had a chance to ask questions and all of these were answered to her satisfaction. The patient chose to proceed with operative intervention. Risks and benefits of surgery were discussed which include but are not limited to bleeding, infection, neurovascular damage, malunion, nonunion, instability, need for further surgery DVT, PE, MI, Stroke and death. They understand these risks and wish to proceed.        Plan:  N.p.o. 4 OR  Plan for ORIF right patella today  Pain control and PT/OT postop        Law Holder M.D.  Physician  [facility]     OP Report     Signed     Date of Service: 7/26/2024  7:27 AM      Operative Report     DATE OF OPERATION: 7/26/2024     PREOPERATIVE DIAGNOSIS: right  patella fracture     POSTOPERATIVE DIAGNOSIS: Same     PROCEDURE PERFORMED: Open treatment of right patella fracture     SURGEON: Stephen Holder M.D.      ASSISTANT: none     ANESTHESIOLOGIST: Casandra PRITCHETT     ANESTHESIA: General     ESTIMATED BLOOD LOSS: 30 mL     Implants: OIC 4.0 mm cannulated screws x 2, smooth wire tension band     INDICATIONS: The patient is a 68 y.o. female with a right patella fracture resulting from a fall while walking her dog.  The patient denies antecedent pain, and was found to have a normal neurovascular exam and skin envelope.  Radiographs reviewed by myself demonstrated the patella fracture.  Given these findings, surgical treatment of the patella was indicated.  I discussed the risks and benefits of the procedure, including the risks of infection, wound healing complication, neurovascular injury, pain, malunion, non-union, hardware failure and pullout, and the medical risks of anesthesia including DVT, PE, MI, stroke, and death.  Benefits include early mobilization, improved chance of union, and reduction in the risk of post-traumatic arthritis associated with patella fractures.  Alternatives to surgery were also discussed, including non-operative management.  The patient signed the informed consent and the operative extremity was marked.        PROCEDURE:  The patient underwent anesthesia, and was positioned supine on a radiolucent table and all bony prominences were well padded.  Preoperative antibiotics were administered. Sequential compression devices were employed. The correct operative site was prepped and draped into a sterile field. A procedural pause was conducted to verify correct patient, correct extremity, presence of the surgeons initials on the operative extremity.     A well padded tourniquet was inflated to 250 mmHg and a midline anterior incision was then made over the patella with care taken to avoid all neurovascular structures. The medial and lateral  retinacular tears were identified. Soft tissue stripping was avoided to preserve blood flow to bone and maximize healing potential. All fracture fragments were cleared of debris and hematoma and the knee joint was irrigated. The fracture was reduced using multiple pointed reduction clamps. Two OIC 4.0 cannulated screws were placed in a retrograde fashion and a 18- gauge figure of eight wire was placed through the cannulations of the screws to create a modified tension band construct. Anatomic reduction was obtained. All screws were checked and found to be of appropriate length and out of the joint. Medial and lateral retinacular tears were repaired with 1-0 vicyrl suture. Wounds were irrigated with normal saline, and closed in layers with 2-0 Vicryl in the subcutaneous tissue, and 3-0 nylon in the skin.  Sterile dressings were applied. Knee immobilizer was applied.     The patient tolerated the procedure well. There were no apparent complications. All sponge, needle, and instrument counts were correct on two separate occasions. She was awakened, extubated, and transferred to the recovery room in satisfactory condition.         Post-Operative Plan:     1.  The patient should remain weightbearing as tolerated in full extension with brace on their operative extremity.  Gait aids (crutch or crutches, cane, walker) may be used as needed, and may be discontinued when no longer required.  2.  IV antibiotics - may be continued for 24 hours  3.  DVT prophylaxis - SCD's and Lovenox 40 mg SQ daily while inpatient.  The patient may transition to Aspirin 325 mg PO BID as an outpatient  4.  Discharge planning, 2-week follow-up with [facility]  ____________________________________   Stephen Holder M.D.   DD: 7/26/2024  9:24 AM         Abraham Cosme M.D.  Physician  [facility]     Progress Notes     Signed     Date of Service: 7/28/2024  5:04 PM      Hospital Medicine Daily Progress Note     Date of Service  7/28/2024      Chief Complaint  García Cho is a 68 y.o. female admitted 7/25/2024 with fractured patella after GLF     Hospital Course  García Cho is a 68 y.o. female who presented 7/25/2024 status post fall. Patient states that earlier today, she was walking her dog, and she ended up tripping over her sidewalk.  She fell onto her knees and wrist.  She states that she has extreme pain to her right knee along with swelling.  Along with left ankle pain as well.  Imaging was obtained in the emergency department showing an acute displaced fracture of the patella.  All other x-rays were unremarkable.  Orthopedic surgery consulted and recommended admission, n.p.o. at midnight, and surgery tomorrow.      Interval Problem Update  7/27  Evaluated in her inpatient room.   Afebrile pulse in the 50s to 60s normal respiratory rate and blood pressure, oxygen saturation 91 to 95% on 1 L nasal cannula.  Unremarkable CBC, CMP glucose 117 AST 84 ALT 51.  Went for surgical repair on 7/26, tolerated well, no apparent complications, pain is controlled, all limbs neurovascularly intact.  Cleared for discharge to home versus rehab by Ortho pending medicine clearance, weightbearing as tolerated in knee immobilizer at all times.     7/28  Afebrile with HR 54-65 RR 16-18 's-130's SpO2 92-95%.  No significant lab abnormalities shown.  Pain controlled.  PT recommending PM&R eval for possible IPR admission.  Consult placed.     I have discussed this patient's plan of care a  nd discharge plan at IDT rounds today with Case Management, Nursing, Nursing leadership, and other members of the IDT team.     Consultants/Specialty  orthopedics     Code Status  Full Code     Disposition  The patient is not medically cleared for discharge to home or a post-acute facility.        I have placed the appropriate orders for post-discharge needs.     Review of Systems  ROS   Review of Systems   Constitutional:  Negative for chills and  fever.   HENT:  Negative for congestion, ear discharge, ear pain, nosebleeds and sinus pain.    Eyes:  Negative for double vision, photophobia, pain and discharge.   Respiratory:  Negative for hemoptysis, sputum production and shortness of breath.    Cardiovascular:  Negative for chest pain, palpitations and orthopnea.   Gastrointestinal:  Negative for abdominal pain, diarrhea, nausea and vomiting.   Genitourinary:  Negative for dysuria, flank pain, frequency, hematuria and urgency.   Musculoskeletal:  Positive for falls and joint pain. Negative for back pain and neck pain.   Neurological:  Negative for dizziness and headaches.   Psychiatric/Behavioral:  Negative for hallucinations, substance abuse and suicidal ideas. The patient is not nervous/anxious and does not have insomnia.     Physical Exam  Temp:  [36.2 °C (97.2 °F)-36.8 °C (98.2 °F)] 36.2 °C (97.2 °F)  Pulse:  [54-69] 59  Resp:  [15-18] 18  BP: (111-136)/(49-64) 119/52  SpO2:  [92 %-95 %] 95 %     Physical Exam  Vitals and nursing note reviewed.   Constitutional:       Appearance: She is not toxic-appearing.   HENT:      Head: Normocephalic and atraumatic.      Nose: Nose normal. No rhinorrhea.      Mouth/Throat:      Mouth: Mucous membranes are moist.      Pharynx: Oropharynx is clear.   Eyes:      General: No scleral icterus.     Extraocular Movements: Extraocular movements intact.      Conjunctiva/sclera: Conjunctivae normal.   Cardiovascular:      Rate and Rhythm: Normal rate and regular rhythm.      Pulses: Normal pulses.   Pulmonary:      Effort: Pulmonary effort is normal. No respiratory distress.      Breath sounds: Normal breath sounds. No wheezing, rhonchi or rales.   Abdominal:      General: Bowel sounds are normal.      Palpations: Abdomen is soft.      Tenderness: There is no abdominal tenderness. There is no guarding or rebound.   Musculoskeletal:         General: Tenderness present. Normal range of motion.      Cervical back: Normal range of  motion and neck supple. No rigidity.      Right lower leg: No edema.      Left lower leg: No edema.   Skin:     General: Skin is warm and dry.      Capillary Refill: Capillary refill takes less than 2 seconds.      Findings: No erythema or rash.   Neurological:      General: No focal deficit present.      Mental Status: She is alert and oriented to person, place, and time. Mental status is at baseline.      Cranial Nerves: No cranial nerve deficit.      Sensory: No sensory deficit.      Motor: No weakness.      Coordination: Coordination normal.   Psychiatric:         Mood and Affect: Mood normal.         Behavior: Behavior normal.         Thought Content: Thought content normal.         Judgment: Judgment normal.            Fluids     Intake/Output Summary (Last 24 hours) at 7/28/2024 1704  Last data filed at 7/28/2024 0000    Gross per 24 hour  Intake --  Output 650 ml  Net -650 ml        Laboratory    Recent Labs    07/25/24 1931 07/26/24 0524 07/28/24  0512  WBC 11.5* 8.0 9.4  RBC 4.23 4.55 3.80*  HEMOGLOBIN 13.7 14.2 12.1  HEMATOCRIT 39.3 42.4 36.7*  MCV 92.9 93.2 96.6  MCH 32.4 31.2 31.8  MCHC 34.9 33.5 33.0  RDW 43.6 43.8 45.7  PLATELETCT 271 248 242  MPV 9.1 9.3 9.7       Recent Labs    07/25/24 1931 07/26/24 0524 07/28/24  0512  SODIUM 139 138 137  POTASSIUM 4.0 3.9 4.2  CHLORIDE 107 102 100  CO2 22 25 29  GLUCOSE 107* 117* 107*  BUN 13 12 14  CREATININE 0.89 0.83 0.72  CALCIUM 8.9 9.2 9.2                    Imaging    DX-PORTABLE FLUORO > 1 HOUR  Final Result     Portable fluoroscopy utilized for 55 seconds.     INTERPRETING LOCATION: [address] [facility], [address]     DX-KNEE 2- RIGHT  Final Result     Digitized intraoperative radiograph is submitted for review. This examination is not for diagnostic purpose but for guidance during a surgical procedure. Please see the patient's chart for full procedural details.        INTERPRETING LOCATION: [address] [facility], [address]     DX-KNEE COMPLETE 4+  RIGHT  Final Result        Acute displaced fracture of the patella.     DX-FOOT-COMPLETE 3+ LEFT  Final Result     1.  No fracture or dislocation of LEFT foot.  2.  Mild to moderate osteoarthritis.     DX-HAND 3+ LEFT  Final Result        No acute osseous abnormality.           Assessment/Plan  * Fracture of patella with routine healing, right, closed- (present on admission)  Assessment & Plan  Fall from ground level.  Imaging personally reviewed and shows Acute displaced fracture of the patella  Ortho consulted by EDP and patient will be taken to OR tomorrow  Patient will be started on IV and p.o. narcotics.  She will need close hemodynamic monitoring  PT/OT        Hyperglycemia  Assessment & Plan  Monitor on BMP, mild, no need for ISS     Leukocytosis  Assessment & Plan  Likely reactive.  No indication for antibiotics           VTE prophylaxis:    enoxaparin ppx        I have performed a physical exam and reviewed and updated ROS and Plan today (7/28/2024). In review of yesterday's note (7/27/2024), there are no changes except as documented above.  Total time spent 45 minutes. I spent greater than 50% of the time for patient care, counseling, and coordination on this date, including unit/floor time, and face-to-face time with the patient as per interval events, my own review of patient's imaging and lab analysis and developing my assessment and plan above.             Co-morbidities:  See PMH  Potential Risk - Complications: Contractures, Deep Vein Thrombosis, Pain, Pneumonia, Pressure Ulcer, and Urinary Tract Infection  Level of Risk: High    Ongoing Medical Management Needed (Medical/Nursing Needs):   Patient Active Problem List    Diagnosis Date Noted    Hyperglycemia 07/27/2024    Fracture of patella with routine healing, right, closed 07/25/2024    Leukocytosis 07/25/2024    PAF (paroxysmal atrial fibrillation) (HCC) - 2 minutes on Zio 06/15/2024    Statin intolerance 04/30/2024    Malignant tumor of urinary  "bladder (HCC) 11/21/2022    Vertigo 01/20/2022    Acute cystitis with hematuria 01/20/2022    NSVT (nonsustained ventricular tachycardia) ([facility]) - seen in recovery from stress Echo 12/10/2020    Abnormal finding on cardiovascular stress test - hypertensive response to low level of exercise - normal stress imaging 12/10/2020    Familial hyperlipidemia, high LDL 11/05/2020    Agatston coronary artery calcium score between 200 and 399 - 355 2020     Coronary atherosclerosis due to lipid rich plaque 09/15/2020    Asthma 11/05/2019    Chronic diarrhea 07/22/2019    Hypercalcemia 02/25/2019    Family history of alcoholism 02/25/2019    Anxiety and depression 01/30/2019    Other insomnia 01/30/2019    Allergic rhinitis 05/18/2009    Schatzki's ring     Hiatal hernia     Dyslipidemia     Fear of travel with panic attacks      A/o  Current Vital Signs:   Temperature: 36.8 °C (98.2 °F) Pulse: 63 Respiration: 15 Blood Pressure : (!) 147/62  Weight: 76.2 kg (168 lb) Height: 172.7 cm (5' 8\")  Pulse Oximetry: 95 % O2 (LPM): 1      Completed Laboratory Reports:  Recent Labs     07/28/24  0512   WBC 9.4   HEMOGLOBIN 12.1   HEMATOCRIT 36.7*   PLATELETCT 242   SODIUM 137   POTASSIUM 4.2   BUN 14   CREATININE 0.72   ALBUMIN 3.5   GLUCOSE 107*     Additional Labs: Not Applicable    Prior Living Situation:   Housing / Facility: Mobile Home (RV)  Steps Into Home: 9  Steps In Home: 2 (to access bedroom)  Lives with - Patient's Self Care Capacity: Spouse  Equipment Owned: Tub / Shower Seat    Prior Level of Function / Living Situation:   Physical Therapy: Prior Services: Home-Independent  Housing / Facility: Mobile Home (RV)  Steps Into Home: 9  Steps In Home: 2 (to access bedroom)  Rail: Left Rail  (Steps into Home)  Bathroom Set up: Walk In Shower, Shower Curtain  Equipment Owned: Tub / Shower Seat  Lives with - Patient's Self Care Capacity: Spouse  Bed Mobility: Independent  Transfer Status: Independent  Ambulation: " Independent  Assistive Devices Used: None  Stairs: Independent  Current Level of Function:   Gait Level Of Assist: Contact Guard Assist  Assistive Device: Front Wheel Walker  Distance (Feet): 6  Deviation: Antalgic, Step To, Bradykinetic  # of Stairs Climbed: 0  Supine to Sit: Minimal Assist  Sit to Supine: Minimal Assist (with R LE)  Scooting: Standby Assist  Comments: HOB flat, no bed rail  Sit to Stand: Minimal Assist  Bed, Chair, Wheelchair Transfer: Minimal Assist  Toilet Transfers: Minimal Assist  Transfer Method: Stand Step  Sitting in Chair: pt wanted to return to bed  Sitting Edge of Bed: 3 minutes  Standing: 3 minutes total  Occupational Therapy:   Self Feeding: Independent  Grooming / Hygiene: Independent  Bathing: Independent  Dressing: Independent  Toileting: Independent  Medication Management: Independent  Laundry: Independent  Kitchen Mobility: Independent  Finances: Independent  Home Management: Independent  Shopping: Independent  Prior Level Of Mobility: Independent Without Device in Community  Driving / Transportation: Driving Independent  Prior Services: Home-Independent  Housing / Facility: Mobile Home ()  Occupation (Pre-Hospital Vocational): Retired Due To Age  Current Level of Function:   Lower Body Dressing: Minimal Assist (assist for immobilizer mgmt, donned underwear in long sitting)  Toileting: Contact Guard Assist (BSC over toilet for increased height with knee immobilizer)  Speech Language Pathology:      Rehabilitation Prognosis/Potential: Good  Estimated Length of Stay: 10-14 days    Nursing:      Incontinent female brandin    Scope/Intensity of Services Recommended:  Physical Therapy: 1.5 hr / day  5 days / week. Therapeutic Interventions Required: Maximize Endurance, Mobility, Strength, and Safety  Occupational Therapy: 1.5 hr / day 5 days / week. Therapeutic Interventions Required: Maximize Self Care, ADLs, IADLs, and Energy Conservation  Rehabilitation Nursin/7. Therapeutic  Interventions Required: Monitor Pain, Skin, Vital Signs, Intake and Output, Labs, Safety, and Family Training  Rehabilitation Physician: 3 - 5 days / week. Therapeutic Interventions Required: Medical Management    She requires 24-hour rehabilitation nursing to manage bowel and bladder function, skin care, surgical incision, nutrition and fluid intake, pain control, safety, medication management, and patient/family goals. In addition, rehabilitation nursing will reiterate and reinforce therapy skills and equipment use, including ADLs, as well as provide education to the patient and family. García Cho is willing to participate in and is able to tolerate the proposed plan of care.    Rehabilitation Goals and Plan (Expected frequency & duration of treatment in the IRF):   Return to the Community, Minimal Assist Level of Care, and Family Able to Provide 24/7 Assistance  Anticipated Date of Rehabilitation Admission: 7/29/24  Patient/Family oriented IRF level of care/facility/plan: Yes  Patient/Family willing to participate in IRF care/facility/plan: Yes  Patient able to tolerate IRF level of care proposed: Yes  Patient has potential to benefit IRF level of care proposed: Yes  Comments: Not Applicable    Special Needs or Precautions - Medical Necessity:  Safety Concerns/Precautions:  Fall Risk / High Risk for Falls and Balance  Pain Management  Current Medications:    Current Facility-Administered Medications Ordered in Epic   Medication Dose Route Frequency Provider Last Rate Last Admin    oxyCODONE immediate release (Roxicodone) tablet 10 mg  10 mg Oral Q4HRS PRN Abraham Cosme M.D.   10 mg at 07/29/24 0739    HYDROmorphone (Dilaudid) injection 1 mg  1 mg Intravenous Q4HRS PRN Abraham Cosme M.D.   1 mg at 07/28/24 1127    polyethylene glycol/lytes (Miralax) Packet 1 Packet  1 Packet Oral DAILY Abraham Cosme M.D.   1 Packet at 07/29/24 0437    ALPRAZolam (Xanax) tablet 1 mg  1 mg Oral BID PRN  VERN RizoP.R.N.   1 mg at 07/28/24 2054    enoxaparin (Lovenox) inj 40 mg  40 mg Subcutaneous DAILY AT 1800 Efraín Sims M.D.   40 mg at 07/28/24 1701    acetaminophen (Tylenol) tablet 650 mg  650 mg Oral Q6HRS PRN Efraín Sims M.D.   650 mg at 07/29/24 0436    senna-docusate (Pericolace Or Senokot S) 8.6-50 MG per tablet 2 Tablet  2 Tablet Oral Q EVENING Efraín Sims M.D.   2 Tablet at 07/28/24 1701    ondansetron (Zofran) syringe/vial injection 4 mg  4 mg Intravenous Q4HRS PRSU Sims M.D.   4 mg at 07/26/24 1932    ondansetron (Zofran ODT) dispertab 4 mg  4 mg Oral Q4HRS PRN Efraín Sims M.D.        hydrALAZINE (Apresoline) injection 10 mg  10 mg Intravenous Q4HRS PRN Efraín Sims M.D.        ARIPiprazole (Abilify) tablet 30 mg  30 mg Oral QDAY Rosalina Vallecillo A.P.R.N.   30 mg at 07/29/24 0437    metoprolol tartrate (Lopressor) tablet 25 mg  25 mg Oral BID ROSANA Covarrubias.P.R.N.   25 mg at 07/28/24 0416     No current [facility]-ordered outpatient medications on file.     Diet:   DIET ORDERS (From admission to next 24h)       Start     Ordered    07/26/24 1209  Diet Order Diet: Regular  ALL MEALS        Question:  Diet:  Answer:  Regular    07/26/24 1208                    Anticipated Discharge Destination / Patient/Family Goal:  Destination: Home with Assistance Support System: Spouse  Anticipated home health services: OT, PT, and Nursing  Previously used HH service/ provider: Not Applicable  Anticipated DME Needs:  TBD  Outpatient Services: OT and PT  Alternative resources to address additional identified needs:   No future appointments.   Pre-Screen Completed: 7/29/2024 9:50 AM Kaycee Addison

## 2024-07-29 NOTE — CARE PLAN
Problem: Pain - Standard  Goal: Alleviation of pain or a reduction in pain to the patient’s comfort goal  Outcome: Progressing     Problem: Knowledge Deficit - Standard  Goal: Patient and family/care givers will demonstrate understanding of plan of care, disease process/condition, diagnostic tests and medications  Outcome: Progressing     Problem: Depression  Goal: Patient and family/caregiver will verbalize accurate information about at least two of the possible causes of depression, three-four of the signs and symptoms of depression  Outcome: Progressing     Problem: Skin Integrity  Goal: Skin integrity is maintained or improved  Outcome: Progressing     Problem: Fall Risk  Goal: Patient will remain free from falls  Outcome: Progressing    The patient is Stable - Low risk of patient condition declining or worsening    Shift Goals  Clinical Goals: NPO for surgery tomorrow, Pain management, Anxiety, safety  Patient Goals: Maintained NPO for surgery tomorrow, Pain protocol, verbalized understanding, comfort  Family Goals: Not present    Progress made toward(s) clinical / shift goals: Maintained NPO for surgery today, advised and instructed NPO after 12 midnight. Placed NPO tag at the outside door. Administered pain medication as ordered. Verbalized understanding towards the side effects of the medication provided. Placed bed in a lowest possible position, placed bed side table and call bell within reach. Kept safe and comfortably.      
The patient is Stable - Low risk of patient condition declining or worsening    Shift Goals  Clinical Goals: Maintain pain at a tolerable level throughout shift. Safety.  Patient Goals: pain management  Family Goals: INA    Progress made toward(s) clinical / shift goals:  Patient maintained a tolerable pain level throughout the shift. Pt remained free from falls with the use of a call light and assistive devices.     Patient is not progressing towards the following goals: N/A    Problem: Pain - Standard  Goal: Alleviation of pain or a reduction in pain to the patient’s comfort goal  Outcome: Progressing      Problem: Knowledge Deficit - Standard  Goal: Patient and family/care givers will demonstrate understanding of plan of care, disease process/condition, diagnostic tests and medications  Outcome: Progressing     Problem: Fall Risk  Goal: Patient will remain free from falls  Outcome: Progressing            
The patient is Stable - Low risk of patient condition declining or worsening    Shift Goals  Clinical Goals: Pain control, comfort, mobility  Patient Goals: pain control,  Family Goals: not present    Progress made toward(s) clinical / shift goals:    Problem: Pain - Standard  Goal: Alleviation of pain or a reduction in pain to the patient’s comfort goal  Outcome: Progressing     Patient experiencing uncontrolled pain related to patellar ORIF of right knee, educated to use 0-10 pain scale to determine pain medication and dosage given. RN offered pt ultimodal methods for pain relief    Problem: Knowledge Deficit - Standard  Goal: Patient and family/care givers will demonstrate understanding of plan of care, disease process/condition, diagnostic tests and medications  Outcome: Progressing     Problem: Depression  Goal: Patient and family/caregiver will verbalize accurate information about at least two of the possible causes of depression, three-four of the signs and symptoms of depression  Outcome: Progressing     Problem: Skin Integrity  Goal: Skin integrity is maintained or improved  Outcome: Progressing     Problem: Fall Risk  Goal: Patient will remain free from falls  Outcome: Progressing       Patient is not progressing towards the following goals:      
The patient is Stable - Low risk of patient condition declining or worsening    Shift Goals  Clinical Goals: Pain control, safety, mobility  Patient Goals: Pain control, comfort  Family Goals: N/A    Progress made toward(s) clinical / shift goals:    Problem: Pain - Standard  Goal: Alleviation of pain or a reduction in pain to the patient’s comfort goal  Outcome: Progressing     Problem: Knowledge Deficit - Standard  Goal: Patient and family/care givers will demonstrate understanding of plan of care, disease process/condition, diagnostic tests and medications  Outcome: Progressing     Problem: Skin Integrity  Goal: Skin integrity is maintained or improved  Outcome: Progressing       Patient is not progressing towards the following goals:      
The patient is Stable - Low risk of patient condition declining or worsening    Shift Goals  Clinical Goals: pain control  Patient Goals: pain control  Family Goals: Not present    Problem: Pain - Standard  Goal: Alleviation of pain or a reduction in pain to the patient’s comfort goal  Outcome: Progressing  Flowsheets (Taken 7/27/2024 2136)  Non Verbal Scale:   Calm   Sleeping  Note: Patient will have controlled pain throughout the shift.     Problem: Knowledge Deficit - Standard  Goal: Patient and family/care givers will demonstrate understanding of plan of care, disease process/condition, diagnostic tests and medications  Outcome: Progressing  Note: Patient will demonstrate understanding of plan of care during stay and have decreased anxiety after therapeutic communication with RN.         
The patient is Stable - Low risk of patient condition declining or worsening    Shift Goals  Clinical Goals: pain management, safety  Patient Goals: pain management  Family Goals: INA    Progress made toward(s) clinical / shift goals:      Problem: Pain - Standard  Goal: Alleviation of pain or a reduction in pain to the patient’s comfort goal  Outcome: Progressing     Problem: Knowledge Deficit - Standard  Goal: Patient and family/care givers will demonstrate understanding of plan of care, disease process/condition, diagnostic tests and medications  Outcome: Progressing       Patient has call light within reach and calls appropriately. She has been updated on plan of care and will continue to be updated as information arises. Her pain has been controlled through medication administration (see MAR) as well as rest.   
Yes

## 2024-07-29 NOTE — DISCHARGE PLANNING
Care Transition Team Assessment    Pt lives with spouse in a once story RV home, with 2 steps to enter.  Pt was independent prior to hospitalization, with no use of DME.  Pt has medicare and medicaid FFS for medical coverage.  Pt's PCP is Dr. Alejandro.  Pt's choice is to go to [facility] rehab.  Choice form signed and faxed to [facility].      Information Source  Orientation Level: Oriented X4  Information Given By: Patient  Who is responsible for making decisions for patient? : Patient    Readmission Evaluation  Is this a readmission?: No    Elopement Risk  Legal Hold: No  Ambulatory or Self Mobile in Wheelchair: Yes  Disoriented: No  Psychiatric Symptoms: None  History of Wandering: No  Elopement this Admit: No  Vocalizing Wanting to Leave: No  Displays Behaviors, Body Language Wanting to Leave: No-Not at Risk for Elopement  Elopement Risk: Not at Risk for Elopement    Interdisciplinary Discharge Planning  Lives with - Patient's Self Care Capacity: Spouse  Patient or legal guardian wants to designate a caregiver: No  Support Systems: Spouse / Significant Other  Housing / Facility: Mobile Home (RV)  Name of Care Facility: N/A  Prior Services: Home-Independent    Discharge Preparedness  What is your plan after discharge?: Uncertain - pending medical team collaboration  What are your discharge supports?: Spouse  Prior Functional Level: Ambulatory, Independent with Activities of Daily Living  Difficulity with ADLs: None  Difficulity with IADLs: None    Functional Assesment  Prior Functional Level: Ambulatory, Independent with Activities of Daily Living    Finances  Financial Barriers to Discharge: No  Prescription Coverage: Yes    Vision / Hearing Impairment  Vision Impairment : Yes  Right Eye Vision: Impaired, Wears Glasses  Left Eye Vision: Impaired, Wears Glasses  Hearing Impairment : Yes  Hearing Impairment: Both Ears, Hearing Device(s) Available  Does Pt Need Special Equipment for the Hearing Impaired?: No         Advance  Directive  Advance Directive?: None  Advance Directive offered?: AD Booklet refused    Domestic Abuse  Have you ever been the victim of abuse or violence?: No  Possible Abuse/Neglect Reported to:: Not Applicable    Psychological Assessment  History of Substance Abuse: None  History of Psychiatric Problems: No  Non-compliant with Treatment: No  Newly Diagnosed Illness: Yes    Discharge Risks or Barriers  Discharge risks or barriers?: Complex medical needs  Patient risk factors: Complex medical needs    Anticipated Discharge Information  Discharge Disposition: Disch to  rehab facility or distinct part unit (62)

## 2024-07-29 NOTE — PROGRESS NOTES
Received report from previous shift RN. Patient is laying in bed with even and unlabored respirations. Patient reporting a pain level of 8/10 at this time, patient medicated per MAR. Patient is A/Ox4, SpO2 > 90% on 2L NC. Patient denies any shortness of breath, chest pain, or new onset numbness/tingling in any extremities. Safety education discussed, bed is locked and in lowest position, call light and personal belongings within reach. Hourly rounding in progress, all needs met at this time.

## 2024-07-29 NOTE — PROGRESS NOTES
Discharge instructions provided to pt.  Pt states understanding.  Pt states all questions have been answered.  Copy of discharge provided to pt.  Signed copy in chart.   Pt states that all personal belongings are in possession.

## 2024-07-29 NOTE — H&P
Physical Medicine & Rehabilitation  History and Physical (H&P)  &     Post Admission Physician Evaluation (ARELIS)       Date of Admission: 7/29/24  Date of Service: 7/29/2024   García Cho    [facility] Code to Support Admission: 0008.9 - Orthopaedic Disorders: Other Orthopaedic  Etiologic Diagnosis: Fracture of patella with routine healing, right, closed    _______________________________________________    Chief Complaint: Decreased mobility, weakness    History of Present Illness:    Patient is a 68 y.o. female with a PMH of HTN, A fib, HLD, and anxiety who presented on 7/25/24 with GLF.  She reportedly was walking her dog and tripped on the sidewalk and went down on her right knee and left wrist. In ED she was found to have right knee patellar fracture. Her XR of left wrist was negative. She was evaluated by orthopedic surgery and recommended surgical intervention. Patient underwent open repair with Dr. Holder on 7/26/24. Per orthopedics, patient should be WBAT with full extension in brace.  She should also be placed on Lovenox initially and then  mg BID as outpatient. Post-operative course complicated by leukocytosis thought to be reactive, elevated SBP and elevated LFTs.     Patient tolerated transfer to [facility]. She reports she injured both legs in the fall. She reports she feels like she sprained her left knee as well so transfers are limited. She reports no changes in sensation to right leg. She reports baseline tremor in right foot that is unchanged. Denies SOB.     Review of Systems:     Comprehensive 14 point ROS was reviewed and all were negative except as noted elsewhere in this document.     Past Medical History:  Past Medical History:   Diagnosis Date    Abnormal finding on cardiovascular stress test - hypertensive response to low level of exercise - normal stress imaging 12/10/2020    Agatston coronary artery calcium score between 200 and 399 - 355 2020     Agoraphobia with panic  disorder     anxiety    Allergic rhinitis     Arthritis     all joints    Bowel habit changes     Dental disorder     Dyslipidemia     Fear of travel with panic attacks     Heart burn     Hiatal hernia     NSVT (nonsustained ventricular tachycardia) (HCC) - seen in recovery from stress Echo 12/10/2020    PAF (paroxysmal atrial fibrillation) (HCC) - 2 minutes on Zio 06/15/2024    Schatzki's ring        Past Surgical History:  Past Surgical History:   Procedure Laterality Date    ORIF, PATELLA Right 7/26/2024    Procedure: ORIF, PATELLA;  Surgeon: Stephen Holder M.D.;  Location: SURGERY [facility];  Service: Orthopedics    ALEX BY LAPAROSCOPY  1/19/2018    Procedure: ALEX BY LAPAROSCOPY/SURGICAL;  Surgeon: Keren Henderson M.D.;  Location: SURGERY [facility];  Service: General    EGD WITH ASP/BX  11/26/12    distal esophageal stricture secondary to reflux status post dilation to 51 Indonesian, mild erosive esophagitis, mild gastric erythema, small sliding hiatal hernia, mild chronic gastritis, negative H. pylori, no dysplasia or malignancy    COLONOSCOPY WITH BIOPSY  7/08    hemorrhoids, no malignancy    EGD WITH ASP/BX  7/08    hiatal hernia, schatzki's ring    ABDOMINAL HYSTERECTOMY TOTAL      with BSO due to infection    OTHER       I &D rectal abscess       Family History:  Family History   Problem Relation Age of Onset    Cancer Brother         pancreatic cancer    Arthritis Mother     Cancer Mother         lung cancer with mets     Arthritis Father     Alcohol/Drug Brother         drug abuse       Medications:  Current Facility-Administered Medications   Medication Dose    Respiratory Therapy Consult      Pharmacy Consult Request ...Pain Management Review 1 Each  1 Each    hydrALAZINE (Apresoline) tablet 25 mg  25 mg    acetaminophen (Tylenol) tablet 650 mg  650 mg    senna-docusate (Pericolace Or Senokot S) 8.6-50 MG per tablet 2 Tablet  2 Tablet    And    polyethylene glycol/lytes (Miralax) Packet 1  Packet  1 Packet    docusate sodium (Enemeez) enema 283 mg  283 mg    magnesium hydroxide (Milk Of Magnesia) suspension 30 mL  30 mL    omeprazole (PriLOSEC) capsule 20 mg  20 mg    carboxymethylcellulose (Refresh Tears) 0.5 % ophthalmic drops 1 Drop  1 Drop    mag hydrox-al hydrox-simeth (Maalox Plus Es Or Mylanta Ds) suspension 20 mL  20 mL    ondansetron (Zofran ODT) dispertab 4 mg  4 mg    Or    ondansetron (Zofran) syringe/vial injection 4 mg  4 mg    traZODone (Desyrel) tablet 50 mg  50 mg    sodium chloride (Ocean) 0.65 % nasal spray 2 Spray  2 Spray    oxyCODONE immediate-release (Roxicodone) tablet 5 mg  5 mg    Or    oxyCODONE immediate release (Roxicodone) tablet 10 mg  10 mg    [START ON 7/30/2024] ARIPiprazole (Abilify) tablet 30 mg  30 mg    enoxaparin (Lovenox) inj 40 mg  40 mg    metoprolol tartrate (Lopressor) tablet 25 mg  25 mg    [START ON 7/30/2024] polyethylene glycol/lytes (Miralax) Packet 1 Packet  1 Packet    ALPRAZolam (Xanax) tablet 1 mg  1 mg       Allergies:  Atorvastatin, Bee pollen, Ibuprofen, Pravastatin, and Other environmental    Psychosocial History:  Housing / Facility: Mobile Home (RV)  Steps Into Home: 9  Steps In Home: 2 (to access bedroom)  Lives with - Patient's Self Care Capacity: Spouse  Equipment Owned: Tub / Shower Seat     Prior Level of Function / Living Situation:   Physical Therapy: Prior Services: Home-Independent  Housing / Facility: Mobile Home (RV)  Steps Into Home: 9  Steps In Home: 2 (to access bedroom)  Rail: Left Rail  (Steps into Home)  Bathroom Set up: Walk In Shower, Shower Curtain  Equipment Owned: Tub / Shower Seat  Lives with - Patient's Self Care Capacity: Spouse  Bed Mobility: Independent  Transfer Status: Independent  Ambulation: Independent  Assistive Devices Used: None  Stairs: Independent  Current Level of Function:   Gait Level Of Assist: Contact Guard Assist  Assistive Device: Front Wheel Walker  Distance (Feet): 6  Deviation: Antalgic, Step To,  Bradykinetic  # of Stairs Climbed: 0  Supine to Sit: Minimal Assist  Sit to Supine: Minimal Assist (with R LE)  Scooting: Standby Assist  Comments: HOB flat, no bed rail  Sit to Stand: Minimal Assist  Bed, Chair, Wheelchair Transfer: Minimal Assist  Toilet Transfers: Minimal Assist  Transfer Method: Stand Step  Sitting in Chair: pt wanted to return to bed  Sitting Edge of Bed: 3 minutes  Standing: 3 minutes total  Occupational Therapy:   Self Feeding: Independent  Grooming / Hygiene: Independent  Bathing: Independent  Dressing: Independent  Toileting: Independent  Medication Management: Independent  Laundry: Independent  Kitchen Mobility: Independent  Finances: Independent  Home Management: Independent  Shopping: Independent  Prior Level Of Mobility: Independent Without Device in Community  Driving / Transportation: Driving Independent  Prior Services: Home-Independent  Housing / Facility: Mobile Home ()  Occupation (Pre-Hospital Vocational): Retired Due To Age  Current Level of Function:   Lower Body Dressing: Minimal Assist (assist for immobilizer mgmt, donned underwear in long sitting)  Toileting: Contact Guard Assist (BSC over toilet for increased height with knee immobilizer)    CURRENT LEVEL OF FUNCTION:   Same as level of function prior to admission to [facility]    Physical Examination:   GENERAL: No apparent distress  HEENT: Normocephalic/atraumatic, EOMI, and PERRL  CARDIAC: Regular rate and rhythm, normal S1, S2   LUNGS: Clear to auscultation   ABDOMINAL: bowel sounds present, soft, and nontender    EXTREMITIES: no contractures, spasticity, or edema.   NEURO:  Mental status:  A&Ox4 (person, place, date, situation) answers questions appropriately  Speech: fluent, no aphasia or dysarthria  Motor:    5/5 BUE  4/5 BHF, R knee in brace, 4/5 L KE, 5/5 ADF/APF  Sensory: intact to light touch through out      Radiology:  XR Knee 7/25/24  IMPRESSION:        Acute displaced fracture of the  patella.    Laboratory Values:  Recent Labs     07/28/24  0512   SODIUM 137   POTASSIUM 4.2   CHLORIDE 100   CO2 29   GLUCOSE 107*   BUN 14   CREATININE 0.72   CALCIUM 9.2     Recent Labs     07/28/24  0512   WBC 9.4   RBC 3.80*   HEMOGLOBIN 12.1   HEMATOCRIT 36.7*   MCV 96.6   MCH 31.8   MCHC 33.0   RDW 45.7   PLATELETCT 242   MPV 9.7           Primary Rehabilitation Diagnosis:    This patient is a 68 y.o. female admitted for acute inpatient rehabilitation with Fracture of patella with routine healing, right, closed.    Impairments:   ADLs/IADLs  Mobility    Secondary Diagnosis/Medical Co-morbidities Affecting Function  A fib/HTN  HLD  Anxiety disorder    Relevant Changes Since Preadmission Evaluation:    Status unchanged    The patient's rehabilitation potential is Good  The patient's medical prognosis is good    Rehabilitation Plan:   Discussion and Recommendations:   1. The patient requires an acute inpatient rehabilitation program with a coordinated program of care at an intensity and frequency not available at a lower level of care. This recommendation is substantiated by the patient's medical physicians who recommend that the patient's intervention and assessment of medical issues needs to be done at an acute level of care for patient's safety and maximum outcome.   2. A coordinated program of care will be supplied by an interdisciplinary team of physical therapy, occupational therapy, rehab physician, rehab nursing, and, if needed, speech therapy and rehab psychology. Rehab team presents a patient-specific rehabilitation and education program concentrating on prevention of future problems related to accessibility, mobility, skin, bowel, bladder, sexuality, and psychosocial and medical/surgical problems.   3. Need for Rehabilitation Physician: The rehab physician will be evaluating the patient on a multi-weekly basis to help coordinate the program of care. The rehab physician communicates between medical  physicians, therapists, and nurses to maximize the patient's potential outcome. Specific areas in which the rehab physician will be providing daily assessment include the following:   A. Assessing the patient's heart rate and blood pressure response (vitals monitoring) to activity and making adjustments in medications or conservative measures as needed.   B. The rehab physician will be assessing the frequency at which the program can be increased to allow the patient to reach optimal functional outcome.   C. The rehab physician will also provide assessments in daily skin care, especially in light of patient's impairments in mobility.   D. The rehab physician will provide special expertise in understanding how to work with functional impairment and recommend appropriate interventions, compensatory techniques, and education that will facilitate the patient's outcome.   4. Rehab R.N.   The rehab RN will be working with patient to carry over in room mobility and activities of daily living when the patient is not in 3 hours of skilled therapy. Rehab nursing will be working in conjunction with rehab physician to address all the medical issues above and continue to assess laboratory work and discuss abnormalities with the treating physicians, assess vitals, and response to activity, and discuss and report abnormalities with the rehab physician. Rehab RN will also continue daily skin care, supervise bladder/bowel program, instruct in medication administration, and ensure patient safety.   5. Rehab Therapy: Therapies to treat at intensity and frequency of (may change after completion of evaluation by all therapeutic disciplines):       PT:  Physical therapy to address mobility, transfer, gait training and evaluation for adaptive equipment needs 1 and 1/2 hour/day at least 5 days/week for the duration of the ELOS (see below)       OT:  Occupational therapy to address ADLs, self-care, home management training, functional  mobility/transfers and assistive device evaluation, and community re-integration 1 and 1/2 hour/day at least 5 days/week for the duration of the ELOS (see below).     Medical management / Rehabilitation Issues/ Adverse Potential as part of rehabilitation plan     Rehabilitation Issues/Adverse Potential  1.  R patellar fracture - Patient sustained GLF with right knee fracture s/p open repair with Dr. Holder on 7/26/24. She is WBAT with knee in extension brace. Patient demonstrates functional deficits in strength, balance, coordination, and ADL's. Patient is admitted to [facility] for comprehensive rehabilitation therapy as described below.   Rehabilitation nursing monitors bowel and bladder control, educates on medication administration, co-morbidities and monitors patient safety.    2.  Neurostimulants: None at this time but continue to assess daily for need to initiate should status change.    3.  DVT prophylaxis:  Patient is on Lovenox for anticoagulation upon transfer. Encourage OOB. Monitor daily for signs and symptoms of DVT including but not limited to swelling and pain to prevent the development of DVT that may interfere with therapies.    4.  GI prophylaxis:  On prilosec to prevent gastritis/dyspepsia which may interfere with therapies.    5.  Pain: No issues with pain currently / Controlled with APAP/Oxycodone    6.  Nutrition/Dysphagia: Dietician monitors nutrient intake, recommend supplements prn and provide nutrition education to pt/family to promote optimal nutrition for wound healing/recovery.     7.  Bladder/bowel:  Start bowel and bladder program as described below, to prevent constipation, urinary retention (which may lead to UTI), and urinary incontinence (which will impact upon pt's functional independence).   - Post void bladder scans, I&O cath for PVRs >400  - up to commode after meal     8.  Skin/dermal ulcer prophylaxis: Monitor for new skin conditions with q.2 h. turns  as required to prevent the development of skin breakdown.     9.  Cognition/Behavior: As needed psychologist provides adjustment counseling to illness and psychosocial barriers that may be potential barriers to rehabilitation.     10. Respiratory therapy: RT performs O2 management prn, breathing retraining, pulmonary hygiene and bronchospasm management prn to optimize participation in therapies.     Medical Co-Morbidities/Adverse Potential Affecting Function:  R patellar fracture - Patient sustained GLF with right knee fracture s/p open repair with Dr. Holder on 7/26/24. She is WBAT with knee in extension brace.  -PT and OT for mobility and ADLs. Per guidelines, 15 hours per week between PT, OT and/or SLP.  -Follow-up Dr. Glory WAYNE fib/HTN - History of A fib on ziopatch. On metoprolol 25 mg BID and previously on Nebivolol.     HLD - Patient with allergy to statins. Diet controlled    Anxiety disorder - Patient on Ability 30 mg daily and PRN Xanax 1 mg    Pain - Patient on PRN Tylenol and Oxycodone     Skin - Patient at risk for skin breakdown due to debility in areas including sacrum, achilles, elbows and head in addition to other sites. Nursing to assess skin daily.     GI Ppx - Patient on Prilosec for GERD prophylaxis. Patient on Senna-docusate for constipation prophylaxis.        DVT Ppx - Patient Lovenox on transfer.    I personally performed a complete drug regimen review and no potential clinically significant medication issues were identified.     Goals/Expected Level of Function Based on Current Medical and Functional Status:  (may change based on patient's medical status and rate of impairment recovery)  Transfers:   Modified Independent  Mobility/Gait:   Modified Independent  ADL's:   Modified Independent    DISPOSITION: Discharge to pre-morbid independent living setting with the supportive care of patient's family.    ELOS: 7-14 days  ____________________________________    DARIEN Theodore  MD/PhD  [facility] - Physical Medicine & Rehabilitation   [facility]   ____________________________________    Pt was seen today for 75 min. Time spent included pre-admission screening, pre-admission review of vitals and laboratory values/tests, unit/floor time, face-to-face time with the patient including physical examination, care coordination, counseling of patient and/or family, ordering medications/procedures/tests, discussion with other healthcare providers, and/or documentation in the electronic medical record.

## 2024-07-29 NOTE — DISCHARGE PLANNING
0923: Per attending patient is medically cleared. Contacted spouse to verify dc support. Patient lives in an RV with 3 REBEKAH and 3 steps inside to bedroom/bathroom. Spouse reports he can provided assist with IADLs and driving, but is worried about providing physical assistance d/t h/o Parkinsons. Discussed expectations for IPR and answered questions.    PMR to review.    1028: Patient has been accepted by Dr Theodore at [facility]. Transport set for 12/1230 GMT w/c, nurse report [id number]. Notified care team and spouse Jannette

## 2024-07-29 NOTE — PROGRESS NOTES
[facility] Medicine Daily Progress Note    Date of Service  7/28/2024    Chief Complaint  García Cho is a 68 y.o. female admitted 7/25/2024 with fractured patella after GLF    Hospital Course  García Cho is a 68 y.o. female who presented 7/25/2024 status post fall. Patient states that earlier today, she was walking her dog, and she ended up tripping over her sidewalk.  She fell onto her knees and wrist.  She states that she has extreme pain to her right knee along with swelling.  Along with left ankle pain as well.  Imaging was obtained in the emergency department showing an acute displaced fracture of the patella.  All other x-rays were unremarkable.  Orthopedic surgery consulted and recommended admission, n.p.o. at midnight, and surgery tomorrow.     Interval Problem Update  7/27  Evaluated in her inpatient room.   Afebrile pulse in the 50s to 60s normal respiratory rate and blood pressure, oxygen saturation 91 to 95% on 1 L nasal cannula.  Unremarkable CBC, CMP glucose 117 AST 84 ALT 51.  Went for surgical repair on 7/26, tolerated well, no apparent complications, pain is controlled, all limbs neurovascularly intact.  Cleared for discharge to home versus rehab by Ortho pending medicine clearance, weightbearing as tolerated in knee immobilizer at all times.    7/28  Afebrile with HR 54-65 RR 16-18 's-130's SpO2 92-95%.  No significant lab abnormalities shown.  Pain controlled.  PT recommending PM&R eval for possible IPR admission.  Consult placed.    I have discussed this patient's plan of care a  nd discharge plan at IDT rounds today with Case Management, Nursing, Nursing leadership, and other members of the IDT team.    Consultants/Specialty  orthopedics    Code Status  Full Code    Disposition  The patient is not medically cleared for discharge to home or a post-acute facility.      I have placed the appropriate orders for post-discharge needs.    Review of Systems  ROS    Review of Systems   Constitutional:  Negative for chills and fever.   HENT:  Negative for congestion, ear discharge, ear pain, nosebleeds and sinus pain.    Eyes:  Negative for double vision, photophobia, pain and discharge.   Respiratory:  Negative for hemoptysis, sputum production and shortness of breath.    Cardiovascular:  Negative for chest pain, palpitations and orthopnea.   Gastrointestinal:  Negative for abdominal pain, diarrhea, nausea and vomiting.   Genitourinary:  Negative for dysuria, flank pain, frequency, hematuria and urgency.   Musculoskeletal:  Positive for falls and joint pain. Negative for back pain and neck pain.   Neurological:  Negative for dizziness and headaches.   Psychiatric/Behavioral:  Negative for hallucinations, substance abuse and suicidal ideas. The patient is not nervous/anxious and does not have insomnia.    Physical Exam  Temp:  [36.2 °C (97.2 °F)-36.8 °C (98.2 °F)] 36.2 °C (97.2 °F)  Pulse:  [54-69] 59  Resp:  [15-18] 18  BP: (111-136)/(49-64) 119/52  SpO2:  [92 %-95 %] 95 %    Physical Exam  Vitals and nursing note reviewed.   Constitutional:       Appearance: She is not toxic-appearing.   HENT:      Head: Normocephalic and atraumatic.      Nose: Nose normal. No rhinorrhea.      Mouth/Throat:      Mouth: Mucous membranes are moist.      Pharynx: Oropharynx is clear.   Eyes:      General: No scleral icterus.     Extraocular Movements: Extraocular movements intact.      Conjunctiva/sclera: Conjunctivae normal.   Cardiovascular:      Rate and Rhythm: Normal rate and regular rhythm.      Pulses: Normal pulses.   Pulmonary:      Effort: Pulmonary effort is normal. No respiratory distress.      Breath sounds: Normal breath sounds. No wheezing, rhonchi or rales.   Abdominal:      General: Bowel sounds are normal.      Palpations: Abdomen is soft.      Tenderness: There is no abdominal tenderness. There is no guarding or rebound.   Musculoskeletal:         General: Tenderness present.  Normal range of motion.      Cervical back: Normal range of motion and neck supple. No rigidity.      Right lower leg: No edema.      Left lower leg: No edema.   Skin:     General: Skin is warm and dry.      Capillary Refill: Capillary refill takes less than 2 seconds.      Findings: No erythema or rash.   Neurological:      General: No focal deficit present.      Mental Status: She is alert and oriented to person, place, and time. Mental status is at baseline.      Cranial Nerves: No cranial nerve deficit.      Sensory: No sensory deficit.      Motor: No weakness.      Coordination: Coordination normal.   Psychiatric:         Mood and Affect: Mood normal.         Behavior: Behavior normal.         Thought Content: Thought content normal.         Judgment: Judgment normal.         Fluids    Intake/Output Summary (Last 24 hours) at 7/28/2024 1704  Last data filed at 7/28/2024 0000  Gross per 24 hour   Intake --   Output 650 ml   Net -650 ml       Laboratory  Recent Labs     07/25/24 1931 07/26/24  0524 07/28/24  0512   WBC 11.5* 8.0 9.4   RBC 4.23 4.55 3.80*   HEMOGLOBIN 13.7 14.2 12.1   HEMATOCRIT 39.3 42.4 36.7*   MCV 92.9 93.2 96.6   MCH 32.4 31.2 31.8   MCHC 34.9 33.5 33.0   RDW 43.6 43.8 45.7   PLATELETCT 271 248 242   MPV 9.1 9.3 9.7     Recent Labs     07/25/24 1931 07/26/24  0524 07/28/24  0512   SODIUM 139 138 137   POTASSIUM 4.0 3.9 4.2   CHLORIDE 107 102 100   CO2 22 25 29   GLUCOSE 107* 117* 107*   BUN 13 12 14   CREATININE 0.89 0.83 0.72   CALCIUM 8.9 9.2 9.2                   Imaging  DX-PORTABLE FLUORO > 1 HOUR   Final Result      Portable fluoroscopy utilized for 55 seconds.      INTERPRETING LOCATION: [address] [place] [address], [id number]      DX-KNEE 2- RIGHT   Final Result      Digitized intraoperative radiograph is submitted for review. This examination is not for diagnostic purpose but for guidance during a surgical procedure. Please see the patient's chart for full procedural details.          INTERPRETING LOCATION: [address] [place] [address] [place], [id number]      DX-KNEE COMPLETE 4+ RIGHT   Final Result         Acute displaced fracture of the patella.      DX-FOOT-COMPLETE 3+ LEFT   Final Result      1.  No fracture or dislocation of LEFT foot.   2.  Mild to moderate osteoarthritis.      DX-HAND 3+ LEFT   Final Result         No acute osseous abnormality.           Assessment/Plan  * Fracture of patella with routine healing, right, closed- (present on admission)  Assessment & Plan  Fall from ground level.  Imaging personally reviewed and shows Acute displaced fracture of the patella  Ortho consulted by EDP and patient will be taken to OR tomorrow  Patient will be started on IV and p.o. narcotics.  She will need close hemodynamic monitoring  PT/OT      Hyperglycemia  Assessment & Plan  Monitor on BMP, mild, no need for ISS    Leukocytosis  Assessment & Plan  Likely reactive.  No indication for antibiotics         VTE prophylaxis:    enoxaparin ppx      I have performed a physical exam and reviewed and updated ROS and Plan today (7/28/2024). In review of yesterday's note (7/27/2024), there are no changes except as documented above.  Total time spent 45 minutes. I spent greater than 50% of the time for patient care, counseling, and coordination on this date, including unit/floor time, and face-to-face time with the patient as per interval events, my own review of patient's imaging and lab analysis and developing my assessment and plan above.

## 2024-07-29 NOTE — THERAPY
"Physical Therapy   Daily Treatment     Patient Name: García Cho  Age:  68 y.o., Sex:  female  Medical Record #: [id number]  Today's Date: 7/29/2024     Precautions  Precautions: Fall Risk;Immobilizer Right Lower Extremity;Weight Bearing As Tolerated Right Lower Extremity  Comments: RLE immobilized in full extension    Assessment    Pt was received in bed and agreeable to treatment. Pt needed some assistance with lowering RLE off the side of the bed and back into bed but was otherwise CGA with ambulation and SBA with STS transfers. Pt was able to ambulate into the hallway but was limited by UE fatigue with Wbing through the walker. Continue to recommend post-acute rehab to progress with functional mobility. Will follow pt through acute PT until d/c.    Plan    Treatment Plan Status: Continue Current Treatment Plan  Type of Treatment: Bed Mobility, Gait Training, Neuro Re-Education / Balance, Therapeutic Activities, Therapeutic Exercise  Treatment Frequency: 4 Times per Week  Treatment Duration: Until Therapy Goals Met    DC Equipment Recommendations: Unable to determine at this time  Discharge Recommendations: Recommend post-acute placement for additional physical therapy services prior to discharge home      Subjective    \"Where can I get a bedside commode?'     Objective       07/29/24 1105   Precautions   Precautions Fall Risk;Immobilizer Right Lower Extremity;Weight Bearing As Tolerated Right Lower Extremity   Comments RLE immobilized in full extension   Vitals   O2 Delivery Device None - Room Air   Pain   Intervention Cold Pack;Nurse Notified   Pain 0 - 10 Group   Therapist Pain Assessment Post Activity Pain Same as Prior to Activity   Cognition    Cognition / Consciousness WDL   Level of Consciousness Alert   Comments Pleasant and cooperative   Balance   Sitting Balance (Static) Fair +   Sitting Balance (Dynamic) Fair +   Standing Balance (Static) Fair   Standing Balance (Dynamic) Fair -   Weight " Shift Sitting Fair   Weight Shift Standing Fair   Comments with FWW   Bed Mobility    Supine to Sit Minimal Assist   Sit to Supine Minimal Assist   Scooting Standby Assist   Skilled Intervention Verbal Cuing   Comments HOB elevated, needed assist with lowering RLE off the bed   Gait Analysis   Gait Level Of Assist Contact Guard Assist   Assistive Device Front Wheel Walker   Distance (Feet) 50   # of Times Distance was Traveled 1   Deviation Bradykinetic;Step To   Skilled Intervention Verbal Cuing   Comments Provided cues for energy conservation and staying within walker while walking   Functional Mobility   Sit to Stand Standby Assist   Bed, Chair, Wheelchair Transfer Standby Assist   Toilet Transfers Standby Assist   Transfer Method Stand Step   Mobility EOB> toliet>sink>walking>EOB   Skilled Intervention Verbal Cuing;Compensatory Strategies   6 Clicks Assessment - How much HELP from from another person do you currently need... (If the patient hasn't done an activity recently, how much help from another person do you think he/she would need if he/she tried?)   Turning from your back to your side while in a flat bed without using bedrails? 3   Moving from lying on your back to sitting on the side of a flat bed without using bedrails? 3   Moving to and from a bed to a chair (including a wheelchair)? 3   Standing up from a chair using your arms (e.g., wheelchair, or bedside chair)? 3   Walking in hospital room? 3   Climbing 3-5 steps with a railing? 2   6 clicks Mobility Score 17   Activity Tolerance   Comments Ambulation limited by generalized weakness and fatigue in UEs   Short Term Goals    Short Term Goal # 1 Pt will perform supine to/from sit with flat bed and no features supervised in 6 visits to progress bed mobility   Goal Outcome # 1 Progressing as expected   Short Term Goal # 2 Pt will perform sit to/from stand with FWW supervised in 6 visits to progress transfers   Goal Outcome # 2 Progressing as expected    Short Term Goal # 3 Pt will amb with FWW 100ft supervised in 6 visits to progress with functional household mobility.   Goal Outcome # 3 Progressing as expected   Short Term Goal # 4 Pt will be able to ascend/descend 9 steps with L rail with supervision in 6 visits to access home   Goal Outcome # 4 Progressing as expected   Short Term Goal # 5 Pt will be able to ascend/descend 2 steps with crutches in 6 visits to access her bedroom   Goal Outcome # 5 Progressing as expected   Physical Therapy Treatment Plan   Physical Therapy Treatment Plan Continue Current Treatment Plan   Anticipated Discharge Equipment and Recommendations   DC Equipment Recommendations Unable to determine at this time   Discharge Recommendations Recommend post-acute placement for additional physical therapy services prior to discharge home   Interdisciplinary Plan of Care Collaboration   IDT Collaboration with  Nursing   Patient Position at End of Therapy Seated;In Bed;Bed Alarm On;Call Light within Reach;Tray Table within Reach;Phone within Reach   Collaboration Comments RN updated   Session Information   Date / Session Number  7/29-2(2/4, 8/2)

## 2024-07-29 NOTE — PROGRESS NOTES
4 Eyes Skin Assessment Completed by SARA BORJAS and SARA Bro.    Head WDL  Ears Redness  Nose WDL  Mouth WDL  Neck WDL  Breast/Chest WDL  Shoulder Blades WDL  Spine WDL  (R) Arm/Elbow/Hand WDL  (L) Arm/Elbow/Hand bruising  Abdomen WDL  Groin WDL  Scrotum/Coccyx/Buttocks WDL  (R) Leg Incision  (L) Leg Scab  (R) Heel/Foot/Toe Blanching  (L) Heel/Foot/Toe Blanching          Devices In Places Blood Pressure Cuff      Interventions In Place Waffle Overlay    Possible Skin Injury No    Pictures Uploaded Into Epic Yes  Wound Consult Placed N/A  RN Wound Prevention Protocol Ordered Yes

## 2024-07-30 ENCOUNTER — APPOINTMENT (OUTPATIENT)
Dept: OCCUPATIONAL THERAPY | Facility: REHABILITATION | Age: 69
DRG: 561 | End: 2024-07-30
Attending: PHYSICAL MEDICINE & REHABILITATION
Payer: MEDICARE

## 2024-07-30 ENCOUNTER — APPOINTMENT (OUTPATIENT)
Dept: PHYSICAL THERAPY | Facility: REHABILITATION | Age: 69
DRG: 561 | End: 2024-07-30
Attending: PHYSICAL MEDICINE & REHABILITATION
Payer: MEDICARE

## 2024-07-30 LAB
25(OH)D3 SERPL-MCNC: 27 NG/ML (ref 30–100)
ALBUMIN SERPL BCP-MCNC: 3.9 G/DL (ref 3.2–4.9)
ALBUMIN/GLOB SERPL: 1.1 G/DL
ALP SERPL-CCNC: 232 U/L (ref 30–99)
ALT SERPL-CCNC: 127 U/L (ref 2–50)
ANION GAP SERPL CALC-SCNC: 15 MMOL/L (ref 7–16)
AST SERPL-CCNC: 129 U/L (ref 12–45)
BASOPHILS # BLD AUTO: 0.5 % (ref 0–1.8)
BASOPHILS # BLD: 0.04 K/UL (ref 0–0.12)
BILIRUB SERPL-MCNC: 1.2 MG/DL (ref 0.1–1.5)
BUN SERPL-MCNC: 13 MG/DL (ref 8–22)
CALCIUM ALBUM COR SERPL-MCNC: 10.2 MG/DL (ref 8.5–10.5)
CALCIUM SERPL-MCNC: 10.1 MG/DL (ref 8.5–10.5)
CHLORIDE SERPL-SCNC: 97 MMOL/L (ref 96–112)
CO2 SERPL-SCNC: 25 MMOL/L (ref 20–33)
CREAT SERPL-MCNC: 0.69 MG/DL (ref 0.5–1.4)
EOSINOPHIL # BLD AUTO: 0.19 K/UL (ref 0–0.51)
EOSINOPHIL NFR BLD: 2.5 % (ref 0–6.9)
ERYTHROCYTE [DISTWIDTH] IN BLOOD BY AUTOMATED COUNT: 41.3 FL (ref 35.9–50)
EST. AVERAGE GLUCOSE BLD GHB EST-MCNC: 111 MG/DL
GFR SERPLBLD CREATININE-BSD FMLA CKD-EPI: 94 ML/MIN/1.73 M 2
GLOBULIN SER CALC-MCNC: 3.4 G/DL (ref 1.9–3.5)
GLUCOSE SERPL-MCNC: 118 MG/DL (ref 65–99)
HBA1C MFR BLD: 5.5 % (ref 4–5.6)
HCT VFR BLD AUTO: 45.5 % (ref 37–47)
HGB BLD-MCNC: 15.4 G/DL (ref 12–16)
IMM GRANULOCYTES # BLD AUTO: 0.03 K/UL (ref 0–0.11)
IMM GRANULOCYTES NFR BLD AUTO: 0.4 % (ref 0–0.9)
LYMPHOCYTES # BLD AUTO: 2.75 K/UL (ref 1–4.8)
LYMPHOCYTES NFR BLD: 36.7 % (ref 22–41)
MCH RBC QN AUTO: 31.2 PG (ref 27–33)
MCHC RBC AUTO-ENTMCNC: 33.8 G/DL (ref 32.2–35.5)
MCV RBC AUTO: 92.1 FL (ref 81.4–97.8)
MONOCYTES # BLD AUTO: 0.5 K/UL (ref 0–0.85)
MONOCYTES NFR BLD AUTO: 6.7 % (ref 0–13.4)
NEUTROPHILS # BLD AUTO: 3.99 K/UL (ref 1.82–7.42)
NEUTROPHILS NFR BLD: 53.2 % (ref 44–72)
NRBC # BLD AUTO: 0 K/UL
NRBC BLD-RTO: 0 /100 WBC (ref 0–0.2)
PLATELET # BLD AUTO: 374 K/UL (ref 164–446)
PMV BLD AUTO: 9.3 FL (ref 9–12.9)
POTASSIUM SERPL-SCNC: 3.9 MMOL/L (ref 3.6–5.5)
PROT SERPL-MCNC: 7.3 G/DL (ref 6–8.2)
RBC # BLD AUTO: 4.94 M/UL (ref 4.2–5.4)
SODIUM SERPL-SCNC: 137 MMOL/L (ref 135–145)
TSH SERPL DL<=0.005 MIU/L-ACNC: 2.33 UIU/ML (ref 0.38–5.33)
WBC # BLD AUTO: 7.5 K/UL (ref 4.8–10.8)

## 2024-07-30 PROCEDURE — 94760 N-INVAS EAR/PLS OXIMETRY 1: CPT

## 2024-07-30 PROCEDURE — 85025 COMPLETE CBC W/AUTO DIFF WBC: CPT

## 2024-07-30 PROCEDURE — 97162 PT EVAL MOD COMPLEX 30 MIN: CPT

## 2024-07-30 PROCEDURE — 99233 SBSQ HOSP IP/OBS HIGH 50: CPT | Performed by: PHYSICAL MEDICINE & REHABILITATION

## 2024-07-30 PROCEDURE — 36415 COLL VENOUS BLD VENIPUNCTURE: CPT

## 2024-07-30 PROCEDURE — 82306 VITAMIN D 25 HYDROXY: CPT

## 2024-07-30 PROCEDURE — 97535 SELF CARE MNGMENT TRAINING: CPT

## 2024-07-30 PROCEDURE — 97530 THERAPEUTIC ACTIVITIES: CPT

## 2024-07-30 PROCEDURE — A9270 NON-COVERED ITEM OR SERVICE: HCPCS | Performed by: PHYSICAL MEDICINE & REHABILITATION

## 2024-07-30 PROCEDURE — 700102 HCHG RX REV CODE 250 W/ 637 OVERRIDE(OP): Performed by: PHYSICAL MEDICINE & REHABILITATION

## 2024-07-30 PROCEDURE — 97110 THERAPEUTIC EXERCISES: CPT

## 2024-07-30 PROCEDURE — 700111 HCHG RX REV CODE 636 W/ 250 OVERRIDE (IP): Mod: JZ | Performed by: PHYSICAL MEDICINE & REHABILITATION

## 2024-07-30 PROCEDURE — 80053 COMPREHEN METABOLIC PANEL: CPT

## 2024-07-30 PROCEDURE — 83036 HEMOGLOBIN GLYCOSYLATED A1C: CPT

## 2024-07-30 PROCEDURE — 84443 ASSAY THYROID STIM HORMONE: CPT

## 2024-07-30 PROCEDURE — 770010 HCHG ROOM/CARE - REHAB SEMI PRIVAT*

## 2024-07-30 PROCEDURE — 97165 OT EVAL LOW COMPLEX 30 MIN: CPT

## 2024-07-30 RX ORDER — VITAMIN B COMPLEX
1000 TABLET ORAL DAILY
Status: DISCONTINUED | OUTPATIENT
Start: 2024-07-30 | End: 2024-08-07 | Stop reason: HOSPADM

## 2024-07-30 RX ADMIN — OMEPRAZOLE 20 MG: 20 CAPSULE, DELAYED RELEASE ORAL at 08:35

## 2024-07-30 RX ADMIN — Medication 1000 UNITS: at 13:49

## 2024-07-30 RX ADMIN — METOPROLOL TARTRATE 25 MG: 25 TABLET, FILM COATED ORAL at 06:03

## 2024-07-30 RX ADMIN — METOPROLOL TARTRATE 25 MG: 25 TABLET, FILM COATED ORAL at 17:12

## 2024-07-30 RX ADMIN — OXYCODONE HYDROCHLORIDE 10 MG: 10 TABLET ORAL at 02:31

## 2024-07-30 RX ADMIN — OXYCODONE HYDROCHLORIDE 10 MG: 10 TABLET ORAL at 21:01

## 2024-07-30 RX ADMIN — SENNOSIDES AND DOCUSATE SODIUM 2 TABLET: 50; 8.6 TABLET ORAL at 20:58

## 2024-07-30 RX ADMIN — OXYCODONE HYDROCHLORIDE 10 MG: 10 TABLET ORAL at 10:32

## 2024-07-30 RX ADMIN — OXYCODONE HYDROCHLORIDE 10 MG: 10 TABLET ORAL at 06:03

## 2024-07-30 RX ADMIN — ARIPIPRAZOLE 30 MG: 10 TABLET ORAL at 06:02

## 2024-07-30 RX ADMIN — ENOXAPARIN SODIUM 40 MG: 100 INJECTION SUBCUTANEOUS at 17:12

## 2024-07-30 RX ADMIN — OXYCODONE HYDROCHLORIDE 10 MG: 10 TABLET ORAL at 13:49

## 2024-07-30 RX ADMIN — POLYETHYLENE GLYCOL 3350 1 PACKET: 17 POWDER, FOR SOLUTION ORAL at 08:35

## 2024-07-30 RX ADMIN — TRAZODONE HYDROCHLORIDE 50 MG: 50 TABLET ORAL at 20:58

## 2024-07-30 RX ADMIN — SENNOSIDES AND DOCUSATE SODIUM 2 TABLET: 50; 8.6 TABLET ORAL at 08:35

## 2024-07-30 RX ADMIN — OXYCODONE HYDROCHLORIDE 10 MG: 10 TABLET ORAL at 17:13

## 2024-07-30 RX ADMIN — ALPRAZOLAM 1 MG: 0.5 TABLET ORAL at 20:58

## 2024-07-30 ASSESSMENT — ACTIVITIES OF DAILY LIVING (ADL)
TOILET_TRANSFER_DESCRIPTION: GRAB BAR;INCREASED TIME;VERBAL CUEING;SUPERVISION FOR SAFETY
TOILETING: INDEPENDENT
TUB_SHOWER_TRANSFER_DESCRIPTION: GRAB BAR;SHOWER BENCH;SET-UP OF EQUIPMENT;SUPERVISION FOR SAFETY;VERBAL CUEING
BED_CHAIR_WHEELCHAIR_TRANSFER_DESCRIPTION: INCREASED TIME;SET-UP OF EQUIPMENT;SUPERVISION FOR SAFETY;VERBAL CUEING

## 2024-07-30 ASSESSMENT — BRIEF INTERVIEW FOR MENTAL STATUS (BIMS)
BIMS SUMMARY SCORE: 12
ASKED TO RECALL BLUE: YES, NO CUE REQUIRED
WHAT YEAR IS IT: MISSED BY MORE THAN 5 YEARS
WHAT MONTH IS IT: ACCURATE WITHIN 5 DAYS
WHAT DAY OF THE WEEK IS IT: CORRECT
INITIAL REPETITION OF BED BLUE SOCK - FIRST ATTEMPT: 3
ASKED TO RECALL SOCK: YES, NO CUE REQUIRED
ASKED TO RECALL BED: YES, NO CUE REQUIRED

## 2024-07-30 ASSESSMENT — GAIT ASSESSMENTS
GAIT LEVEL OF ASSIST: CONTACT GUARD ASSIST
DISTANCE (FEET): 45
GAIT LEVEL OF ASSIST: CONTACT GUARD ASSIST
ASSISTIVE DEVICE: FRONT WHEEL WALKER
ASSISTIVE DEVICE: FRONT WHEEL WALKER
DEVIATION: ANTALGIC;DECREASED BASE OF SUPPORT;DECREASED TOE OFF
DISTANCE (FEET): 75

## 2024-07-30 ASSESSMENT — COPD QUESTIONNAIRES
DURING THE PAST 4 WEEKS HOW MUCH DID YOU FEEL SHORT OF BREATH: SOME OF THE TIME
HAVE YOU SMOKED AT LEAST 100 CIGARETTES IN YOUR ENTIRE LIFE: YES
DO YOU EVER COUGH UP ANY MUCUS OR PHLEGM?: YES, A FEW DAYS A WEEK OR MONTH
COPD SCREENING SCORE: 6

## 2024-07-30 ASSESSMENT — LIFESTYLE VARIABLES: EVER_SMOKED: YES

## 2024-07-30 ASSESSMENT — PAIN DESCRIPTION - PAIN TYPE: TYPE: ACUTE PAIN;SURGICAL PAIN

## 2024-07-30 NOTE — CARE PLAN
The patient is Stable - Low risk of patient condition declining or worsening    Shift Goals  Clinical Goals: safety  Patient Goals: pain control, sleep well    Progress made toward(s) clinical / shift goals:    Problem: Pain - Standard  Goal: Alleviation of pain or a reduction in pain to the patient’s comfort goal  Outcome: Progressing. Patient states pain 8/10 to rle over shift, and requesting prn oxycodone 10 mg every 3-4 hours, patient states pain reduces from 8/10 to 4-5/10 with prn oxy and that level is tolerable per patient.      Problem: Fall Risk - Rehab  Goal: Patient will remain free from falls  Outcome: Progressing. Patient bed in lowest locked position with bed alarm on and call light within reach. Patient calls appropriately with call light for needs and has not attempted to self transfer over shift.

## 2024-07-30 NOTE — PROGRESS NOTES
NURSING DAILY NOTE    Name: García Cho  Date of Admission: 7/29/2024  Admitting Diagnosis: Fracture of patella with routine healing, right, closed  Attending Physician: Maxwell Theodore M.d.  Allergies: Atorvastatin, Bee pollen, Ibuprofen, Pravastatin, and Other environmental    Safety  Patient Assist  oSBA to CGA  Patient Precautions  o   Precaution Comments  o   Bed Transfer Status  o   Toilet Transfer Status  o   Assistive Devices  oRails, Wheelchair  Oxygen  oNone - Room Air  Diet/Therapeutic Dining  o  Current Diet Order   Procedures    Diet Order Diet: Regular     Pill Administration  owhole  Agitated Behavioral Scale  o   ABS Level of Severity  o     Fall Risk  Has the patient had a fall this admission?  Karla  Marychuy Son Fall Risk Scoring  o19, HIGH RISK  Fall Risk Safety Measures  marisel alarm and chair alarm    Vitals  Temperature: 36.7 °C (98 °F)  Temp src: Oral  Pulse: 77  Respiration: 19  Blood Pressure : 131/70  Blood Pressure MAP (Calculated): 90 MM HG  BP Location: Left, Upper Arm  Patient BP Position: Supine    Oxygen  Pulse Oximetry: 94 %  O2 (LPM): 0  O2 Delivery Device: None - Room Air    Bowel and Bladder  Last Bowel Movement  o07/29/24  Stool Type  o   Bowel Device  o   Continent  oBladder: Continent void  oBowel: Continent movement  Bladder Function  oNumber of Times Voided: 1  Urine Color: Yellow  Genitourinary Assessment  oBladder Assessment (WDL):  WDL Except  Guthrie Catheter: Not Applicable  Urine Color: Yellow  Bladder Device: Bathroom  Bladder Scan: Post Void  $ Bladder Scan Results (mL): 30    Skin  Aurelio Score  o 18  Sensory Interventions  o Bed Types: Standard/Trauma Mattress with Overlay  Skin Preventative Measures: Pillows in Use for Support / Positioning, Waffle Overlay  Moisture Interventions  oMoisturizers/Barriers: Barrier Wipes      Pain  Pain Rating Scale  o8 - Awful, hard to do anything  Pain Location  oIncision, Foot, Leg, Knee  Pain Location  Orientation  oRight  Pain Interventions  oMedication (see MAR)    ADLs    Bathing  o   Linen Change  o   Personal Hygiene  oMoist Lynsey Wipes  Chlorhexidine Bath  o   Oral Care  o   Teeth/Dentures  o   Shave  o   Nutrition Percentage Eaten  o   Environmental Precautions  oTreaded Slipper Socks on Patient, Personal Belongings, Wastebasket, Call Bell etc. in Easy Reach, Transferred to Stronger Side, Bed in Low Position  Patient Turns/Positioning  oPatient Turns Self from Side to Side  Patient Turns Assistance/Tolerance  o   Bed Positions  Azar Controls On  Head of Bed Elevated  oSelf regulated      Psychosocial/Neurologic Assessment  Psychosocial Assessment  oPsychosocial (WDL):  WDL Except  Patient Behaviors: Anxious  Neurologic Assessment  oNeuro (WDL): Exceptions to WDL  Level of Consciousness: Alert  Orientation Level: Oriented X4  Cognition: Follows commands  Speech: Clear  Motor Function/Sensation Assessment: Sensation, Motor strength  Muscle Strength Right Arm: Good Strength Against Gravity and Moderate Resistance  Muscle Strength Left Arm: Good Strength Against Gravity and Moderate Resistance  RLE Sensation: Pain  Muscle Strength Right Leg: Fair Strength against Gravity but No Resistance  Muscle Strength Left Leg: Good Strength Against Gravity and Moderate Resistance  oEENT (WDL):  WDL Except    Cardio/Pulmonary Assessment  Edema  o   Respiratory Breath Sounds  o   Cardiac Assessment  oCardiac (WDL):  WDL Except

## 2024-07-30 NOTE — THERAPY
Physical Therapy   Daily Treatment     Patient Name: García Cho  Age:  68 y.o., Sex:  female  Medical Record #: [id number]  Today's Date: 7/30/2024     Precautions  Precautions: Fall Risk, Weight Bearing As Tolerated Right Upper Extremity    Subjective    Pt in bed and agreeable to participate in therapy     Objective       07/30/24 1401   PT Charge Group   PT Therapeutic Exercise (Units) 1   PT Therapeutic Activities (Units) 1   PT Total Time Spent   PT Individual Total Time Spent (Mins) 30   Gait Functional Level of Assist    Gait Level Of Assist Contact Guard Assist   Assistive Device Front Wheel Walker   Distance (Feet) 45   # of Times Distance was Traveled 1   Deviation Antalgic;Decreased Base Of Support;Increased Base Of Support   Transfer Functional Level of Assist   Bed, Chair, Wheelchair Transfer Contact Guard Assist   Bed Chair Wheelchair Transfer Description Verbal cueing;Supervision for safety;Adaptive equipment;Set-up of equipment   Standing Lower Body Exercises   Hip Flexion 1 set of 10;Bilateral   Hip Abduction 1 set of 10;Bilateral   Bed Mobility    Supine to Sit Supervised   Sit to Supine Supervised   Sit to Stand Contact Guard Assist   Scooting Supervised   Rolling Supervised   Interdisciplinary Plan of Care Collaboration   IDT Collaboration with  Nursing   Patient Position at End of Therapy In Bed;Bed Alarm On;Call Light within Reach;Tray Table within Reach;Phone within Reach   Collaboration Comments Pressure injury on R hamstring from immobilizer     Car transfer attempted:   - pt had no difficulty getting hips and torso into [facility], but knee immobilizer does not allow room for RLE to swing into car while in full extension.   - pt practiced using the back seats with her R leg resting on the adjacent seats, no difficulty    - pt plans on leaving in [employer], asked pt if  could drive [facility] to [facility] to practice transfer/see if their is enough room for  her leg in the front seat.     Assessment    Pt has good functional mobility and transfers using FWW. Adequate strength in LLE and manages pain appropriately. Slight difficulty with multi-step directions. Speech evaluation put in by OT.     Strengths: Able to follow instructions, Adequate strength, Alert and oriented, Effective communication skills, Independent prior level of function, Manages pain appropriately, Motivated for self care and independence, Pleasant and cooperative, Willingly participates in therapeutic activities  Barriers: Decreased endurance, Fatigue, Hearing impairment, Impaired activity tolerance, Impaired balance, Pain, Limited mobility    Plan    Gait using LRAD (progression to community distances)  Dual task training   LE strengthening   Endurance training   Stairs     DME  PT DME Recommendations  Assistive Device: Front Wheeled Walker    Passport items to be completed:  Get in/out of bed safely, in/out of a vehicle, safely use mobility device, walk or wheel around home/community, navigate up and down stairs, show how to get up/down from the ground, ensure home is accessible, demonstrate HEP, complete caregiver training    Physical Therapy Problems (Active)       Problem: Balance       Dates: Start:  07/30/24         Goal: STG-Within one week, patient will maintain dynamic standing balance SPV for 2 minutes or longer       Dates: Start:  07/30/24               Problem: Mobility       Dates: Start:  07/30/24         Goal: STG-Within one week, patient will ambulate community distances of at least 150' using LRAD SPV       Dates: Start:  07/30/24               Problem: Mobility Transfers       Dates: Start:  07/30/24         Goal: STG-Within one week, patient will perform transfers SPV using LRAD       Dates: Start:  07/30/24               Problem: PT-Long Term Goals       Dates: Start:  07/30/24         Goal: LTG-By discharge, patient will ambulate greater than 250' Allie using LRAD       Dates:  Start:  07/30/24            Goal: LTG-By discharge, patient will transfer one surface to another Allie using LRAD       Dates: Start:  07/30/24            Goal: LTG-By discharge, patient will ambulate up/down flight of stairs Allie with unilateral HR       Dates: Start:  07/30/24            Goal: LTG-By discharge, patient will be independent in HEP       Dates: Start:  07/30/24

## 2024-07-30 NOTE — FLOWSHEET NOTE
07/30/24 1127   Events/Summary/Plan   Events/Summary/Plan RT Assessment   Vital Signs   Pulse 80   Respiration 16   Pulse Oximetry 95 %   $ Pulse Oximetry (Spot Check) Yes   Respiratory Assessment   Respiratory Pattern Within Normal Limits   Level of Consciousness Alert   Chest Exam   Work Of Breathing / Effort Within Normal Limits   Breath Sounds   RUL Breath Sounds Clear   RML Breath Sounds Clear   RLL Breath Sounds Clear   DOROTHY Breath Sounds Clear   LLL Breath Sounds Clear   Secretions   Cough Strong;Non Productive   Oxygen   O2 Delivery Device None - Room Air   Smoking History   Have you ever smoked Yes   Have you smoked in the last 12 months No   Confirm Quit Date   (Quit 2018)

## 2024-07-30 NOTE — THERAPY
Physical Therapy   Initial Evaluation     Patient Name: García Cho  Age:  68 y.o., Sex:  female  Medical Record #: [id number]  Today's Date: 7/30/2024     Subjective    Pt agreeable to participate in therapy. C/o of soreness from leg immobilizer. Two small pressure injuries evident upon inspection. Padded dressing applied to skin to decrease extent of injury. Nursing notified.      Objective       07/30/24 1031   PT Charge Group   PT Evaluation PT Evaluation Mod   PT Total Time Spent   PT Individual Total Time Spent (Mins) 60   Pain 0 - 10 Group   Location Knee   Location Orientation Right   Pain Rating Scale (NPRS) 8   Description Aching;Constant   Therapist Pain Assessment Prior to Activity;Nurse Notified   Cognition    Cognition / Consciousness X   Speech/ Communication Hard of Hearing   Level of Consciousness Alert   Safety Awareness Impaired   Attention Impaired   Comments Difficulty following mult-step directions   Passive ROM Lower Body   Passive ROM Lower Body X   Comments R knee immobilizer   Active ROM Lower Body    Active ROM Lower Body  X   Comments R knee immobilizer   Strength Lower Body   Lower Body Strength  WDL   Comments Only tested LLE   Sensation Lower Body   Lower Extremity Sensation   WDL   Lower Body Muscle Tone   Lower Body Muscle Tone  WDL   Balance Assessment   Sitting Balance (Static) Good   Sitting Balance (Dynamic) Fair +   Standing Balance (Static) Fair -   Standing Balance (Dynamic) Poor +   Weight Shift Sitting Good   Weight Shift Standing Poor   Bed Mobility    Supine to Sit Supervised   Sit to Supine Supervised   Sit to Stand Contact Guard Assist   Scooting Supervised   Rolling Supervised   Neurological Concerns   Neurological Concerns Yes   Comments Resting tremor in R ankle, has not had MD examine   Coordination Lower Body    Coordination Lower Body  WDL   Roll Left and Right   Assistance Needed Supervision   CARE Score - Roll Left and Right 4   Roll Left and Right  Discharge Goal   Discharge Goal 6   Sit to Lying   Assistance Needed Supervision   CARE Score - Sit to Lying 4   Sit to Lying Discharge Goal   Discharge Goal 6   Lying to Sitting on Side of Bed   Assistance Needed Supervision   CARE Score - Lying to Sitting on Side of Bed 4   Lying to Sitting on Side of Bed Discharge Goal   Discharge Goal 6   Sit to Stand   Assistance Needed Incidental touching   CARE Score - Sit to Stand 4   Sit to Stand Discharge Goal   Discharge Goal 6   Chair/Bed-to-Chair Transfer   Assistance Needed Incidental touching   CARE Score - Chair/Bed-to-Chair Transfer 4   Chair/Bed-to-Chair Transfer Discharge Goal   Discharge Goal 6   Toilet Transfer   Assistance Needed Incidental touching   CARE Score - Toilet Transfer 4   Toilet Transfer Discharge Goal   Discharge Goal 6   Car Transfer Discharge Goal   Discharge Goal 6   Walk 10 Feet   Assistance Needed Incidental touching   CARE Score - Walk 10 Feet 4   Walk 10 Feet Discharge Goal   Discharge Goal 6   Walk 50 Feet with Two Turns   Assistance Needed Incidental touching   CARE Score - Walk 50 Feet with Two Turns 4   Walk 50 Feet with Two Turns Discharge Goal   Discharge Goal 6   Walk 150 Feet   Reason if not Attempted Safety concerns   CARE Score - Walk 150 Feet 88   Walk 150 Feet Discharge Goal   Discharge Goal 6   Walking 10 Feet on Uneven Surfaces   Assistance Needed Incidental touching   CARE Score - Walking 10 Feet on Uneven Surfaces 4   Walking 10 Feet on Uneven Surfaces Discharge Goal   Discharge Goal 6   1 Step (Curb)   Assistance Needed Incidental touching   CARE Score - 1 Step (Curb) 4   1 Step (Curb) Discharge Goal   Discharge Goal 6   4 Steps   Assistance Needed Incidental touching   CARE Score - 4 Steps 4   4 Steps Discharge Goal   Discharge Goal 6   12 Steps   Reason if not Attempted Safety concerns   CARE Score - 12 Steps 88   12 Steps Discharge Goal   Discharge Goal 6   Picking Up Object   Reason if not Attempted Incidental Touching    CARE Score - Picking Up Object 4   Wheel 50 Feet with Two Turns   Reason if not Attempted Activity not applicable   CARE Score - Wheel 50 Feet with Two Turns 9   Wheel 50 Feet with Two Turns Discharge Goal   Discharge Goal 9   Wheel 150 Feet   Reason if not Attempted Activity not applicable   CARE Score - Wheel 150 Feet 9   Wheel 150 Feet Discharge Goal   Discharge Goal 9   Gait Functional Level of Assist    Gait Level Of Assist Contact Guard Assist   Assistive Device Front Wheel Walker   Distance (Feet) 75   # of Times Distance was Traveled 1   Deviation Antalgic;Decreased Base Of Support;Decreased Toe Off   Stairs Functional Level of Assist   Level of Assist with Stairs Contact Guard Assist   # of Stairs Climbed 4   Stairs Description Verbal cueing;Supervision for safety;Hand rails;Extra time   Transfer Functional Level of Assist   Bed, Chair, Wheelchair Transfer Contact Guard Assist   Bed Chair Wheelchair Transfer Description Increased time;Initial preparation for task;Set-up of equipment;Supervision for safety;Adaptive equipment;Verbal cueing   Toilet Transfers Contact Guard Assist   Toilet Transfer Description Adaptive equipment;Grab bar;Supervision for safety;Set-up of equipment   Problem List    Problems Pain;Functional Strength Deficit;Impaired Balance;Decreased Activity Tolerance;Safety Awareness Deficits / Cognition   Precautions   Precautions Fall Risk;Weight Bearing As Tolerated Right Upper Extremity   Current Discharge Plan   Current Discharge Plan Return to Prior Living Situation   Interdisciplinary Plan of Care Collaboration   IDT Collaboration with  Nursing;Occupational Therapist;Respiratory Therapist   Patient Position at End of Therapy Seated;Chair Alarm On;Tray Table within Reach;Call Light within Reach   Collaboration Comments POC with OT, pressure injury from immobilizer, RT consult.   PT DME Recommendations   Assistive Device Front Wheeled Walker   Benefit   Therapy Benefit Patient Would  Benefit from Inpatient Rehabilitation Physical Therapy to Maximize Functional [place] with ADLs, IADLs and Mobility.   Strengths & Barriers   Strengths Able to follow instructions;Adequate strength;Alert and oriented;Effective communication skills;Independent prior level of function;Manages pain appropriately;Motivated for self care and independence;Pleasant and cooperative;Willingly participates in therapeutic activities   Barriers Decreased endurance;Fatigue;Hearing impairment;Impaired activity tolerance;Impaired balance;Pain;Limited mobility       Assessment  Patient is 68 y.o. female with PMH of HTN, A fib, HLD, and anxiety who presented on 7/25/24 with GLF. She reportedly was walking her dog and tripped on the sidewalk and went down on her right knee and left wrist. In ED she was found to have right knee patellar fracture. Her XR of left wrist was negative. Patient underwent open repair with Dr. Holder on 7/26/24. Per orthopedics, patient should be WBAT with full extension in brace.    Pt lives in a mobile home with her  with 9 REBEKAH to enter and 2 REBEKAH inside to enter the main bedroom. Pt lives with her  who has PD and uses a cane for mobility. Pt's PLOF is independent with all mobility and ADLs. Pt is currently experiencing balance deficits, pain, ambulation deficits, and poor activity tolerance that are negatively impacting her functional independence. Pt will benefit from daily skilled therapy in order to address deficits listed above. Pt had difficulty with word finding and following multi-step directions. Speech to be consulted.     Plan  Recommend Physical Therapy  minutes per day 5-7 days per week for 5-7 days for the following treatments:  PT Gait Training, PT Therapeutic Exercises, PT TENS Application, PT Therapeutic Activity, PT Manual Therapy, and PT Evaluation.    Passport items to be completed:  Get in/out of bed safely, in/out of a vehicle, safely use mobility device, walk  or wheel around home/community, navigate up and down stairs, show how to get up/down from the ground, ensure home is accessible, demonstrate HEP, complete caregiver training    Goals:  Long term and short term goals have been discussed with patient and they are in agreement.    Physical Therapy Problems (Active)       Problem: Balance       Dates: Start:  07/30/24         Goal: STG-Within one week, patient will maintain dynamic standing balance SPV for 2 minutes or longer       Dates: Start:  07/30/24               Problem: Mobility       Dates: Start:  07/30/24         Goal: STG-Within one week, patient will ambulate community distances of at least 150' using LRAD SPV       Dates: Start:  07/30/24               Problem: Mobility Transfers       Dates: Start:  07/30/24         Goal: STG-Within one week, patient will perform transfers SPV using LRAD       Dates: Start:  07/30/24               Problem: PT-Long Term Goals       Dates: Start:  07/30/24         Goal: LTG-By discharge, patient will ambulate greater than 250' Allie using LRAD       Dates: Start:  07/30/24            Goal: LTG-By discharge, patient will transfer one surface to another Allie using LRAD       Dates: Start:  07/30/24            Goal: LTG-By discharge, patient will ambulate up/down flight of stairs Allie with unilateral HR       Dates: Start:  07/30/24            Goal: LTG-By discharge, patient will be independent in HEP       Dates: Start:  07/30/24

## 2024-07-30 NOTE — FLOWSHEET NOTE
07/30/24 1122   Protocol Assessment   Initial Assessment Yes   Patient History   Pulmonary Diagnosis None   Procedures Relevant to Respiratory Status None   Home O2 No   Nocturnal CPAP No   Home Treatments/Frequency No   COPD Risk Screening   Do you have a history of COPD? No   COPD Population Screener   During the past 4 weeks, how much did you feel short of breath? 1   Do you ever cough up any mucus or phlegm? 1   In the past 12 months, you do less than you used to because of your breathing problems 0   Have you smoked at least 100 cigarettes in your entire life? 2   How old are you? 2   COPD Screening Score 6   COPD Coordinator Not Recommended Yes   Protocol Pathways   Protocol Pathways None

## 2024-07-30 NOTE — PROGRESS NOTES
NURSING DAILY NOTE    Name: García Cho   Date of Admission: 7/29/2024   Admitting Diagnosis: Fracture of patella with routine healing, right, closed  Attending Physician: Maxwell Theodore M.d.  Allergies: Atorvastatin, Bee pollen, Ibuprofen, Pravastatin, and Other environmental    Safety  Patient Assist     Patient Precautions     Precaution Comments     Bed Transfer Status     Toilet Transfer Status      Assistive Devices     Oxygen  None - Room Air  Diet/Therapeutic Dining  Current Diet Order   Procedures    Diet Order Diet: Regular     Pill Administration  whole  Agitated Behavioral Scale     ABS Level of Severity       Fall Risk  Has the patient had a fall this admission?   No  Marychuy Son Fall Risk Scoring  16, HIGH RISK  Fall Risk Safety Measures  bed alarm, chair alarm, poor balance, and low vision/ hearing    Vitals  Temperature: 37 °C (98.6 °F)  Temp src: Oral  Pulse: 70  Respiration: 18  Blood Pressure : 130/60  Blood Pressure MAP (Calculated): 83 MM HG  BP Location: Right, Upper Arm  Patient BP Position: Supine     Oxygen  Pulse Oximetry: 92 %  O2 (LPM): 0  O2 Delivery Device: None - Room Air    Bowel and Bladder  Last Bowel Movement  07/29/24 (per report)  Stool Type     Bowel Device     Continent  Bladder: Continent void   Bowel: Continent movement  Bladder Function  Number of Times Voided: 1  Urine Color: Yellow  Genitourinary Assessment   Bladder Assessment (WDL):  WDL Except  Guthrie Catheter: Not Applicable  Urine Color: Yellow  Bladder Device: Bathroom  Bladder Scan: Post Void  $ Bladder Scan Results (mL): 32    Skin  Aurelio Score   18  Sensory Interventions   Bed Types: Standard/Trauma Mattress with Overlay  Skin Preventative Measures: Waffle Overlay  Moisture Interventions  Moisturizers/Barriers: Barrier Wipes      Pain  Pain Rating Scale  7 - Focus of attention, prevents doing daily activities  Pain  Location  Incision, Leg  Pain Location Orientation  Left  Pain Interventions   Repositioned    ADLs    Bathing      Linen Change      Personal Hygiene     Chlorhexidine Bath      Oral Care     Teeth/Dentures     Shave     Nutrition Percentage Eaten     Environmental Precautions  Treaded Slipper Socks on Patient  Patient Turns/Positioning  Patient Turns Self from Side to Side  Patient Turns Assistance/Tolerance     Bed Positions  Bed Controls On  Head of Bed Elevated  Self regulated      Psychosocial/Neurologic Assessment  Psychosocial Assessment  Psychosocial (WDL):  WDL Except  Patient Behaviors: Anxious  Neurologic Assessment  Neuro (WDL): Exceptions to WDL  Level of Consciousness: Alert  Orientation Level: Oriented X4  Cognition: Follows commands  Speech: Clear  Motor Function/Sensation Assessment: Sensation, Motor strength  Muscle Strength Right Arm: Good Strength Against Gravity and Moderate Resistance  Muscle Strength Left Arm: Good Strength Against Gravity and Moderate Resistance  RLE Sensation: Pain  Muscle Strength Right Leg: Fair Strength against Gravity but No Resistance  Muscle Strength Left Leg: Good Strength Against Gravity and Moderate Resistance  EENT (WDL):  WDL Except    Cardio/Pulmonary Assessment  Edema      Respiratory Breath Sounds     Cardiac Assessment   Cardiac (WDL):  WDL Except (A fib)

## 2024-07-30 NOTE — THERAPY
Occupational Therapy   Initial Evaluation     Patient Name: García Cho  Age:  68 y.o., Sex:  female  Medical Record #: [id number]  Today's Date: 7/30/2024     Subjective    Pt seen 2x this day, initially in AM for eval and tx and again in PM for tx with issuance of AE for LBD with trial.     Objective       07/30/24 0701 07/30/24 1301   OT Charge Group   Charges Yes  --    OT Self Care / ADL (Units) 1 1   OT Therapy Activity (Units)  --  1   OT Evaluation OT Evaluation Low  --    OT Total Time Spent   OT Individual Total Time Spent (Mins) 60 30   Prior Living Situation   Prior Services None;Home-Independent  --    Housing / Facility Other (Comments)  (Large RV)  --    Steps Into Home 9  (Wooden)  --    Steps In Home 0  --    Rail Left Rail  (Steps into Home)  --    Bathroom Set up Walk In Shower;Shower Glass Doors  --    Equipment Owned Crutches  --    Lives with - Patient's Self Care Capacity Spouse  --    Comments Spouse   (Has Parkinson’s )  --    Prior Level of ADL Function   Self Feeding Independent  --    Grooming / Hygiene Independent  --    Bathing Independent  --    Dressing Independent  --    Toileting Independent  --    Prior Level of IADL Function   Medication Management Independent  --    Laundry Independent  --    Kitchen Mobility Independent  --    Finances Independent  --    Home Management Independent  --    Shopping Independent  --    Prior Level Of Mobility Independent Without Device in Community;Independent With Steps in Community;Independent Without Device in Home;Independent With Steps in Home  --    Driving / Transportation Driving Independent  --    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 )  --    Leisure Interests Family;Pets  --    Prior Functioning: Everyday Activities   Self Care Independent  --    Indoor Mobility (Ambulation) Independent  --    Stairs Independent  --    Functional Cognition Independent  --    Prior Device Use None of the given  "options  --    Pain   Intervention Medication (see MAR);Emotional Support;Distraction;Nurse Notified;Repositioned;Rest  --    Pain 0 - 10 Group   Location Knee;Hand  --    Location Orientation Right  --    Description Constant;Aching  --    Comfort Goal Comfort with Movement;Perform Activity  --    Therapist Pain Assessment During Activity;Nurse Notified  --    Cognition    Cognition / Consciousness X  --    Speech/ Communication Hard of Hearing  (Has L-hearing aid here only.)  --    Level of Consciousness Alert  --    Safety Awareness Impaired  --    Attention Impaired  --    Cognitive Pattern Assessment   Cognitive Pattern Assessment Used BIMS  --    Brief Interview for Mental Status (BIMS)   Repetition of Three Words (First Attempt) 3  --    Temporal Orientation: Year Missed by more than 5 years  (\"2004\")  --    Temporal Orientation: Month Accurate within 5 days  --    Temporal Orientation: Day Correct  --    Recall: \"Sock\" Yes, no cue required  --    Recall: \"Blue\" Yes, no cue required  --    Recall: \"Bed\" Yes, no cue required  --    BIMS Summary Score 12  --    Confusion Assessment Method (CAM)   Is there evidence of an acute change in mental status from the patient's baseline? Yes  --    Inattention Behavior present, fluctuates (comes and goes, changes in severity)  --    Disorganized thinking Behavior not present  --    Altered level of consciousness Behavior not present  --    Vision Screen   Vision Not tested  (Pt wears corrective lens for)  --    Passive ROM Upper Body   Passive ROM Upper Body WDL  --    Comments BUES  --    Active ROM Upper Body   Active ROM Upper Body  WDL  --    Dominant Hand Right  --    Comments BUES  --    Strength Upper Body   Upper Body Strength  WDL  --    Comments BUES  --    Sensation Upper Body   Upper Extremity Sensation  WDL  --    Comments BUES  --    Upper Body Muscle Tone   Upper Body Muscle Tone  WDL  --    Comments BUES  --    Balance Assessment   Sitting Balance " (Static) Good  --    Sitting Balance (Dynamic) Fair +  --    Standing Balance (Static) Poor +  --    Standing Balance (Dynamic) Poor +  --    Weight Shift Sitting Good  --    Weight Shift Standing Poor  --    Bed Mobility    Supine to Sit Supervised  --    Sit to Stand Contact Guard Assist  --    Scooting Supervised  --    Coordination Upper Body   Coordination WDL  --    Comments BUES  --    Hearing, Speech, and Vision   Ability to Hear Adequate  --    Ability to See in Adequate Light Adequate  --    Expression of Ideas and Wants Without difficulty  --    Understanding Verbal and Non-Verbal Content Understands  --    Functional Level of Assist   Eating Independent  --    Grooming Supervision;Seated  --    Grooming Description Increased time;Supervision for safety  --    Bathing Supervision  --    Bathing Description Grab bar;Hand held shower;Tub bench;Supervision for safety;Verbal cueing  (Completed seated on fold down shower bench)  --    Upper Body Dressing Independent  --    Lower Body Dressing Minimal Assist Standby Assist   Lower Body Dressing Description Grab bar;Verbal cueing;Supervision for safety;Assist with threading into pant leg Reacher;Sock aid;Dressing stick;Increased time;Supervision for safety;Verbal cueing  (Initiated education and handson training for AE use for RLE. Pt completed 2x trials for sock aid, and shorts with cues for sequencing/problem solving both.)   Toileting Contact Guard Assist  --    Toileting Description Assist for standing balance;Grab bar;Initial preparation for task;Increased time;Supervision for safety  --    Bed, Chair, Wheelchair Transfer Contact Guard Assist Contact Guard Assist   Bed Chair Wheelchair Transfer Description Increased time;Set-up of equipment;Supervision for safety;Verbal cueing Verbal cueing;Supervision for safety;Adaptive equipment;Set-up of equipment  (Stand step with FWW w/c>EOB with cues not to abandon FWW.)   Toilet Transfers Contact Guard Assist  --     Toilet Transfer Description Grab bar;Increased time;Verbal cueing;Supervision for safety  --    Tub / Shower Transfers Contact Guard Assist  --    Tub Shower Transfer Description Grab bar;Shower bench;Set-up of equipment;Supervision for safety;Verbal cueing  --    Comprehension Minimal Assist  --    Comprehension Description Verbal cues;Hearing aids/amplifiers;Glasses  --    Expression Independent  --    Problem Solving Minimal Assist  --    Problem Solving Description Increased time;Supervision;Verbal cueing;Adaptive equipment  --    Memory Minimal Assist  --    Memory Description Increased time;Adaptive equipment;Bed/chair alarm;Verbal cueing;Supervision  --    Eating   Assistance Needed Independent  --    CARE Score - Eating 6  --    Eating Discharge Goal   Discharge Goal 6  --    Oral Hygiene   Assistance Needed Set-up / clean-up  --    CARE Score - Oral Hygiene 5  --    Oral Hygiene Discharge Goal   Discharge Goal 6  --    Shower/Bathe Self   Assistance Needed Supervision  --    CARE Score - Shower/Bathe Self 4  --    Shower/Bathe Self Discharge Goal   Discharge Goal 6  --    Upper Body Dressing   Assistance Needed Independent  --    CARE Score - Upper Body Dressing 6  --    Upper Body Dressing Discharge Goal   Discharge Goal 6  --    Lower Body Dressing   Assistance Needed Physical assistance  --    Physical Assistance Level 25% or less  --    CARE Score - Lower Body Dressing 3  --    Lower Body Dressing Discharge Goal   Discharge Goal 6  --    Putting On/Taking Off Footwear   Assistance Needed Physical assistance  --    Physical Assistance Level 25% or less  --    CARE Score - Putting On/Taking Off Footwear 3  --    Putting On/Taking Off Footwear Discharge Goal   Discharge Goal 6  --    Toileting Hygiene   Assistance Needed Incidental touching  --    CARE Score - Toileting Hygiene 4  --    Toileting Hygiene Discharge Goal   Discharge Goal 6  --    Toilet Transfer   Assistance Needed Incidental touching  --     CARE Score - Toilet Transfer 4  --    Toilet Transfer Discharge Goal   Discharge Goal 6  --    Problem List   Problem List Decreased Active Daily Living Skills;Decreased Homemaking Skills;Decreased Upper Extremity Strength Right;Decreased Upper Extremity Strength Left;Decreased Functional Mobility;Decreased Activity Tolerance;Safety Awareness Deficits / Cognition;Impaired Cognitive Function;Impaired Postural Control / Balance;Limited Knowledge of Post Op Precautions  --    Precautions   Precautions Fall Risk;Weight Bearing As Tolerated Right Lower Extremity  --    Current Discharge Plan   Current Discharge Plan Return to Prior Living Situation  --    Benefit    Therapy Benefit Patient Would Benefit from Inpatient Rehab Occupational Therapy to Maximize [place] with ADLs, IADLs and Functional Mobility.  --    Interdisciplinary Plan of Care Collaboration   IDT Collaboration with  Nursing;Physical Therapist Nursing;Physical Therapist;Physician   Patient Position at End of Therapy Seated;Chair Alarm On;Call Light within Reach;Tray Table within Reach;Phone within Reach In Bed;Bed Alarm On;Call Light within Reach;Tray Table within Reach;Phone within Reach   Collaboration Comments CLOF CLOF, ice for pain/edema, SLP referral.   Strengths & Barriers   Strengths Independent prior level of function;Making steady progress towards goals;Motivated for self care and independence;Pleasant and cooperative;Supportive family;Willingly participates in therapeutic activities  --    Barriers Decreased endurance;Difficulty following instructions;Fatigue;Generalized weakness;Hearing impairment;Home accessibility;Impaired activity tolerance;Impaired balance;Impaired carryover of learning;Impaired insight/denial of deficits;Impaired functional cognition;Limited mobility;Pain;Pain poorly managed  --    Occupational Therapist Assigned   Assigned OT / Treatment Time / Comments FAY 30/60  --      AM session - Pt provided with elevating  R-leg rest for increased comfort, as she reported some pain behind leg from brace.   PM session - Pt education on edema management supine in bed for RLE. Provided with ice packs. Pt did not wish to open immobilizer for icing, so it was placed over top.    Assessment  Patient is 68 y.o. female with a diagnosis of fx of R-patella with surgical intervention, wearing immobilizer in extension and WBAT..  Additional factors influencing patient status / progress (ie: cognitive factors, co-morbidities, social support, etc): with a PMH of HTN, A fib, HLD, and anxiety who presented on 7/25/24 with GLF. She reportedly was walking her dog and tripped on the sidewalk and went down on her right knee and left wrist. In ED she was found to have right knee patellar fracture. Her XR of left wrist was negative. She was evaluated by orthopedic surgery and recommended surgical intervention. Patient underwent open repair with Dr. Holder on 7/26/24. Per orthopedics, patient should be WBAT with full extension in brace. She should also be placed on Lovenox initially and then  mg BID as outpatient. Post-operative course complicated by leukocytosis thought to be reactive, elevated SBP and elevated LFTs. .      Plan  Recommend Occupational Therapy  minutes per day 5-7 days per week for 7-10 days for the following treatments:  OT Group Therapy, OT Self Care/ADL, OT Cognitive Skill Dev, OT Community Reintegration, OT Manual Ther Technique, OT Neuro Re-Ed/Balance, OT Therapeutic Activity, OT Evaluation, and OT Therapeutic Exercise.    Passport items to be completed:  Perform bathroom transfers, complete dressing, complete feeding, get ready for the day, prepare a simple meal, participate in household tasks, adapt home for safety needs, demonstrate home exercise program, complete caregiver training     Goals:  Long term and short term goals have been discussed with patient and they are in agreement.    Occupational Therapy Goals  (Active)       Problem: Bathing       Dates: Start:  07/30/24         Goal: STG-Within one week, patient will bathe at Setup level using AE PRN.       Dates: Start:  07/30/24               Problem: Dressing       Dates: Start:  07/30/24         Goal: STG-Within one week, patient will dress LB at SBA level using DME/AE PRN.       Dates: Start:  07/30/24               Problem: Functional Transfers       Dates: Start:  07/30/24         Goal: STG-Within one week, patient will transfer to toilet at SPV level using DME PRN.       Dates: Start:  07/30/24            Goal: STG-Within one week, patient will transfer to step in shower at SPV level using DME PRN.       Dates: Start:  07/30/24               Problem: IADL's       Dates: Start:  07/30/24         Goal: STG-Within one week, patient will access kitchen area at SBA level using DME PRN.       Dates: Start:  07/30/24               Problem: OT Long Term Goals       Dates: Start:  07/30/24         Goal: LTG-By discharge, patient will complete basic self care tasks at SPV-Mod I level using AE/DME PRN.       Dates: Start:  07/30/24            Goal: LTG-By discharge, patient will perform bathroom transfersat SPV-Mod I level using AE/DME PRN.       Dates: Start:  07/30/24            Goal: LTG-By discharge, patient will complete basic home management at SPV-Mod I level using AE/DME PRN.at SPV-Mod I level using AE/DME PRN.       Dates: Start:  07/30/24               Problem: Toileting       Dates: Start:  07/30/24         Goal: STG-Within one week, patient will complete toileting tasks at SPV level using DME PRN.       Dates: Start:  07/30/24

## 2024-07-30 NOTE — CARE PLAN
The patient is Stable - Low risk of patient condition declining or worsening         Progress made toward(s) clinical / shift goals:    Problem: Knowledge Deficit - Standard  Goal: Patient and family/care givers will demonstrate understanding of plan of care, disease process/condition, diagnostic tests and medications  Outcome: Progressing   Patient educated on the POC and medications administered. All questions and concerns addressed at this time. Safety education video demonstrated to the patient   Problem: Pain - Standard  Goal: Alleviation of pain or a reduction in pain to the patient’s comfort goal  Outcome: Progressing   Use of 0-10 pain scale. PRN pain medications administered.   Problem: Fall Risk - Rehab  Goal: Patient will remain free from falls  Outcome: Progressing   Patient calls appropriately for assistance

## 2024-07-30 NOTE — DIETARY
"Nutrition Services: Initial Assessment     Day 1 of admit. García Cho is a 68 y.o. female with admitting DX of Other fracture of right patella, initial encounter for closed fracture [S82.091A]     Consult received for MST score >2: 2-13 lb x 1 month, poor PO intake PTA.     Nutrition Assessment:      Height: 176.8 cm (5' 9.6\")  Weight: 75.5 kg (166 lb 8 oz)   Body mass index is 24.17 kg/m². BMI classification: Normal.  Wt Readings from Last 6 Encounters:   07/29/24 75.5 kg (166 lb 8 oz)   07/25/24 76.2 kg (168 lb)   04/30/24 76.7 kg (169 lb)   01/31/23 74.8 kg (165 lb)   03/01/22 74.8 kg (165 lb)   01/20/22 74.7 kg (164 lb 11.2 oz)       Objective:  Problem list    Fracture of patella with routine healing, right, closed (POA: Yes)    PAF (paroxysmal atrial fibrillation) (HCC) - 2 minutes on Zio (Chronic) (POA: Yes)    Leukocytosis (POA: Yes)    Other fracture of right patella, initial encounter for closed fracture (POA: Yes)  Pertinent medical hx:   Past Medical History:   Diagnosis Date    Abnormal finding on cardiovascular stress test - hypertensive response to low level of exercise - normal stress imaging 12/10/2020    Agatston coronary artery calcium score between 200 and 399 - 355 2020     Agoraphobia with panic disorder     anxiety    Allergic rhinitis     Arthritis     all joints    Bowel habit changes     Dental disorder     Dyslipidemia     Fear of travel with panic attacks     Heart burn     Hiatal hernia     NSVT (nonsustained ventricular tachycardia) ([facility]) - seen in recovery from stress Echo 12/10/2020    PAF (paroxysmal atrial fibrillation) (HCC) - 2 minutes on Zio 06/15/2024    Schatzki's ring      Pertinent labs:   Lab Results   Component Value Date/Time    SODIUM 137 07/30/2024 06:35 AM    POTASSIUM 3.9 07/30/2024 06:35 AM    CO2 25 07/30/2024 06:35 AM    CHLORIDE 97 07/30/2024 06:35 AM    BUN 13 07/30/2024 06:35 AM    CREATININE 0.69 07/30/2024 06:35 AM    CALCIUM 10.1 07/30/2024 " 06:35 AM    MAGNESIUM 1.8 07/28/2024 05:12 AM    GLUCOSE 118 (H) 07/30/2024 06:35 AM      Pertinent meds:   Scheduled Medications   Medication Dose Frequency    Pharmacy Consult Request  1 Each PHARMACY TO DOSE    senna-docusate  2 Tablet BID    omeprazole  20 mg DAILY    ARIPiprazole  30 mg QDAY    enoxaparin (LOVENOX) injection  40 mg DAILY AT 1800    metoprolol tartrate  25 mg BID    polyethylene glycol/lytes  1 Packet DAILY      Skin/wounds: +R knee incision   Food Allergies: NKFA  Last BM: 07/29/24, per flowsheets     Current diet order:   Regular diet; PO intake per ADLs: not yet documented. PO intake at [facility] (7/25-7/29) per chart hx review was 50-75% x 3 meals + 1 snack    Subjective:   RD visited pt at bedside. Pt reports poor PO intake r/t poor appetite and food appeal since admit; was at [facility] 7/25-29. States she has been eating 50% of normal. Patient reported UBW: 160 lb.   Nutrition Focused Physical Exam (NFPE)   Weight loss: No measured wt during [facility] stay; stated wt of 168 lb per flowsheets. This is <1% wt loss in 5 days to current wt 166.5 lb which is insignificant.  Muscle mass: No overt muscle wasting  Subcutaneous fat: Well-nourished  Fluid Accumulation: none documented  Reduced  Strength: N/A in acute care setting.     Nutrition Diagnosis:      Inadequate oral intake related to decreased appetite in setting of acute care stay as evidenced by pt reports PO intake 50% of normal x5 days.    Based on RD assessment at this time, pt does not meet criteria in congruence with ASPEN/Academy guidelines for malnutrition.        Nutrition Interventions:      Provide Ensure Plus (provides 350 calories, 13 g protein per 8 fl oz) BID between meals.   RD to update [facility] w/ pt's preferred breakfast and notify NR of pt's preferences for lunch.   Patient aware of active plan of care as appropriate.     Nutrition Monitoring and Evaluation:     Monitor nutrition POC  Document intake of meals and supplements  as % consumed in ADLs.  Additional fluids per MD/DO  Monitor weight trends.    RD following and will provide updated recommendations as indicated.

## 2024-07-30 NOTE — PROGRESS NOTES
"  Physical Medicine & Rehabilitation Progress Note    Encounter Date: 7/30/2024    Chief Complaint: Decreased mobility, weakness    Interval Events (Subjective):  Patient sitting up in room. She reports therapy evaluation went fine this morning. She reports pain is an issue but she was able to sleep through the night. Discussed about AM labs including low Vitamin D and new elevated LFTs. Discussed about watching Tylenol intake and would recheck later in the week.     Objective:  VITAL SIGNS: /89   Pulse 80   Temp 37 °C (98.6 °F) (Oral)   Resp 16   Ht 1.768 m (5' 9.6\")   Wt 75.5 kg (166 lb 8 oz)   SpO2 95%   BMI 24.17 kg/m²   Gen: NAD  Psych: Mood and affect appropriate  CV: RRR, 1+ RLE edema  Resp: CTAB, no upper airway sounds  Abd: NTND  Neuro: AOx4, following commands    Laboratory Values:  Recent Results (from the past 72 hour(s))   CBC WITH DIFFERENTIAL    Collection Time: 07/28/24  5:12 AM   Result Value Ref Range    WBC 9.4 4.8 - 10.8 K/uL    RBC 3.80 (L) 4.20 - 5.40 M/uL    Hemoglobin 12.1 12.0 - 16.0 g/dL    Hematocrit 36.7 (L) 37.0 - 47.0 %    MCV 96.6 81.4 - 97.8 fL    MCH 31.8 27.0 - 33.0 pg    MCHC 33.0 32.2 - 35.5 g/dL    RDW 45.7 35.9 - 50.0 fL    Platelet Count 242 164 - 446 K/uL    MPV 9.7 9.0 - 12.9 fL    Neutrophils-Polys 55.30 44.00 - 72.00 %    Lymphocytes 34.40 22.00 - 41.00 %    Monocytes 7.90 0.00 - 13.40 %    Eosinophils 1.70 0.00 - 6.90 %    Basophils 0.40 0.00 - 1.80 %    Immature Granulocytes 0.30 0.00 - 0.90 %    Nucleated RBC 0.00 0.00 - 0.20 /100 WBC    Neutrophils (Absolute) 5.21 1.82 - 7.42 K/uL    Lymphs (Absolute) 3.25 1.00 - 4.80 K/uL    Monos (Absolute) 0.75 0.00 - 0.85 K/uL    Eos (Absolute) 0.16 0.00 - 0.51 K/uL    Baso (Absolute) 0.04 0.00 - 0.12 K/uL    Immature Granulocytes (abs) 0.03 0.00 - 0.11 K/uL    NRBC (Absolute) 0.00 K/uL   Comp Metabolic Panel    Collection Time: 07/28/24  5:12 AM   Result Value Ref Range    Sodium 137 135 - 145 mmol/L    Potassium 4.2 " 3.6 - 5.5 mmol/L    Chloride 100 96 - 112 mmol/L    Co2 29 20 - 33 mmol/L    Anion Gap 8.0 7.0 - 16.0    Glucose 107 (H) 65 - 99 mg/dL    Bun 14 8 - 22 mg/dL    Creatinine 0.72 0.50 - 1.40 mg/dL    Calcium 9.2 8.5 - 10.5 mg/dL    Correct Calcium 9.6 8.5 - 10.5 mg/dL    AST(SGOT) 19 12 - 45 U/L    ALT(SGPT) 14 2 - 50 U/L    Alkaline Phosphatase 46 30 - 99 U/L    Total Bilirubin 0.7 0.1 - 1.5 mg/dL    Albumin 3.5 3.2 - 4.9 g/dL    Total Protein 6.2 6.0 - 8.2 g/dL    Globulin 2.7 1.9 - 3.5 g/dL    A-G Ratio 1.3 g/dL   MAGNESIUM    Collection Time: 07/28/24  5:12 AM   Result Value Ref Range    Magnesium 1.8 1.5 - 2.5 mg/dL   ESTIMATED GFR    Collection Time: 07/28/24  5:12 AM   Result Value Ref Range    GFR (CKD-EPI) 91 >60 mL/min/1.73 m 2   URINALYSIS    Collection Time: 07/29/24  4:40 PM   Result Value Ref Range    Color Yellow     Character Clear     Specific Gravity 1.006 <1.035    Ph 7.5 5.0 - 8.0    Glucose Negative Negative mg/dL    Ketones Negative Negative mg/dL    Protein 30 (A) Negative mg/dL    Bilirubin Negative Negative    Urobilinogen, Urine 0.2 Negative    Nitrite Negative Negative    Leukocyte Esterase Negative Negative    Occult Blood Moderate (A) Negative    Micro Urine Req Microscopic    URINE MICROSCOPIC (W/UA)    Collection Time: 07/29/24  4:40 PM   Result Value Ref Range    WBC 0-2 /hpf    RBC 10-20 (A) /hpf    Bacteria Negative None /hpf    Epithelial Cells Negative /hpf    Hyaline Cast 0-2 /lpf   CBC with Differential    Collection Time: 07/30/24  6:35 AM   Result Value Ref Range    WBC 7.5 4.8 - 10.8 K/uL    RBC 4.94 4.20 - 5.40 M/uL    Hemoglobin 15.4 12.0 - 16.0 g/dL    Hematocrit 45.5 37.0 - 47.0 %    MCV 92.1 81.4 - 97.8 fL    MCH 31.2 27.0 - 33.0 pg    MCHC 33.8 32.2 - 35.5 g/dL    RDW 41.3 35.9 - 50.0 fL    Platelet Count 374 164 - 446 K/uL    MPV 9.3 9.0 - 12.9 fL    Neutrophils-Polys 53.20 44.00 - 72.00 %    Lymphocytes 36.70 22.00 - 41.00 %    Monocytes 6.70 0.00 - 13.40 %     Eosinophils 2.50 0.00 - 6.90 %    Basophils 0.50 0.00 - 1.80 %    Immature Granulocytes 0.40 0.00 - 0.90 %    Nucleated RBC 0.00 0.00 - 0.20 /100 WBC    Neutrophils (Absolute) 3.99 1.82 - 7.42 K/uL    Lymphs (Absolute) 2.75 1.00 - 4.80 K/uL    Monos (Absolute) 0.50 0.00 - 0.85 K/uL    Eos (Absolute) 0.19 0.00 - 0.51 K/uL    Baso (Absolute) 0.04 0.00 - 0.12 K/uL    Immature Granulocytes (abs) 0.03 0.00 - 0.11 K/uL    NRBC (Absolute) 0.00 K/uL   Comp Metabolic Panel (CMP)    Collection Time: 07/30/24  6:35 AM   Result Value Ref Range    Sodium 137 135 - 145 mmol/L    Potassium 3.9 3.6 - 5.5 mmol/L    Chloride 97 96 - 112 mmol/L    Co2 25 20 - 33 mmol/L    Anion Gap 15.0 7.0 - 16.0    Glucose 118 (H) 65 - 99 mg/dL    Bun 13 8 - 22 mg/dL    Creatinine 0.69 0.50 - 1.40 mg/dL    Calcium 10.1 8.5 - 10.5 mg/dL    Correct Calcium 10.2 8.5 - 10.5 mg/dL    AST(SGOT) 129 (H) 12 - 45 U/L    ALT(SGPT) 127 (H) 2 - 50 U/L    Alkaline Phosphatase 232 (H) 30 - 99 U/L    Total Bilirubin 1.2 0.1 - 1.5 mg/dL    Albumin 3.9 3.2 - 4.9 g/dL    Total Protein 7.3 6.0 - 8.2 g/dL    Globulin 3.4 1.9 - 3.5 g/dL    A-G Ratio 1.1 g/dL   HEMOGLOBIN A1C    Collection Time: 07/30/24  6:35 AM   Result Value Ref Range    Glycohemoglobin 5.5 4.0 - 5.6 %    Est Avg Glucose 111 mg/dL   TSH with Reflex to FT4    Collection Time: 07/30/24  6:35 AM   Result Value Ref Range    TSH 2.330 0.380 - 5.330 uIU/mL   Vitamin D, 25-hydroxy (blood)    Collection Time: 07/30/24  6:35 AM   Result Value Ref Range    25-Hydroxy   Vitamin D 25 27 (L) 30 - 100 ng/mL   ESTIMATED GFR    Collection Time: 07/30/24  6:35 AM   Result Value Ref Range    GFR (CKD-EPI) 94 >60 mL/min/1.73 m 2       Medications:  Scheduled Medications   Medication Dose Frequency    Pharmacy Consult Request  1 Each PHARMACY TO DOSE    senna-docusate  2 Tablet BID    omeprazole  20 mg DAILY    ARIPiprazole  30 mg QDAY    enoxaparin (LOVENOX) injection  40 mg DAILY AT 1800    metoprolol tartrate  25 mg  BID    polyethylene glycol/lytes  1 Packet DAILY     PRN medications: Respiratory Therapy Consult, hydrALAZINE, acetaminophen, senna-docusate **AND** polyethylene glycol/lytes, docusate sodium, magnesium hydroxide, carboxymethylcellulose, mag hydrox-al hydrox-simeth, ondansetron **OR** ondansetron, traZODone, sodium chloride, oxyCODONE immediate-release **OR** oxyCODONE immediate-release, ALPRAZolam    Diet:  Current Diet Order   Procedures    Diet Order Diet: Regular       Medical Decision Making and Plan:  R patellar fracture - Patient sustained GLF with right knee fracture s/p open repair with Dr. Holder on 7/26/24. She is WBAT with knee in extension brace.  -PT and OT for mobility and ADLs. Per guidelines, 15 hours per week between PT, OT and/or SLP.  -Follow-up Dr. Glory WAYNE fib/HTN - History of A fib on ziopatch. On metoprolol 25 mg BID and previously on Nebivolol. SBP into 130s, HR into 80s, continue Metoprolol 25 mg BID     HLD - Patient with allergy to statins. Diet controlled     Anxiety disorder - Patient on Abilify 30 mg daily and PRN Xanax 1 mg. Requiring PRN Xanax at night, continue Abilify 30 mg daily     Elevated LFTs - on admission, previously normal. Will monitor as may be reactionary from fracture. Bilirubin normal.     Pain - Patient on PRN Tylenol and Oxycodone     Vitamin D insufficiency - 27 on admission, start 1000 U      Skin - Patient at risk for skin breakdown due to debility in areas including sacrum, achilles, elbows and head in addition to other sites. Nursing to assess skin daily.      GI Ppx - Patient on Prilosec for GERD prophylaxis. Patient on Senna-docusate for constipation prophylaxis.      DVT Ppx - Patient Lovenox on transfer.  ____________________________________    T. Jn Theodore MD/PhD  [facility] - Physical Medicine & Rehabilitation   [facility] - Brain Injury Medicine   ____________________________________    Total time:  50 minutes. Time spent included pre-rounding  review of vitals and tests, unit/floor time, face-to-face time with the patient including physical examination, care coordination, counseling of patient and/or family, ordering medications/procedures/tests, discussion with CM, PT, OT, SLP and/or other healthcare providers, and documentation in the electronic medical record. Topics discussed included admission labs, new elevated LFTs, discussed follow-up, anxiety, ongoing use of Xanax, and low Vitamin D.

## 2024-07-30 NOTE — DISCHARGE PLANNING
CASE MANAGEMENT INITIAL ASSESSMENT    Admit Date:  7/29/2024     I met with pt  to discuss role of case management / discharge planning / team conference.   Patient is a  68 y.o. female transferred from [facility] from 7/25 to 7/29. .    Diagnosis: Other fracture of right patella, initial encounter for closed fracture [S82.091A]    Co-morbidities:   Patient Active Problem List    Diagnosis Date Noted    Other fracture of right patella, initial encounter for closed fracture 07/29/2024    Hyperglycemia 07/27/2024    Fracture of patella with routine healing, right, closed 07/25/2024    Leukocytosis 07/25/2024    PAF (paroxysmal atrial fibrillation) ([facility]) - 2 minutes on Zio 06/15/2024    Statin intolerance 04/30/2024    Malignant tumor of urinary bladder ([facility]) 11/21/2022    Vertigo 01/20/2022    Acute cystitis with hematuria 01/20/2022    NSVT (nonsustained ventricular tachycardia) ([facility]) - seen in recovery from stress Echo 12/10/2020    Abnormal finding on cardiovascular stress test - hypertensive response to low level of exercise - normal stress imaging 12/10/2020    Familial hyperlipidemia, high LDL 11/05/2020    Agatston coronary artery calcium score between 200 and 399 - 355 2020     Coronary atherosclerosis due to lipid rich plaque 09/15/2020    Asthma 11/05/2019    Chronic diarrhea 07/22/2019    Hypercalcemia 02/25/2019    Family history of alcoholism 02/25/2019    Anxiety and depression 01/30/2019    Other insomnia 01/30/2019    Allergic rhinitis 05/18/2009    Schatzki's ring     Hiatal hernia     Dyslipidemia     Fear of travel with panic attacks      Prior Living Situation:  Housing / Facility:  (5th wheel @[facility] in [place]); 7 steps to enter/ 3 steps to bathroom  Lives with - Patient's Self Care Capacity:  (SO)    Prior Level of Function:  Medication Management: Independent  Finances: Independent  Home Management: Independent  Shopping: Independent  Prior Level Of Mobility:  Supervision With Device in Community, Independent Without Device in Community  Driving / Transportation: Driving Independent    Support Systems:  Primary : David CROWELL  [phone] or   Other support systems: none  Power of  (Name & Phone): none    Previous Services Utilized:   Equipment Owned: None  Prior Services: None, Home-Independent    Other Information:  Occupation (Pre-Hospital Vocational): Retired Due To Age  Pharmacy: usually goes to the [facility]  Primary Payor Source: Medicare A  Secondary Payor Source:  (Medicaid FFS)  Primary Care Practitioner : Dr Noah Alejandro  Other MDs: Dr Stephen Amin    Patient / Family Goal:  Patient / Family Goal: return home to previous living situation    Plan:  1. Continue to follow patient through hospitalization and provide discharge planning in collaboration with patient, family, physicians and ancillary services.     2. Utilize community resources to ensure a safe discharge.     3.  Order a fww; follow up w/ pcp; pt also has a physiatry who manages her pys. Meds; ortho Dr Anat Amin.

## 2024-07-31 ENCOUNTER — APPOINTMENT (OUTPATIENT)
Dept: PHYSICAL THERAPY | Facility: REHABILITATION | Age: 69
DRG: 561 | End: 2024-07-31
Attending: PHYSICAL MEDICINE & REHABILITATION
Payer: MEDICARE

## 2024-07-31 ENCOUNTER — APPOINTMENT (OUTPATIENT)
Dept: SPEECH THERAPY | Facility: REHABILITATION | Age: 69
DRG: 561 | End: 2024-07-31
Attending: PHYSICAL MEDICINE & REHABILITATION
Payer: MEDICARE

## 2024-07-31 ENCOUNTER — APPOINTMENT (OUTPATIENT)
Dept: OCCUPATIONAL THERAPY | Facility: REHABILITATION | Age: 69
DRG: 561 | End: 2024-07-31
Attending: PHYSICAL MEDICINE & REHABILITATION
Payer: MEDICARE

## 2024-07-31 VITALS
OXYGEN SATURATION: 95 % | WEIGHT: 166.5 LBS | TEMPERATURE: 96.8 F | BODY MASS INDEX: 23.84 KG/M2 | DIASTOLIC BLOOD PRESSURE: 66 MMHG | SYSTOLIC BLOOD PRESSURE: 125 MMHG | HEIGHT: 70 IN | RESPIRATION RATE: 18 BRPM | HEART RATE: 82 BPM

## 2024-07-31 PROCEDURE — 99232 SBSQ HOSP IP/OBS MODERATE 35: CPT | Performed by: PHYSICAL MEDICINE & REHABILITATION

## 2024-07-31 PROCEDURE — 97110 THERAPEUTIC EXERCISES: CPT | Mod: CO

## 2024-07-31 PROCEDURE — 700102 HCHG RX REV CODE 250 W/ 637 OVERRIDE(OP): Performed by: PHYSICAL MEDICINE & REHABILITATION

## 2024-07-31 PROCEDURE — 700111 HCHG RX REV CODE 636 W/ 250 OVERRIDE (IP): Mod: JZ | Performed by: PHYSICAL MEDICINE & REHABILITATION

## 2024-07-31 PROCEDURE — A9270 NON-COVERED ITEM OR SERVICE: HCPCS | Performed by: PHYSICAL MEDICINE & REHABILITATION

## 2024-07-31 PROCEDURE — 97530 THERAPEUTIC ACTIVITIES: CPT | Mod: CO

## 2024-07-31 PROCEDURE — 92523 SPEECH SOUND LANG COMPREHEN: CPT

## 2024-07-31 PROCEDURE — 97112 NEUROMUSCULAR REEDUCATION: CPT

## 2024-07-31 PROCEDURE — 770010 HCHG ROOM/CARE - REHAB SEMI PRIVAT*

## 2024-07-31 PROCEDURE — 97535 SELF CARE MNGMENT TRAINING: CPT | Mod: CO

## 2024-07-31 PROCEDURE — 97530 THERAPEUTIC ACTIVITIES: CPT

## 2024-07-31 RX ADMIN — OXYCODONE HYDROCHLORIDE 10 MG: 10 TABLET ORAL at 12:23

## 2024-07-31 RX ADMIN — ALPRAZOLAM 1 MG: 0.5 TABLET ORAL at 20:32

## 2024-07-31 RX ADMIN — SENNOSIDES AND DOCUSATE SODIUM 2 TABLET: 50; 8.6 TABLET ORAL at 08:33

## 2024-07-31 RX ADMIN — METOPROLOL TARTRATE 25 MG: 25 TABLET, FILM COATED ORAL at 05:06

## 2024-07-31 RX ADMIN — ARIPIPRAZOLE 30 MG: 10 TABLET ORAL at 05:06

## 2024-07-31 RX ADMIN — ENOXAPARIN SODIUM 40 MG: 100 INJECTION SUBCUTANEOUS at 17:21

## 2024-07-31 RX ADMIN — Medication 1000 UNITS: at 08:33

## 2024-07-31 RX ADMIN — METOPROLOL TARTRATE 25 MG: 25 TABLET, FILM COATED ORAL at 17:21

## 2024-07-31 RX ADMIN — OMEPRAZOLE 20 MG: 20 CAPSULE, DELAYED RELEASE ORAL at 08:33

## 2024-07-31 RX ADMIN — OXYCODONE HYDROCHLORIDE 10 MG: 10 TABLET ORAL at 17:20

## 2024-07-31 RX ADMIN — OXYCODONE HYDROCHLORIDE 10 MG: 10 TABLET ORAL at 05:04

## 2024-07-31 RX ADMIN — POLYETHYLENE GLYCOL 3350 1 PACKET: 17 POWDER, FOR SOLUTION ORAL at 08:35

## 2024-07-31 RX ADMIN — OXYCODONE HYDROCHLORIDE 10 MG: 10 TABLET ORAL at 08:33

## 2024-07-31 RX ADMIN — OXYCODONE HYDROCHLORIDE 10 MG: 10 TABLET ORAL at 20:32

## 2024-07-31 ASSESSMENT — BRIEF INTERVIEW FOR MENTAL STATUS (BIMS)
ASKED TO RECALL BED: YES, NO CUE REQUIRED
ASKED TO RECALL BLUE: YES, NO CUE REQUIRED
ASKED TO RECALL SOCK: YES, NO CUE REQUIRED
WHAT DAY OF THE WEEK IS IT: CORRECT
INITIAL REPETITION OF BED BLUE SOCK - FIRST ATTEMPT: 3
WHAT YEAR IS IT: CORRECT
BIMS SUMMARY SCORE: 15
WHAT MONTH IS IT: ACCURATE WITHIN 5 DAYS

## 2024-07-31 ASSESSMENT — GAIT ASSESSMENTS
DEVIATION: DECREASED BASE OF SUPPORT;DECREASED HEEL STRIKE;DECREASED TOE OFF
GAIT LEVEL OF ASSIST: CONTACT GUARD ASSIST
ASSISTIVE DEVICE: FRONT WHEEL WALKER
DISTANCE (FEET): 150

## 2024-07-31 ASSESSMENT — ACTIVITIES OF DAILY LIVING (ADL): BED_CHAIR_WHEELCHAIR_TRANSFER_DESCRIPTION: ADAPTIVE EQUIPMENT

## 2024-07-31 ASSESSMENT — PAIN DESCRIPTION - PAIN TYPE: TYPE: ACUTE PAIN;SURGICAL PAIN

## 2024-07-31 NOTE — THERAPY
"Physical Therapy   Daily Treatment     Patient Name: García Cho  Age:  68 y.o., Sex:  female  Medical Record #: [id number]  Today's Date: 7/31/2024     Precautions  Precautions: (P) Fall Risk, Weight Bearing As Tolerated Right Lower Extremity, Immobilizer Right Lower Extremity    Subjective    Pt reported having crutches and a walking stick at home that she can use of 9 REBEKAH; she has L HR.      Objective       07/31/24 1001   PT Charge Group   PT Neuromuscular Re-Education / Balance (Units) 2   PT Therapeutic Activities (Units) 2   PT Total Time Spent   PT Individual Total Time Spent (Mins) 60   Precautions   Precautions Fall Risk;Weight Bearing As Tolerated Right Lower Extremity;Immobilizer Right Lower Extremity   Cognition    Sequencing Impaired   Comments Dec recall of stair sequencing, requiring intermittent vc.   Gait Functional Level of Assist    Gait Level Of Assist Contact Guard Assist   Assistive Device Front Wheel Walker   Distance (Feet) 150   # of Times Distance was Traveled 1   Deviation Decreased Base Of Support;Decreased Heel Strike;Decreased Toe Off  (Immobilizer RLE)   Wheelchair Functional Level of Assist   Wheelchair Assist Supervised   Distance Wheelchair (Feet or Distance) 150   Wheelchair Description Extra time;Impaired coordination;Limited by fatigue  (as warm up)   Stairs Functional Level of Assist   Level of Assist with Stairs Contact Guard Assist  (CGA/SBA consecutive 6\" stairs, CGA in // bars 1 HR + 1 crutch)   # of Stairs Climbed 8  (8 6\" consecutive stairs 2x, CGA/SBA. CGA in // bars 8 4\" steps 1 HR + 1 crutch.)   Stairs Description Hand rails;Limited by fatigue;Requires incidental assist;Safety concerns;Verbal cueing;Supervision for safety;Assist device/equipment   Transfer Functional Level of Assist   Bed, Chair, Wheelchair Transfer Contact Guard Assist   Bed Chair Wheelchair Transfer Description Adaptive equipment  (SPT FWW, cues for safe performance, attempted to leave FWW " FDC through transfer)   Bed Mobility    Sit to Supine Modified Independent   Scooting Modified Independent   Rolling Modified Independent   Neuro-Muscular Treatments   Neuro-Muscular Treatments Weight Shift Right;Weight Shift Left;Verbal Cuing;Tactile Cuing;Sequencing   Comments Stair sequencing repetitive review   Interdisciplinary Plan of Care Collaboration   IDT Collaboration with  Physician   Patient Position at End of Therapy In Bed;Call Light within Reach;Tray Table within Reach   Collaboration Comments POC, CLOF         Assessment    Pt progressed in activity tolerance during session, progressing gait distance, and consecutive stair mobility (8x3). Pt progressed to trial 1 HR + 1 crutch with CGA in // bars. Pt already has crutches at home, and admits that spouse cannot safety guard her d/t his health issues.      Strengths: Able to follow instructions, Adequate strength, Alert and oriented, Effective communication skills, Independent prior level of function, Manages pain appropriately, Motivated for self care and independence, Pleasant and cooperative, Willingly participates in therapeutic activities  Barriers: Decreased endurance, Fatigue, Hearing impairment, Impaired activity tolerance, Impaired balance, Pain, Limited mobility    Plan    Gait using FWW  Dual task training   LE strengthening   Endurance training   Stairs with 1 HR + 1 crutch     DME  PT DME Recommendations  Assistive Device: Front Wheeled Walker    Passport items to be completed:  Get in/out of bed safely, in/out of a vehicle, safely use mobility device, walk or wheel around home/community, navigate up and down stairs, show how to get up/down from the ground, ensure home is accessible, demonstrate HEP, complete caregiver training    Physical Therapy Problems (Active)       Problem: Balance       Dates: Start:  07/30/24         Goal: STG-Within one week, patient will maintain dynamic standing balance SPV for 2 minutes or longer        Dates: Start:  07/30/24               Problem: Mobility       Dates: Start:  07/30/24         Goal: STG-Within one week, patient will ambulate community distances of at least 150' using LRAD SPV       Dates: Start:  07/30/24               Problem: Mobility Transfers       Dates: Start:  07/30/24         Goal: STG-Within one week, patient will perform transfers SPV using LRAD       Dates: Start:  07/30/24               Problem: PT-Long Term Goals       Dates: Start:  07/30/24         Goal: LTG-By discharge, patient will ambulate greater than 250' Allie using LRAD       Dates: Start:  07/30/24            Goal: LTG-By discharge, patient will transfer one surface to another Allie using LRAD       Dates: Start:  07/30/24            Goal: LTG-By discharge, patient will ambulate up/down flight of stairs Allie with unilateral HR       Dates: Start:  07/30/24            Goal: LTG-By discharge, patient will be independent in HEP       Dates: Start:  07/30/24

## 2024-07-31 NOTE — PROGRESS NOTES
"  Physical Medicine & Rehabilitation Progress Note    Encounter Date: 7/31/2024    Chief Complaint: Decreased mobility, weakness    Interval Events (Subjective):  Patient sitting up in room. She reports therapy is going well. Therapy recommended SLP evaluation which was done this morning. She reports she does think her cognition is off. With elevated enzymes most likely metabolic encephalopathy after her fall.     Objective:  VITAL SIGNS: /67   Pulse 85   Temp 37 °C (98.6 °F) (Oral)   Resp 18   Ht 1.768 m (5' 9.6\")   Wt 75.5 kg (166 lb 8 oz)   SpO2 95%   BMI 24.17 kg/m²   Gen: NAD  Psych: Mood and affect appropriate  CV: RRR, 1+ RLE edema  Resp: CTAB, no upper airway sounds  Abd: NTND  Neuro: AOx4, following commands  Unchanged from 7/30/24    Laboratory Values:  Recent Results (from the past 72 hour(s))   URINALYSIS    Collection Time: 07/29/24  4:40 PM   Result Value Ref Range    Color Yellow     Character Clear     Specific Gravity 1.006 <1.035    Ph 7.5 5.0 - 8.0    Glucose Negative Negative mg/dL    Ketones Negative Negative mg/dL    Protein 30 (A) Negative mg/dL    Bilirubin Negative Negative    Urobilinogen, Urine 0.2 Negative    Nitrite Negative Negative    Leukocyte Esterase Negative Negative    Occult Blood Moderate (A) Negative    Micro Urine Req Microscopic    URINE MICROSCOPIC (W/UA)    Collection Time: 07/29/24  4:40 PM   Result Value Ref Range    WBC 0-2 /hpf    RBC 10-20 (A) /hpf    Bacteria Negative None /hpf    Epithelial Cells Negative /hpf    Hyaline Cast 0-2 /lpf   CBC with Differential    Collection Time: 07/30/24  6:35 AM   Result Value Ref Range    WBC 7.5 4.8 - 10.8 K/uL    RBC 4.94 4.20 - 5.40 M/uL    Hemoglobin 15.4 12.0 - 16.0 g/dL    Hematocrit 45.5 37.0 - 47.0 %    MCV 92.1 81.4 - 97.8 fL    MCH 31.2 27.0 - 33.0 pg    MCHC 33.8 32.2 - 35.5 g/dL    RDW 41.3 35.9 - 50.0 fL    Platelet Count 374 164 - 446 K/uL    MPV 9.3 9.0 - 12.9 fL    Neutrophils-Polys 53.20 44.00 - 72.00 % "    Lymphocytes 36.70 22.00 - 41.00 %    Monocytes 6.70 0.00 - 13.40 %    Eosinophils 2.50 0.00 - 6.90 %    Basophils 0.50 0.00 - 1.80 %    Immature Granulocytes 0.40 0.00 - 0.90 %    Nucleated RBC 0.00 0.00 - 0.20 /100 WBC    Neutrophils (Absolute) 3.99 1.82 - 7.42 K/uL    Lymphs (Absolute) 2.75 1.00 - 4.80 K/uL    Monos (Absolute) 0.50 0.00 - 0.85 K/uL    Eos (Absolute) 0.19 0.00 - 0.51 K/uL    Baso (Absolute) 0.04 0.00 - 0.12 K/uL    Immature Granulocytes (abs) 0.03 0.00 - 0.11 K/uL    NRBC (Absolute) 0.00 K/uL   Comp Metabolic Panel (CMP)    Collection Time: 07/30/24  6:35 AM   Result Value Ref Range    Sodium 137 135 - 145 mmol/L    Potassium 3.9 3.6 - 5.5 mmol/L    Chloride 97 96 - 112 mmol/L    Co2 25 20 - 33 mmol/L    Anion Gap 15.0 7.0 - 16.0    Glucose 118 (H) 65 - 99 mg/dL    Bun 13 8 - 22 mg/dL    Creatinine 0.69 0.50 - 1.40 mg/dL    Calcium 10.1 8.5 - 10.5 mg/dL    Correct Calcium 10.2 8.5 - 10.5 mg/dL    AST(SGOT) 129 (H) 12 - 45 U/L    ALT(SGPT) 127 (H) 2 - 50 U/L    Alkaline Phosphatase 232 (H) 30 - 99 U/L    Total Bilirubin 1.2 0.1 - 1.5 mg/dL    Albumin 3.9 3.2 - 4.9 g/dL    Total Protein 7.3 6.0 - 8.2 g/dL    Globulin 3.4 1.9 - 3.5 g/dL    A-G Ratio 1.1 g/dL   HEMOGLOBIN A1C    Collection Time: 07/30/24  6:35 AM   Result Value Ref Range    Glycohemoglobin 5.5 4.0 - 5.6 %    Est Avg Glucose 111 mg/dL   TSH with Reflex to FT4    Collection Time: 07/30/24  6:35 AM   Result Value Ref Range    TSH 2.330 0.380 - 5.330 uIU/mL   Vitamin D, 25-hydroxy (blood)    Collection Time: 07/30/24  6:35 AM   Result Value Ref Range    25-Hydroxy   Vitamin D 25 27 (L) 30 - 100 ng/mL   ESTIMATED GFR    Collection Time: 07/30/24  6:35 AM   Result Value Ref Range    GFR (CKD-EPI) 94 >60 mL/min/1.73 m 2       Medications:  Scheduled Medications   Medication Dose Frequency    vitamin D3  1,000 Units DAILY    Pharmacy Consult Request  1 Each PHARMACY TO DOSE    senna-docusate  2 Tablet BID    omeprazole  20 mg DAILY     ARIPiprazole  30 mg QDAY    enoxaparin (LOVENOX) injection  40 mg DAILY AT 1800    metoprolol tartrate  25 mg BID    polyethylene glycol/lytes  1 Packet DAILY     PRN medications: Respiratory Therapy Consult, hydrALAZINE, acetaminophen, senna-docusate **AND** polyethylene glycol/lytes, docusate sodium, magnesium hydroxide, carboxymethylcellulose, mag hydrox-al hydrox-simeth, ondansetron **OR** ondansetron, traZODone, sodium chloride, oxyCODONE immediate-release **OR** oxyCODONE immediate-release, ALPRAZolam    Diet:  Current Diet Order   Procedures    Diet Order Diet: Regular       Medical Decision Making and Plan:  R patellar fracture - Patient sustained GLF with right knee fracture s/p open repair with Dr. Holder on 7/26/24. She is WBAT with knee in extension brace.  -PT and OT for mobility and ADLs. Per guidelines, 15 hours per week between PT, OT and/or SLP.  -Follow-up Dr. Holder      Confusion - Present on admission, slightly worsened. Elevated LFTs, SLP to evaluate. Metabolic encephalopathy, change [facility] Code / Diagnosis to Support: 0002.1 - Brain Dysfunction: Non-Traumatic  -Recheck CMP    A fib/HTN - History of A fib on ziopatch. On metoprolol 25 mg BID and previously on Nebivolol. SBP into 130s, HR 80s, continue Metoprolol 25 mg BID     HLD - Patient with allergy to statins. Diet controlled     Anxiety disorder - Patient on Abilify 30 mg daily and PRN Xanax 1 mg. Requiring PRN Xanax at night, continue Abilify 30 mg daily     Elevated LFTs - on admission, previously normal. Will monitor as may be reactionary from fracture. Bilirubin normal.     Pain - Patient on PRN Tylenol and Oxycodone     Vitamin D insufficiency - 27 on admission, start 1000 U      Skin - Patient at risk for skin breakdown due to debility in areas including sacrum, achilles, elbows and head in addition to other sites. Nursing to assess skin daily.      GI Ppx - Patient on Prilosec for GERD prophylaxis. Patient on Senna-docusate for  constipation prophylaxis.      DVT Ppx - Patient Lovenox on transfer.  ____________________________________    T. Jn Theodore MD/PhD  [facility] - Physical Medicine & Rehabilitation   [facility] - Brain Injury Medicine   ____________________________________

## 2024-07-31 NOTE — CARE PLAN
The patient is Stable - Low risk of patient condition declining or worsening    Problem: Knowledge Deficit - Standard  Goal: Patient and family/care givers will demonstrate understanding of plan of care, disease process/condition, diagnostic tests and medications  Outcome: Progressing. Reviewed POC, all questions answered.        Problem: Fall Risk - Rehab  Goal: Patient will remain free from falls  Outcome: Progressing. Reviewed POC, all questions answered.        Shift Goals  Clinical Goals: Safety  Patient Goals: Participate in therapy

## 2024-07-31 NOTE — THERAPY
"Occupational Therapy  Daily Treatment     Patient Name: García Cho  Age:  68 y.o., Sex:  female  Medical Record #: [id number]  Today's Date: 7/31/2024     Precautions  Precautions: (P) Fall Risk, Weight Bearing As Tolerated Right Lower Extremity, Immobilizer Right Lower Extremity  Comments: (P) García reports  right knee immobilizer to be on at all times         Subjective    \"  I'm glad I came to rehab . My  would not have been able to help me .\"     Objective       07/31/24 1231   OT Charge Group   OT Self Care / ADL (Units) 1   OT Therapy Activity (Units) 1   OT Therapeutic Exercise (Units) 2   OT Total Time Spent   OT Individual Total Time Spent (Mins) 60   Precautions   Precautions Fall Risk;Weight Bearing As Tolerated Right Lower Extremity;Immobilizer Right Lower Extremity   Comments García reports  right knee immobilizer to be on at all times   Functional Level of Assist   Toileting Standby Assist   Bed, Chair, Wheelchair Transfer Standby Assist   Toilet Transfers Standby Assist   Sitting Upper Body Exercises   Front Arm Raise 1 set of 10;Right ;Left   Shoulder Press 2 sets of 10;Right ;Left   Internal Shoulder Rotation 2 sets of 10;Right ;Left   External Shoulder Rotation 2 sets of 10;Right ;Left   Bicep Curls 2 sets of 10;Right ;Left   Pronation / Supination 2 sets of 10;Right ;Left   Upper Extremity Bike Level 2 Resistance  (water level 2  hydrocycle    x 5 minutes  seated)   Other Exercise 1 set of 10;Right ;Left;  knee to shoulder cross overs    2 sets of 10;Right ;Left;  arm cirlces forward and back   Comments ther ex performed with 2lb weights in each hands   Bed Mobility    Sit to Supine Standby Assist   Interdisciplinary Plan of Care Collaboration   Patient Position at End of Therapy In Bed;Call Light within Reach;Tray Table within Reach  (hand off to Rec therapy for evaluation)     Static standing in // bars   focus on  even weight between both LEs   static standing ring toss " activity       Assessment     Participates to the best of her ability with all tasks presented   Motivated to make functional gains and return  home.     Strengths: Independent prior level of function, Making steady progress towards goals, Motivated for self care and independence, Pleasant and cooperative, Supportive family, Willingly participates in therapeutic activities  Barriers: Decreased endurance, Difficulty following instructions, Fatigue, Generalized weakness, Hearing impairment, Home accessibility, Impaired activity tolerance, Impaired balance, Impaired carryover of learning, Impaired insight/denial of deficits, Impaired functional cognition, Limited mobility, Pain, Pain poorly managed    Plan     ADL IADL  , related mobility and transfers  , strength/endurance building  standing tolerance and balance activity      DME           Occupational Therapy Goals (Active)       Problem: Bathing       Dates: Start:  07/30/24         Goal: STG-Within one week, patient will bathe at Setup level using AE PRN.       Dates: Start:  07/30/24               Problem: Dressing       Dates: Start:  07/30/24         Goal: STG-Within one week, patient will dress LB at SBA level using DME/AE PRN.       Dates: Start:  07/30/24               Problem: Functional Transfers       Dates: Start:  07/30/24         Goal: STG-Within one week, patient will transfer to toilet at SPV level using DME PRN.       Dates: Start:  07/30/24            Goal: STG-Within one week, patient will transfer to step in shower at SPV level using DME PRN.       Dates: Start:  07/30/24               Problem: IADL's       Dates: Start:  07/30/24         Goal: STG-Within one week, patient will access kitchen area at SBA level using DME PRN.       Dates: Start:  07/30/24               Problem: OT Long Term Goals       Dates: Start:  07/30/24         Goal: LTG-By discharge, patient will complete basic self care tasks at SPV-Mod I level using AE/DME PRN.        Dates: Start:  07/30/24            Goal: LTG-By discharge, patient will perform bathroom transfersat SPV-Mod I level using AE/DME PRN.       Dates: Start:  07/30/24            Goal: LTG-By discharge, patient will complete basic home management at SPV-Mod I level using AE/DME PRN.at SPV-Mod I level using AE/DME PRN.       Dates: Start:  07/30/24               Problem: Toileting       Dates: Start:  07/30/24         Goal: STG-Within one week, patient will complete toileting tasks at SPV level using DME PRN.       Dates: Start:  07/30/24

## 2024-07-31 NOTE — CARE PLAN
The patient is Watcher - Medium risk of patient condition declining or worsening    Shift Goals  Clinical Goals: Safety  Patient Goals: Participate in therapy    Progress made toward(s) clinical / shift goals:    Problem: Infection - Standard  Goal: Patient will remain free from infection  Outcome: Progressing     Problem: Pain - Standard  Goal: Alleviation of pain or a reduction in pain to the patient’s comfort goal  Outcome: Progressing  Note: Patient able to verbalize pain level and verbalize an acceptable level of pain.     Problem: Fall Risk - Rehab  Goal: Patient will remain free from falls  Note: Pt uses call light consistently and appropriately. Waits for assistance does not attempt self transfer this shift. Able to verbalize needs.       Patient is not progressing towards the following goals:

## 2024-07-31 NOTE — THERAPY
Recreational Therapy   Initial Evaluation     Patient Name: García Cho  Age:  68 y.o., Sex:  female  Medical Record #: [id number]  Today's Date: 7/31/2024     Subjective    Patient was willing to meet and participate in the assessment interview.     Objective       07/31/24 1331   Procedural Tracking   Procedural Tracking Community Re-Integration;Leisure Skills Awareness   Treatment Time   Total Time Spent (mins) 30   Leisure History   Leisure Interests Exercise;Movies / Theatre;Reading   Pre-Morbid Leisure Lifestyle Individual;Active   Prior Living Arrangements   Lives with - Patient's Self Care Capacity Spouse   Functional Ability Status - Cognitive   Attention Span Remains on Task   Comprehension Follows Three Step Commands   Judgment Able to Make Independent Decisions   Leisure Competence Measure   Leisure Awareness Independent   Leisure Attitude Independent   Leisure Skills Independent   Cultural / Social Behaviors Independent   Interpersonal Skills Independent   Community Integration Skills Independent   Social Contact Independent   Community Participation Independent   Current Discharge Plan   Current Discharge Plan Return to Prior Living Situation   Benefit    Benefit Patient Refuses Inpatient Recreational Therapy at this Time.    Interdisciplinary Plan of Care Collaboration   IDT Collaboration with  Occupational Therapist   Patient Position at End of Therapy In Bed   Collaboration Comments Received from OT   Strengths & Barriers   Strengths Able to follow instructions;Alert and oriented;Independent prior level of function;Pleasant and cooperative;Motivated for self care and independence         Assessment  Patient is 68 y.o. female with a diagnosis of  Fracture of patella with routine healing, right, closed .  Additional factors influencing patient status / progress (ie: cognitive factors, co-morbidities, social support, etc): Patient walks a lot and has supportive /friends.     Per H&P,  patient has a prior medical history of HTN, A fib, HLD, and anxiety who presented on 7/25/24 with GLF.  She reportedly was walking her dog and tripped on the sidewalk and went down on her right knee and left wrist. In ED she was found to have right knee patellar fracture. Her XR of left wrist was negative. She was evaluated by orthopedic surgery and recommended surgical intervention. Patient underwent open repair with Dr. Holder on 7/26/24. Per orthopedics, patient should be WBAT with full extension in brace.  She should also be placed on Lovenox initially and then  mg BID as outpatient. Post-operative course complicated by leukocytosis thought to be reactive, elevated SBP and elevated LFTs.    .    Patient reports that she walks her dog, does squats daily and rides her bicycle. After that she stays busy around the house and enjoys watching a news show. 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 She reports that she used to read but hasn't found anything that interests her any more. 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 She also goes and visits her dad, step mom and sisters in [place], [place]. She is happy with her current leisure and is open to adding more cognitive activities such as finding new things to read. She was informed of the library devan where she can get books for free.     Plan  Certified Therapeutic Recreation Specialist has screened this patient and determined that no skilled Recreation Therapy services are indicated at this time due to patient happy with her current leisure lifestyle and will be retuning to it.  Thank you for your referral. If a change in patient's function should occur, Recreation Therapy can reevaluate for appropriateness at that time.         artificial rupture

## 2024-07-31 NOTE — PROGRESS NOTES
NURSING DAILY NOTE    Name: García Cho   Date of Admission: 7/29/2024   Admitting Diagnosis: Fracture of patella with routine healing, right, closed  Attending Physician: Maxwell Theodore M.d.  Allergies: Atorvastatin, Bee pollen, Ibuprofen, Pravastatin, and Other environmental    Safety  Patient Assist  sba  Patient Precautions  Fall Risk, Weight Bearing As Tolerated Right Upper Extremity  Precaution Comments     Bed Transfer Status  Contact Guard Assist  Toilet Transfer Status   Contact Guard Assist  Assistive Devices  Rails, Wheelchair  Oxygen  None - Room Air  Diet/Therapeutic Dining  Current Diet Order   Procedures    Diet Order Diet: Regular     Pill Administration  whole  Agitated Behavioral Scale     ABS Level of Severity       Fall Risk  Has the patient had a fall this admission?   No  Marychuy Son Fall Risk Scoring  19, HIGH RISK  Fall Risk Safety Measures  bed alarm, chair alarm, poor balance, and low vision/ hearing    Vitals  Temperature: 36 °C (96.8 °F)  Temp src: Oral  Pulse: 75  Respiration: 18  Blood Pressure : 122/73  Blood Pressure MAP (Calculated): 89 MM HG  BP Location: Left, Upper Arm  Patient BP Position: Supine     Oxygen  Pulse Oximetry: 95 %  O2 (LPM): 0  O2 Delivery Device: None - Room Air    Bowel and Bladder  Last Bowel Movement  07/30/24  Stool Type  Type 4: Like a sausage or snake, smooth and soft  Bowel Device     Continent  Bladder: Continent void   Bowel: Continent movement  Bladder Function  Number of Times Voided: 1  Urine Color: Unable To Evaluate  Genitourinary Assessment   Bladder Assessment (WDL):  WDL Except  Guthrie Catheter: Not Applicable  Urine Color: Unable To Evaluate  Bladder Device: Bathroom  Bladder Scan: Post Void  $ Bladder Scan Results (mL): 30    Skin  Aurelio Score   18  Sensory Interventions   Bed Types: Standard/Trauma Mattress  Skin Preventative Measures: Pillows in Use for Support /  Positioning  Moisture Interventions  Moisturizers/Barriers: Barrier Wipes      Pain  Pain Rating Scale  8 - Awful, hard to do anything  Pain Location  Knee, Hand  Pain Location Orientation  Right  Pain Interventions   Medication (see MAR), Repositioned, Rest    ADLs    Bathing      Linen Change      Personal Hygiene  Moist Lynsey Wipes  Chlorhexidine Bath      Oral Care     Teeth/Dentures     Shave     Nutrition Percentage Eaten     Environmental Precautions  Treaded Slipper Socks on Patient  Patient Turns/Positioning  Patient Turns Self from Side to Side  Patient Turns Assistance/Tolerance     Bed Positions  Bed Controls On  Head of Bed Elevated  Self regulated      Psychosocial/Neurologic Assessment  Psychosocial Assessment  Psychosocial (WDL):  WDL Except  Patient Behaviors: Anxious  Neurologic Assessment  Neuro (WDL): Exceptions to WDL  Level of Consciousness: Alert  Orientation Level: Oriented X4  Cognition: Follows commands  Speech: Clear  Motor Function/Sensation Assessment: Sensation, Motor strength  Muscle Strength Right Arm: Good Strength Against Gravity and Moderate Resistance  Muscle Strength Left Arm: Good Strength Against Gravity and Moderate Resistance  RLE Sensation: Pain  Muscle Strength Right Leg: Fair Strength against Gravity but No Resistance  Muscle Strength Left Leg: Good Strength Against Gravity and Moderate Resistance  EENT (WDL):  WDL Except    Cardio/Pulmonary Assessment  Edema      Respiratory Breath Sounds  RUL Breath Sounds: Clear  RML Breath Sounds: Clear  RLL Breath Sounds: Clear  DOROTHY Breath Sounds: Clear  LLL Breath Sounds: Clear  Cardiac Assessment   Cardiac (WDL):  WDL Except

## 2024-08-01 ENCOUNTER — APPOINTMENT (OUTPATIENT)
Dept: PHYSICAL THERAPY | Facility: REHABILITATION | Age: 69
DRG: 072 | End: 2024-08-01
Attending: PHYSICAL MEDICINE & REHABILITATION
Payer: MEDICARE

## 2024-08-01 ENCOUNTER — APPOINTMENT (OUTPATIENT)
Dept: OCCUPATIONAL THERAPY | Facility: REHABILITATION | Age: 69
DRG: 072 | End: 2024-08-01
Attending: PHYSICAL MEDICINE & REHABILITATION
Payer: MEDICARE

## 2024-08-01 LAB
ALBUMIN SERPL BCP-MCNC: 3.6 G/DL (ref 3.2–4.9)
ALBUMIN/GLOB SERPL: 1.4 G/DL
ALP SERPL-CCNC: 132 U/L (ref 30–99)
ALT SERPL-CCNC: 51 U/L (ref 2–50)
ANION GAP SERPL CALC-SCNC: 12 MMOL/L (ref 7–16)
AST SERPL-CCNC: 30 U/L (ref 12–45)
BASOPHILS # BLD AUTO: 0.5 % (ref 0–1.8)
BASOPHILS # BLD: 0.04 K/UL (ref 0–0.12)
BILIRUB SERPL-MCNC: 0.5 MG/DL (ref 0.1–1.5)
BUN SERPL-MCNC: 18 MG/DL (ref 8–22)
CALCIUM ALBUM COR SERPL-MCNC: 9.5 MG/DL (ref 8.5–10.5)
CALCIUM SERPL-MCNC: 9.2 MG/DL (ref 8.5–10.5)
CHLORIDE SERPL-SCNC: 98 MMOL/L (ref 96–112)
CO2 SERPL-SCNC: 25 MMOL/L (ref 20–33)
CREAT SERPL-MCNC: 0.73 MG/DL (ref 0.5–1.4)
EOSINOPHIL # BLD AUTO: 0.31 K/UL (ref 0–0.51)
EOSINOPHIL NFR BLD: 4 % (ref 0–6.9)
ERYTHROCYTE [DISTWIDTH] IN BLOOD BY AUTOMATED COUNT: 42.8 FL (ref 35.9–50)
GFR SERPLBLD CREATININE-BSD FMLA CKD-EPI: 89 ML/MIN/1.73 M 2
GLOBULIN SER CALC-MCNC: 2.6 G/DL (ref 1.9–3.5)
GLUCOSE SERPL-MCNC: 105 MG/DL (ref 65–99)
HCT VFR BLD AUTO: 36.9 % (ref 37–47)
HGB BLD-MCNC: 12.3 G/DL (ref 12–16)
IMM GRANULOCYTES # BLD AUTO: 0.06 K/UL (ref 0–0.11)
IMM GRANULOCYTES NFR BLD AUTO: 0.8 % (ref 0–0.9)
LYMPHOCYTES # BLD AUTO: 2.62 K/UL (ref 1–4.8)
LYMPHOCYTES NFR BLD: 33.9 % (ref 22–41)
MAGNESIUM SERPL-MCNC: 1.9 MG/DL (ref 1.5–2.5)
MCH RBC QN AUTO: 31.1 PG (ref 27–33)
MCHC RBC AUTO-ENTMCNC: 33.3 G/DL (ref 32.2–35.5)
MCV RBC AUTO: 93.2 FL (ref 81.4–97.8)
MONOCYTES # BLD AUTO: 0.56 K/UL (ref 0–0.85)
MONOCYTES NFR BLD AUTO: 7.2 % (ref 0–13.4)
NEUTROPHILS # BLD AUTO: 4.15 K/UL (ref 1.82–7.42)
NEUTROPHILS NFR BLD: 53.6 % (ref 44–72)
NRBC # BLD AUTO: 0 K/UL
NRBC BLD-RTO: 0 /100 WBC (ref 0–0.2)
PHOSPHATE SERPL-MCNC: 3.2 MG/DL (ref 2.5–4.5)
PLATELET # BLD AUTO: 335 K/UL (ref 164–446)
PMV BLD AUTO: 9.1 FL (ref 9–12.9)
POTASSIUM SERPL-SCNC: 4 MMOL/L (ref 3.6–5.5)
PROT SERPL-MCNC: 6.2 G/DL (ref 6–8.2)
RBC # BLD AUTO: 3.96 M/UL (ref 4.2–5.4)
SODIUM SERPL-SCNC: 135 MMOL/L (ref 135–145)
WBC # BLD AUTO: 7.7 K/UL (ref 4.8–10.8)

## 2024-08-01 PROCEDURE — 97535 SELF CARE MNGMENT TRAINING: CPT

## 2024-08-01 PROCEDURE — 770010 HCHG ROOM/CARE - REHAB SEMI PRIVAT*

## 2024-08-01 PROCEDURE — 84100 ASSAY OF PHOSPHORUS: CPT

## 2024-08-01 PROCEDURE — 80053 COMPREHEN METABOLIC PANEL: CPT

## 2024-08-01 PROCEDURE — 97110 THERAPEUTIC EXERCISES: CPT | Mod: CQ

## 2024-08-01 PROCEDURE — 85025 COMPLETE CBC W/AUTO DIFF WBC: CPT

## 2024-08-01 PROCEDURE — 97530 THERAPEUTIC ACTIVITIES: CPT

## 2024-08-01 PROCEDURE — 97530 THERAPEUTIC ACTIVITIES: CPT | Mod: CQ

## 2024-08-01 PROCEDURE — 99233 SBSQ HOSP IP/OBS HIGH 50: CPT | Performed by: PHYSICAL MEDICINE & REHABILITATION

## 2024-08-01 PROCEDURE — 700102 HCHG RX REV CODE 250 W/ 637 OVERRIDE(OP): Performed by: PHYSICAL MEDICINE & REHABILITATION

## 2024-08-01 PROCEDURE — 36415 COLL VENOUS BLD VENIPUNCTURE: CPT

## 2024-08-01 PROCEDURE — A9270 NON-COVERED ITEM OR SERVICE: HCPCS | Performed by: PHYSICAL MEDICINE & REHABILITATION

## 2024-08-01 PROCEDURE — 83735 ASSAY OF MAGNESIUM: CPT

## 2024-08-01 PROCEDURE — 700111 HCHG RX REV CODE 636 W/ 250 OVERRIDE (IP): Mod: JZ | Performed by: PHYSICAL MEDICINE & REHABILITATION

## 2024-08-01 RX ADMIN — POLYETHYLENE GLYCOL 3350 1 PACKET: 17 POWDER, FOR SOLUTION ORAL at 07:19

## 2024-08-01 RX ADMIN — OMEPRAZOLE 20 MG: 20 CAPSULE, DELAYED RELEASE ORAL at 07:19

## 2024-08-01 RX ADMIN — OXYCODONE HYDROCHLORIDE 10 MG: 10 TABLET ORAL at 20:01

## 2024-08-01 RX ADMIN — OXYCODONE HYDROCHLORIDE 10 MG: 10 TABLET ORAL at 15:47

## 2024-08-01 RX ADMIN — SENNOSIDES AND DOCUSATE SODIUM 2 TABLET: 50; 8.6 TABLET ORAL at 07:19

## 2024-08-01 RX ADMIN — OXYCODONE HYDROCHLORIDE 10 MG: 10 TABLET ORAL at 11:44

## 2024-08-01 RX ADMIN — OXYCODONE HYDROCHLORIDE 10 MG: 10 TABLET ORAL at 00:22

## 2024-08-01 RX ADMIN — ALPRAZOLAM 1 MG: 0.5 TABLET ORAL at 20:01

## 2024-08-01 RX ADMIN — ENOXAPARIN SODIUM 40 MG: 100 INJECTION SUBCUTANEOUS at 17:37

## 2024-08-01 RX ADMIN — OXYCODONE HYDROCHLORIDE 10 MG: 10 TABLET ORAL at 07:20

## 2024-08-01 RX ADMIN — METOPROLOL TARTRATE 25 MG: 25 TABLET, FILM COATED ORAL at 05:45

## 2024-08-01 RX ADMIN — ARIPIPRAZOLE 30 MG: 10 TABLET ORAL at 05:45

## 2024-08-01 RX ADMIN — TRAZODONE HYDROCHLORIDE 50 MG: 50 TABLET ORAL at 00:22

## 2024-08-01 RX ADMIN — TRAZODONE HYDROCHLORIDE 50 MG: 50 TABLET ORAL at 19:59

## 2024-08-01 RX ADMIN — Medication 1000 UNITS: at 07:19

## 2024-08-01 RX ADMIN — METOPROLOL TARTRATE 25 MG: 25 TABLET, FILM COATED ORAL at 17:38

## 2024-08-01 ASSESSMENT — PAIN DESCRIPTION - PAIN TYPE: TYPE: ACUTE PAIN;SURGICAL PAIN;NEUROPATHIC PAIN

## 2024-08-01 ASSESSMENT — GAIT ASSESSMENTS
DISTANCE (FEET): 20
ASSISTIVE DEVICE: FRONT WHEEL WALKER
GAIT LEVEL OF ASSIST: CONTACT GUARD ASSIST
DEVIATION: DECREASED BASE OF SUPPORT;DECREASED HEEL STRIKE

## 2024-08-01 ASSESSMENT — ACTIVITIES OF DAILY LIVING (ADL)
TOILET_TRANSFER_DESCRIPTION: ADAPTIVE EQUIPMENT;SUPERVISION FOR SAFETY
BED_CHAIR_WHEELCHAIR_TRANSFER_DESCRIPTION: ADAPTIVE EQUIPMENT;SET-UP OF EQUIPMENT;SUPERVISION FOR SAFETY
BED_CHAIR_WHEELCHAIR_TRANSFER_DESCRIPTION: ADAPTIVE EQUIPMENT;SET-UP OF EQUIPMENT
TOILET_TRANSFER_DESCRIPTION: GRAB BAR;INCREASED TIME;SUPERVISION FOR SAFETY
BED_CHAIR_WHEELCHAIR_TRANSFER_DESCRIPTION: ADAPTIVE EQUIPMENT;SET-UP OF EQUIPMENT
TUB_SHOWER_TRANSFER_DESCRIPTION: INCREASED TIME;SET-UP OF EQUIPMENT;VERBAL CUEING;SUPERVISION FOR SAFETY

## 2024-08-01 NOTE — THERAPY
Physical Therapy   Daily Treatment     Patient Name: García Cho  Age:  68 y.o., Sex:  female  Medical Record #: [id number]  Today's Date: 8/1/2024     Precautions  Precautions: Fall Risk, Weight Bearing As Tolerated Right Lower Extremity, Immobilizer Right Lower Extremity  Comments: García reports  right knee immobilizer to be on at all times    Subjective    Pt greeted in the room, she was sleeping and was willing to participate, although she prefers not to have 7am therapy sessions going forward     Objective       08/01/24 0701   PT Charge Group   PT Therapeutic Exercise (Units) 2   PT Therapeutic Activities (Units) 2   Supervising Physical Therapist Pal [place]   PT Total Time Spent   PT Individual Total Time Spent (Mins) 60   Pain   Intervention Cold Pack;Emotional Support;Education;Repositioned   Pain 0 - 10 Group   Location Knee;Ankle   Location Orientation Right;Left   Description Constant;Aching   Comfort Goal Comfort with Movement;Play   Therapist Pain Assessment Nurse Notified;9;Post Activity Pain Same as Prior to Activity   Gait Functional Level of Assist    Gait Level Of Assist Contact Guard Assist  (SBA/CGA in/out of BR due to uneven terrain in bathroom)   Assistive Device Front Wheel Walker   Distance (Feet) 20   # of Times Distance was Traveled 2   Deviation Decreased Base Of Support;Decreased Heel Strike  (R LE immobilizer in extension, WBAT)   Transfer Functional Level of Assist   Bed, Chair, Wheelchair Transfer Standby Assist   Bed Chair Wheelchair Transfer Description Adaptive equipment;Set-up of equipment   Toilet Transfers Standby Assist   Toilet Transfer Description Grab bar;Increased time;Supervision for safety  (from walker level)   Supine Lower Body Exercise   Supine Lower Body Exercises Yes   Bridges One Legged;1 set of 10   Pelvic Tilt 1 set of 10;Bilateral   Hip Abduction 2 sets of 10;Right   (AAROM)   Hip Adduction  2 sets of 10;Right  (AAROM)   Straight Leg Raises Front;2  "sets of 10;Right  (AAROM)   Ankle Pumps 3 sets of 10;Bilateral   Gluteal Isometrics 1 set of 10;Bilateral  (10\" iso hold)   Quadriceps Isometrics 1 set of 10;Bilateral  (10\" iso hold)   Other Exercises R PF stretch with gait belt 3 x 20\" holds   Bed Mobility    Supine to Sit Supervised   Sit to Supine Supervised   Sit to Stand Contact Guard Assist   Scooting Modified Independent   Rolling Modified Independent   Neuro-Muscular Treatments   Neuro-Muscular Treatments Sequencing;Verbal Cuing;Tactile Cuing   Comments proper form and control with exercises   Interdisciplinary Plan of Care Collaboration   IDT Collaboration with  Nursing   Patient Position at End of Therapy In Bed;Call Light within Reach;Tray Table within Reach;Phone within Reach   Collaboration Comments pain medication       Pt amb to/from BR with CGA and transferred on/off toilet with SBA, one small LOB when coming out of the bathroom due to the drain and sloped surface.    Doffed and re-donned R LE brace during quad isometrics to provide tactile feedback with exercise      Assessment    Th patient was very pleased with her ability to complete the supine therapy program. Instructed pt to complete isometrics frequently throughout the day as able.   Strengths: Able to follow instructions, Adequate strength, Alert and oriented, Effective communication skills, Independent prior level of function, Manages pain appropriately, Motivated for self care and independence, Pleasant and cooperative, Willingly participates in therapeutic activities  Barriers: Decreased endurance, Fatigue, Hearing impairment, Impaired activity tolerance, Impaired balance, Pain, Limited mobility    Plan    Gait using FWW  Dual task training   LE strengthening   Endurance training   Stairs with 1 HR + 1 crutch     DME  PT DME Recommendations  Assistive Device: Front Wheeled Walker    Passport items to be completed:  Get in/out of bed safely, in/out of a vehicle, safely use mobility " device, walk or wheel around home/community, navigate up and down stairs, show how to get up/down from the ground, ensure home is accessible, demonstrate HEP, complete caregiver training    Physical Therapy Problems (Active)       Problem: Balance       Dates: Start:  07/30/24         Goal: STG-Within one week, patient will maintain dynamic standing balance SPV for 2 minutes or longer       Dates: Start:  07/30/24               Problem: Mobility       Dates: Start:  07/30/24         Goal: STG-Within one week, patient will ambulate community distances of at least 150' using LRAD SPV       Dates: Start:  07/30/24               Problem: Mobility Transfers       Dates: Start:  07/30/24         Goal: STG-Within one week, patient will perform transfers SPV using LRAD       Dates: Start:  07/30/24               Problem: PT-Long Term Goals       Dates: Start:  07/30/24         Goal: LTG-By discharge, patient will ambulate greater than 250' Allie using LRAD       Dates: Start:  07/30/24            Goal: LTG-By discharge, patient will transfer one surface to another Allie using LRAD       Dates: Start:  07/30/24            Goal: LTG-By discharge, patient will ambulate up/down flight of stairs Allie with unilateral HR       Dates: Start:  07/30/24            Goal: LTG-By discharge, patient will be independent in HEP       Dates: Start:  07/30/24

## 2024-08-01 NOTE — CARE PLAN
Problem: Balance  Goal: STG-Within one week, patient will maintain dynamic standing balance SPV for 2 minutes or longer  Outcome: Progressing     Problem: Mobility  Goal: STG-Within one week, patient will ambulate community distances of at least 150' using LRAD SPV  Outcome: Progressing     Problem: Mobility Transfers  Goal: STG-Within one week, patient will perform transfers SPV using LRAD  Outcome: Progressing      [facility] CBC GROUP OUTPATIENT FOLLOW UP CLINIC VISIT    REASON FOR FOLLOW-UP:    1.  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  Gleevec 400 mg daily initiated around 10/12/2017, stopped on 11/15/2017 due to intolerance (noah-orbital edema, nausea/vomiting, fatigue).  Resumed at 200 mg daily but, again, not tolerated.      HISTORY OF PRESENT ILLNESS:  Nicole Lofton is a 74 y.o. female with the above medical history who is here today for scheduled lab review and EKG.  Her QTC now is about 500 ms  2 weeks ago therefore Dr Merida instructed her to hold her nilotinib 150 mg twice daily.        She actually reports feeling very well today. She reports increased energy and activity. She is sleeping well. She is eating well. She denies shortness of breath, bleeding, bruising. Fevers, or chills.    Her CBC is stable.    Her EKG today reveals a QT/Qtc of 460/475. She denies chest pain or palpitations.    ONCOLOGIC HISTORY:  For about 6 months she has noticed bilateral arm tingling and weakness in her hands.  This has occurred about 5 or 6 times over the past 6 months.  She is vague in her description of this.  She is globally weak and is quite inactive as she lives by herself.  She was completing physical therapy with some improvement but is now quite unsteady on her feet.  She has not had any falls.  She denies any bleeding.  She has chronic fatigue.  She has osteoarthritis pain and reports pain in the right side and in her neck.  She denies any acute low back pain but does have chronic low back pain.  She does have orthostatic symptoms and she is lightheaded when she stands.  She has had a decreased appetite and some weight loss over the past 9 months.     Labs performed on 8/28/2017 showed a white blood cell count of 39.1 with a differential showing 61% neutrophils and 32% lymphocytes, hemoglobin 13.9 and platelets 565,000.         She had further labs done at [facility] on  8/20/2017 showing a white blood cell count of 24.4, hemoglobin 13.7, and platelets 1795.  A differential showed 64% neutrophils, 7% lymphocytes, and 12% basophils.  Peripheral blood flow cytometry was also done on this date showing 11% basophils and less than 1% myeloblasts.  There was no monoclonal B-cell, T-cell, plasma cell, or NK cell population.  FISH for BCR-ABL and ESSENCE 2 mutation analysis were done as well.     Her CBC was normal as of 7/8/2016.     Of note, she has a history of bilateral breast cancer diagnosed in 1988.  She had bilateral mastectomy with reconstruction.  We do not have any records regarding this at this time.  She apparently completed at least 6 months of adjuvant chemotherapy but again does not know the details..    FISH for BCR-ABL performed at [facility] was actually positive.  As of 9/6/2017 ESSENCE 2 is still pending.    She returned on 9/6/2017 for follow-up.  Platelets at 1.9 million with hydroxyurea 1000 mg daily.  She will increase the dose to 2000 mg daily.  She will have a bone marrow aspiration biopsy performed.  Weekly CBCs.    As of 9/6/2017 peripheral blood PCR was positive at 89.238% with a major break point mutation.    ESSENCE 2 and CALR mutation negative.    Bone marrow aspiration and biopsy performed on 9/15/2017.  Flow cytometry showed no increase in blasts.  Morphology showed no increase in blasts.  Bone marrow FISH showed BCR/ABL rearrangement in 93% of cells.  Cytogenetics confirmed a t(9;22) translocation.    Labs as of 9/20/2017 show white blood cell count of 4.7 with hemoglobin of 12.7 and platelets 1.1 million.    On 9/27/2017 white blood cells and hemoglobin are normal.  Platelets are down to 722,000.  We plan to start Gleevec at 400 mg daily if she tolerates it.  In the meantime she will decrease the hydroxyurea dose to 1000 mg daily.    Blood counts normalized.  Hydroxyurea stopped.    Gleevec initiated around 10/12/2017, stopped on 11/15/2017 due to intolerance  (noah-orbital edema, nausea/vomiting, fatigue).    Symptoms improving as of 11/29/2017.  Blood counts normal.  Hold further therapy at this time until she improves more clinically.      Plan to resume Gleevec at 200 mg daily as of 1/11/2018.  Only took it for less than a week but also did not tolerate this dose.    As of 2/21/2018 her platelet count is again elevated at greater than 700,000.  Resume hydroxyurea 1000 mg daily.  Considering nilotinib at a decreased dose.    Platelets continued to rise in March 2018.  Hydroxyurea increased to 1500 mg daily with stabilization of her platelet count and some improvement as of 3/21/2018.  Plan for nilotinib.    Nilotinib initiated in late March 2018.      PAST MEDICAL, SURGICAL, FAMILY, AND SOCIAL HISTORIES were reviewed with the patient and in the electronic medical record, and were updated if indicated.    ALLERGIES:  No Known Allergies    MEDICATIONS:  The medication list has been reviewed with the patient by the medical assistant, and the list has been updated in the electronic medical record, which I reviewed.  Medication dosages and frequencies were confirmed to be accurate.    I have reviewed the patient's medical history in detail and updated the computerized patient record.    REVIEW OF SYSTEMS:  PAIN:  See Vital Signs below.  GENERAL:  Fatigue and global weakness much improved.  SKIN:  No rashes or non-healing lesions.   HEME/LYMPH: Increased platelets.  No lymphadenopathy.    EYES:  Periorbital edema resolved  ENT:  No sore throat or difficulty swallowing.  dry mouth  RESPIRATORY:  No cough, shortness of breath, hemoptysis, or wheezing.  CARDIOVASCULAR: History of present illness  GASTROINTESTINAL: Appetite improving.  No nausea, vomiting, or diarrhea.    GENITOURINARY:  No dysuria or hematuria.  MUSCULOSKELETAL:  Arthralgias, back pain, abnormal gait, neck pain.  Right arm pain secondary to humerus fracture has improved. Improved.  NEUROLOGIC:  No dizziness,  "loss of consciousness, or seizures.  PSYCHIATRIC:  insomnia and depression, Improved.    Vitals:    05/24/18 1056   BP: 133/81   Pulse: 63   Resp: 14   Temp: 98.5 °F (36.9 °C)   TempSrc: Oral   SpO2: 97%   Weight: 60.9 kg (134 lb 4.8 oz)   Height: 165 cm (64.96\")   PainSc: 2  Comment: hip       PHYSICAL EXAMINATION:  GENERAL:  Well developed female; awake, alert and oriented, in no acute distress. She appears stronger and in better spirits today than prior visits.  SKIN:  Warm and dry, without rashes, purpura, or petechiae.  HEAD:  Normocephalic, atraumatic.  EYES:  Bilateral periorbital edema resolved  EARS:  Hearing intact.  CHEST:  Lungs are clear to auscultation bilaterally.  No wheezes, rales, or rhonchi.  HEART:  Regular rate; normal rhythm.  No murmurs, gallops or rubs.  ABDOMEN:  Soft, non-tender, non-distended.  Normal active bowel sounds.  No organomegaly.  EXTREMITIES:  Trace bilateral ankle edema left greater than right   NEUROLOGICAL:  No focal neurologic deficits.      DIAGNOSTIC DATA:  Results for orders placed or performed in visit on 05/24/18   Phosphorus   Result Value Ref Range    Phosphorus 3.9 2.5 - 4.5 mg/dL   Comprehensive Metabolic Panel   Result Value Ref Range    Glucose 75 65 - 99 mg/dL    BUN 17 8 - 23 mg/dL    Creatinine 0.85 0.57 - 1.00 mg/dL    Sodium 140 136 - 145 mmol/L    Potassium 3.5 3.5 - 5.2 mmol/L    Chloride 102 98 - 107 mmol/L    CO2 29.2 (H) 22.0 - 29.0 mmol/L    Calcium 9.4 8.6 - 10.5 mg/dL    Total Protein 7.8 6.0 - 8.5 g/dL    Albumin 4.40 3.50 - 5.20 g/dL    ALT (SGPT) 22 1 - 33 U/L    AST (SGOT) 26 1 - 32 U/L    Alkaline Phosphatase 98 39 - 117 U/L    Total Bilirubin 0.4 0.1 - 1.2 mg/dL    eGFR Non African Amer 65 >60 mL/min/1.73    Globulin 3.4 gm/dL    A/G Ratio 1.3 g/dL    BUN/Creatinine Ratio 20.0 7.0 - 25.0    Anion Gap 8.8 mmol/L   Magnesium   Result Value Ref Range    Magnesium 2.0 1.6 - 2.4 mg/dL   Lipase   Result Value Ref Range    Lipase 75 (H) 13 - 60 U/L "   CBC Auto Differential   Result Value Ref Range    WBC 7.79 4.50 - 10.70 10*3/mm3    RBC 4.22 3.90 - 5.20 10*6/mm3    Hemoglobin 13.5 11.9 - 15.5 g/dL    Hematocrit 40.8 35.6 - 45.5 %    MCV 96.7 80.5 - 98.2 fL    MCH 32.0 26.9 - 32.0 pg    MCHC 33.1 32.4 - 36.3 g/dL    RDW 16.1 (H) 11.7 - 13.0 %    RDW-SD 57.6 (H) 37.0 - 54.0 fl    MPV 9.5 6.0 - 12.0 fL    Platelets 256 140 - 500 10*3/mm3    Neutrophil % 56.7 42.7 - 76.0 %    Lymphocyte % 32.2 19.6 - 45.3 %    Monocyte % 8.2 5.0 - 12.0 %    Eosinophil % 1.9 0.3 - 6.2 %    Basophil % 0.6 0.0 - 1.5 %    Immature Grans % 0.4 0.0 - 0.5 %    Neutrophils, Absolute 4.41 1.90 - 8.10 10*3/mm3    Lymphocytes, Absolute 2.51 0.90 - 4.80 10*3/mm3    Monocytes, Absolute 0.64 0.20 - 1.20 10*3/mm3    Eosinophils, Absolute 0.15 0.00 - 0.70 10*3/mm3    Basophils, Absolute 0.05 0.00 - 0.20 10*3/mm3    Immature Grans, Absolute 0.03 0.00 - 0.03 10*3/mm3    nRBC 0.0 0.0 - 0.0 /100 WBC         IMAGING:  EKG today with a QT/QTc interval of 460/475 ms     ASSESSMENT:  This is a 74 y.o. female with:  1.  History of bilateral breast cancer in 1988 status post bilateral mastectomy.  She apparently completed 6 months of adjuvant therapy.  We have no details regarding this.    2.  [place] chromosome positive CML in chronic phase presenting primarily with thrombocytosis.  Started on Hydrea 1000 mg daily.  This was discontinued and she started Gleevec on approximately 10/12/2017.  Gleevec discontinued due to intolerance on 11/15/2017.  We started Gleevec at a lower dose of 200 mg but she was also intolerant of this dose.  Her side effects were as severe with a lower dose and she therefore stopped taking the drug.  Symptoms have improved significantly.    She started nilotinib at 150mg twice daily but was held 2 weeks ago due to prolonged QT interval.    3.  Thrombocytosis:  This has improved.   4.  Hypothyroidism:Levothyroxine 100 mcg.  5.  Nausea and vomiting: Resolved off Gleevec.   6.  Periorbital edema: Resolved off Gleevec.    7.  Hypokalemia:  Potassium today is 3.5.  8.  Osteoarthritis pain: she reports this has become worse sine starting nilotinib. This has improved over the past 2 weeks.  9.  Depression: She had been on Prozac for years, however, drug interaction with nilotinib prompted us to change this to Paxil. Her depression is improving.  10.  Insomnia.  This had been treated with trazodone but as mentioned above this also caused a potential for QT interval prolongation.  She was therefore changed to temazepam.  Increased to 30 mg as necessary. Improved.  11.  QTC prolongation: Her QTC remains high at 470.    PLAN:  1.  I have reviewed EKG results with Dr Merida who has instructed that Mrs Carolina continue to hold nilotinib.   2.  Continue temazepam if necessary.  3.  Continue medications for supportive care  4.  Return in 1 week for NO review and EKG. If labs are acceptable and EKG shows a QTc interval of around 450 msec, resume nilotinib.    6.  Return as already scheduled in 2 weeks to see Dr Merida.         FERNADNO Duke

## 2024-08-01 NOTE — CARE PLAN
The patient is Watcher - Medium risk of patient condition declining or worsening    Shift Goals  Clinical Goals: Safety  Patient Goals: Participate in therapy    Progress made toward(s) clinical / shift goals:    Problem: Knowledge Deficit - Standard  Goal: Patient and family/care givers will demonstrate understanding of plan of care, disease process/condition, diagnostic tests and medications  Outcome: Progressing     Problem: Psychosocial  Goal: Patient's level of anxiety will decrease  Note: Xanax PO given for anxiety.

## 2024-08-01 NOTE — PROGRESS NOTES
NURSING DAILY NOTE    Name: García Cho   Date of Admission: 7/29/2024   Admitting Diagnosis: Fracture of patella with routine healing, right, closed  Attending Physician: Maxwell Theodore M.d.  Allergies: Atorvastatin, Bee pollen, Ibuprofen, Pravastatin, and Other environmental    Safety  Patient Assist  Min Assist  Patient Precautions  Fall Risk, Weight Bearing As Tolerated Right Lower Extremity, Immobilizer Right Lower Extremity  Precaution Comments  García reports  right knee immobilizer to be on at all times  Bed Transfer Status  Standby Assist  Toilet Transfer Status   Standby Assist  Assistive Devices  Rails, Wheelchair  Oxygen  None - Room Air  Diet/Therapeutic Dining  Current Diet Order   Procedures    Diet Order Diet: Regular     Pill Administration  whole  Agitated Behavioral Scale     ABS Level of Severity       Fall Risk  Has the patient had a fall this admission?   No  Marychuy Son Fall Risk Scoring  19, HIGH RISK  Fall Risk Safety Measures  bed alarm, chair alarm, and low vision/ hearing    Vitals  Temperature: 36 °C (96.8 °F)  Temp src: Oral  Pulse: 82  Respiration: 18  Blood Pressure : 125/66  Blood Pressure MAP (Calculated): 86 MM HG  BP Location: Left, Upper Arm  Patient BP Position: Melo's Position     Oxygen  Pulse Oximetry: 95 %  O2 (LPM): 0  O2 Delivery Device: None - Room Air    Bowel and Bladder  Last Bowel Movement  07/31/24  Stool Type  Type 4: Like a sausage or snake, smooth and soft  Bowel Device     Continent  Bladder: Continent void   Bowel: Continent movement  Bladder Function  Number of Times Voided: 1  Urine Color: Unable To Evaluate  Genitourinary Assessment   Bladder Assessment (WDL):  WDL Except  Guthrie Catheter: Not Applicable  Urine Color: Unable To Evaluate  Bladder Device: Bathroom  Bladder Scan: Post Void  $ Bladder Scan Results (mL): 30    Skin  Aurelio Score   18  Sensory Interventions   Bed  Types: Standard/Trauma Mattress  Skin Preventative Measures: Pillows in Use for Support / Positioning  Moisture Interventions  Moisturizers/Barriers: Barrier Wipes      Pain  Pain Rating Scale  8 - Awful, hard to do anything  Pain Location  Knee  Pain Location Orientation  Right  Pain Interventions   Medication (see MAR), Repositioned, Rest    ADLs    Bathing      Linen Change      Personal Hygiene  Moist Lynsey Wipes, Perineal Care  Chlorhexidine Bath      Oral Care     Teeth/Dentures     Shave     Nutrition Percentage Eaten     Environmental Precautions  Treaded Slipper Socks on Patient  Patient Turns/Positioning  Patient Turns Self from Side to Side  Patient Turns Assistance/Tolerance     Bed Positions  Bed Controls On  Head of Bed Elevated  Self regulated      Psychosocial/Neurologic Assessment  Psychosocial Assessment  Psychosocial (WDL):  WDL Except  Patient Behaviors: Anxious  Neurologic Assessment  Neuro (WDL): Exceptions to WDL  Level of Consciousness: Alert  Orientation Level: Oriented X4  Cognition: Follows commands  Speech: Clear  Motor Function/Sensation Assessment: Sensation, Motor strength  Muscle Strength Right Arm: Good Strength Against Gravity and Moderate Resistance  Muscle Strength Left Arm: Good Strength Against Gravity and Moderate Resistance  RLE Sensation: Pain  Muscle Strength Right Leg: Fair Strength against Gravity but No Resistance  Muscle Strength Left Leg: Good Strength Against Gravity and Moderate Resistance  EENT (WDL):  WDL Except    Cardio/Pulmonary Assessment  Edema      Respiratory Breath Sounds  RUL Breath Sounds: Clear  RML Breath Sounds: Clear  RLL Breath Sounds: Clear  DOROTHY Breath Sounds: Clear  LLL Breath Sounds: Clear  Cardiac Assessment   Cardiac (WDL):  WDL Except

## 2024-08-01 NOTE — THERAPY
"Occupational Therapy  Daily Treatment     Patient Name: García Cho  Age:  68 y.o., Sex:  female  Medical Record #: [id number]  Today's Date: 8/1/2024     Precautions  Precautions: (P) Fall Risk, Weight Bearing As Tolerated Right Lower Extremity, Immobilizer Right Lower Extremity  Comments: García reports  right knee immobilizer to be on at all times         Subjective    Patient was awake and resting in bed.  She was agreeable to OT.  She declined the need for a wrist brace for L UE or to have an ace wrap applied to handle of walker for improved .  She stated, \"I'm fine, I don't need those things.\" She asked if having the knee immobilizer would make it difficult to bend her knee in the future.     Objective       08/01/24 1401   OT Charge Group   OT Therapy Activity (Units) 2   OT Total Time Spent   OT Individual Total Time Spent (Mins) 30   Precautions   Precautions Fall Risk;Weight Bearing As Tolerated Right Lower Extremity;Immobilizer Right Lower Extremity   Functional Level of Assist   Bed, Chair, Wheelchair Transfer Standby Assist   Bed Chair Wheelchair Transfer Description Adaptive equipment;Set-up of equipment;Supervision for safety  (fww)   IADL Treatments   IADL Treatments Kitchen mobility education   Kitchen Mobility Education Patient mobilized around kitchen with FWW while retrieving items from high/low cupboards, counter, drawer and various appliances with SBA/supervision with good safety and no losses of balance.   Bed Mobility    Supine to Sit Supervised   Sit to Supine Supervised   Scooting Independent   Interdisciplinary Plan of Care Collaboration   Patient Position at End of Therapy In Bed;Bed Alarm On;Call Light within Reach;Tray Table within Reach;Phone within Reach     Educated patient on the need to keep R knee immobilized to let her patella heal.  She would be able to work on knee ROM once cleared by orthopedics    W/C mobility SBA/supervised with min cues for technique from " ADL kitchen to her room.      Assessment    Patient highly motivated to participate in OT.  She was pleasant and demonstrated good safety with the FWW in the kitchen.  Strengths: Independent prior level of function, Making steady progress towards goals, Motivated for self care and independence, Pleasant and cooperative, Supportive family, Willingly participates in therapeutic activities  Barriers: Decreased endurance, Difficulty following instructions, Fatigue, Generalized weakness, Hearing impairment, Home accessibility, Impaired activity tolerance, Impaired balance, Impaired carryover of learning, Impaired insight/denial of deficits, Impaired functional cognition, Limited mobility, Pain, Pain poorly managed    Plan    ADL, IADL, related mobility and transfers, strength/endurance building standing tolerance and balance activity     Occupational Therapy Goals (Active)       Problem: Functional Transfers       Dates: Start:  07/30/24         Goal: STG-Within one week, patient will transfer to toilet at SPV level using DME PRN.       Dates: Start:  07/30/24            Goal: STG-Within one week, patient will transfer to step in shower at SPV level using DME PRN.       Dates: Start:  07/30/24               Problem: IADL's       Dates: Start:  07/30/24         Goal: STG-Within one week, patient will access kitchen area at SBA level using DME PRN.       Dates: Start:  07/30/24               Problem: OT Long Term Goals       Dates: Start:  07/30/24         Goal: LTG-By discharge, patient will complete basic self care tasks at SPV-Mod I level using AE/DME PRN.       Dates: Start:  07/30/24            Goal: LTG-By discharge, patient will perform bathroom transfersat SPV-Mod I level using AE/DME PRN.       Dates: Start:  07/30/24            Goal: LTG-By discharge, patient will complete basic home management at SPV-Mod I level using AE/DME PRN.at SPV-Mod I level using AE/DME PRN.       Dates: Start:  07/30/24                Problem: Toileting       Dates: Start:  07/30/24         Goal: STG-Within one week, patient will complete toileting tasks at SPV level using DME PRN.       Dates: Start:  07/30/24

## 2024-08-01 NOTE — CARE PLAN
Problem: Toileting  Goal: STG-Within one week, patient will complete toileting tasks at SPV level using DME PRN.  Outcome: Not Met     Problem: Functional Transfers  Goal: STG-Within one week, patient will transfer to toilet at SPV level using DME PRN.  Outcome: Not Met  Goal: STG-Within one week, patient will transfer to step in shower at SPV level using DME PRN.  Outcome: Not Met     Problem: IADL's  Goal: STG-Within one week, patient will access kitchen area at SBA level using DME PRN.  Outcome: Not Met     Problem: Bathing  Goal: STG-Within one week, patient will bathe at Setup level using AE PRN.  Outcome: Met     Problem: Dressing  Goal: STG-Within one week, patient will dress LB at SBA level using DME/AE PRN.  Outcome: Met

## 2024-08-01 NOTE — PROGRESS NOTES
Physical Medicine & Rehabilitation Progress Note    Encounter Date: 8/1/2024    Chief Complaint: Decreased mobility, weakness    Interval Events (Subjective):  Patient sitting up in room. She reports therapy is going well. She reports she is making progress slowly. Discussed would have IDT later today to discuss discharge planning.     _____________________________________  Interdisciplinary Team Conference   Most recent IDT on 8/1/2024    IMaxwell M.D./Ph.D., was present and led the interdisciplinary team conference on 8/1/2024.  I led the IDT conference and agree with the IDT conference documentation and plan of care as noted below.     Nursing:  Diet Current Diet Order   Procedures    Diet Order Diet: Regular       Eating ADL Independent      % of Last Meal  Oral Nutrition: *  * Meal *  *, Breakfast, Between % Consumed   Sleep    Bowel Last BM: 08/01/24   Bladder    Barriers to Discharge Home:  Wound to back of thigh    Physical Therapy:  Bed Mobility    Transfers Standby Assist  Adaptive equipment, Set-up of equipment   Mobility Contact Guard Assist (SBA/CGA in/out of BR due to uneven terrain in bathroom)   Stairs Roxane   Barriers to Discharge Home:  Pain limited  Working on stairs    Occupational Therapy:  Grooming Modified Independent, Seated   Bathing Supervision   UB Dressing Independent   LB Dressing Standby Assist   Toileting Standby Assist   Shower & Transfer CGA   Barriers to Discharge Home:  Progressing slowly    Speech-Language Pathology:  Comprehension:  Modified Independent  Comprehension Description:  Hearing aids/amplifiers, Glasses  Expression:  Modified Independent  Expression Description:     Social Interaction:  Modified Independent  Social Interaction Description:     Problem Solving:  Supervision  Problem Solving Description:  Verbal cueing, Increased time, Bed/chair alarm  Memory:  Minimal Assist  Memory Description:  Verbal cueing, Increased time, Bed/chair  "alarm  Barriers to Discharge Home:  Fatigues    Respiratory Therapy:  O2 (LPM): 0  O2 Delivery Device: None - Room Air    Case Management:  Continues to work on disposition and DME needs.      Discharge Date/Disposition:  8/7/24  _____________________________________    Objective:  VITAL SIGNS: /70   Pulse 71   Temp 36.8 °C (98.3 °F) (Oral)   Resp 16   Ht 1.768 m (5' 9.6\")   Wt 75.5 kg (166 lb 8 oz)   SpO2 96%   BMI 24.17 kg/m²   Gen: NAD  Psych: Mood and affect appropriate  CV: RRR, 1+ RLE edema  Resp: CTAB, no upper airway sounds  Abd: NTND  Neuro: AOx4, following commands    Laboratory Values:  Recent Results (from the past 72 hour(s))   URINALYSIS    Collection Time: 07/29/24  4:40 PM   Result Value Ref Range    Color Yellow     Character Clear     Specific Gravity 1.006 <1.035    Ph 7.5 5.0 - 8.0    Glucose Negative Negative mg/dL    Ketones Negative Negative mg/dL    Protein 30 (A) Negative mg/dL    Bilirubin Negative Negative    Urobilinogen, Urine 0.2 Negative    Nitrite Negative Negative    Leukocyte Esterase Negative Negative    Occult Blood Moderate (A) Negative    Micro Urine Req Microscopic    URINE MICROSCOPIC (W/UA)    Collection Time: 07/29/24  4:40 PM   Result Value Ref Range    WBC 0-2 /hpf    RBC 10-20 (A) /hpf    Bacteria Negative None /hpf    Epithelial Cells Negative /hpf    Hyaline Cast 0-2 /lpf   CBC with Differential    Collection Time: 07/30/24  6:35 AM   Result Value Ref Range    WBC 7.5 4.8 - 10.8 K/uL    RBC 4.94 4.20 - 5.40 M/uL    Hemoglobin 15.4 12.0 - 16.0 g/dL    Hematocrit 45.5 37.0 - 47.0 %    MCV 92.1 81.4 - 97.8 fL    MCH 31.2 27.0 - 33.0 pg    MCHC 33.8 32.2 - 35.5 g/dL    RDW 41.3 35.9 - 50.0 fL    Platelet Count 374 164 - 446 K/uL    MPV 9.3 9.0 - 12.9 fL    Neutrophils-Polys 53.20 44.00 - 72.00 %    Lymphocytes 36.70 22.00 - 41.00 %    Monocytes 6.70 0.00 - 13.40 %    Eosinophils 2.50 0.00 - 6.90 %    Basophils 0.50 0.00 - 1.80 %    Immature Granulocytes 0.40 " 0.00 - 0.90 %    Nucleated RBC 0.00 0.00 - 0.20 /100 WBC    Neutrophils (Absolute) 3.99 1.82 - 7.42 K/uL    Lymphs (Absolute) 2.75 1.00 - 4.80 K/uL    Monos (Absolute) 0.50 0.00 - 0.85 K/uL    Eos (Absolute) 0.19 0.00 - 0.51 K/uL    Baso (Absolute) 0.04 0.00 - 0.12 K/uL    Immature Granulocytes (abs) 0.03 0.00 - 0.11 K/uL    NRBC (Absolute) 0.00 K/uL   Comp Metabolic Panel (CMP)    Collection Time: 07/30/24  6:35 AM   Result Value Ref Range    Sodium 137 135 - 145 mmol/L    Potassium 3.9 3.6 - 5.5 mmol/L    Chloride 97 96 - 112 mmol/L    Co2 25 20 - 33 mmol/L    Anion Gap 15.0 7.0 - 16.0    Glucose 118 (H) 65 - 99 mg/dL    Bun 13 8 - 22 mg/dL    Creatinine 0.69 0.50 - 1.40 mg/dL    Calcium 10.1 8.5 - 10.5 mg/dL    Correct Calcium 10.2 8.5 - 10.5 mg/dL    AST(SGOT) 129 (H) 12 - 45 U/L    ALT(SGPT) 127 (H) 2 - 50 U/L    Alkaline Phosphatase 232 (H) 30 - 99 U/L    Total Bilirubin 1.2 0.1 - 1.5 mg/dL    Albumin 3.9 3.2 - 4.9 g/dL    Total Protein 7.3 6.0 - 8.2 g/dL    Globulin 3.4 1.9 - 3.5 g/dL    A-G Ratio 1.1 g/dL   HEMOGLOBIN A1C    Collection Time: 07/30/24  6:35 AM   Result Value Ref Range    Glycohemoglobin 5.5 4.0 - 5.6 %    Est Avg Glucose 111 mg/dL   TSH with Reflex to FT4    Collection Time: 07/30/24  6:35 AM   Result Value Ref Range    TSH 2.330 0.380 - 5.330 uIU/mL   Vitamin D, 25-hydroxy (blood)    Collection Time: 07/30/24  6:35 AM   Result Value Ref Range    25-Hydroxy   Vitamin D 25 27 (L) 30 - 100 ng/mL   ESTIMATED GFR    Collection Time: 07/30/24  6:35 AM   Result Value Ref Range    GFR (CKD-EPI) 94 >60 mL/min/1.73 m 2   CBC WITH DIFFERENTIAL    Collection Time: 08/01/24  6:17 AM   Result Value Ref Range    WBC 7.7 4.8 - 10.8 K/uL    RBC 3.96 (L) 4.20 - 5.40 M/uL    Hemoglobin 12.3 12.0 - 16.0 g/dL    Hematocrit 36.9 (L) 37.0 - 47.0 %    MCV 93.2 81.4 - 97.8 fL    MCH 31.1 27.0 - 33.0 pg    MCHC 33.3 32.2 - 35.5 g/dL    RDW 42.8 35.9 - 50.0 fL    Platelet Count 335 164 - 446 K/uL    MPV 9.1 9.0 - 12.9  fL    Neutrophils-Polys 53.60 44.00 - 72.00 %    Lymphocytes 33.90 22.00 - 41.00 %    Monocytes 7.20 0.00 - 13.40 %    Eosinophils 4.00 0.00 - 6.90 %    Basophils 0.50 0.00 - 1.80 %    Immature Granulocytes 0.80 0.00 - 0.90 %    Nucleated RBC 0.00 0.00 - 0.20 /100 WBC    Neutrophils (Absolute) 4.15 1.82 - 7.42 K/uL    Lymphs (Absolute) 2.62 1.00 - 4.80 K/uL    Monos (Absolute) 0.56 0.00 - 0.85 K/uL    Eos (Absolute) 0.31 0.00 - 0.51 K/uL    Baso (Absolute) 0.04 0.00 - 0.12 K/uL    Immature Granulocytes (abs) 0.06 0.00 - 0.11 K/uL    NRBC (Absolute) 0.00 K/uL   Comp Metabolic Panel    Collection Time: 08/01/24  6:17 AM   Result Value Ref Range    Sodium 135 135 - 145 mmol/L    Potassium 4.0 3.6 - 5.5 mmol/L    Chloride 98 96 - 112 mmol/L    Co2 25 20 - 33 mmol/L    Anion Gap 12.0 7.0 - 16.0    Glucose 105 (H) 65 - 99 mg/dL    Bun 18 8 - 22 mg/dL    Creatinine 0.73 0.50 - 1.40 mg/dL    Calcium 9.2 8.5 - 10.5 mg/dL    Correct Calcium 9.5 8.5 - 10.5 mg/dL    AST(SGOT) 30 12 - 45 U/L    ALT(SGPT) 51 (H) 2 - 50 U/L    Alkaline Phosphatase 132 (H) 30 - 99 U/L    Total Bilirubin 0.5 0.1 - 1.5 mg/dL    Albumin 3.6 3.2 - 4.9 g/dL    Total Protein 6.2 6.0 - 8.2 g/dL    Globulin 2.6 1.9 - 3.5 g/dL    A-G Ratio 1.4 g/dL   MAGNESIUM    Collection Time: 08/01/24  6:17 AM   Result Value Ref Range    Magnesium 1.9 1.5 - 2.5 mg/dL   PHOSPHORUS    Collection Time: 08/01/24  6:17 AM   Result Value Ref Range    Phosphorus 3.2 2.5 - 4.5 mg/dL   ESTIMATED GFR    Collection Time: 08/01/24  6:17 AM   Result Value Ref Range    GFR (CKD-EPI) 89 >60 mL/min/1.73 m 2       Medications:  Scheduled Medications   Medication Dose Frequency    vitamin D3  1,000 Units DAILY    Pharmacy Consult Request  1 Each PHARMACY TO DOSE    senna-docusate  2 Tablet BID    omeprazole  20 mg DAILY    ARIPiprazole  30 mg QDAY    enoxaparin (LOVENOX) injection  40 mg DAILY AT 1800    metoprolol tartrate  25 mg BID    polyethylene glycol/lytes  1 Packet DAILY     PRN  medications: Respiratory Therapy Consult, hydrALAZINE, acetaminophen, senna-docusate **AND** polyethylene glycol/lytes, docusate sodium, magnesium hydroxide, carboxymethylcellulose, mag hydrox-al hydrox-simeth, ondansetron **OR** ondansetron, traZODone, sodium chloride, oxyCODONE immediate-release **OR** oxyCODONE immediate-release, ALPRAZolam    Diet:  Current Diet Order   Procedures    Diet Order Diet: Regular       Medical Decision Making and Plan:  R patellar fracture - Patient sustained GLF with right knee fracture s/p open repair with Dr. Holder on 7/26/24. She is WBAT with knee in extension brace.  -PT and OT for mobility and ADLs. Per guidelines, 15 hours per week between PT, OT and/or SLP.  -Follow-up Dr. Holder      Confusion - Present on admission, slightly worsened. Elevated LFTs, SLP to evaluate. Metabolic encephalopathy, change [facility] Code / Diagnosis to Support: 0002.1 - Brain Dysfunction: Non-Traumatic  -Recheck CMP - elevated remain elevated but trending down.     A fib/HTN - History of A fib on ziopatch. On metoprolol 25 mg BID and previously on Nebivolol. SBP into 130s, HR 80s, continue Metoprolol 25 mg BID     HLD - Patient with allergy to statins. Diet controlled     Anxiety disorder - Patient on Abilify 30 mg daily and PRN Xanax 1 mg. Requiring PRN Xanax at night. Improving mood, continue Abilify 30 mg     Elevated LFTs - on admission, previously normal. Will monitor as may be reactionary from fracture. Bilirubin normal. Repeat improving on 8/1    Pain - Patient on PRN Tylenol and Oxycodone     Vitamin D insufficiency - 27 on admission, start 1000 U      Skin - Patient at risk for skin breakdown due to debility in areas including sacrum, achilles, elbows and head in addition to other sites. Nursing to assess skin daily.      GI Ppx - Patient on Prilosec for GERD prophylaxis. Patient on Senna-docusate for constipation prophylaxis.      DVT Ppx - Patient Lovenox on transfer. Limited mobility,  continue Lovenox  ____________________________________    T. Jn Theodore MD/PhD  [facility] - Physical Medicine & Rehabilitation   [facility] - Brain Injury Medicine   ____________________________________    Total time:  50 minutes. Time spent included pre-rounding review of vitals and tests, unit/floor time, face-to-face time with the patient including physical examination, care coordination, counseling of patient and/or family, ordering medications/procedures/tests, discussion with CM, PT, OT, SLP and/or other healthcare providers, and documentation in the electronic medical record. Topics discussed included ongoing memory deficits, improving LFTs, continue Abilify, and monitor Tylenol use. Patient was discussed separately in IDT today; please see details above.

## 2024-08-01 NOTE — THERAPY
"Speech Language Pathology   Initial Assessment     Patient Name: García Cho  AGE:  68 y.o., SEX:  female  Medical Record #: [id number]  Today's Date: 8/1/2024     Subjective    Patient was willing to participate in cognitive linguistic evaluation. Patient describes IPLOF including driving and management of all IADL's. Patient denies changes with cognition, however states she feels \"foggy\" when taking pain medications.      Objective       07/31/24 0832   Evaluation Charges   Charges Yes   SLP Speech Language Evaluation Speech Sound Language Comprehension   SLP Total Time Spent   SLP Individual Total Time Spent (Mins) 60   Prior Living Situation   Prior Services None;Home-Independent   Lives with - Patient's Self Care Capacity Spouse   Prior Level Of Function   Communication Within Functional Limits   Swallow Within Functional Limits   Dentition Intact   Dentures None   Hearing Impaired Both Ears   Hearing Aid Right;Left   Vision Wears Corrective Lenses;Reading    Patient's Primary Language English   Occupation (Pre-Hospital Vocational) Retired Due To Age   Brief Interview for Mental Status (BIMS)   Repetition of Three Words (First Attempt) 3   Temporal Orientation: Year Correct   Temporal Orientation: Month Accurate within 5 days   Temporal Orientation: Day Correct   Recall: \"Sock\" Yes, no cue required   Recall: \"Blue\" Yes, no cue required   Recall: \"Bed\" Yes, no cue required   BIMS Summary Score 15   Confusion Assessment Method (CAM)   Is there evidence of an acute change in mental status from the patient's baseline? No   Inattention Behavior present, fluctuates (comes and goes, changes in severity)   Disorganized thinking Behavior not present   Altered level of consciousness Behavior not present   Functional Level of Assist   Comprehension Modified Independent   Comprehension Description Hearing aids/amplifiers;Glasses   Expression Modified Independent   Social Interaction Modified Independent "   Problem Solving Supervision   Problem Solving Description Verbal cueing;Increased time;Bed/chair alarm   Memory Minimal Assist   Memory Description Verbal cueing;Increased time;Bed/chair alarm   Outcome Measures   Outcome Measures Utilized SCCAN   SCCAN (Scales of Cognitive and Communicative Ability for Neurorehabilitation)   Oral Expression - Raw Score 18   Oral Expression - Scale Performance Score 95   Orientation - Raw Score 12   Orientation - Scale Performance Score 100   Memory - Raw Score 14   Memory - Scale Performance Score 74   Speech Comprehension - Raw Score 11   Speech Comprehension - Scale Performance Score 85   Reading Comprehension - Raw Score 10   Reading Comprehension - Scale Performance Score 83   Writing - Raw Score 7   Writing - Scale Performance Score 100   Attention - Raw Score 12   Attention - Scale Performance Score 75   Problem Solving - Raw Score 19   Problem Solving - Scale Performance Score 83   SCCAN Total Raw Score 82   SCCAN Degree of Severity Mild Impairment   Problem List   Problem List Cognitive-Linguistic Deficits   Current Discharge Plan   Current Discharge Plan Return to Prior Living Situation   Benefit   Therapy Benefit Patient would not benefit from Inpatient Rehab Speech-Language Pathology.  No further Speech Therapy recommended at this time.   Interdisciplinary Plan of Care Collaboration   IDT Collaboration with  Physician   Collaboration Comments HARPALF   Speech Language Pathologist Assigned   Assigned SLP / Treatment Time / Comments No ST 7/31/24         Assessment    Patient is 68 y.o. female with a diagnosis of patella fracture.  Additional factors influencing patient status/progress (ie: cognitive factors, co-morbidities, social support, etc): IPLOF, supportive spouse.    Cognitive linguistic evaluation was completed. Patient participated in SCCAN with a final raw score of 82 indicating mild cognitive deficits. Patient demonstrates mild deficits in the areas of memory  "and attention, however, patient reports mild memory deficits at baseline. She does state that pain medications provided just prior to cognitive evaluation make her feel \"foggy.\" Discussed option for speech therapy to address memory and attention strategies, however patient pleasantly declines and states she is at [facility] to focus on \"physical recovery.\"         Plan  Speech therapy is not recommended at this time.     Goals:  No ST goals.     Speech Therapy Problems (Active)       There are no active problems.             "

## 2024-08-01 NOTE — THERAPY
"Occupational Therapy  Daily Treatment     Patient Name: García Cho  Age:  68 y.o., Sex:  female  Medical Record #: [id number]  Today's Date: 8/1/2024     Precautions  Precautions: Fall Risk, Weight Bearing As Tolerated Right Lower Extremity, Immobilizer Right Lower Extremity  Comments: García reports  right knee immobilizer to be on at all times         Subjective    \"I'll call my . He can take pictures right now.\"     Objective       08/01/24 1001   OT Charge Group   OT Self Care / ADL (Units) 1   OT Therapy Activity (Units) 3   OT Total Time Spent   OT Individual Total Time Spent (Mins) 60   Cognition    Level of Consciousness Alert   Functional Level of Assist   Grooming Modified Independent;Seated   Grooming Description   (for hand washing)   Bed, Chair, Wheelchair Transfer Standby Assist   Bed Chair Wheelchair Transfer Description Adaptive equipment;Set-up of equipment   Toilet Transfers Standby Assist   Toilet Transfer Description Adaptive equipment;Supervision for safety  (Mock toilet transfer to raised commode with arms over toilet to simulate needs for home, as pt's toilet is low.)   Tub / Shower Transfers Standby Assist   Tub Shower Transfer Description Increased time;Set-up of equipment;Verbal cueing;Supervision for safety  (**mock step in shower transfer using FWW backing over 4 inch barrier to shower chair. Pt has built in chair at home.)   Interdisciplinary Plan of Care Collaboration   IDT Collaboration with  Physician;Nursing   Patient Position at End of Therapy In Bed;Bed Alarm On;Call Light within Reach;Tray Table within Reach;Phone within Reach   Collaboration Comments re: Icing pt supine in bed with immobilizer open and leg in extension only. Cleared by Dr. Theodore.     Pt educated on use of ice over R-knee for edema and pain management. Able to verbalize understanding of 20min on/off.  Pt phoned  who took pictures of home setup and measurements as listed " below.                        Assessment    Pt tolerated session well with focus on home setup and DME determination. This was communicated to covering OT for team conference. Pt will require a commode over toilet for raising toilet due to low toilet at home.   Strengths: Independent prior level of function, Making steady progress towards goals, Motivated for self care and independence, Pleasant and cooperative, Supportive family, Willingly participates in therapeutic activities  Barriers: Decreased endurance, Difficulty following instructions, Fatigue, Generalized weakness, Hearing impairment, Home accessibility, Impaired activity tolerance, Impaired balance, Impaired carryover of learning, Impaired insight/denial of deficits, Impaired functional cognition, Limited mobility, Pain, Pain poorly managed    Plan    Pt mentioned during session that her R-wrist soreness. To address next session.   ADL IADL  , related mobility and transfers  , strength/endurance building  standing tolerance and balance activity       DME    Pt has built in shower chair, will require commode to raise toilet with arms.    Occupational Therapy Goals (Active)       Problem: Functional Transfers       Dates: Start:  07/30/24         Goal: STG-Within one week, patient will transfer to toilet at SPV level using DME PRN.       Dates: Start:  07/30/24            Goal: STG-Within one week, patient will transfer to step in shower at SPV level using DME PRN.       Dates: Start:  07/30/24               Problem: IADL's       Dates: Start:  07/30/24         Goal: STG-Within one week, patient will access kitchen area at SBA level using DME PRN.       Dates: Start:  07/30/24               Problem: OT Long Term Goals       Dates: Start:  07/30/24         Goal: LTG-By discharge, patient will complete basic self care tasks at SPV-Mod I level using AE/DME PRN.       Dates: Start:  07/30/24            Goal: LTG-By discharge, patient will perform bathroom  transfersat SPV-Mod I level using AE/DME PRN.       Dates: Start:  07/30/24            Goal: LTG-By discharge, patient will complete basic home management at SPV-Mod I level using AE/DME PRN.at SPV-Mod I level using AE/DME PRN.       Dates: Start:  07/30/24               Problem: Toileting       Dates: Start:  07/30/24         Goal: STG-Within one week, patient will complete toileting tasks at SPV level using DME PRN.       Dates: Start:  07/30/24

## 2024-08-01 NOTE — THERAPY
"Physical Therapy   Daily Treatment     Patient Name: García Cho  Age:  68 y.o., Sex:  female  Medical Record #: [id number]  Today's Date: 8/1/2024     Precautions  Precautions: Fall Risk, Weight Bearing As Tolerated Right Lower Extremity, Immobilizer Right Lower Extremity  Comments: García reports  right knee immobilizer to be on at all times    Subjective    Agreed to therapy    \"Thank you for going over those exercises with me, they were really helpful\"     Objective       08/01/24 0931   PT Charge Group   PT Therapeutic Activities (Units) 2   Supervising Physical Therapist [place]   PT Total Time Spent   PT Individual Total Time Spent (Mins) 30   Stairs Functional Level of Assist   Level of Assist with Stairs Standby Assist   # of Stairs Climbed 24  (6 x 2 four inch and 4 x 3 six inch L HR and single axillary crutch)   Stairs Description Extra time;Hand rails;Assist device/equipment;Supervision for safety;Verbal cueing;Requires incidental assist   Bed Mobility    Sit to Stand Standby Assist   Neuro-Muscular Treatments   Neuro-Muscular Treatments Sequencing;Verbal Cuing   Comments stair sequencing   Interdisciplinary Plan of Care Collaboration   IDT Collaboration with  Occupational Therapist   Patient Position at End of Therapy Seated;Chair Alarm On;Other (Comments)  (in main gym)   Collaboration Comments hand off to OT         Assessment    Pt progressed to stairs with single crutch and L HR this session, completing both short rise and standard height steps with SBA. Pt was instructed on LE sequencing and needs to have the crutch in the R arm when ascending and the L arm when descending, uses step to pattern with good within session carryover.   Strengths: Able to follow instructions, Adequate strength, Alert and oriented, Effective communication skills, Independent prior level of function, Manages pain appropriately, Motivated for self care and independence, Pleasant and cooperative, Willingly " participates in therapeutic activities  Barriers: Decreased endurance, Fatigue, Hearing impairment, Impaired activity tolerance, Impaired balance, Pain, Limited mobility    Plan    Gait using FWW  Dual task training   LE strengthening   Endurance training   Continue Stairs with L HR + 1 crutch       DME  PT DME Recommendations  Assistive Device: Front Wheeled Walker    Passport items to be completed:  Get in/out of bed safely, in/out of a vehicle, safely use mobility device, walk or wheel around home/community, navigate up and down stairs, show how to get up/down from the ground, ensure home is accessible, demonstrate HEP, complete caregiver training    Physical Therapy Problems (Active)       Problem: Balance       Dates: Start:  07/30/24         Goal: STG-Within one week, patient will maintain dynamic standing balance SPV for 2 minutes or longer       Dates: Start:  07/30/24               Problem: Mobility       Dates: Start:  07/30/24         Goal: STG-Within one week, patient will ambulate community distances of at least 150' using LRAD SPV       Dates: Start:  07/30/24               Problem: Mobility Transfers       Dates: Start:  07/30/24         Goal: STG-Within one week, patient will perform transfers SPV using LRAD       Dates: Start:  07/30/24               Problem: PT-Long Term Goals       Dates: Start:  07/30/24         Goal: LTG-By discharge, patient will ambulate greater than 250' Allie using LRAD       Dates: Start:  07/30/24            Goal: LTG-By discharge, patient will transfer one surface to another Allie using LRAD       Dates: Start:  07/30/24            Goal: LTG-By discharge, patient will ambulate up/down flight of stairs Allie with unilateral HR       Dates: Start:  07/30/24            Goal: LTG-By discharge, patient will be independent in HEP       Dates: Start:  07/30/24

## 2024-08-02 ENCOUNTER — APPOINTMENT (OUTPATIENT)
Dept: PHYSICAL THERAPY | Facility: REHABILITATION | Age: 69
DRG: 072 | End: 2024-08-02
Attending: PHYSICAL MEDICINE & REHABILITATION
Payer: MEDICARE

## 2024-08-02 ENCOUNTER — APPOINTMENT (OUTPATIENT)
Dept: OCCUPATIONAL THERAPY | Facility: REHABILITATION | Age: 69
DRG: 072 | End: 2024-08-02
Attending: PHYSICAL MEDICINE & REHABILITATION
Payer: MEDICARE

## 2024-08-02 PROCEDURE — 97110 THERAPEUTIC EXERCISES: CPT

## 2024-08-02 PROCEDURE — 97535 SELF CARE MNGMENT TRAINING: CPT

## 2024-08-02 PROCEDURE — 99232 SBSQ HOSP IP/OBS MODERATE 35: CPT | Performed by: PHYSICAL MEDICINE & REHABILITATION

## 2024-08-02 PROCEDURE — 97116 GAIT TRAINING THERAPY: CPT

## 2024-08-02 PROCEDURE — 97530 THERAPEUTIC ACTIVITIES: CPT

## 2024-08-02 PROCEDURE — 700102 HCHG RX REV CODE 250 W/ 637 OVERRIDE(OP): Performed by: PHYSICAL MEDICINE & REHABILITATION

## 2024-08-02 PROCEDURE — 770010 HCHG ROOM/CARE - REHAB SEMI PRIVAT*

## 2024-08-02 PROCEDURE — A9270 NON-COVERED ITEM OR SERVICE: HCPCS | Performed by: PHYSICAL MEDICINE & REHABILITATION

## 2024-08-02 PROCEDURE — 700111 HCHG RX REV CODE 636 W/ 250 OVERRIDE (IP): Mod: JZ | Performed by: PHYSICAL MEDICINE & REHABILITATION

## 2024-08-02 RX ADMIN — OXYCODONE HYDROCHLORIDE 10 MG: 10 TABLET ORAL at 00:26

## 2024-08-02 RX ADMIN — Medication 1000 UNITS: at 08:43

## 2024-08-02 RX ADMIN — OXYCODONE HYDROCHLORIDE 10 MG: 10 TABLET ORAL at 08:43

## 2024-08-02 RX ADMIN — POLYETHYLENE GLYCOL 3350 1 PACKET: 17 POWDER, FOR SOLUTION ORAL at 08:48

## 2024-08-02 RX ADMIN — OXYCODONE HYDROCHLORIDE 10 MG: 10 TABLET ORAL at 15:24

## 2024-08-02 RX ADMIN — TRAZODONE HYDROCHLORIDE 50 MG: 50 TABLET ORAL at 19:49

## 2024-08-02 RX ADMIN — METOPROLOL TARTRATE 25 MG: 25 TABLET, FILM COATED ORAL at 17:37

## 2024-08-02 RX ADMIN — OXYCODONE HYDROCHLORIDE 10 MG: 10 TABLET ORAL at 19:50

## 2024-08-02 RX ADMIN — OXYCODONE HYDROCHLORIDE 10 MG: 10 TABLET ORAL at 12:12

## 2024-08-02 RX ADMIN — ALPRAZOLAM 1 MG: 0.5 TABLET ORAL at 19:49

## 2024-08-02 RX ADMIN — SENNOSIDES AND DOCUSATE SODIUM 2 TABLET: 50; 8.6 TABLET ORAL at 08:43

## 2024-08-02 RX ADMIN — ARIPIPRAZOLE 30 MG: 10 TABLET ORAL at 05:25

## 2024-08-02 RX ADMIN — METOPROLOL TARTRATE 25 MG: 25 TABLET, FILM COATED ORAL at 05:25

## 2024-08-02 RX ADMIN — OMEPRAZOLE 20 MG: 20 CAPSULE, DELAYED RELEASE ORAL at 08:43

## 2024-08-02 RX ADMIN — OXYCODONE HYDROCHLORIDE 10 MG: 10 TABLET ORAL at 05:26

## 2024-08-02 RX ADMIN — ENOXAPARIN SODIUM 40 MG: 100 INJECTION SUBCUTANEOUS at 17:37

## 2024-08-02 RX ADMIN — SENNOSIDES AND DOCUSATE SODIUM 2 TABLET: 50; 8.6 TABLET ORAL at 19:49

## 2024-08-02 ASSESSMENT — GAIT ASSESSMENTS
DISTANCE (FEET): 75
GAIT LEVEL OF ASSIST: STANDBY ASSIST
DEVIATION: BRADYKINETIC
ASSISTIVE DEVICE: FRONT WHEEL WALKER
ASSISTIVE DEVICE: FRONT WHEEL WALKER
DISTANCE (FEET): 100
GAIT LEVEL OF ASSIST: CONTACT GUARD ASSIST

## 2024-08-02 ASSESSMENT — ACTIVITIES OF DAILY LIVING (ADL)
TOILET_TRANSFER_DESCRIPTION: VERBAL CUEING;SUPERVISION FOR SAFETY
BED_CHAIR_WHEELCHAIR_TRANSFER_DESCRIPTION: INCREASED TIME;SET-UP OF EQUIPMENT
BED_CHAIR_WHEELCHAIR_TRANSFER_DESCRIPTION: SUPERVISION FOR SAFETY
TOILETING_LEVEL_OF_ASSIST_DESCRIPTION: SUPERVISION FOR SAFETY;SET-UP OF EQUIPMENT
TOILET_TRANSFER_DESCRIPTION: GRAB BAR;SET-UP OF EQUIPMENT
BED_CHAIR_WHEELCHAIR_TRANSFER_DESCRIPTION: ADAPTIVE EQUIPMENT;SET-UP OF EQUIPMENT;SUPERVISION FOR SAFETY;VERBAL CUEING
BED_CHAIR_WHEELCHAIR_TRANSFER_DESCRIPTION: ADAPTIVE EQUIPMENT;SET-UP OF EQUIPMENT;SUPERVISION FOR SAFETY;VERBAL CUEING

## 2024-08-02 ASSESSMENT — PAIN DESCRIPTION - PAIN TYPE: TYPE: ACUTE PAIN;SURGICAL PAIN

## 2024-08-02 NOTE — DISCHARGE PLANNING
CM//Discharge :    The following has been ordered:   Home health:   [facility]                    Disciplines ordered: RN, PT, OT  Status: Accepted     DME:     [facility]             Following equipment has been ordered: Front Wheeled Walker  Status: Sent

## 2024-08-02 NOTE — PROGRESS NOTES
NURSING DAILY NOTE    Name: García Cho   Date of Admission: 7/29/2024   Admitting Diagnosis: Fracture of patella with routine healing, right, closed  Attending Physician: Maxwell Theodore M.d.  Allergies: Atorvastatin, Bee pollen, Ibuprofen, Pravastatin, and Other environmental    Safety  Patient Assist  Min Assist  Patient Precautions  Fall Risk, Weight Bearing As Tolerated Right Lower Extremity, Immobilizer Right Lower Extremity  Precaution Comments  García reports  right knee immobilizer to be on at all times  Bed Transfer Status  Standby Assist  Toilet Transfer Status   Standby Assist  Assistive Devices  Rails, Walker - front wheel  Oxygen  None - Room Air  Diet/Therapeutic Dining  Current Diet Order   Procedures    Diet Order Diet: Regular     Pill Administration  whole  Agitated Behavioral Scale     ABS Level of Severity       Fall Risk  Has the patient had a fall this admission?   No  Marychuy Son Fall Risk Scoring  19, HIGH RISK  Fall Risk Safety Measures  bed alarm, chair alarm, and poor balance    Vitals  Temperature: 36.7 °C (98.1 °F)  Temp src: Oral  Pulse: 68  Respiration: 18  Blood Pressure : 134/52  Blood Pressure MAP (Calculated): 79 MM HG  BP Location: Left, Upper Arm  Patient BP Position: Sitting     Oxygen  Pulse Oximetry: 96 %  O2 (LPM): 0  O2 Delivery Device: None - Room Air    Bowel and Bladder  Last Bowel Movement  08/01/24  Stool Type  Type 4: Like a sausage or snake, smooth and soft  Bowel Device     Continent  Bladder: Continent void   Bowel: Continent movement  Bladder Function  Number of Times Voided: 1  Urine Color: Unable To Evaluate  Genitourinary Assessment   Bladder Assessment (WDL):  WDL Except  Guthrie Catheter: Not Applicable  Urine Color: Unable To Evaluate  Bladder Device: Bathroom  Bladder Scan: Post Void  $ Bladder Scan Results (mL): 30    Skin  Aurelio Score   18  Sensory Interventions   Bed Types:  Standard/Trauma Mattress  Skin Preventative Measures: Pillows in Use for Support / Positioning  Moisture Interventions  Moisturizers/Barriers: Barrier Wipes      Pain  Pain Rating Scale  8 - Awful, hard to do anything  Pain Location  Knee, Ankle  Pain Location Orientation  Right, Left  Pain Interventions   Medication (see MAR), Repositioned, Rest    ADLs    Bathing   Shower (patient stated she had shower 7/31)  Linen Change      Personal Hygiene  Moist Lynsey Wipes, Perineal Care  Chlorhexidine Bath      Oral Care     Teeth/Dentures     Shave     Nutrition Percentage Eaten  *  * Meal *  *, Breakfast, Between % Consumed  Environmental Precautions  Treaded Slipper Socks on Patient  Patient Turns/Positioning  Patient Turns Self from Side to Side  Patient Turns Assistance/Tolerance     Bed Positions  Bed Controls On, Bed Locked  Head of Bed Elevated  Self regulated      Psychosocial/Neurologic Assessment  Psychosocial Assessment  Psychosocial (WDL):  WDL Except  Patient Behaviors: Anxious  Neurologic Assessment  Neuro (WDL): Exceptions to WDL  Level of Consciousness: Alert  Orientation Level: Oriented X4  Cognition: Follows commands  Speech: Clear  Motor Function/Sensation Assessment: Sensation, Motor strength  Muscle Strength Right Arm: Good Strength Against Gravity and Moderate Resistance  Muscle Strength Left Arm: Good Strength Against Gravity and Moderate Resistance  RLE Sensation: Pain  Muscle Strength Right Leg: Fair Strength against Gravity but No Resistance  Muscle Strength Left Leg: Good Strength Against Gravity and Moderate Resistance  EENT (WDL):  WDL Except    Cardio/Pulmonary Assessment  Edema      Respiratory Breath Sounds  RUL Breath Sounds: Clear  RML Breath Sounds: Clear  RLL Breath Sounds: Clear  DOROTHY Breath Sounds: Clear  LLL Breath Sounds: Clear  Cardiac Assessment   Cardiac (WDL):  WDL Except

## 2024-08-02 NOTE — CARE PLAN
The patient is Stable - Low risk of patient condition declining or worsening    Problem: Knowledge Deficit - Standard  Goal: Patient and family/care givers will demonstrate understanding of plan of care, disease process/condition, diagnostic tests and medications  Outcome: Progressing. Reviewed POC, all questions answered.        Problem: Fall Risk - Rehab  Goal: Patient will remain free from falls  Outcome: Progressing. Call light within reach, pt educated to use for assistance for safe transferring.         Shift Goals  Clinical Goals: Safety  Patient Goals: Participate in therapy

## 2024-08-02 NOTE — PROGRESS NOTES
"  Physical Medicine & Rehabilitation Progress Note    Encounter Date: 8/2/2024    Chief Complaint: Decreased mobility, weakness    Interval Events (Subjective):  Patient sitting up in room. She reports she is doing well. She denies pain at rest. She reports nursing is watching an area of skin under her brace to make sure no breakdown.  Denies leg swelling.     _____________________________________  Interdisciplinary Team Conference   Most recent IDT on 8/1/2024    Discharge Date/Disposition:  8/7/24  _____________________________________    Objective:  VITAL SIGNS: /55   Pulse 68   Temp 37 °C (98.6 °F) (Oral)   Resp 18   Ht 1.768 m (5' 9.6\")   Wt 75.5 kg (166 lb 8 oz)   SpO2 96%   BMI 24.17 kg/m²   Gen: NAD  Psych: Mood and affect appropriate  CV: RRR, 1+ RLE edema  Resp: CTAB, no upper airway sounds  Abd: NTND  Neuro: AOx4, following commands  Unchanged from 8/1/24    Laboratory Values:  Recent Results (from the past 72 hour(s))   CBC WITH DIFFERENTIAL    Collection Time: 08/01/24  6:17 AM   Result Value Ref Range    WBC 7.7 4.8 - 10.8 K/uL    RBC 3.96 (L) 4.20 - 5.40 M/uL    Hemoglobin 12.3 12.0 - 16.0 g/dL    Hematocrit 36.9 (L) 37.0 - 47.0 %    MCV 93.2 81.4 - 97.8 fL    MCH 31.1 27.0 - 33.0 pg    MCHC 33.3 32.2 - 35.5 g/dL    RDW 42.8 35.9 - 50.0 fL    Platelet Count 335 164 - 446 K/uL    MPV 9.1 9.0 - 12.9 fL    Neutrophils-Polys 53.60 44.00 - 72.00 %    Lymphocytes 33.90 22.00 - 41.00 %    Monocytes 7.20 0.00 - 13.40 %    Eosinophils 4.00 0.00 - 6.90 %    Basophils 0.50 0.00 - 1.80 %    Immature Granulocytes 0.80 0.00 - 0.90 %    Nucleated RBC 0.00 0.00 - 0.20 /100 WBC    Neutrophils (Absolute) 4.15 1.82 - 7.42 K/uL    Lymphs (Absolute) 2.62 1.00 - 4.80 K/uL    Monos (Absolute) 0.56 0.00 - 0.85 K/uL    Eos (Absolute) 0.31 0.00 - 0.51 K/uL    Baso (Absolute) 0.04 0.00 - 0.12 K/uL    Immature Granulocytes (abs) 0.06 0.00 - 0.11 K/uL    NRBC (Absolute) 0.00 K/uL   Comp Metabolic Panel    Collection " Time: 08/01/24  6:17 AM   Result Value Ref Range    Sodium 135 135 - 145 mmol/L    Potassium 4.0 3.6 - 5.5 mmol/L    Chloride 98 96 - 112 mmol/L    Co2 25 20 - 33 mmol/L    Anion Gap 12.0 7.0 - 16.0    Glucose 105 (H) 65 - 99 mg/dL    Bun 18 8 - 22 mg/dL    Creatinine 0.73 0.50 - 1.40 mg/dL    Calcium 9.2 8.5 - 10.5 mg/dL    Correct Calcium 9.5 8.5 - 10.5 mg/dL    AST(SGOT) 30 12 - 45 U/L    ALT(SGPT) 51 (H) 2 - 50 U/L    Alkaline Phosphatase 132 (H) 30 - 99 U/L    Total Bilirubin 0.5 0.1 - 1.5 mg/dL    Albumin 3.6 3.2 - 4.9 g/dL    Total Protein 6.2 6.0 - 8.2 g/dL    Globulin 2.6 1.9 - 3.5 g/dL    A-G Ratio 1.4 g/dL   MAGNESIUM    Collection Time: 08/01/24  6:17 AM   Result Value Ref Range    Magnesium 1.9 1.5 - 2.5 mg/dL   PHOSPHORUS    Collection Time: 08/01/24  6:17 AM   Result Value Ref Range    Phosphorus 3.2 2.5 - 4.5 mg/dL   ESTIMATED GFR    Collection Time: 08/01/24  6:17 AM   Result Value Ref Range    GFR (CKD-EPI) 89 >60 mL/min/1.73 m 2       Medications:  Scheduled Medications   Medication Dose Frequency    vitamin D3  1,000 Units DAILY    senna-docusate  2 Tablet BID    omeprazole  20 mg DAILY    ARIPiprazole  30 mg QDAY    enoxaparin (LOVENOX) injection  40 mg DAILY AT 1800    metoprolol tartrate  25 mg BID    polyethylene glycol/lytes  1 Packet DAILY     PRN medications: Respiratory Therapy Consult, hydrALAZINE, acetaminophen, senna-docusate **AND** polyethylene glycol/lytes, docusate sodium, magnesium hydroxide, carboxymethylcellulose, mag hydrox-al hydrox-simeth, ondansetron **OR** ondansetron, traZODone, sodium chloride, oxyCODONE immediate-release **OR** oxyCODONE immediate-release, ALPRAZolam    Diet:  Current Diet Order   Procedures    Diet Order Diet: Regular       Medical Decision Making and Plan:  R patellar fracture - Patient sustained GLF with right knee fracture s/p open repair with Dr. Holder on 7/26/24. She is WBAT with knee in extension brace.  -PT and OT for mobility and ADLs. Per  guidelines, 15 hours per week between PT, OT and/or SLP.  -Follow-up Dr. Holder      Confusion - Present on admission, slightly worsened. Elevated LFTs, SLP to evaluate. Metabolic encephalopathy, change [facility] Code / Diagnosis to Support: 0002.1 - Brain Dysfunction: Non-Traumatic  -Recheck CMP - elevated remain elevated but trending down. Repeat 8/5    A fib/HTN - History of A fib on ziopatch. On metoprolol 25 mg BID and previously on Nebivolol. HR into 60s, continue Metoprolol 25 mg BID     HLD - Patient with allergy to statins. Diet controlled     Anxiety disorder - Patient on Abilify 30 mg daily and PRN Xanax 1 mg. Requiring PRN Xanax at night. Improving mood/cognition, continue Abilify 30 mg daily, using Xanax at night     Elevated LFTs - on admission, previously normal. Will monitor as may be reactionary from fracture. Bilirubin normal. Repeat improving on 8/1. Repeat 8/5    Pain - Patient on PRN Tylenol and Oxycodone     Vitamin D insufficiency - 27 on admission, start 1000 U      Skin - Patient at risk for skin breakdown due to debility in areas including sacrum, achilles, elbows and head in addition to other sites. Nursing to assess skin daily.      GI Ppx - Patient on Prilosec for GERD prophylaxis. Patient on Senna-docusate for constipation prophylaxis.      DVT Ppx - Patient Lovenox on transfer. Limited mobility, continue Lovenox  ____________________________________    T. Jn Theodore MD/PhD  [facility] - Physical Medicine & Rehabilitation   [facility] - Brain Injury Medicine   ____________________________________

## 2024-08-02 NOTE — WOUND TEAM
[facility]  Inpatient Services  Initial Wound and Skin Care Evaluation    Admission Date: 7/29/2024     Last order of IP CONSULT TO WOUND CARE was found on 8/1/2024 from Hospital Encounter on 7/29/2024     HPI, PMH, SH: Reviewed    Past Surgical History:   Procedure Laterality Date    ORIF, PATELLA Right 7/26/2024    Procedure: ORIF, PATELLA;  Surgeon: Stephen Holder M.D.;  Location: SURGERY [facility];  Service: Orthopedics    ALEX BY LAPAROSCOPY  1/19/2018    Procedure: ALEX BY LAPAROSCOPY/SURGICAL;  Surgeon: Keren Henderson M.D.;  Location: SURGERY [facility];  Service: General    EGD WITH ASP/BX  11/26/12    distal esophageal stricture secondary to reflux status post dilation to 51 [place], mild erosive esophagitis, mild gastric erythema, small sliding hiatal hernia, mild chronic gastritis, negative H. pylori, no dysplasia or malignancy    COLONOSCOPY WITH BIOPSY  7/08    hemorrhoids, no malignancy    EGD WITH ASP/BX  7/08    hiatal hernia, schatzki's ring    ABDOMINAL HYSTERECTOMY TOTAL      with BSO due to infection    OTHER       I &D rectal abscess     Social History     Tobacco Use    Smoking status: Former     Current packs/day: 0.00     Average packs/day: 0.1 packs/day for 44.0 years (4.4 ttl pk-yrs)     Types: Cigarettes     Start date: 3/17/1970     Quit date: 3/17/2014     Years since quitting: 10.3    Smokeless tobacco: Never   Substance Use Topics    Alcohol use: No     Comment: 10 drinks/week quit 11/11, quit 2/13     No chief complaint on file.    Diagnosis: Other fracture of right patella, initial encounter for closed fracture [S82.091A]    Unit where seen by Wound Team: [facility]31/01     WOUND CONSULT RELATED TO:  Right posterior thigh    WOUND TEAM PLAN OF CARE - Frequency of Follow-up:   Nursing to follow dressing orders written for wound care. Contact wound team if area fails to progress, deteriorates or with any questions/concerns if something comes up before next  scheduled follow up (See below as to whether wound is following and frequency of wound follow up)   Weekly - wednesdays    WOUND HISTORY:   Pt with Right knee patellar fracture following GLF. Pt had open repair on 7/26, she now has a knee immobilizer on at all times and is WBAT.       WOUND ASSESSMENT/LDA  Wound 08/01/24 Pressure Injury Thigh Proximal;Posterior Right (Active)   Date First Assessed/Time First Assessed: 08/01/24 1441   Present on Original Admission: No  Hand Hygiene Completed: Yes  Primary Wound Type: Pressure Injury  Location: Thigh  Wound Orientation: Proximal;Posterior  Laterality: Right      Assessments 8/2/2024  3:00 PM   Wound Image     Site Assessment Pink;White;Tan   Periwound Assessment Blanchable erythema   Margins Defined edges   Closure Secondary intention   Treatments Cleansed;Offloading   Dressing Options Offloading Dressing - Sacral   Dressing Change/Treatment Frequency Every 48 hrs, and As Needed   ** WOUND NURSE ONLY BELOW THIS LINE** NURSE   Wound Team Following Weekly   Pressure Injury Stage Unstageable   Wound Length (cm) 1.8 cm   Wound Width (cm) 1.1 cm   Wound Surface Area (cm^2) 1.98 cm^2   Wound Bed Slough (%) 99 %   WOUND NURSE ONLY - Time Spent with Patient (mins) 30        Vascular:    JUNIOR:   No results found.    Lab Values:    Lab Results   Component Value Date/Time    WBC 7.7 08/01/2024 06:17 AM    RBC 3.96 (L) 08/01/2024 06:17 AM    HEMOGLOBIN 12.3 08/01/2024 06:17 AM    HEMATOCRIT 36.9 (L) 08/01/2024 06:17 AM    CREACTPROT 0.22 04/20/2015 11:50 AM    SEDRATEWES 10 04/20/2015 11:50 AM    HBA1C 5.5 07/30/2024 06:35 AM         Culture Results show:  No results found for this or any previous visit (from the past 720 hour(s)).    Pain Level/Medicated:  None, Tolerated without pain medication       INTERVENTIONS BY WOUND TEAM:  Chart and images reviewed. Discussed with bedside RN. All areas of concern (based on picture review, LDA review and discussion with bedside RN) have  been thoroughly assessed. Documentation of areas based on significant findings. This RN in to assess patient. Performed standard wound care which includes appropriate positioning, dressing removal and non-selective debridement. Pictures and measurements obtained weekly if/when required.    Wound:  Right posterior thigh  Cleansed/Non-selectively Debrided with:  Wound cleanser and Gauze  Primary Dressing:  Offloading dressing    Advanced Wound Care Discharge Planning  Number of Clinicians necessary to complete wound care: 1  Is patient requiring IV pain medications for dressing changes:  No   Length of time for dressing change 20 min. (This does not include chart review, pre-medication time, set up, clean up or time spent charting.)    Interdisciplinary consultation: Patient, Bedside RN (Analisa Johnson .  Pressure injury and staging reviewed with N/A.    EVALUATION / RATIONALE FOR TREATMENT:     Date:  08/02/24  Wound Status:  Initial evaluation    Patient has two small open areas consistent with pressure from the metal ribbing on the knee immobilizer on the right posterior leg. C/O rubbing and pressure when she sits and walks. Contacted PT to see if she can get a better fitting immobilizer.           Goals: Steady decrease in wound area and depth weekly.    NURSING PLAN OF CARE ORDERS:  Dressing changes: See Dressing Care orders  Skin care: See Skin Care orders    NUTRITION RECOMMENDATIONS   Wound Team Recommendations:  N/A    DIET ORDERS (From admission to next 24h)       Start     Ordered    07/30/24 1500  Supplements  BETWEEN MEALS        Question Answer Comment   Which Supplement Ensure    Ensure: Ensure Plus Carton        07/30/24 1315    07/29/24 1432  Diet Order Diet: Regular  ALL MEALS        Question:  Diet:  Answer:  Regular    07/29/24 1431                    PREVENTATIVE INTERVENTIONS:    Q shift Aurelio - performed per nursing policy  Q shift pressure point assessments - performed per nursing  policy    Surface/Positioning  Float heels on pillows - Currently in Place    Anticipated discharge plans:  Self/Family Care        Vac Discharge Needs:  Vac Discharge plan is purely a recommendation from wound team and not a requirement for discharge unless otherwise stated by physician.  Not Applicable Pt not on a wound vac

## 2024-08-02 NOTE — DIETARY
Nutrition Update:    Day 4 of admit.  García Cho is a 68 y.o. female with admitting DX of Other fracture of right patella, initial encounter for closed fracture [S82.458N].  Patient being followed to optimize nutrition.    Current Diet: Regular, supplements (Ensure Plus BID between meals). PO intake per ADLs w/ only 3 meals documented this admit: 50-75% x1, % x 2 meals on 8/1.     Problem: Nutritional:  Goal: Achieve adequate nutritional intake  Description: Patient will consume >75% of meals or >50% of meals and supplements.  Outcome: Progressing    RD continues to follow.

## 2024-08-02 NOTE — THERAPY
Occupational Therapy  Daily Treatment     Patient Name: García Cho  Age:  68 y.o., Sex:  female  Medical Record #: [id number]  Today's Date: 8/2/2024     Precautions  Precautions: Fall Risk, Weight Bearing As Tolerated Right Lower Extremity, Immobilizer Right Lower Extremity  Comments: García reports  right knee immobilizer to be on at all times         Subjective    Pt supine in bed and agreeable to OT tx. Reporting poor night sleep with new roommate.     Objective       08/02/24 1001   OT Charge Group   OT Self Care / ADL (Units) 1   OT Therapeutic Exercise (Units) 1   OT Total Time Spent   OT Individual Total Time Spent (Mins) 30   Pain   Intervention Declines   Cognition    Level of Consciousness Alert   Safety Awareness Impaired;Impulsive   Comments Pt educated on use of call light following impulsive stand from toilet.   Functional Level of Assist   Grooming Supervision;Standing   Grooming Description Supervision for safety;Standing at sink  (SPV standing at sink for hand wshing.)   Toileting Supervision   Toileting Description Supervision for safety;Set-up of equipment  (Pt education on use of call light, standing from toilet before OT returned to bathroom ofr safety.)   Bed, Chair, Wheelchair Transfer Standby Assist   Bed Chair Wheelchair Transfer Description Adaptive equipment;Set-up of equipment;Supervision for safety;Verbal cueing   Toilet Transfers Supervised   Toilet Transfer Description Verbal cueing;Supervision for safety  (Stand step with FWW)   Sitting Upper Body Exercises   Upper Extremity Bike Level 4 Resistance  (Motomed for 10 min with focus on increasing overall strength and endurance for ADLs and functional transfers. Pt reporting no increase in pain with R-wrist.)   Balance   Comments Pt completed household distance mobility in room using FWW at SPV level without LOB.   Interdisciplinary Plan of Care Collaboration   Patient Position at End of Therapy Seated;Chair Alarm On;Self  Releasing Lap Belt Applied;Call Light within Reach;Phone within Reach;Tray Table within Reach         Assessment    Pt tolerated session well with continued progress made towards functional goals and transfers, but did demonstrate poor insight to safety with impulsive standing. Pt's L-wrist pain seems to be improving, as she is able to tolerate more use in function.  Strengths: Independent prior level of function, Making steady progress towards goals, Motivated for self care and independence, Pleasant and cooperative, Supportive family, Willingly participates in therapeutic activities  Barriers: Decreased endurance, Difficulty following instructions, Fatigue, Generalized weakness, Hearing impairment, Home accessibility, Impaired activity tolerance, Impaired balance, Impaired carryover of learning, Impaired insight/denial of deficits, Impaired functional cognition, Limited mobility, Pain, Pain poorly managed    Plan    ADL, IADL, related mobility and transfers, strength/endurance building standing tolerance and balance activity     DME       Passport items to be completed:  Perform bathroom transfers, complete dressing, complete feeding, get ready for the day, prepare a simple meal, participate in household tasks, adapt home for safety needs, demonstrate home exercise program, complete caregiver training     Occupational Therapy Goals (Active)       Problem: Functional Transfers       Dates: Start:  07/30/24         Goal: STG-Within one week, patient will transfer to toilet at SPV level using DME PRN.       Dates: Start:  07/30/24            Goal: STG-Within one week, patient will transfer to step in shower at SPV level using DME PRN.       Dates: Start:  07/30/24               Problem: IADL's       Dates: Start:  07/30/24         Goal: STG-Within one week, patient will access kitchen area at SBA level using DME PRN.       Dates: Start:  07/30/24               Problem: OT Long Term Goals       Dates: Start:  07/30/24          Goal: LTG-By discharge, patient will complete basic self care tasks at SPV-Mod I level using AE/DME PRN.       Dates: Start:  07/30/24            Goal: LTG-By discharge, patient will perform bathroom transfersat SPV-Mod I level using AE/DME PRN.       Dates: Start:  07/30/24            Goal: LTG-By discharge, patient will complete basic home management at SPV-Mod I level using AE/DME PRN.at SPV-Mod I level using AE/DME PRN.       Dates: Start:  07/30/24               Problem: Toileting       Dates: Start:  07/30/24         Goal: STG-Within one week, patient will complete toileting tasks at SPV level using DME PRN.       Dates: Start:  07/30/24

## 2024-08-02 NOTE — THERAPY
Physical Therapy   Daily Treatment     Patient Name: García Cho  Age:  68 y.o., Sex:  female  Medical Record #: [id number]  Today's Date: 8/2/2024     Precautions  Precautions: Fall Risk, Weight Bearing As Tolerated Right Lower Extremity, Immobilizer Right Lower Extremity  Comments: García reports  right knee immobilizer to be on at all times    Subjective    Pt seated edge of bed, willing to participate.     Objective       08/02/24 1215   PT Charge Group   PT Therapeutic Exercise (Units) 2   PT Therapeutic Activities (Units) 2   PT Total Time Spent   PT Individual Total Time Spent (Mins) 60   Gait Functional Level of Assist    Gait Level Of Assist Standby Assist   Assistive Device Front Wheel Walker   Distance (Feet) 75   # of Times Distance was Traveled 2   Deviation Bradykinetic  (R LE immobilized in extension WBAT)   Stairs Functional Level of Assist   Level of Assist with Stairs Standby Assist   # of Stairs Climbed 8  (4 standard rise steps x 2 trials)   Stairs Description Extra time;Hand rails;Verbal cueing  (L rail ascending/ crutch)   Transfer Functional Level of Assist   Bed, Chair, Wheelchair Transfer Supervised   Bed Chair Wheelchair Transfer Description Increased time;Set-up of equipment   Toilet Transfers Supervised   Toilet Transfer Description Grab bar;Set-up of equipment   Supine Lower Body Exercise   Bridges One Legged;2 sets of 10   Hip Abduction 2 sets of 10   Hip Adduction  2 sets of 10   Straight Leg Raises 2 sets of 10;Right  (AAROM)   Ankle Pumps 2 sets of 10   Gluteal Isometrics 2 sets of 10   Quadriceps Isometrics 2 sets of 10;Right   Bed Mobility    Supine to Sit Independent   Sit to Supine Independent   Sit to Stand Supervised   Scooting Independent   Rolling Modified Independent     Pt completed car transfer to this therapist's [employer] (same as pt's personal vehicle) with SPV/ verbal cues for sequencing after set-up.  Cold pack to RLE post-tx.    Assessment    Pt  fatigued this pm and reports increased RLE discomfort following prior therapies today, provided cold pack post tx with pt resting in bed. Approaching modified independent for basic household mobility.    Strengths: Able to follow instructions, Adequate strength, Alert and oriented, Effective communication skills, Independent prior level of function, Manages pain appropriately, Motivated for self care and independence, Pleasant and cooperative, Willingly participates in therapeutic activities  Barriers: Decreased endurance, Fatigue, Hearing impairment, Impaired activity tolerance, Impaired balance, Pain, Limited mobility    Plan    Gait using FWW  Dual task training   LE strengthening   Endurance training   Continue Stairs with L HR + 1 crutch     DME  PT DME Recommendations  Assistive Device: Front Wheeled Walker    Passport items to be completed:  Get in/out of bed safely, in/out of a vehicle, safely use mobility device, walk or wheel around home/community, navigate up and down stairs, show how to get up/down from the ground, ensure home is accessible, demonstrate HEP, complete caregiver training    Physical Therapy Problems (Active)       Problem: Balance       Dates: Start:  07/30/24         Goal: STG-Within one week, patient will maintain dynamic standing balance SPV for 2 minutes or longer       Dates: Start:  07/30/24               Problem: Mobility       Dates: Start:  07/30/24         Goal: STG-Within one week, patient will ambulate community distances of at least 150' using LRAD SPV       Dates: Start:  07/30/24               Problem: Mobility Transfers       Dates: Start:  07/30/24         Goal: STG-Within one week, patient will perform transfers SPV using LRAD       Dates: Start:  07/30/24               Problem: PT-Long Term Goals       Dates: Start:  07/30/24         Goal: LTG-By discharge, patient will ambulate greater than 250' Allie using LRAD       Dates: Start:  07/30/24            Goal: LTG-By  discharge, patient will transfer one surface to another Allie using LRAD       Dates: Start:  07/30/24            Goal: LTG-By discharge, patient will ambulate up/down flight of stairs Allie with unilateral HR       Dates: Start:  07/30/24            Goal: LTG-By discharge, patient will be independent in HEP       Dates: Start:  07/30/24

## 2024-08-02 NOTE — THERAPY
Physical Therapy   Daily Treatment     Patient Name: García Cho  Age:  68 y.o., Sex:  female  Medical Record #: [id number]  Today's Date: 8/2/2024     Precautions  Precautions: Fall Risk, Weight Bearing As Tolerated Right Lower Extremity, Immobilizer Right Lower Extremity  Comments: García reports  right knee immobilizer to be on at all times    Subjective    Pt reports that her spouse was present for car transfer earlier but therapy schedule changed. Will call him to reschedule for tomorrow     Objective       08/02/24 1030   PT Charge Group   PT Therapeutic Exercise (Units) 3   PT Therapeutic Activities (Units) 1   PT Total Time Spent   PT Individual Total Time Spent (Mins) 60   Gait Functional Level of Assist    Gait Level Of Assist Contact Guard Assist   Assistive Device Front Wheel Walker   Distance (Feet) 100   # of Times Distance was Traveled 1   Deviation   (R LE immobilized in extension WBAT)   Wheelchair Functional Level of Assist   Wheelchair Assist Modified Independent   Distance Wheelchair (Feet or Distance) 120   Transfer Functional Level of Assist   Bed, Chair, Wheelchair Transfer Standby Assist   Bed Chair Wheelchair Transfer Description Adaptive equipment;Set-up of equipment;Supervision for safety;Verbal cueing  (R LE knee immobilizer WBAT, stand step FWW)   Supine Lower Body Exercise   Bridges One Legged;3 sets of 10   Hip Abduction 2 sets of 10;Side Lying  (AROM supine snow angels and sidelying R LE hip abduction 2x10)   Hip Adduction  2 sets of 10;Bilateral  (ball squeeze 5 sec hold)   Straight Leg Raises Front;2 sets of 10;Right   Ankle Pumps Reciprocal;2 sets of 10   Gluteal Isometrics 1 set of 10;Bilateral   Quadriceps Isometrics 2 sets of 10;Right  (5 sec hold)   Sitting Lower Body Exercises   Sit to Stand 1 set of 10  (no UE support)   Other Exercises 5 min Ue fluidobike level 4 0.391mi for cardio training   Bed Mobility    Supine to Sit Independent   Sit to Supine Independent    Sit to Stand Supervised   Interdisciplinary Plan of Care Collaboration   Patient Position at End of Therapy Seated;Chair Alarm On;Self Releasing Lap Belt Applied;Call Light within Reach;Tray Table within Reach;Phone within Reach         Assessment    Pt is making steady progress toward her goals and is on track for scheduled DC. Primary barrier is being able to transfer into passenger seat of her vehicle with R knee locked in extension. Her  will be present for car transfer training tomorrow (time TBD).  Strengths: Able to follow instructions, Adequate strength, Alert and oriented, Effective communication skills, Independent prior level of function, Manages pain appropriately, Motivated for self care and independence, Pleasant and cooperative, Willingly participates in therapeutic activities  Barriers: Decreased endurance, Fatigue, Hearing impairment, Impaired activity tolerance, Impaired balance, Pain, Limited mobility    Plan    Gait using FWW  Dual task training   LE strengthening   Endurance training   Continue Stairs with L HR + 1 crutch     DME  PT DME Recommendations  Assistive Device: Front Wheeled Walker    Passport items to be completed:  Get in/out of bed safely, in/out of a vehicle, safely use mobility device, walk or wheel around home/community, navigate up and down stairs, show how to get up/down from the ground, ensure home is accessible, demonstrate HEP, complete caregiver training    Physical Therapy Problems (Active)       Problem: Balance       Dates: Start:  07/30/24         Goal: STG-Within one week, patient will maintain dynamic standing balance SPV for 2 minutes or longer       Dates: Start:  07/30/24               Problem: Mobility       Dates: Start:  07/30/24         Goal: STG-Within one week, patient will ambulate community distances of at least 150' using LRAD SPV       Dates: Start:  07/30/24               Problem: Mobility Transfers       Dates: Start:  07/30/24         Goal:  STG-Within one week, patient will perform transfers SPV using LRAD       Dates: Start:  07/30/24               Problem: PT-Long Term Goals       Dates: Start:  07/30/24         Goal: LTG-By discharge, patient will ambulate greater than 250' Allie using LRAD       Dates: Start:  07/30/24            Goal: LTG-By discharge, patient will transfer one surface to another Allie using LRAD       Dates: Start:  07/30/24            Goal: LTG-By discharge, patient will ambulate up/down flight of stairs Allie with unilateral HR       Dates: Start:  07/30/24            Goal: LTG-By discharge, patient will be independent in HEP       Dates: Start:  07/30/24

## 2024-08-02 NOTE — PROGRESS NOTES
NURSING DAILY NOTE    Name: García Cho   Date of Admission: 7/29/2024   Admitting Diagnosis: Fracture of patella with routine healing, right, closed  Attending Physician: Maxwell Theodore M.d.  Allergies: Atorvastatin, Bee pollen, Ibuprofen, Pravastatin, and Other environmental    Safety  Patient Assist  Min Assist  Patient Precautions  Fall Risk, Weight Bearing As Tolerated Right Lower Extremity, Immobilizer Right Lower Extremity  Precaution Comments  García reports  right knee immobilizer to be on at all times  Bed Transfer Status  Standby Assist  Toilet Transfer Status   Standby Assist  Assistive Devices  Rails, Walker - front wheel  Oxygen  None - Room Air  Diet/Therapeutic Dining  Current Diet Order   Procedures    Diet Order Diet: Regular     Pill Administration  whole  Agitated Behavioral Scale     ABS Level of Severity       Fall Risk  Has the patient had a fall this admission?   No  Marychuy Son Fall Risk Scoring  19, HIGH RISK  Fall Risk Safety Measures  bed alarm, chair alarm, poor balance, and low vision/ hearing    Vitals  Temperature: 36.7 °C (98.1 °F)  Temp src: Oral  Pulse: 68  Respiration: 18  Blood Pressure : 134/52  Blood Pressure MAP (Calculated): 79 MM HG  BP Location: Left, Upper Arm  Patient BP Position: Sitting     Oxygen  Pulse Oximetry: 96 %  O2 (LPM): 0  O2 Delivery Device: None - Room Air    Bowel and Bladder  Last Bowel Movement  08/01/24  Stool Type  Type 4: Like a sausage or snake, smooth and soft  Bowel Device     Continent  Bladder: Continent void   Bowel: Continent movement  Bladder Function  Number of Times Voided: 1  Urine Color: Yellow  Genitourinary Assessment   Bladder Assessment (WDL):  WDL Except  Guthrie Catheter: Not Applicable  Urine Color: Yellow  Bladder Device: Bathroom  Bladder Scan: Post Void  $ Bladder Scan Results (mL): 30    Skin  Aurelio Score   17  Sensory Interventions   Bed Types:  Standard/Trauma Mattress  Skin Preventative Measures: Pillows in Use for Support / Positioning  Moisture Interventions  Moisturizers/Barriers: Barrier Wipes      Pain  Pain Rating Scale  8 - Awful, hard to do anything  Pain Location  Knee, Ankle  Pain Location Orientation  Right, Left  Pain Interventions   Medication (see MAR)    ADLs    Bathing   Shower (patient stated she had shower 7/31)  Linen Change      Personal Hygiene  Moist Lynsey Wipes, Perineal Care  Chlorhexidine Bath      Oral Care     Teeth/Dentures     Shave     Nutrition Percentage Eaten  *  * Meal *  *, Dinner, Between % Consumed  Environmental Precautions  Treaded Slipper Socks on Patient  Patient Turns/Positioning  Patient Turns Self from Side to Side  Patient Turns Assistance/Tolerance     Bed Positions  Bed Controls On, Bed Locked  Head of Bed Elevated  Self regulated      Psychosocial/Neurologic Assessment  Psychosocial Assessment  Psychosocial (WDL):  WDL Except  Patient Behaviors: Anxious  Neurologic Assessment  Neuro (WDL): Exceptions to WDL  Level of Consciousness: Alert  Orientation Level: Oriented X4  Cognition: Follows commands  Speech: Clear  Motor Function/Sensation Assessment: Sensation, Motor strength  Muscle Strength Right Arm: Good Strength Against Gravity and Moderate Resistance  Muscle Strength Left Arm: Good Strength Against Gravity and Moderate Resistance  RLE Sensation: Pain  Muscle Strength Right Leg: Fair Strength against Gravity but No Resistance  Muscle Strength Left Leg: Good Strength Against Gravity and Moderate Resistance  EENT (WDL):  WDL Except    Cardio/Pulmonary Assessment  Edema      Respiratory Breath Sounds  RUL Breath Sounds: Clear  RML Breath Sounds: Clear  RLL Breath Sounds: Clear  DOROTHY Breath Sounds: Clear  LLL Breath Sounds: Clear  Cardiac Assessment   Cardiac (WDL):  WDL Except

## 2024-08-02 NOTE — CARE PLAN
"The patient is Stable - Low risk of patient condition declining or worsening    Shift Goals  Clinical Goals: Safety  Patient Goals: Participate in therapy    Progress made toward(s) clinical / shift goals:      Problem: Pain - Standard  Goal: Alleviation of pain or a reduction in pain to the patient’s comfort goal  Outcome: Progressing  Note: Oxycodone 10mg given PRN per MAR for c/o right knee and leg pain with adequate relief. Pt sleeps good with PRN trazodone and PRN xanax. Will continue to monitor.     Problem: Fall Risk - Rehab  Goal: Patient will remain free from falls  Outcome: Progressing  Note: Marychuy Son Fall risk Assessment Score: 19    High fall risk Interventions   - Alarming seatbelt  - Bed and strip alarm   - Yellow sign by the door   - Yellow wrist band \"Fall risk\"  - Room near to the nurse station  - Do not leave patient unattended in the bathroom  - Fall risk education provided    Pt calls appropriately when in need of assistance.       Patient is not progressing towards the following goals:      "

## 2024-08-02 NOTE — DISCHARGE PLANNING
Case Management/IDT follow up.   Projected dc date:  IDT continues to recommend IRF level of care as patient continue to make progress with all therapies.     DC needs  Home health:  PT/OT//RN/ - [facility]  Family training:  n/a - pt's SO has Parkinson's; no other family.    Follow up:   PCP:  Other: faheem    I met with patient providing update from IDT and discussed plan of care.  She is in agreement w/ plan     Plan:  Continue to follow

## 2024-08-03 ENCOUNTER — APPOINTMENT (OUTPATIENT)
Dept: OCCUPATIONAL THERAPY | Facility: REHABILITATION | Age: 69
DRG: 072 | End: 2024-08-03
Attending: PHYSICAL MEDICINE & REHABILITATION
Payer: MEDICARE

## 2024-08-03 ENCOUNTER — APPOINTMENT (OUTPATIENT)
Dept: PHYSICAL THERAPY | Facility: REHABILITATION | Age: 69
DRG: 072 | End: 2024-08-03
Attending: PHYSICAL MEDICINE & REHABILITATION
Payer: MEDICARE

## 2024-08-03 PROCEDURE — 770010 HCHG ROOM/CARE - REHAB SEMI PRIVAT*

## 2024-08-03 PROCEDURE — 97110 THERAPEUTIC EXERCISES: CPT | Mod: CQ

## 2024-08-03 PROCEDURE — 97116 GAIT TRAINING THERAPY: CPT | Mod: CQ

## 2024-08-03 PROCEDURE — 700111 HCHG RX REV CODE 636 W/ 250 OVERRIDE (IP): Mod: JZ | Performed by: PHYSICAL MEDICINE & REHABILITATION

## 2024-08-03 PROCEDURE — A9270 NON-COVERED ITEM OR SERVICE: HCPCS | Performed by: PHYSICAL MEDICINE & REHABILITATION

## 2024-08-03 PROCEDURE — 97535 SELF CARE MNGMENT TRAINING: CPT

## 2024-08-03 PROCEDURE — 97530 THERAPEUTIC ACTIVITIES: CPT

## 2024-08-03 PROCEDURE — 700102 HCHG RX REV CODE 250 W/ 637 OVERRIDE(OP): Performed by: PHYSICAL MEDICINE & REHABILITATION

## 2024-08-03 PROCEDURE — 97110 THERAPEUTIC EXERCISES: CPT

## 2024-08-03 PROCEDURE — 97530 THERAPEUTIC ACTIVITIES: CPT | Mod: CQ

## 2024-08-03 RX ADMIN — ARIPIPRAZOLE 30 MG: 10 TABLET ORAL at 05:44

## 2024-08-03 RX ADMIN — METOPROLOL TARTRATE 25 MG: 25 TABLET, FILM COATED ORAL at 17:51

## 2024-08-03 RX ADMIN — OXYCODONE HYDROCHLORIDE 10 MG: 10 TABLET ORAL at 17:51

## 2024-08-03 RX ADMIN — TRAZODONE HYDROCHLORIDE 50 MG: 50 TABLET ORAL at 21:03

## 2024-08-03 RX ADMIN — OXYCODONE HYDROCHLORIDE 10 MG: 10 TABLET ORAL at 21:03

## 2024-08-03 RX ADMIN — ONDANSETRON 4 MG: 4 TABLET, ORALLY DISINTEGRATING ORAL at 09:38

## 2024-08-03 RX ADMIN — SENNOSIDES AND DOCUSATE SODIUM 2 TABLET: 50; 8.6 TABLET ORAL at 21:03

## 2024-08-03 RX ADMIN — OMEPRAZOLE 20 MG: 20 CAPSULE, DELAYED RELEASE ORAL at 07:52

## 2024-08-03 RX ADMIN — ENOXAPARIN SODIUM 40 MG: 100 INJECTION SUBCUTANEOUS at 17:52

## 2024-08-03 RX ADMIN — ALPRAZOLAM 1 MG: 0.5 TABLET ORAL at 21:03

## 2024-08-03 RX ADMIN — OXYCODONE HYDROCHLORIDE 10 MG: 10 TABLET ORAL at 05:44

## 2024-08-03 RX ADMIN — OXYCODONE HYDROCHLORIDE 10 MG: 10 TABLET ORAL at 14:00

## 2024-08-03 RX ADMIN — OXYCODONE HYDROCHLORIDE 10 MG: 10 TABLET ORAL at 01:09

## 2024-08-03 RX ADMIN — OXYCODONE HYDROCHLORIDE 10 MG: 10 TABLET ORAL at 09:38

## 2024-08-03 RX ADMIN — SENNOSIDES AND DOCUSATE SODIUM 2 TABLET: 50; 8.6 TABLET ORAL at 07:52

## 2024-08-03 RX ADMIN — Medication 1000 UNITS: at 07:52

## 2024-08-03 ASSESSMENT — GAIT ASSESSMENTS
DEVIATION: BRADYKINETIC
ASSISTIVE DEVICE: FRONT WHEEL WALKER
GAIT LEVEL OF ASSIST: STANDBY ASSIST
DISTANCE (FEET): 30

## 2024-08-03 ASSESSMENT — ACTIVITIES OF DAILY LIVING (ADL)
TOILET_TRANSFER_DESCRIPTION: SET-UP OF EQUIPMENT;SUPERVISION FOR SAFETY;GRAB BAR
BED_CHAIR_WHEELCHAIR_TRANSFER_DESCRIPTION: ADAPTIVE EQUIPMENT;SET-UP OF EQUIPMENT;SUPERVISION FOR SAFETY
TOILET_TRANSFER_DESCRIPTION: ADAPTIVE EQUIPMENT;SUPERVISION FOR SAFETY
BED_CHAIR_WHEELCHAIR_TRANSFER_DESCRIPTION: SET-UP OF EQUIPMENT;SUPERVISION FOR SAFETY
BED_CHAIR_WHEELCHAIR_TRANSFER_DESCRIPTION: ADAPTIVE EQUIPMENT;SUPERVISION FOR SAFETY
TOILET_TRANSFER_DESCRIPTION: ADAPTIVE EQUIPMENT;GRAB BAR;SUPERVISION FOR SAFETY
TOILETING_LEVEL_OF_ASSIST_DESCRIPTION: ADAPTIVE EQUIPMENT;SUPERVISION FOR SAFETY

## 2024-08-03 ASSESSMENT — PAIN DESCRIPTION - PAIN TYPE: TYPE: ACUTE PAIN

## 2024-08-03 NOTE — THERAPY
"Occupational Therapy  Daily Treatment     Patient Name: García Cho  Age:  68 y.o., Sex:  female  Medical Record #: [id number]  Today's Date: 8/3/2024     Precautions  Precautions: Fall Risk, Weight Bearing As Tolerated Right Lower Extremity, Immobilizer Right Lower Extremity  Comments: García reports  right knee immobilizer to be on at all times         Subjective    \"My wrist is getting a little worse today.\"     Objective       08/03/24 0831   OT Charge Group   OT Self Care / ADL (Units) 1   OT Therapy Activity (Units) 3   OT Total Time Spent   OT Individual Total Time Spent (Mins) 60   Pain   Intervention   (compression glove and retrograde massage.)   Pain 0 - 10 Group   Location Wrist   Location Orientation Left   Pain Rating Scale (NPRS) 4   Description Aching;Constant   Comfort Goal Comfort with Movement;Perform Activity   Therapist Pain Assessment Prior to Activity   Cognition    Level of Consciousness Alert   Functional Level of Assist   Grooming Supervision  (to Mod I)   Grooming Description Standing at sink;Supervision for safety  (for hand hygiene)   Toileting Supervision  (to Mod I)   Toileting Description Adaptive equipment;Supervision for safety  (FWW for UB support during clothing management)   Bed, Chair, Wheelchair Transfer Supervised  (to Mod I)   Bed Chair Wheelchair Transfer Description Adaptive equipment;Set-up of equipment;Supervision for safety  (FWW for stand step while wearing RLE immobilizer)   Toilet Transfers Supervised   Toilet Transfer Description Adaptive equipment;Supervision for safety  (FWW for stand step to raised commode over toilet)   Balance   Comments Pt participated in static standing balance/tolerance task at raised table top following community distance mobility from room to main gym using FWW without LOB at SPV level. Pt initially attempted standing on airex pad, but was limited by L-lateral foot discomfort due to brusing/fall (reason for admission). Pt " tolerated standing for 8 min before seated break was required.   Interdisciplinary Plan of Care Collaboration   IDT Collaboration with  Nursing   Patient Position at End of Therapy Seated;Chair Alarm On;Self Releasing Lap Belt Applied;Tray Table within Reach;Call Light within Reach;Phone within Reach   Collaboration Comments RN notified of pt's nausea.     Retrograde massage completed over radial side of L-wrist and palm for pain and edema management for 8 min with Small sized compression glove placed following for additional management. OTR also added ACE wrap to L-side of pt's new FWW (which was sized to pt during session) to add additional support for pain prevention. Pt reported immediate relief and was educated to wear glove during waking hours only and elevate when able with rest.    Assessment    Pt tolerated session well, progressing towards functional goals with self-care skills. Though pt has L-wrist pain, it has not been a primary barrier to function.   Strengths: Independent prior level of function, Making steady progress towards goals, Motivated for self care and independence, Pleasant and cooperative, Supportive family, Willingly participates in therapeutic activities  Barriers: Decreased endurance, Difficulty following instructions, Fatigue, Generalized weakness, Hearing impairment, Home accessibility, Impaired activity tolerance, Impaired balance, Impaired carryover of learning, Impaired insight/denial of deficits, Impaired functional cognition, Limited mobility, Pain, Pain poorly managed    Plan    *DC shower Tuesday with primary  ADL, IADL, related mobility and transfers, strength/endurance building standing tolerance and balance activity      DME  Hip kit, shower chair     Passport items to be completed:  Perform bathroom transfers, complete dressing, complete feeding, get ready for the day, prepare a simple meal, participate in household tasks, adapt home for safety needs, demonstrate home  exercise program, complete caregiver training    Occupational Therapy Goals (Active)       Problem: Functional Transfers       Dates: Start:  07/30/24         Goal: STG-Within one week, patient will transfer to toilet at SPV level using DME PRN.       Dates: Start:  07/30/24            Goal: STG-Within one week, patient will transfer to step in shower at SPV level using DME PRN.       Dates: Start:  07/30/24               Problem: IADL's       Dates: Start:  07/30/24         Goal: STG-Within one week, patient will access kitchen area at SBA level using DME PRN.       Dates: Start:  07/30/24               Problem: OT Long Term Goals       Dates: Start:  07/30/24         Goal: LTG-By discharge, patient will complete basic self care tasks at SPV-Mod I level using AE/DME PRN.       Dates: Start:  07/30/24            Goal: LTG-By discharge, patient will perform bathroom transfersat SPV-Mod I level using AE/DME PRN.       Dates: Start:  07/30/24            Goal: LTG-By discharge, patient will complete basic home management at SPV-Mod I level using AE/DME PRN.at SPV-Mod I level using AE/DME PRN.       Dates: Start:  07/30/24               Problem: Toileting       Dates: Start:  07/30/24         Goal: STG-Within one week, patient will complete toileting tasks at SPV level using DME PRN.       Dates: Start:  07/30/24

## 2024-08-03 NOTE — PROGRESS NOTES
NURSING DAILY NOTE    Name: García Cho   Date of Admission: 7/29/2024   Admitting Diagnosis: Fracture of patella with routine healing, right, closed  Attending Physician: Maxwell Theodore M.d.  Allergies: Atorvastatin, Bee pollen, Ibuprofen, Pravastatin, and Other environmental    Safety  Patient Assist  Min Assist  Patient Precautions  Fall Risk, Weight Bearing As Tolerated Right Lower Extremity, Immobilizer Right Lower Extremity  Precaution Comments  García reports  right knee immobilizer to be on at all times  Bed Transfer Status  Supervised  Toilet Transfer Status   Supervised  Assistive Devices  Walker - front wheel  Oxygen  None - Room Air  Diet/Therapeutic Dining  Current Diet Order   Procedures    Diet Order Diet: Regular     Pill Administration  whole  Agitated Behavioral Scale     ABS Level of Severity       Fall Risk  Has the patient had a fall this admission?   No  Marychuy Son Fall Risk Scoring  19, HIGH RISK  Fall Risk Safety Measures  bed alarm, chair alarm, poor balance, and low vision/ hearing    Vitals  Temperature: 37 °C (98.6 °F)  Temp src: Oral  Pulse: 67  Respiration: 16  Blood Pressure : 127/43  Blood Pressure MAP (Calculated): 71 MM HG  BP Location: Left, Upper Arm  Patient BP Position: Melo's Position     Oxygen  Pulse Oximetry: 92 %  O2 (LPM): 0  O2 Delivery Device: None - Room Air    Bowel and Bladder  Last Bowel Movement  08/02/24  Stool Type  Type 4: Like a sausage or snake, smooth and soft  Bowel Device     Continent  Bladder: Continent void   Bowel: Continent movement  Bladder Function  Urine Void (mL):  (large)  Number of Times Voided: 1  Urine Color: Yellow  Genitourinary Assessment   Bladder Assessment (WDL):  Within Defined Limits  Guthrie Catheter: Not Applicable  Urine Color: Yellow  Bladder Device: Bathroom  Bladder Scan: Post Void  $ Bladder Scan Results (mL): 30    Skin  Aurelio Score   17  Sensory  Interventions   Bed Types: Standard/Trauma Mattress  Skin Preventative Measures: Pillows in Use for Support / Positioning  Moisture Interventions  Moisturizers/Barriers: Barrier Wipes      Pain  Pain Rating Scale  8 - Awful, hard to do anything  Pain Location  Ankle, Knee  Pain Location Orientation  Right, Left  Pain Interventions   Medication (see MAR)    ADLs    Bathing   Partial Bed Bath, Self Care, No Assistance Required  Linen Change      Personal Hygiene  Moist Lynsey Wipes, Perineal Care  Chlorhexidine Bath      Oral Care     Teeth/Dentures     Shave     Nutrition Percentage Eaten  *  * Meal *  *, Dinner, Between % Consumed  Environmental Precautions  Treaded Slipper Socks on Patient  Patient Turns/Positioning  Patient Turns Self from Side to Side  Patient Turns Assistance/Tolerance     Bed Positions  Bed Controls On, Bed Locked  Head of Bed Elevated  Self regulated      Psychosocial/Neurologic Assessment  Psychosocial Assessment  Psychosocial (WDL):  WDL Except  Patient Behaviors: Anxious  Neurologic Assessment  Neuro (WDL): Exceptions to WDL  Level of Consciousness: Alert  Orientation Level: Oriented X4  Cognition: Follows commands  Speech: Clear  Motor Function/Sensation Assessment: Sensation, Motor strength  Muscle Strength Right Arm: Good Strength Against Gravity and Moderate Resistance  Muscle Strength Left Arm: Good Strength Against Gravity and Moderate Resistance  RLE Sensation: Pain  Muscle Strength Right Leg: Fair Strength against Gravity but No Resistance  Muscle Strength Left Leg: Good Strength Against Gravity and Moderate Resistance  EENT (WDL):  WDL Except    Cardio/Pulmonary Assessment  Edema      Respiratory Breath Sounds  RUL Breath Sounds: Clear  RML Breath Sounds: Clear  RLL Breath Sounds: Clear  DOROTHY Breath Sounds: Clear  LLL Breath Sounds: Clear  Cardiac Assessment   Cardiac (WDL):  WDL Except

## 2024-08-03 NOTE — PROGRESS NOTES
NURSING DAILY NOTE    Name: García Cho   Date of Admission: 7/29/2024   Admitting Diagnosis: Fracture of patella with routine healing, right, closed  Attending Physician: Maxwell Theodore M.d.  Allergies: Atorvastatin, Bee pollen, Ibuprofen, Pravastatin, and Other environmental    Safety  Patient Assist  Min Assist  Patient Precautions  Fall Risk, Weight Bearing As Tolerated Right Lower Extremity, Immobilizer Right Lower Extremity  Precaution Comments  García reports  right knee immobilizer to be on at all times  Bed Transfer Status  Supervised  Toilet Transfer Status   Supervised  Assistive Devices  Rails, Walker - front wheel  Oxygen  None - Room Air  Diet/Therapeutic Dining  Current Diet Order   Procedures    Diet Order Diet: Regular     Pill Administration  whole  Agitated Behavioral Scale     ABS Level of Severity       Fall Risk  Has the patient had a fall this admission?   No  Marychuy Son Fall Risk Scoring  19, HIGH RISK  Fall Risk Safety Measures  bed alarm, chair alarm, and low vision/ hearing    Vitals  Temperature: 37 °C (98.6 °F)  Temp src: Oral  Pulse: 67  Respiration: 16  Blood Pressure : 127/43  Blood Pressure MAP (Calculated): 71 MM HG  BP Location: Left, Upper Arm  Patient BP Position: Melo's Position     Oxygen  Pulse Oximetry: 92 %  O2 (LPM): 0  O2 Delivery Device: None - Room Air    Bowel and Bladder  Last Bowel Movement  08/02/24  Stool Type  Type 4: Like a sausage or snake, smooth and soft  Bowel Device     Continent  Bladder: Continent void   Bowel: Continent movement  Bladder Function  Urine Void (mL):  (large)  Number of Times Voided: 1  Urine Color: Unable To Evaluate  Genitourinary Assessment   Bladder Assessment (WDL):  Within Defined Limits  Guthrie Catheter: Not Applicable  Urine Color: Unable To Evaluate  Bladder Device: Bathroom  Bladder Scan: Post Void  $ Bladder Scan Results (mL): 30    Skin  Aurelio  Score   17  Sensory Interventions   Bed Types: Standard/Trauma Mattress  Skin Preventative Measures: Pillows in Use for Support / Positioning  Moisture Interventions  Moisturizers/Barriers: Barrier Wipes      Pain  Pain Rating Scale  8 - Awful, hard to do anything  Pain Location  Ankle, Knee  Pain Location Orientation  Right, Left  Pain Interventions   Declines    ADLs    Bathing   Partial Bed Bath, Self Care, No Assistance Required  Linen Change      Personal Hygiene  Moist Lynsey Wipes, Perineal Care  Chlorhexidine Bath      Oral Care     Teeth/Dentures     Shave     Nutrition Percentage Eaten  *  * Meal *  *, Lunch, Between % Consumed  Environmental Precautions  Treaded Slipper Socks on Patient  Patient Turns/Positioning  Patient Turns Self from Side to Side  Patient Turns Assistance/Tolerance     Bed Positions  Bed Controls On, Bed Locked  Head of Bed Elevated  Self regulated      Psychosocial/Neurologic Assessment  Psychosocial Assessment  Psychosocial (WDL):  WDL Except  Patient Behaviors: Anxious  Neurologic Assessment  Neuro (WDL): Exceptions to WDL  Level of Consciousness: Alert  Orientation Level: Oriented X4  Cognition: Follows commands  Speech: Clear  Motor Function/Sensation Assessment: Sensation, Motor strength  Muscle Strength Right Arm: Good Strength Against Gravity and Moderate Resistance  Muscle Strength Left Arm: Good Strength Against Gravity and Moderate Resistance  RLE Sensation: Pain  Muscle Strength Right Leg: Fair Strength against Gravity but No Resistance  Muscle Strength Left Leg: Good Strength Against Gravity and Moderate Resistance  EENT (WDL):  WDL Except    Cardio/Pulmonary Assessment  Edema      Respiratory Breath Sounds  RUL Breath Sounds: Clear  RML Breath Sounds: Clear  RLL Breath Sounds: Clear  DOROTHY Breath Sounds: Clear  LLL Breath Sounds: Clear  Cardiac Assessment   Cardiac (WDL):  WDL Except

## 2024-08-03 NOTE — THERAPY
Physical Therapy   Daily Treatment     Patient Name: García Cho  Age:  68 y.o., Sex:  female  Medical Record #: [id number]  Today's Date: 8/3/2024     Precautions  Precautions: Fall Risk, Weight Bearing As Tolerated Right Lower Extremity, Immobilizer Right Lower Extremity  Comments: García reports  right knee immobilizer to be on at all times    Subjective    Pt was in bed upon arrival, reported feeling nausea and received zofrin earlier. Agreeable to supine TE.     Objective       08/03/24 1031   PT Charge Group   PT Gait Training (Units) 1   PT Therapeutic Exercise (Units) 2   PT Therapeutic Activities (Units) 1   Supervising Physical Therapist Kulwinder Bertrand   PT Total Time Spent   PT Individual Total Time Spent (Mins) 60   Cognition    Level of Consciousness Alert   Gait Functional Level of Assist    Gait Level Of Assist Standby Assist   Assistive Device Front Wheel Walker   Distance (Feet) 30   # of Times Distance was Traveled 2   Deviation Bradykinetic  (R LE immobilized in extension WBAT)   Transfer Functional Level of Assist   Bed, Chair, Wheelchair Transfer Supervised   Bed Chair Wheelchair Transfer Description Adaptive equipment;Supervision for safety   Toilet Transfers Supervised   Toilet Transfer Description Adaptive equipment;Grab bar;Supervision for safety  (FWW <> toilet)   Supine Lower Body Exercise   Bridges One Legged;2 sets of 10   Straight Leg Raises Front;2 sets of 10;Bilateral   Ankle Pumps 3 sets of 15;Bilateral   Gluteal Isometrics 2 sets of 15;Bilateral   Quadriceps Isometrics 2 sets of 15;Bilateral   Other Exercises BLE IR/ER for ROM   Bed Mobility    Supine to Sit Independent   Sit to Supine Independent   Sit to Stand Supervised   Scooting Independent   Interdisciplinary Plan of Care Collaboration   Patient Position at End of Therapy In Bed;Call Light within Reach;Tray Table within Reach;Phone within Reach     Education regarding future PT POC after 6wks of immobilizer.  Cues for  breathing throughout supine TE.  Reposition immobilizer and educated regarding positioning.    Assessment    Pt tolerated session well, nausea improved within session and was able to ambulate to bathroom w/ FWW w/ SBA/SPV. Pt's motivated to progress and is pleasant throughout.      Strengths: Able to follow instructions, Adequate strength, Alert and oriented, Effective communication skills, Independent prior level of function, Manages pain appropriately, Motivated for self care and independence, Pleasant and cooperative, Willingly participates in therapeutic activities  Barriers: Decreased endurance, Fatigue, Hearing impairment, Impaired activity tolerance, Impaired balance, Pain, Limited mobility    Plan    Gait using FWW  Dual task training   LE strengthening   Endurance training   Continue Stairs with L HR + 1 crutch      DME  PT DME Recommendations  Assistive Device: Front Wheeled Walker     Passport items to be completed:  Get in/out of bed safely, in/out of a vehicle, safely use mobility device, walk or wheel around home/community, navigate up and down stairs, show how to get up/down from the ground, ensure home is accessible, demonstrate HEP, complete caregiver training    Physical Therapy Problems (Active)       Problem: Balance       Dates: Start:  07/30/24         Goal: STG-Within one week, patient will maintain dynamic standing balance SPV for 2 minutes or longer       Dates: Start:  07/30/24               Problem: Mobility       Dates: Start:  07/30/24         Goal: STG-Within one week, patient will ambulate community distances of at least 150' using LRAD SPV       Dates: Start:  07/30/24               Problem: Mobility Transfers       Dates: Start:  07/30/24         Goal: STG-Within one week, patient will perform transfers SPV using LRAD       Dates: Start:  07/30/24               Problem: PT-Long Term Goals       Dates: Start:  07/30/24         Goal: LTG-By discharge, patient will ambulate greater than  250' Allie using LRAD       Dates: Start:  07/30/24            Goal: LTG-By discharge, patient will transfer one surface to another Allie using LRAD       Dates: Start:  07/30/24            Goal: LTG-By discharge, patient will ambulate up/down flight of stairs Allie with unilateral HR       Dates: Start:  07/30/24            Goal: LTG-By discharge, patient will be independent in HEP       Dates: Start:  07/30/24

## 2024-08-03 NOTE — THERAPY
Occupational Therapy  Daily Treatment     Patient Name: García Cho  Age:  68 y.o., Sex:  female  Medical Record #: [id number]  Today's Date: 8/3/2024     Precautions  Precautions: Fall Risk, Weight Bearing As Tolerated Right Lower Extremity, Immobilizer Right Lower Extremity  Comments: García reports  right knee immobilizer to be on at all times         Subjective    Pt asleep in bed, easily awakened, willing to work with OT.      Objective       08/03/24 1231   OT Charge Group   OT Self Care / ADL (Units) 1   OT Therapy Activity (Units) 2   OT Therapeutic Exercise (Units) 1   OT Total Time Spent   OT Individual Total Time Spent (Mins) 60   Functional Level of Assist   Grooming Modified Independent   Grooming Description Seated in wheelchair at sink  (brushing teeth, washing hands)   Toileting Independent   Bed, Chair, Wheelchair Transfer Supervised   Bed Chair Wheelchair Transfer Description Set-up of equipment;Supervision for safety   Toilet Transfers Standby Assist   Toilet Transfer Description Set-up of equipment;Supervision for safety;Grab bar   Sitting Upper Body Exercises   Front Arm Raise 2 sets of 10;Bilateral  (2lb)   Shoulder Press 2 sets of 10;Bilateral  (2lb)   Neuro-Muscular Treatments   Comments patient stood to complete clothespin tree activity to facilitate functional reach (clothespins placed to the left) and balance in standing         Assessment    Pt seen for OT tx session. Tolerated UE exercises fair despite some pain in the left hand. Progressing well with ADLs.   Strengths: Independent prior level of function, Making steady progress towards goals, Motivated for self care and independence, Pleasant and cooperative, Supportive family, Willingly participates in therapeutic activities  Barriers: Decreased endurance, Difficulty following instructions, Fatigue, Generalized weakness, Hearing impairment, Home accessibility, Impaired activity tolerance, Impaired balance, Impaired  carryover of learning, Impaired insight/denial of deficits, Impaired functional cognition, Limited mobility, Pain, Pain poorly managed    Plan    *DC shower Tuesday with primary  ADL, IADL, related mobility and transfers, strength/endurance building standing tolerance and balance activity       DME       Passport items to be completed:  Perform bathroom transfers, complete dressing, complete feeding, get ready for the day, prepare a simple meal, participate in household tasks, adapt home for safety needs, demonstrate home exercise program, complete caregiver training     Occupational Therapy Goals (Active)       Problem: Functional Transfers       Dates: Start:  07/30/24         Goal: STG-Within one week, patient will transfer to toilet at SPV level using DME PRN.       Dates: Start:  07/30/24            Goal: STG-Within one week, patient will transfer to step in shower at SPV level using DME PRN.       Dates: Start:  07/30/24               Problem: IADL's       Dates: Start:  07/30/24         Goal: STG-Within one week, patient will access kitchen area at SBA level using DME PRN.       Dates: Start:  07/30/24               Problem: OT Long Term Goals       Dates: Start:  07/30/24         Goal: LTG-By discharge, patient will complete basic self care tasks at SPV-Mod I level using AE/DME PRN.       Dates: Start:  07/30/24            Goal: LTG-By discharge, patient will perform bathroom transfersat SPV-Mod I level using AE/DME PRN.       Dates: Start:  07/30/24            Goal: LTG-By discharge, patient will complete basic home management at SPV-Mod I level using AE/DME PRN.at SPV-Mod I level using AE/DME PRN.       Dates: Start:  07/30/24               Problem: Toileting       Dates: Start:  07/30/24         Goal: STG-Within one week, patient will complete toileting tasks at SPV level using DME PRN.       Dates: Start:  07/30/24

## 2024-08-03 NOTE — CARE PLAN
The patient is Stable - Low risk of patient condition declining or worsening    Shift Goals  Clinical Goals: Safety  Patient Goals: Participate in therapy    Progress made toward(s) clinical / shift goals:      Problem: Pain - Standard  Goal: Alleviation of pain or a reduction in pain to the patient’s comfort goal  Outcome: Progressing  Note: Oxycodone 10mg given PRN per MAR for c/o right knee pain with adequate relief. Pt sleeping well with PRN Xanax and trazodone. Will continue to monitor.     Problem: Bladder / Voiding  Goal: Patient will establish and maintain regular urinary output  Outcome: Progressing  Note: Pt continent of bladder. Voiding adequately and ambulating using FWW to the BR. She denies dysuria.       Patient is not progressing towards the following goals:

## 2024-08-04 PROCEDURE — 700102 HCHG RX REV CODE 250 W/ 637 OVERRIDE(OP): Performed by: PHYSICAL MEDICINE & REHABILITATION

## 2024-08-04 PROCEDURE — 99232 SBSQ HOSP IP/OBS MODERATE 35: CPT | Performed by: PHYSICAL MEDICINE & REHABILITATION

## 2024-08-04 PROCEDURE — A9270 NON-COVERED ITEM OR SERVICE: HCPCS | Performed by: PHYSICAL MEDICINE & REHABILITATION

## 2024-08-04 PROCEDURE — 94760 N-INVAS EAR/PLS OXIMETRY 1: CPT

## 2024-08-04 PROCEDURE — 770010 HCHG ROOM/CARE - REHAB SEMI PRIVAT*

## 2024-08-04 PROCEDURE — 700111 HCHG RX REV CODE 636 W/ 250 OVERRIDE (IP): Performed by: PHYSICAL MEDICINE & REHABILITATION

## 2024-08-04 RX ADMIN — OXYCODONE HYDROCHLORIDE 10 MG: 10 TABLET ORAL at 20:42

## 2024-08-04 RX ADMIN — SENNOSIDES AND DOCUSATE SODIUM 2 TABLET: 50; 8.6 TABLET ORAL at 20:15

## 2024-08-04 RX ADMIN — OXYCODONE HYDROCHLORIDE 10 MG: 10 TABLET ORAL at 11:37

## 2024-08-04 RX ADMIN — OMEPRAZOLE 20 MG: 20 CAPSULE, DELAYED RELEASE ORAL at 08:11

## 2024-08-04 RX ADMIN — Medication 1000 UNITS: at 08:11

## 2024-08-04 RX ADMIN — METOPROLOL TARTRATE 25 MG: 25 TABLET, FILM COATED ORAL at 17:35

## 2024-08-04 RX ADMIN — SENNOSIDES AND DOCUSATE SODIUM 2 TABLET: 50; 8.6 TABLET ORAL at 08:11

## 2024-08-04 RX ADMIN — ENOXAPARIN SODIUM 40 MG: 100 INJECTION SUBCUTANEOUS at 17:34

## 2024-08-04 RX ADMIN — OXYCODONE HYDROCHLORIDE 10 MG: 10 TABLET ORAL at 05:31

## 2024-08-04 RX ADMIN — ONDANSETRON 4 MG: 4 TABLET, ORALLY DISINTEGRATING ORAL at 08:28

## 2024-08-04 RX ADMIN — OXYCODONE HYDROCHLORIDE 10 MG: 10 TABLET ORAL at 08:29

## 2024-08-04 RX ADMIN — ALPRAZOLAM 1 MG: 0.5 TABLET ORAL at 20:15

## 2024-08-04 RX ADMIN — OXYCODONE HYDROCHLORIDE 10 MG: 10 TABLET ORAL at 14:18

## 2024-08-04 RX ADMIN — OXYCODONE HYDROCHLORIDE 10 MG: 10 TABLET ORAL at 17:34

## 2024-08-04 RX ADMIN — POLYETHYLENE GLYCOL 3350 1 PACKET: 17 POWDER, FOR SOLUTION ORAL at 08:29

## 2024-08-04 RX ADMIN — ARIPIPRAZOLE 30 MG: 10 TABLET ORAL at 05:31

## 2024-08-04 RX ADMIN — METOPROLOL TARTRATE 25 MG: 25 TABLET, FILM COATED ORAL at 05:31

## 2024-08-04 ASSESSMENT — FIBROSIS 4 INDEX: FIB4 SCORE: 0.85

## 2024-08-04 ASSESSMENT — PAIN DESCRIPTION - PAIN TYPE: TYPE: ACUTE PAIN

## 2024-08-04 NOTE — PROGRESS NOTES
Physical Medicine & Rehabilitation Progress Note  Encounter Date: 8/4/2024    Chief Complaint:   Left wrist pain    Interval Events (Subjective):  The patient is complaining of left wrist pain since her fall.  She did note a previous hand x-ray which was negative.  Wrist pain has persisted and is more on the ulnar aspect of the wrist.  This is worse with wrist extension and wrist flexion and worse with palpation.  Moderate in intensity.  Nonradiating.  Denies numbness     Objective:  VITAL SIGNS: VITAL SIGNS:   Vitals:    08/04/24 0528 08/04/24 0700 08/04/24 1025 08/04/24 1515   BP: 130/65  93/58 124/51   Pulse: 61  60 64   Resp: 18  16 18   Temp: 36.7 °C (98.1 °F)  36.7 °C (98.1 °F) 36.2 °C (97.1 °F)   TempSrc: Oral  Oral Oral   SpO2:   92% 96%   Weight:  75 kg (165 lb 5.5 oz)     Height:             Gen: NAD  Psych: Mood and affect appropriate  CV: RRR, Resp: CTAB, no upper airway sounds  Abd: NTND  Neuro: Alert and oriented x 4  Musculoskeletal no tenderness palpation of the anatomical snuffbox.  Positive tenderness palpation attributes the patient's symptoms in the ulnar aspect of the wrist    Laboratory Values:  No results found for this or any previous visit (from the past 72 hour(s)).    Medications:  Scheduled Medications   Medication Dose Frequency    vitamin D3  1,000 Units DAILY    senna-docusate  2 Tablet BID    omeprazole  20 mg DAILY    ARIPiprazole  30 mg QDAY    enoxaparin (LOVENOX) injection  40 mg DAILY AT 1800    metoprolol tartrate  25 mg BID    polyethylene glycol/lytes  1 Packet DAILY     PRN medications: Respiratory Therapy Consult, hydrALAZINE, acetaminophen, senna-docusate **AND** polyethylene glycol/lytes, docusate sodium, magnesium hydroxide, carboxymethylcellulose, mag hydrox-al hydrox-simeth, ondansetron **OR** ondansetron, traZODone, sodium chloride, oxyCODONE immediate-release **OR** oxyCODONE immediate-release, ALPRAZolam    Bowel:  Last Documented BM Date:Last BM: 08/04/24  Last  Documented BM Description: Stool Description: Medium;Brown;Soft    Bladder:  Last Documented Urine Void (mL):    Last Documented Bladder Scan:    Last Documented Bladder Scan Results (mL):      Physical Therapy:  Bed Mobility    Transfers        Mobility     Stairs    Barriers to Discharge Home:      Occupational Therapy:  Grooming     Bathing     UB Dressing     LB Dressing     Toileting     Shower & Transfer     Barriers to Discharge Home:    Diet:  Current Diet Order   Procedures    Diet Order Diet: Regular     Medical Decision Making and Plan:  R patellar fracture - Patient sustained GLF with right knee fracture s/p open repair with Dr. Holder on 7/26/24. She is WBAT with knee in extension brace.  -PT and OT for mobility and ADLs. Per guidelines, 15 hours per week between PT, OT and/or SLP.  -Follow-up Dr. Holder   -Continue pain medications    Left wrist pain.  Patient does have a history of fall.  X-ray of the hand on 7/25 was negative for fracture but there was not an x-ray of the wrist which was dedicated.  I ordered an x-ray of the left wrist.  Continue physical therapy for now.  Labs reviewed.  Vitals reviewed.      Skin - Patient at risk for skin breakdown due to debility in areas including sacrum, achilles, elbows and head in addition to other sites. Nursing to assess skin daily.      GI Ppx - Patient on Prilosec for GERD prophylaxis. Patient on Senna-docusate for constipation prophylaxis.      DVT Ppx - Patient Lovenox on transfer. Limited mobility, continue Lovenox    ISudhir M.D., personally performed a complete drug regimen review and no potential clinically significant medication issues were identified.  _____________________________________    Sudhir Calixto MD  [facility] [place] - Physical Medicine & Rehabilitation     _____________________________________

## 2024-08-04 NOTE — CARE PLAN
The patient is Stable - Low risk of patient condition declining or worsening    Shift Goals  Clinical Goals: Safety  Patient Goals: Participate in therapy    Progress made toward(s) clinical / shift goals: VSS, pain controlled with PRN pain medication, skin intact, able to participate in therapy.

## 2024-08-04 NOTE — CARE PLAN
"The patient is Stable - Low risk of patient condition declining or worsening    Shift Goals  Clinical Goals: Safety, Pain control  Patient Goals: Safety  Family Goals: Education    Progress made toward(s) clinical / shift goals:      Problem: Pain - Standard  Goal: Alleviation of pain or a reduction in pain to the patient’s comfort goal  Outcome: Progressing  Note: [id number] given PRN per MAR for c/o right knee pain with adequate relief. Pt sleeps good. Will continue to monitor.     Problem: Fall Risk - Rehab  Goal: Patient will remain free from falls  Outcome: Progressing  Note: Marychuy Son Fall risk Assessment Score: 19    High fall risk Interventions   - Alarming seatbelt  - Bed and strip alarm   - Yellow sign by the door   - Yellow wrist band \"Fall risk\"  - Room near to the nurse station  - Do not leave patient unattended in the bathroom  - Fall risk education provided    Pt calls appropriately when in need of assistance.       Patient is not progressing towards the following goals:      "

## 2024-08-04 NOTE — PROGRESS NOTES
NURSING DAILY NOTE    Name: García Cho   Date of Admission: 7/29/2024   Admitting Diagnosis: Fracture of patella with routine healing, right, closed  Attending Physician: Maxwell Theodore M.d.  Allergies: Atorvastatin, Bee pollen, Ibuprofen, Pravastatin, and Other environmental    Safety  Patient Assist  Min Assist  Patient Precautions  Fall Risk, Weight Bearing As Tolerated Right Lower Extremity, Immobilizer Right Lower Extremity  Precaution Comments  García reports  right knee immobilizer to be on at all times  Bed Transfer Status  Supervised  Toilet Transfer Status   Standby Assist  Assistive Devices  Walker - front wheel  Oxygen  None - Room Air  Diet/Therapeutic Dining  Current Diet Order   Procedures    Diet Order Diet: Regular     Pill Administration  whole  Agitated Behavioral Scale     ABS Level of Severity       Fall Risk  Has the patient had a fall this admission?   No  Marychuy Son Fall Risk Scoring  20, HIGH RISK  Fall Risk Safety Measures  bed alarm and chair alarm    Vitals  Temperature: 36.6 °C (97.9 °F)  Temp src: Oral  Pulse: 76  Respiration: 16  Blood Pressure : 128/64  Blood Pressure MAP (Calculated): 85 MM HG  BP Location: Right, Upper Arm  Patient BP Position: Sitting     Oxygen  Pulse Oximetry: 94 %  O2 (LPM): 0  O2 Delivery Device: None - Room Air    Bowel and Bladder  Last Bowel Movement  08/03/24  Stool Type  Type 4: Like a sausage or snake, smooth and soft  Bowel Device     Continent  Bladder: Continent void   Bowel: Continent movement  Bladder Function  Urine Void (mL):  (large)  Number of Times Voided: 1  Urine Color: Yellow  Genitourinary Assessment   Bladder Assessment (WDL):  Within Defined Limits  Guthrie Catheter: Not Applicable  Urine Color: Yellow  Bladder Device: Bathroom  Bladder Scan: Post Void  $ Bladder Scan Results (mL): 30    Skin  Aurelio Score   17  Sensory Interventions   Bed Types:  Standard/Trauma Mattress  Skin Preventative Measures: Pillows in Use for Support / Positioning  Moisture Interventions  Moisturizers/Barriers: Moisturizer       Pain  Pain Rating Scale  8 - Awful, hard to do anything  Pain Location  Wrist  Pain Location Orientation  Left  Pain Interventions    (compression glove and retrograde massage.)    ADLs    Bathing   Partial Bed Bath, Self Care, No Assistance Required  Linen Change      Personal Hygiene  Moist Lynsey Wipes, Perineal Care  Chlorhexidine Bath      Oral Care     Teeth/Dentures     Shave     Nutrition Percentage Eaten  *  * Meal *  *, Lunch, Between % Consumed  Environmental Precautions  Treaded Slipper Socks on Patient  Patient Turns/Positioning  Patient Turns Self from Side to Side  Patient Turns Assistance/Tolerance     Bed Positions  Bed Controls On, Bed Locked  Head of Bed Elevated  Self regulated      Psychosocial/Neurologic Assessment  Psychosocial Assessment  Psychosocial (WDL):  WDL Except  Patient Behaviors: Anxious  Neurologic Assessment  Neuro (WDL): Exceptions to WDL  Level of Consciousness: Alert  Orientation Level: Oriented X4  Cognition: Follows commands  Speech: Clear  Motor Function/Sensation Assessment: Sensation, Motor strength  Muscle Strength Right Arm: Good Strength Against Gravity and Moderate Resistance  Muscle Strength Left Arm: Good Strength Against Gravity and Moderate Resistance  RLE Sensation: Pain  Muscle Strength Right Leg: Fair Strength against Gravity but No Resistance  Muscle Strength Left Leg: Good Strength Against Gravity and Moderate Resistance  EENT (WDL):  WDL Except    Cardio/Pulmonary Assessment  Edema      Respiratory Breath Sounds  RUL Breath Sounds: Clear  RML Breath Sounds: Clear  RLL Breath Sounds: Clear  DOROTHY Breath Sounds: Clear  LLL Breath Sounds: Clear  Cardiac Assessment   Cardiac (WDL):  WDL Except

## 2024-08-05 ENCOUNTER — APPOINTMENT (OUTPATIENT)
Dept: RADIOLOGY | Facility: REHABILITATION | Age: 69
DRG: 072 | End: 2024-08-05
Attending: PHYSICAL MEDICINE & REHABILITATION
Payer: MEDICARE

## 2024-08-05 ENCOUNTER — APPOINTMENT (OUTPATIENT)
Dept: PHYSICAL THERAPY | Facility: REHABILITATION | Age: 69
DRG: 072 | End: 2024-08-05
Attending: PHYSICAL MEDICINE & REHABILITATION
Payer: MEDICARE

## 2024-08-05 ENCOUNTER — APPOINTMENT (OUTPATIENT)
Dept: OCCUPATIONAL THERAPY | Facility: REHABILITATION | Age: 69
DRG: 072 | End: 2024-08-05
Attending: PHYSICAL MEDICINE & REHABILITATION
Payer: MEDICARE

## 2024-08-05 PROCEDURE — 94760 N-INVAS EAR/PLS OXIMETRY 1: CPT

## 2024-08-05 PROCEDURE — A9270 NON-COVERED ITEM OR SERVICE: HCPCS | Performed by: PHYSICAL MEDICINE & REHABILITATION

## 2024-08-05 PROCEDURE — 97112 NEUROMUSCULAR REEDUCATION: CPT

## 2024-08-05 PROCEDURE — 73110 X-RAY EXAM OF WRIST: CPT | Mod: LT

## 2024-08-05 PROCEDURE — 770010 HCHG ROOM/CARE - REHAB SEMI PRIVAT*

## 2024-08-05 PROCEDURE — 97110 THERAPEUTIC EXERCISES: CPT

## 2024-08-05 PROCEDURE — 700102 HCHG RX REV CODE 250 W/ 637 OVERRIDE(OP): Performed by: PHYSICAL MEDICINE & REHABILITATION

## 2024-08-05 PROCEDURE — 97535 SELF CARE MNGMENT TRAINING: CPT

## 2024-08-05 PROCEDURE — 99232 SBSQ HOSP IP/OBS MODERATE 35: CPT | Performed by: PHYSICAL MEDICINE & REHABILITATION

## 2024-08-05 PROCEDURE — 97530 THERAPEUTIC ACTIVITIES: CPT

## 2024-08-05 PROCEDURE — 97116 GAIT TRAINING THERAPY: CPT

## 2024-08-05 PROCEDURE — 700111 HCHG RX REV CODE 636 W/ 250 OVERRIDE (IP): Mod: JZ | Performed by: PHYSICAL MEDICINE & REHABILITATION

## 2024-08-05 RX ADMIN — SENNOSIDES AND DOCUSATE SODIUM 2 TABLET: 50; 8.6 TABLET ORAL at 08:11

## 2024-08-05 RX ADMIN — METOPROLOL TARTRATE 25 MG: 25 TABLET, FILM COATED ORAL at 17:23

## 2024-08-05 RX ADMIN — OXYCODONE HYDROCHLORIDE 10 MG: 10 TABLET ORAL at 05:50

## 2024-08-05 RX ADMIN — METOPROLOL TARTRATE 25 MG: 25 TABLET, FILM COATED ORAL at 05:50

## 2024-08-05 RX ADMIN — OMEPRAZOLE 20 MG: 20 CAPSULE, DELAYED RELEASE ORAL at 08:11

## 2024-08-05 RX ADMIN — POLYETHYLENE GLYCOL 3350 1 PACKET: 17 POWDER, FOR SOLUTION ORAL at 08:12

## 2024-08-05 RX ADMIN — ALPRAZOLAM 1 MG: 0.5 TABLET ORAL at 21:05

## 2024-08-05 RX ADMIN — OXYCODONE HYDROCHLORIDE 10 MG: 10 TABLET ORAL at 09:03

## 2024-08-05 RX ADMIN — POLYETHYLENE GLYCOL 3350 1 PACKET: 17 POWDER, FOR SOLUTION ORAL at 08:11

## 2024-08-05 RX ADMIN — OXYCODONE HYDROCHLORIDE 10 MG: 10 TABLET ORAL at 14:57

## 2024-08-05 RX ADMIN — OXYCODONE HYDROCHLORIDE 10 MG: 10 TABLET ORAL at 21:05

## 2024-08-05 RX ADMIN — OXYCODONE HYDROCHLORIDE 10 MG: 10 TABLET ORAL at 12:06

## 2024-08-05 RX ADMIN — OXYCODONE HYDROCHLORIDE 10 MG: 10 TABLET ORAL at 18:06

## 2024-08-05 RX ADMIN — ACETAMINOPHEN 650 MG: 325 TABLET ORAL at 08:34

## 2024-08-05 RX ADMIN — ENOXAPARIN SODIUM 40 MG: 100 INJECTION SUBCUTANEOUS at 17:23

## 2024-08-05 RX ADMIN — ARIPIPRAZOLE 30 MG: 10 TABLET ORAL at 05:50

## 2024-08-05 RX ADMIN — Medication 1000 UNITS: at 08:11

## 2024-08-05 RX ADMIN — OXYCODONE HYDROCHLORIDE 10 MG: 10 TABLET ORAL at 00:55

## 2024-08-05 ASSESSMENT — GAIT ASSESSMENTS
DISTANCE (FEET): 60
GAIT LEVEL OF ASSIST: STANDBY ASSIST
ASSISTIVE DEVICE: FRONT WHEEL WALKER
DEVIATION: BRADYKINETIC

## 2024-08-05 ASSESSMENT — PAIN DESCRIPTION - PAIN TYPE
TYPE: ACUTE PAIN

## 2024-08-05 ASSESSMENT — ACTIVITIES OF DAILY LIVING (ADL)
BED_CHAIR_WHEELCHAIR_TRANSFER_DESCRIPTION: SET-UP OF EQUIPMENT;SUPERVISION FOR SAFETY
TOILET_TRANSFER_DESCRIPTION: ADAPTIVE EQUIPMENT;GRAB BAR
TOILET_TRANSFER_DESCRIPTION: SET-UP OF EQUIPMENT;SUPERVISION FOR SAFETY;GRAB BAR
BED_CHAIR_WHEELCHAIR_TRANSFER_DESCRIPTION: ADAPTIVE EQUIPMENT
BED_CHAIR_WHEELCHAIR_TRANSFER_DESCRIPTION: ADAPTIVE EQUIPMENT
TOILET_TRANSFER_DESCRIPTION: GRAB BAR;ADAPTIVE EQUIPMENT

## 2024-08-05 ASSESSMENT — PAIN SCALES - PAIN ASSESSMENT IN ADVANCED DEMENTIA (PAINAD)
BODYLANGUAGE: RELAXED
BREATHING: NORMAL

## 2024-08-05 NOTE — PROGRESS NOTES
NURSING DAILY NOTE    Name: García Cho   Date of Admission: 7/29/2024   Admitting Diagnosis: Fracture of patella with routine healing, right, closed  Attending Physician: Maxwell Theodore M.d.  Allergies: Atorvastatin, Bee pollen, Ibuprofen, Pravastatin, and Other environmental    Safety  Patient Assist  SBA  Patient Precautions  Fall Risk, Weight Bearing As Tolerated Right Lower Extremity, Immobilizer Right Lower Extremity  Precaution Comments  García reports  right knee immobilizer to be on at all times  Bed Transfer Status  Supervised  Toilet Transfer Status   Standby Assist  Assistive Devices  Walker - front wheel  Oxygen  None - Room Air  Diet/Therapeutic Dining  Current Diet Order   Procedures    Diet Order Diet: Regular     Pill Administration  whole  Agitated Behavioral Scale     ABS Level of Severity       Fall Risk  Has the patient had a fall this admission?   No  Marychuy Son Fall Risk Scoring  19, HIGH RISK  Fall Risk Safety Measures  bed alarm and chair alarm    Vitals  Temperature: 36.2 °C (97.1 °F)  Temp src: Oral  Pulse: 68  Respiration: 18  Blood Pressure : 127/58  Blood Pressure MAP (Calculated): 81 MM HG  BP Location: Right, Upper Arm  Patient BP Position: Supine     Oxygen  Pulse Oximetry: 96 %  O2 (LPM): 0  O2 Delivery Device: None - Room Air    Bowel and Bladder  Last Bowel Movement  08/04/24  Stool Type  Type 3: Like a sausage, but with cracks on its surface  Bowel Device     Continent  Bladder: Continent void   Bowel: Continent movement  Bladder Function  Urine Void (mL):  (large)  Number of Times Voided: 1  Urine Color: Yellow  Genitourinary Assessment   Bladder Assessment (WDL):  Within Defined Limits  Guthrie Catheter: Not Applicable  Urine Color: Yellow  Bladder Device: Bathroom  Bladder Scan: Post Void  $ Bladder Scan Results (mL): 30    Skin  Aurelio Score   17  Sensory Interventions   Bed Types:  Standard/Trauma Mattress  Skin Preventative Measures: Pillows in Use for Support / Positioning  Moisture Interventions  Moisturizers/Barriers: Barrier Wipes      Pain  Pain Rating Scale  8 - Awful, hard to do anything  Pain Location  Knee  Pain Location Orientation  Right  Pain Interventions   Medication (see MAR)    ADLs    Bathing   Partial Bed Bath, Self Care, No Assistance Required  Linen Change      Personal Hygiene  Moist Lynsey Wipes, Perineal Care  Chlorhexidine Bath      Oral Care     Teeth/Dentures     Shave     Nutrition Percentage Eaten  *  * Meal *  *, Lunch, Between % Consumed  Environmental Precautions  Treaded Slipper Socks on Patient  Patient Turns/Positioning  Patient Turns Self from Side to Side  Patient Turns Assistance/Tolerance     Bed Positions  Bed Controls On, Bed Locked  Head of Bed Elevated  Self regulated      Psychosocial/Neurologic Assessment  Psychosocial Assessment  Psychosocial (WDL):  WDL Except  Patient Behaviors: Anxious  Neurologic Assessment  Neuro (WDL): Exceptions to WDL  Level of Consciousness: Alert  Orientation Level: Oriented X4  Cognition: Follows commands  Speech: Clear  Motor Function/Sensation Assessment: Sensation, Motor strength  Muscle Strength Right Arm: Good Strength Against Gravity and Moderate Resistance  Muscle Strength Left Arm: Good Strength Against Gravity and Moderate Resistance  RLE Sensation: Pain  Muscle Strength Right Leg: Fair Strength against Gravity but No Resistance  Muscle Strength Left Leg: Good Strength Against Gravity and Moderate Resistance  EENT (WDL):  WDL Except    Cardio/Pulmonary Assessment  Edema      Respiratory Breath Sounds  RUL Breath Sounds: Clear  RML Breath Sounds: Clear  RLL Breath Sounds: Clear  DOROTHY Breath Sounds: Clear  LLL Breath Sounds: Clear  Cardiac Assessment   Cardiac (WDL):  WDL Except

## 2024-08-05 NOTE — THERAPY
Occupational Therapy  Daily Treatment     Patient Name: García Cho  Age:  68 y.o., Sex:  female  Medical Record #: [id number]  Today's Date: 8/5/2024     Precautions  Precautions: Fall Risk, Weight Bearing As Tolerated Right Upper Extremity, Immobilizer Right Lower Extremity  Comments: García reports  right knee immobilizer to be on at all times    Subjective    Pt in bed upon arrival for both OT sessions. Agreeable to participate in OT.      Objective     08/05/24 0931 08/05/24 1431   OT Charge Group   OT Self Care / ADL (Units) 2  --    OT Neuromuscular Re-education / Balance (Units) 2  --    OT Therapy Activity (Units)  --  1   OT Therapeutic Exercise (Units)  --  1   OT Total Time Spent   OT Individual Total Time Spent (Mins) 60 30   Precautions   Precautions Fall Risk;Weight Bearing As Tolerated Right Upper Extremity;Immobilizer Right Lower Extremity  --    Comments García reports  right knee immobilizer to be on at all times  --    Functional Level of Assist   Bed, Chair, Wheelchair Transfer Modified Independent  (standard bed and hospital bed) Modified Independent   Toilet Transfers Supervised  (commode over toilet, FWW level)  --    Tub / Shower Transfers Supervised  (Dry WIS txfr to simulate home setup, FWW level - side step technique)  --    Sitting Upper Body Exercises   Chest Press  --  2 sets of 10;Bilateral;Weight (See Comments for lbs)  (6# weighted ball)   Front Arm Raise  --  2 sets of 10;Bilateral;Weight (See Comments for lbs)  (6# weighted ball)   Shoulder Press  --  2 sets of 10;Bilateral;Weight (See Comments for lbs)  (6# weighted ball)   Comments  --  seated EOM   IADL Treatments   IADL Treatments Meal preparation  --    Meal Preparation Supervision for stove top meal prep task @ FWW level incl retrieval of items from varies heights in kitchen (ie upper/lower cabinets, drawers). No overt LOB observed.  --    Interdisciplinary Plan of Care Collaboration   IDT Collaboration with    "--  Physical Therapist   Patient Position at End of Therapy Seated;Self Releasing Lap Belt Applied Seated  (on mat in west therapy gym)   Collaboration Comments  --  Txfrd care to PT     Provided pt w/ Care Chest devan/assisted with completing     Education re: adaptive kitchen mobility strategies including use of tray attachment to maximize safety/independence.     FWW mobility ~ 150' x2 @ supervision level     Assessment    Pt tolerated OT sessions well overall w/ primary barriers being decreased endurance and mild memory deficits. Pt demo's ability to complete functional txfrs @ supervision to mod I level and benefits from use of commode (over toilet) due to low toilet height @ home (12\"). Assisted pt w/ completion of Care chest devan w/ indication of need for commode and FWW tray attachment.   Strengths: Independent prior level of function, Making steady progress towards goals, Motivated for self care and independence, Pleasant and cooperative, Supportive family, Willingly participates in therapeutic activities  Barriers: Decreased endurance, Difficulty following instructions, Fatigue, Generalized weakness, Hearing impairment, Home accessibility, Impaired activity tolerance, Impaired balance, Impaired carryover of learning, Impaired insight/denial of deficits, Impaired functional cognition, Limited mobility, Pain, Pain poorly managed    Plan    DC IRF Jazzy tomorrow w/ primary OT (KW) @ 9am.     DME  - pt has built in shower chair in [facility] 3:1 commode recommended (indicated on Care Chest devan)    Occupational Therapy Goals (Active)       Problem: Functional Transfers       Dates: Start:  07/30/24         Goal: STG-Within one week, patient will transfer to toilet at SPV level using DME PRN.       Dates: Start:  07/30/24            Goal: STG-Within one week, patient will transfer to step in shower at SPV level using DME PRN.       Dates: Start:  07/30/24               Problem: IADL's       Dates: Start:  07/30/24    "      Goal: STG-Within one week, patient will access kitchen area at SBA level using DME PRN.       Dates: Start:  07/30/24               Problem: OT Long Term Goals       Dates: Start:  07/30/24         Goal: LTG-By discharge, patient will complete basic self care tasks at SPV-Mod I level using AE/DME PRN.       Dates: Start:  07/30/24            Goal: LTG-By discharge, patient will perform bathroom transfersat SPV-Mod I level using AE/DME PRN.       Dates: Start:  07/30/24            Goal: LTG-By discharge, patient will complete basic home management at SPV-Mod I level using AE/DME PRN.at SPV-Mod I level using AE/DME PRN.       Dates: Start:  07/30/24               Problem: Toileting       Dates: Start:  07/30/24         Goal: STG-Within one week, patient will complete toileting tasks at SPV level using DME PRN.       Dates: Start:  07/30/24

## 2024-08-05 NOTE — CARE PLAN
The patient is Watcher - Medium risk of patient condition declining or worsening    Shift Goals  Clinical Goals: Safety  Patient Goals: Rest  Family Goals: Education          Problem: Knowledge Deficit - Standard  Goal: Patient and family/care givers will demonstrate understanding of plan of care, disease process/condition, diagnostic tests and medications  Outcome: Progressing     Problem: Psychosocial  Goal: Patient's level of anxiety will decrease  Outcome: Progressing  Goal: Patient's ability to verbalize feelings about condition will improve  Outcome: Progressing  Goal: Patient's ability to re-evaluate and adapt role responsibilities will improve  Outcome: Progressing  Goal: Patient and family will demonstrate ability to cope with life altering diagnosis and/or procedure  Outcome: Progressing  Goal: Spiritual and cultural needs incorporated into hospitalization  Outcome: Progressing     Problem: Mobility  Goal: Patient's capacity to carry out activities will improve  Outcome: Progressing     Problem: Self Care  Goal: Patient will have the ability to perform ADLs independently or with assistance (bathe, groom, dress, toilet and feed)  Outcome: Progressing     Problem: Pain - Standard  Goal: Alleviation of pain or a reduction in pain to the patient’s comfort goal  Outcome: Progressing     Problem: Bladder / Voiding  Goal: Patient will establish and maintain regular urinary output  Outcome: Progressing     Problem: Bowel Elimination  Goal: Patient will participate in bowel management program  Outcome: Progressing   Patient educated and states understanding on all POC , all questions answered  at this time.

## 2024-08-05 NOTE — PROGRESS NOTES
"  Physical Medicine & Rehabilitation Progress Note    Encounter Date: 8/5/2024    Chief Complaint: Decreased mobility, weakness    Interval Events (Subjective):  Patient sitting up in room. She reports therapy is going well. She denies pain at rest. She reports she is looking forward to going home on Wednesday.     _____________________________________  Interdisciplinary Team Conference   Most recent IDT on 8/1/2024    Discharge Date/Disposition:  8/7/24  _____________________________________    Objective:  VITAL SIGNS: /72   Pulse 64   Temp 36.3 °C (97.4 °F) (Oral)   Resp 20   Ht 1.768 m (5' 9.6\")   Wt 75 kg (165 lb 5.5 oz)   SpO2 95%   BMI 24.00 kg/m²   Gen: NAD  Psych: Mood and affect appropriate  CV: RRR, 0 edema  Resp: CTAB, no upper airway sounds  Abd: NTND  Neuro: AOx4, following commands, tremor RLE    Laboratory Values:  No results found for this or any previous visit (from the past 72 hour(s)).      Medications:  Scheduled Medications   Medication Dose Frequency    vitamin D3  1,000 Units DAILY    senna-docusate  2 Tablet BID    omeprazole  20 mg DAILY    ARIPiprazole  30 mg QDAY    enoxaparin (LOVENOX) injection  40 mg DAILY AT 1800    metoprolol tartrate  25 mg BID    polyethylene glycol/lytes  1 Packet DAILY     PRN medications: Respiratory Therapy Consult, hydrALAZINE, acetaminophen, senna-docusate **AND** polyethylene glycol/lytes, docusate sodium, magnesium hydroxide, carboxymethylcellulose, mag hydrox-al hydrox-simeth, ondansetron **OR** ondansetron, traZODone, sodium chloride, oxyCODONE immediate-release **OR** oxyCODONE immediate-release, ALPRAZolam    Diet:  Current Diet Order   Procedures    Diet Order Diet: Regular       Medical Decision Making and Plan:  R patellar fracture - Patient sustained GLF with right knee fracture s/p open repair with Dr. Holder on 7/26/24. She is WBAT with knee in extension brace.  -PT and OT for mobility and ADLs. Per guidelines, 15 hours per week " between PT, OT and/or SLP.  -Follow-up Dr. Holder      Confusion - Present on admission, slightly worsened. Elevated LFTs, SLP to evaluate. Metabolic encephalopathy, change [facility] Code / Diagnosis to Support: 0002.1 - Brain Dysfunction: Non-Traumatic  -Recheck CMP - elevated remain elevated but trending down. Repeat 8/6    Left wrist pain - XR without fracture per my read on 8/5    A fib/HTN - History of A fib on ziopatch. On metoprolol 25 mg BID and previously on Nebivolol. HR into 60s, continue Metoprolol 25 mg BID     HLD - Patient with allergy to statins. Diet controlled     Anxiety disorder - Patient on Abilify 30 mg daily and PRN Xanax 1 mg. Requiring PRN Xanax at night. Improving mood/cognition, continue Abilify 30 mg daily, using Xanax at night     Elevated LFTs - on admission, previously normal. Will monitor as may be reactionary from fracture. Bilirubin normal. Repeat improving on 8/1. Repeat 8/5    Pain - Patient on PRN Tylenol and Oxycodone     Vitamin D insufficiency - 27 on admission, start 1000 U      Skin - Patient at risk for skin breakdown due to debility in areas including sacrum, achilles, elbows and head in addition to other sites. Nursing to assess skin daily.      GI Ppx - Patient on Prilosec for GERD prophylaxis. Patient on Senna-docusate for constipation prophylaxis.      DVT Ppx - Patient Lovenox on transfer. Limited mobility, continue Lovenox, will need ASA at discharge  ____________________________________    T. Jn Theodore MD/PhD  [facility] - Physical Medicine & Rehabilitation   [facility] - Brain Injury Medicine   ____________________________________

## 2024-08-05 NOTE — THERAPY
Physical Therapy   Daily Treatment     Patient Name: García Cho  Age:  68 y.o., Sex:  female  Medical Record #: [id number]  Today's Date: 8/5/2024     Precautions  Precautions: (P) Fall Risk, Weight Bearing As Tolerated Right Upper Extremity, Immobilizer Right Lower Extremity  Comments: García reports  right knee immobilizer to be on at all times    Subjective    Pt greeted in room and agreeable to therapy. High levels of pain this AM.     Objective       08/05/24 0831   PT Charge Group   PT Gait Training (Units) 1   PT Therapeutic Activities (Units) 1   PT Total Time Spent   PT Individual Total Time Spent (Mins) 30   Precautions   Precautions Fall Risk;Weight Bearing As Tolerated Right Upper Extremity;Immobilizer Right Lower Extremity   Pain 0 - 10 Group   Location Knee   Location Orientation Right   Pain Rating Scale (NPRS) 8   Description Aching   Gait Functional Level of Assist    Gait Level Of Assist Standby Assist   Assistive Device Front Wheel Walker   Distance (Feet) 60  (+ 20' x 2)   # of Times Distance was Traveled 1   Deviation Bradykinetic  (RLE immobilized into extension)   Transfer Functional Level of Assist   Bed, Chair, Wheelchair Transfer Supervised   Bed Chair Wheelchair Transfer Description Set-up of equipment;Supervision for safety   Toilet Transfers Supervised   Toilet Transfer Description Set-up of equipment;Supervision for safety;Grab bar   Bed Mobility    Supine to Sit Independent   Sit to Supine Independent   Sit to Stand Supervised   Scooting Independent   Rolling Modified Independent   Interdisciplinary Plan of Care Collaboration   IDT Collaboration with  Nursing   Patient Position at End of Therapy Chair Alarm On;Seated;Call Light within Reach;Tray Table within Reach;Phone within Reach   Collaboration Comments RN for pain meds at 9 am   Roll Left and Right   Assistance Needed Independent   CARE Score - Roll Left and Right 6   Sit to Lying   Assistance Needed Independent    CARE Score - Sit to Lying 6   Lying to Sitting on Side of Bed   Assistance Needed Independent   CARE Score - Lying to Sitting on Side of Bed 6   Sit to Stand   Assistance Needed Set-up / clean-up   CARE Score - Sit to Stand 5   Chair/Bed-to-Chair Transfer   Assistance Needed Supervision   CARE Score - Chair/Bed-to-Chair Transfer 4   Toilet Transfer   Assistance Needed Supervision   CARE Score - Toilet Transfer 4   Walk 10 Feet   Assistance Needed Set-up / clean-up   CARE Score - Walk 10 Feet 5   Walk 50 Feet with Two Turns   Assistance Needed Set-up / clean-up   CARE Score - Walk 50 Feet with Two Turns 5   Walking 10 Feet on Uneven Surfaces   Assistance Needed Set-up / clean-up   CARE Score - Walking 10 Feet on Uneven Surfaces 5         Assessment    Pt is SBA-SPV for gait and transfers using FWW. Pt completed mobility despite high levels of pain. Reports she feels confident with going home in 2 days.     Strengths: Able to follow instructions, Adequate strength, Alert and oriented, Effective communication skills, Independent prior level of function, Manages pain appropriately, Motivated for self care and independence, Pleasant and cooperative, Willingly participates in therapeutic activities  Barriers: Decreased endurance, Fatigue, Hearing impairment, Impaired activity tolerance, Impaired balance, Pain, Limited mobility    Plan    D/C IRFPAIS tomorrow 8/6 (stairs, 150' of gait, car tx)     DME  PT DME Recommendations  Assistive Device: Front Wheeled Walker    Passport items to be completed:  Get in/out of bed safely, in/out of a vehicle, safely use mobility device, walk or wheel around home/community, navigate up and down stairs, show how to get up/down from the ground, ensure home is accessible, demonstrate HEP, complete caregiver training    Physical Therapy Problems (Active)       Problem: Balance       Dates: Start:  07/30/24         Goal: STG-Within one week, patient will maintain dynamic standing balance  SPV for 2 minutes or longer       Dates: Start:  07/30/24               Problem: Mobility       Dates: Start:  07/30/24         Goal: STG-Within one week, patient will ambulate community distances of at least 150' using LRAD SPV       Dates: Start:  07/30/24               Problem: Mobility Transfers       Dates: Start:  07/30/24         Goal: STG-Within one week, patient will perform transfers SPV using LRAD       Dates: Start:  07/30/24               Problem: PT-Long Term Goals       Dates: Start:  07/30/24         Goal: LTG-By discharge, patient will ambulate greater than 250' Allie using LRAD       Dates: Start:  07/30/24            Goal: LTG-By discharge, patient will transfer one surface to another Allie using LRAD       Dates: Start:  07/30/24            Goal: LTG-By discharge, patient will ambulate up/down flight of stairs Allie with unilateral HR       Dates: Start:  07/30/24            Goal: LTG-By discharge, patient will be independent in HEP       Dates: Start:  07/30/24

## 2024-08-05 NOTE — CARE PLAN
The patient is Stable - Low risk of patient condition declining or worsening    Shift Goals  Clinical Goals: Safety, Pain control  Patient Goals: Safety  Family Goals: Education    Progress made toward(s) clinical / shift goals: VSS, pain controlled with PRN pain medication, skin intact, able to participate in therapy.

## 2024-08-05 NOTE — THERAPY
Physical Therapy   Daily Treatment     Patient Name: García Cho  Age:  68 y.o., Sex:  female  Medical Record #: [id number]  Today's Date: 8/5/2024     Precautions  Precautions: (P) Fall Risk, Weight Bearing As Tolerated Right Upper Extremity, Immobilizer Right Lower Extremity  Comments: García reports  right knee immobilizer to be on at all times    Subjective    Session 1: Pt was proud of progress with stair mobility, since spouse cannot provide assist at home.     Session 2: Pt reported ongoing wrist pain, but was able to self propel wc. Pt was too fatigued for distance ambulation.      Objective       08/05/24 1301 08/05/24 1501   PT Charge Group   PT Therapeutic Exercise (Units) 1  --    PT Neuromuscular Re-Education / Balance (Units) 2  --    PT Therapeutic Activities (Units) 1 2   PT Total Time Spent   PT Individual Total Time Spent (Mins) 60 30   Precautions   Precautions Fall Risk;Weight Bearing As Tolerated Right Upper Extremity;Immobilizer Right Lower Extremity Fall Risk;Weight Bearing As Tolerated Right Upper Extremity;Immobilizer Right Lower Extremity   Gait Functional Level of Assist    Gait Level Of Assist   (Mod I short in room distance bed <> bathroom 15 ft x 2 FWW)  --    Wheelchair Functional Level of Assist   Wheelchair Assist  --  Modified Independent   Distance Wheelchair (Feet or Distance)  --  150   Stairs Functional Level of Assist   Level of Assist with Stairs Standby Assist  --    # of Stairs Climbed 12  --    Stairs Description Hand rails;Extra time;Safety concerns;Verbal cueing;Supervision for safety  (Cues for sequencing, 1 HR + 1 crutch)  --    Transfer Functional Level of Assist   Bed, Chair, Wheelchair Transfer Modified Independent Modified Independent   Bed Chair Wheelchair Transfer Description Adaptive equipment  (SPT FWW) Adaptive equipment  (SPT FWW)   Toilet Transfers Modified Independent Modified Independent   Toilet Transfer Description Grab bar;Adaptive  equipment  (SPT FWW) Adaptive equipment;Grab bar  (FWW SPT)   Supine Lower Body Exercise   Bridges One Legged;1 set of 10  --    Hip Abduction 1 set of 10;Left  --    Straight Leg Raises Left;1 set of 10  --    Ankle Pumps 1 set of 10;Bilateral  --    Gluteal Isometrics 1 set of 10;Bilateral  --    Quadriceps Isometrics 1 set of 10;Bilateral  --    Comments HEP provided/ reviewed  --    Sitting Lower Body Exercises   Ankle Pumps  --  1 set of 10;Bilateral   Long Arc Quad  --  Left;1 set of 10   Comments  --  Quad sets RLE   Bed Mobility    Supine to Sit Independent Independent   Sit to Supine Independent Independent   Sit to Stand Modified Independent Supervised  (cues for improved hand placement)   Scooting Independent Independent   Rolling Modified Independent Modified Independent   Neuro-Muscular Treatments   Neuro-Muscular Treatments Weight Shift Left;Weight Shift Right;Verbal Cuing;Sequencing Weight Shift Left;Weight Shift Right;Verbal Cuing;Sequencing   Comments Home safety/ fall prevention handouts reviewed/ provided. Passport reviewed/ initialed. Standing to wash hands SPV   Interdisciplinary Plan of Care Collaboration   IDT Collaboration with  Physician Occupational Therapist;Nursing   Patient Position at End of Therapy In Bed;Call Light within Reach;Tray Table within Reach In Bed;Call Light within Reach;Tray Table within Reach   Collaboration Comments Inc in RLE tremors at rest POC, recent imaging of wrist= no fx.         Assessment    Session 1: Pt progressed to SBA for stair mobility using 1 crutch + 1 handrail. Pt demonstrated consistently and safety with FWW during short distance ambulation and bed/ toilet transfers in room. Pt was receptive to education on D/C recommendations, and fall prevention. Pt demonstrated recall of previous HEP training for supine program.    Session 2: Pt was limited by fatigue, but agreeable to push wc back to room for endurance, and review her seated HEP. Pt used bathroom  with FWW safely, and consistently.      Strengths: Able to follow instructions, Adequate strength, Alert and oriented, Effective communication skills, Independent prior level of function, Manages pain appropriately, Motivated for self care and independence, Pleasant and cooperative, Willingly participates in therapeutic activities  Barriers: Decreased endurance, Fatigue, Hearing impairment, Impaired activity tolerance, Impaired balance, Pain, Limited mobility    Plan    D/C data collection, reviewed D/C recommendations, gong celebration.    DME  PT DME Recommendations  Assistive Device: Front Wheeled Walker    Passport items to be completed: Completed      Physical Therapy Problems (Active)       Problem: Balance       Dates: Start:  07/30/24         Goal: STG-Within one week, patient will maintain dynamic standing balance SPV for 2 minutes or longer       Dates: Start:  07/30/24               Problem: Mobility       Dates: Start:  07/30/24         Goal: STG-Within one week, patient will ambulate community distances of at least 150' using LRAD SPV       Dates: Start:  07/30/24               Problem: Mobility Transfers       Dates: Start:  07/30/24         Goal: STG-Within one week, patient will perform transfers SPV using LRAD       Dates: Start:  07/30/24               Problem: PT-Long Term Goals       Dates: Start:  07/30/24         Goal: LTG-By discharge, patient will ambulate greater than 250' Allie using LRAD       Dates: Start:  07/30/24            Goal: LTG-By discharge, patient will transfer one surface to another Allie using LRAD       Dates: Start:  07/30/24            Goal: LTG-By discharge, patient will ambulate up/down flight of stairs Allie with unilateral HR       Dates: Start:  07/30/24            Goal: LTG-By discharge, patient will be independent in HEP       Dates: Start:  07/30/24

## 2024-08-05 NOTE — CARE PLAN
"  Problem: Pain - Standard  Goal: Alleviation of pain or a reduction in pain to the patient’s comfort goal  Outcome: Progressing  Patient is able to rate pain on a scale of 1-10.            Problem: Fall Risk - Rehab  Goal: Patient will remain free from falls  Outcome: Progressing   Marychuy Son Fall risk Assessment Score: 19    High fall risk Interventions     - Bed and strip alarm   - Yellow sign by the door   - Yellow wrist band \"Fall risk\"  - Room near to the nurse station  - Do not leave patient unattended in the bathroom  - Fall risk education provided             "

## 2024-08-05 NOTE — PROGRESS NOTES
NURSING DAILY NOTE    Name: García Cho   Date of Admission: 7/29/2024   Admitting Diagnosis: Fracture of patella with routine healing, right, closed  Attending Physician: Maxwell Theodore M.d.  Allergies: Atorvastatin, Bee pollen, Ibuprofen, Pravastatin, and Other environmental    Safety  Patient Assist  Min Assist  Patient Precautions  Fall Risk, Weight Bearing As Tolerated Right Lower Extremity, Immobilizer Right Lower Extremity  Precaution Comments  García reports  right knee immobilizer to be on at all times  Bed Transfer Status  Supervised  Toilet Transfer Status   Standby Assist  Assistive Devices  Walker - front wheel  Oxygen  None - Room Air  Diet/Therapeutic Dining  Current Diet Order   Procedures    Diet Order Diet: Regular     Pill Administration  whole  Agitated Behavioral Scale     ABS Level of Severity       Fall Risk  Has the patient had a fall this admission?   No  Marychuy Son Fall Risk Scoring  19, HIGH RISK  Fall Risk Safety Measures  bed alarm and chair alarm    Vitals  Temperature: 36.2 °C (97.1 °F)  Temp src: Oral  Pulse: 64  Respiration: 18  Blood Pressure : 124/51  Blood Pressure MAP (Calculated): 75 MM HG  BP Location: Right, Upper Arm  Patient BP Position: Supine     Oxygen  Pulse Oximetry: 96 %  O2 (LPM): 0  O2 Delivery Device: None - Room Air    Bowel and Bladder  Last Bowel Movement  08/04/24  Stool Type  Type 4: Like a sausage or snake, smooth and soft  Bowel Device     Continent  Bladder: Continent void   Bowel: Continent movement  Bladder Function  Urine Void (mL):  (large)  Number of Times Voided: 1  Urine Color: Unable To Evaluate  Genitourinary Assessment   Bladder Assessment (WDL):  Within Defined Limits  Guthrie Catheter: Not Applicable  Urine Color: Unable To Evaluate  Bladder Device: Bathroom  Bladder Scan: Post Void  $ Bladder Scan Results (mL): 30    Skin  Aurelio Score   17  Sensory Interventions    Bed Types: Standard/Trauma Mattress  Skin Preventative Measures: Pillows in Use for Support / Positioning  Moisture Interventions  Moisturizers/Barriers: Barrier Wipes      Pain  Pain Rating Scale  8 - Awful, hard to do anything  Pain Location  Knee  Pain Location Orientation  Right  Pain Interventions   Medication (see MAR)    ADLs    Bathing   Partial Bed Bath, Self Care, No Assistance Required  Linen Change      Personal Hygiene  Moist Lynsey Wipes, Perineal Care  Chlorhexidine Bath      Oral Care     Teeth/Dentures     Shave     Nutrition Percentage Eaten  *  * Meal *  *, Lunch, Between % Consumed  Environmental Precautions  Treaded Slipper Socks on Patient  Patient Turns/Positioning  Patient Turns Self from Side to Side  Patient Turns Assistance/Tolerance     Bed Positions  Bed Controls On, Bed Locked  Head of Bed Elevated  Self regulated      Psychosocial/Neurologic Assessment  Psychosocial Assessment  Psychosocial (WDL):  WDL Except  Patient Behaviors: Anxious  Neurologic Assessment  Neuro (WDL): Exceptions to WDL  Level of Consciousness: Alert  Orientation Level: Oriented X4  Cognition: Follows commands  Speech: Clear  Motor Function/Sensation Assessment: Sensation, Motor strength  Muscle Strength Right Arm: Good Strength Against Gravity and Moderate Resistance  Muscle Strength Left Arm: Good Strength Against Gravity and Moderate Resistance  RLE Sensation: Pain  Muscle Strength Right Leg: Fair Strength against Gravity but No Resistance  Muscle Strength Left Leg: Good Strength Against Gravity and Moderate Resistance  EENT (WDL):  WDL Except    Cardio/Pulmonary Assessment  Edema      Respiratory Breath Sounds  RUL Breath Sounds: Clear  RML Breath Sounds: Clear  RLL Breath Sounds: Clear  DOROTHY Breath Sounds: Clear  LLL Breath Sounds: Clear  Cardiac Assessment   Cardiac (WDL):  WDL Except

## 2024-08-06 ENCOUNTER — APPOINTMENT (OUTPATIENT)
Dept: PHYSICAL THERAPY | Facility: REHABILITATION | Age: 69
DRG: 072 | End: 2024-08-06
Attending: PHYSICAL MEDICINE & REHABILITATION
Payer: MEDICARE

## 2024-08-06 ENCOUNTER — APPOINTMENT (OUTPATIENT)
Dept: OCCUPATIONAL THERAPY | Facility: REHABILITATION | Age: 69
DRG: 072 | End: 2024-08-06
Attending: PHYSICAL MEDICINE & REHABILITATION
Payer: MEDICARE

## 2024-08-06 LAB
ALBUMIN SERPL BCP-MCNC: 3.6 G/DL (ref 3.2–4.9)
ALBUMIN/GLOB SERPL: 1.3 G/DL
ALP SERPL-CCNC: 223 U/L (ref 30–99)
ALT SERPL-CCNC: 77 U/L (ref 2–50)
ANION GAP SERPL CALC-SCNC: 13 MMOL/L (ref 7–16)
AST SERPL-CCNC: 69 U/L (ref 12–45)
BASOPHILS # BLD AUTO: 0.9 % (ref 0–1.8)
BASOPHILS # BLD: 0.05 K/UL (ref 0–0.12)
BILIRUB SERPL-MCNC: 0.5 MG/DL (ref 0.1–1.5)
BUN SERPL-MCNC: 15 MG/DL (ref 8–22)
CALCIUM ALBUM COR SERPL-MCNC: 9.8 MG/DL (ref 8.5–10.5)
CALCIUM SERPL-MCNC: 9.5 MG/DL (ref 8.5–10.5)
CHLORIDE SERPL-SCNC: 99 MMOL/L (ref 96–112)
CO2 SERPL-SCNC: 25 MMOL/L (ref 20–33)
CREAT SERPL-MCNC: 0.76 MG/DL (ref 0.5–1.4)
EKG IMPRESSION: NORMAL
EOSINOPHIL # BLD AUTO: 0.18 K/UL (ref 0–0.51)
EOSINOPHIL NFR BLD: 3.1 % (ref 0–6.9)
ERYTHROCYTE [DISTWIDTH] IN BLOOD BY AUTOMATED COUNT: 41.9 FL (ref 35.9–50)
GFR SERPLBLD CREATININE-BSD FMLA CKD-EPI: 85 ML/MIN/1.73 M 2
GLOBULIN SER CALC-MCNC: 2.8 G/DL (ref 1.9–3.5)
GLUCOSE SERPL-MCNC: 102 MG/DL (ref 65–99)
HCT VFR BLD AUTO: 36.4 % (ref 37–47)
HGB BLD-MCNC: 12.4 G/DL (ref 12–16)
IMM GRANULOCYTES # BLD AUTO: 0.05 K/UL (ref 0–0.11)
IMM GRANULOCYTES NFR BLD AUTO: 0.9 % (ref 0–0.9)
LYMPHOCYTES # BLD AUTO: 1.95 K/UL (ref 1–4.8)
LYMPHOCYTES NFR BLD: 33.6 % (ref 22–41)
MAGNESIUM SERPL-MCNC: 1.8 MG/DL (ref 1.5–2.5)
MCH RBC QN AUTO: 31.4 PG (ref 27–33)
MCHC RBC AUTO-ENTMCNC: 34.1 G/DL (ref 32.2–35.5)
MCV RBC AUTO: 92.2 FL (ref 81.4–97.8)
MONOCYTES # BLD AUTO: 0.57 K/UL (ref 0–0.85)
MONOCYTES NFR BLD AUTO: 9.8 % (ref 0–13.4)
NEUTROPHILS # BLD AUTO: 3.01 K/UL (ref 1.82–7.42)
NEUTROPHILS NFR BLD: 51.7 % (ref 44–72)
NRBC # BLD AUTO: 0 K/UL
NRBC BLD-RTO: 0 /100 WBC (ref 0–0.2)
PHOSPHATE SERPL-MCNC: 3.4 MG/DL (ref 2.5–4.5)
PLATELET # BLD AUTO: 393 K/UL (ref 164–446)
PMV BLD AUTO: 9.1 FL (ref 9–12.9)
POTASSIUM SERPL-SCNC: 4 MMOL/L (ref 3.6–5.5)
PROT SERPL-MCNC: 6.4 G/DL (ref 6–8.2)
RBC # BLD AUTO: 3.95 M/UL (ref 4.2–5.4)
SODIUM SERPL-SCNC: 137 MMOL/L (ref 135–145)
WBC # BLD AUTO: 5.8 K/UL (ref 4.8–10.8)

## 2024-08-06 PROCEDURE — A9270 NON-COVERED ITEM OR SERVICE: HCPCS | Performed by: PHYSICAL MEDICINE & REHABILITATION

## 2024-08-06 PROCEDURE — 97112 NEUROMUSCULAR REEDUCATION: CPT

## 2024-08-06 PROCEDURE — 99232 SBSQ HOSP IP/OBS MODERATE 35: CPT | Performed by: PHYSICAL MEDICINE & REHABILITATION

## 2024-08-06 PROCEDURE — 97535 SELF CARE MNGMENT TRAINING: CPT

## 2024-08-06 PROCEDURE — 84100 ASSAY OF PHOSPHORUS: CPT

## 2024-08-06 PROCEDURE — 83735 ASSAY OF MAGNESIUM: CPT

## 2024-08-06 PROCEDURE — 80053 COMPREHEN METABOLIC PANEL: CPT

## 2024-08-06 PROCEDURE — 36415 COLL VENOUS BLD VENIPUNCTURE: CPT

## 2024-08-06 PROCEDURE — 97530 THERAPEUTIC ACTIVITIES: CPT

## 2024-08-06 PROCEDURE — 93005 ELECTROCARDIOGRAM TRACING: CPT | Performed by: PHYSICAL MEDICINE & REHABILITATION

## 2024-08-06 PROCEDURE — 700102 HCHG RX REV CODE 250 W/ 637 OVERRIDE(OP): Performed by: PHYSICAL MEDICINE & REHABILITATION

## 2024-08-06 PROCEDURE — 93010 ELECTROCARDIOGRAM REPORT: CPT | Performed by: INTERNAL MEDICINE

## 2024-08-06 PROCEDURE — 770010 HCHG ROOM/CARE - REHAB SEMI PRIVAT*

## 2024-08-06 PROCEDURE — 85025 COMPLETE CBC W/AUTO DIFF WBC: CPT

## 2024-08-06 PROCEDURE — 97116 GAIT TRAINING THERAPY: CPT

## 2024-08-06 PROCEDURE — 700111 HCHG RX REV CODE 636 W/ 250 OVERRIDE (IP): Performed by: PHYSICAL MEDICINE & REHABILITATION

## 2024-08-06 PROCEDURE — 97110 THERAPEUTIC EXERCISES: CPT

## 2024-08-06 RX ORDER — ALPRAZOLAM 1 MG
1 TABLET ORAL 2 TIMES DAILY
Qty: 28 TABLET | Refills: 0 | Status: SHIPPED | OUTPATIENT
Start: 2024-08-06 | End: 2024-08-20

## 2024-08-06 RX ORDER — ASPIRIN 81 MG/1
81 TABLET, CHEWABLE ORAL 2 TIMES DAILY
Qty: 100 TABLET | Refills: 0 | Status: SHIPPED | OUTPATIENT
Start: 2024-08-06

## 2024-08-06 RX ORDER — ASPIRIN 81 MG/1
81 TABLET, CHEWABLE ORAL 2 TIMES DAILY
Status: DISCONTINUED | OUTPATIENT
Start: 2024-08-06 | End: 2024-08-07 | Stop reason: HOSPADM

## 2024-08-06 RX ORDER — METOPROLOL TARTRATE 25 MG/1
25 TABLET, FILM COATED ORAL 2 TIMES DAILY
Qty: 60 TABLET | Refills: 2 | Status: SHIPPED | OUTPATIENT
Start: 2024-08-06

## 2024-08-06 RX ORDER — OXYCODONE HYDROCHLORIDE 10 MG/1
10 TABLET ORAL EVERY 6 HOURS PRN
Qty: 28 TABLET | Refills: 0 | Status: SHIPPED | OUTPATIENT
Start: 2024-08-06 | End: 2024-08-13

## 2024-08-06 RX ORDER — ARIPIPRAZOLE 30 MG/1
30 TABLET ORAL
Qty: 30 TABLET | Refills: 2 | Status: SHIPPED | OUTPATIENT
Start: 2024-08-06

## 2024-08-06 RX ADMIN — ARIPIPRAZOLE 30 MG: 10 TABLET ORAL at 05:11

## 2024-08-06 RX ADMIN — OXYCODONE HYDROCHLORIDE 10 MG: 10 TABLET ORAL at 21:20

## 2024-08-06 RX ADMIN — Medication 1000 UNITS: at 08:09

## 2024-08-06 RX ADMIN — ONDANSETRON 4 MG: 4 TABLET, ORALLY DISINTEGRATING ORAL at 13:50

## 2024-08-06 RX ADMIN — ASPIRIN 81 MG: 81 TABLET, CHEWABLE ORAL at 21:19

## 2024-08-06 RX ADMIN — OXYCODONE HYDROCHLORIDE 10 MG: 10 TABLET ORAL at 14:36

## 2024-08-06 RX ADMIN — ALPRAZOLAM 1 MG: 0.5 TABLET ORAL at 08:20

## 2024-08-06 RX ADMIN — OXYCODONE HYDROCHLORIDE 10 MG: 10 TABLET ORAL at 00:48

## 2024-08-06 RX ADMIN — OXYCODONE HYDROCHLORIDE 10 MG: 10 TABLET ORAL at 05:12

## 2024-08-06 RX ADMIN — OMEPRAZOLE 20 MG: 20 CAPSULE, DELAYED RELEASE ORAL at 08:09

## 2024-08-06 RX ADMIN — ALPRAZOLAM 1 MG: 0.5 TABLET ORAL at 21:21

## 2024-08-06 RX ADMIN — OXYCODONE HYDROCHLORIDE 10 MG: 10 TABLET ORAL at 11:15

## 2024-08-06 RX ADMIN — OXYCODONE HYDROCHLORIDE 10 MG: 10 TABLET ORAL at 08:09

## 2024-08-06 RX ADMIN — OXYCODONE HYDROCHLORIDE 10 MG: 10 TABLET ORAL at 17:49

## 2024-08-06 RX ADMIN — METOPROLOL TARTRATE 25 MG: 25 TABLET, FILM COATED ORAL at 17:49

## 2024-08-06 RX ADMIN — METOPROLOL TARTRATE 25 MG: 25 TABLET, FILM COATED ORAL at 05:12

## 2024-08-06 ASSESSMENT — ACTIVITIES OF DAILY LIVING (ADL)
BED_CHAIR_WHEELCHAIR_TRANSFER_DESCRIPTION: ADAPTIVE EQUIPMENT
BED_CHAIR_WHEELCHAIR_TRANSFER_DESCRIPTION: ADAPTIVE EQUIPMENT
TOILETING_LEVEL_OF_ASSIST_DESCRIPTION: ADAPTIVE EQUIPMENT
TOILET_TRANSFER_LEVEL_OF_ASSIST: REQUIRES SUPERVISION WITH TOILET TRANSFER
BED_CHAIR_WHEELCHAIR_TRANSFER_DESCRIPTION: ADAPTIVE EQUIPMENT
TOILET_TRANSFER_DESCRIPTION: ADAPTIVE EQUIPMENT
TOILETING_LEVEL_OF_ASSIST: ABLE TO COMPLETE TOILETING WITHOUT ASSIST
TOILET_TRANSFER_DESCRIPTION: ADAPTIVE EQUIPMENT;VERBAL CUEING;SUPERVISION FOR SAFETY
SHOWER_TRANSFER_LEVEL_OF_ASSIST: REQUIRES SUPERVISION WITH SHOWER TRANSFER

## 2024-08-06 ASSESSMENT — BRIEF INTERVIEW FOR MENTAL STATUS (BIMS)
INITIAL REPETITION OF BED BLUE SOCK - FIRST ATTEMPT: 3
ASKED TO RECALL BLUE: YES, AFTER CUEING (A COLOR")"
WHAT DAY OF THE WEEK IS IT: CORRECT
ASKED TO RECALL BED: YES, NO CUE REQUIRED
BIMS SUMMARY SCORE: 14
WHAT MONTH IS IT: ACCURATE WITHIN 5 DAYS
WHAT YEAR IS IT: CORRECT
ASKED TO RECALL SOCK: YES, NO CUE REQUIRED

## 2024-08-06 ASSESSMENT — GAIT ASSESSMENTS
DEVIATION: BRADYKINETIC;OTHER (COMMENT)
GAIT LEVEL OF ASSIST: SUPERVISED
DISTANCE (FEET): 150
DISTANCE (FEET): 120
DEVIATION: BRADYKINETIC;ANTALGIC
GAIT LEVEL OF ASSIST: MODIFIED INDEPENDENT
ASSISTIVE DEVICE: FRONT WHEEL WALKER
ASSISTIVE DEVICE: FRONT WHEEL WALKER

## 2024-08-06 ASSESSMENT — PAIN DESCRIPTION - PAIN TYPE
TYPE: ACUTE PAIN

## 2024-08-06 NOTE — DISCHARGE PLANNING
Case management-  I met with pt reviewing all cm dc instructions. I email [facility] application + required documentation to them; pt able to  additional dme there.    Also, I confirmed w/ pharmacy that pt is NOT eligible for [id number] as she has [employer] for prescription coverage.    Reviewed signed copy of IMM and answered all questions.    Dc date /disposition: 8/7 dc home w/ SO and home health      Follow-up Information:      Stephen Holder M.D.  [address] [facility] [id number] [phone]   Please call your ORTHOPAEDIC SURGEON to scheude a follow up appointment.     Noah Alejandro M.D.  [address] [facility] [address] [phone]  call to paola follow up appointment     [facility]  [address] [facility] [address]  [phone]  they will call you to schedule home visits.     [facility]  [address] [facility] [id number] [phone]  Follow up  provider for front wheeled walker     [facility]  [address] [facility] [address] [phone]  Follow up with your PRIMARY CARE     [facility]  [address] [facility] [id number] [phone]  For additonal durable medical equipment; applicaton has been faxed to them.

## 2024-08-06 NOTE — THERAPY
Physical Therapy   Daily Treatment     Patient Name: García Cho  Age:  68 y.o., Sex:  female  Medical Record #: [id number]  Today's Date: 8/6/2024     Precautions  Precautions: (P) Fall Risk, Weight Bearing As Tolerated Right Upper Extremity, Immobilizer Right Lower Extremity  Comments: García reports  right knee immobilizer to be on at all times    Subjective    Pt is taking a nap upon PT arrival. She is agreeable to participate in therapy.      Objective       08/06/24 1301   PT Charge Group   PT Gait Training (Units) 1   PT Therapeutic Exercise (Units) 2   PT Therapeutic Activities (Units) 1   PT Total Time Spent   PT Individual Total Time Spent (Mins) 60   Precautions   Precautions Fall Risk;Weight Bearing As Tolerated Right Upper Extremity;Immobilizer Right Lower Extremity   Vitals   Pulse 76   Patient BP Position Sitting   Blood Pressure  (!) 146/59   Respiration 18   Room Air Oximetry 95   O2 Delivery Device None - Room Air   Vitals Comments pt feels nauseous   Gait Functional Level of Assist    Gait Level Of Assist Supervised   Assistive Device Front Wheel Walker   Distance (Feet) 120   # of Times Distance was Traveled 1   Deviation Bradykinetic;Other (Comment)  (RLE immobilized into extension)   Transfer Functional Level of Assist   Bed, Chair, Wheelchair Transfer Modified Independent   Bed Chair Wheelchair Transfer Description Adaptive equipment   Supine Lower Body Exercise   Bridges One Legged;1 set of 15   Hip Abduction 1 set of 15;Bilateral   Straight Leg Raises Front;1 set of 15;Left   Ankle Pumps 1 set of 15;Bilateral   Gluteal Isometrics 1 set of 15;Bilateral   Quadriceps Isometrics 1 set of 15;Bilateral   Standing Lower Body Exercises   Hip Flexion 1 set of 10;Right    Hip Extension Right ;1 set of 10   Hip Abduction 1 set of 10;Right    Bed Mobility    Supine to Sit Independent   Sit to Supine Independent   Sit to Stand Supervised   Scooting Independent   Rolling Modified Independent    Interdisciplinary Plan of Care Collaboration   IDT Collaboration with  Physical Therapist;Nursing   Patient Position at End of Therapy In Bed;Bed Alarm On;Call Light within Reach;Tray Table within Reach;Phone within Reach   Collaboration Comments CLOF; re nausea         Assessment    PT reviewed and instructed pt in supine and standing HEP as above. PT provided cueing for proper exercise techniques. She performed bed mobility and transfer, Mod I to independent. She demonstrated good sequencing and techniques. She felt nauseous after supine thera ex. PT informed nursing regarding nausea.     Strengths: Able to follow instructions, Adequate strength, Alert and oriented, Effective communication skills, Independent prior level of function, Manages pain appropriately, Motivated for self care and independence, Pleasant and cooperative, Willingly participates in therapeutic activities  Barriers: Decreased endurance, Fatigue, Hearing impairment, Impaired activity tolerance, Impaired balance, Pain, Limited mobility    Plan    DC home tomorrow.    DME  PT DME Recommendations  Assistive Device: Front Wheeled Walker    Passport items to be completed:  Get in/out of bed safely, in/out of a vehicle, safely use mobility device, walk or wheel around home/community, navigate up and down stairs, show how to get up/down from the ground, ensure home is accessible, demonstrate HEP, complete caregiver training    Physical Therapy Problems (Active)       Problem: Balance       Dates: Start:  07/30/24         Goal: STG-Within one week, patient will maintain dynamic standing balance SPV for 2 minutes or longer       Dates: Start:  07/30/24               Problem: Mobility       Dates: Start:  07/30/24         Goal: STG-Within one week, patient will ambulate community distances of at least 150' using LRAD SPV       Dates: Start:  07/30/24               Problem: Mobility Transfers       Dates: Start:  07/30/24         Goal: STG-Within one  week, patient will perform transfers SPV using LRAD       Dates: Start:  07/30/24               Problem: PT-Long Term Goals       Dates: Start:  07/30/24         Goal: LTG-By discharge, patient will ambulate greater than 250' Allie using LRAD       Dates: Start:  07/30/24            Goal: LTG-By discharge, patient will transfer one surface to another Allie using LRAD       Dates: Start:  07/30/24            Goal: LTG-By discharge, patient will ambulate up/down flight of stairs Allie with unilateral HR       Dates: Start:  07/30/24            Goal: LTG-By discharge, patient will be independent in HEP       Dates: Start:  07/30/24

## 2024-08-06 NOTE — DISCHARGE SUMMARY
Physical Medicine & Rehabilitation Discharge Summary    Admission Date: 7/29/2024    Discharge Date: 8/7/2024    Attending Provider: Maxwell Theodore MD/PhD    Admission Diagnosis:   Active Hospital Problems    Diagnosis     *Fracture of patella with routine healing, right, closed     Other fracture of right patella, initial encounter for closed fracture     Leukocytosis     PAF (paroxysmal atrial fibrillation) ([facility]) - 2 minutes on Zio        Discharge Diagnosis:  Active Hospital Problems    Diagnosis     *Fracture of patella with routine healing, right, closed     Other fracture of right patella, initial encounter for closed fracture     Leukocytosis     PAF (paroxysmal atrial fibrillation) ([facility]) - 2 minutes on Zio        HPI per Admission History & Physical:  Patient is a 68 y.o. female with a PMH of HTN, A fib, HLD, and anxiety who presented on 7/25/24 with GLF. She reportedly was walking her dog and tripped on the sidewalk and went down on her right knee and left wrist. In ED she was found to have right knee patellar fracture. Her XR of left wrist was negative. She was evaluated by orthopedic surgery and recommended surgical intervention. Patient underwent open repair with Dr. Holder on 7/26/24. Per orthopedics, patient should be WBAT with full extension in brace. She should also be placed on Lovenox initially and then  mg BID as outpatient. Post-operative course complicated by leukocytosis thought to be reactive, elevated SBP and elevated LFTs.     Patient was admitted to [facility] on 7/29/2024.     Hospital Course by Problem List:  R patellar fracture - Patient sustained GLF with right knee fracture s/p open repair with Dr. Holder on 7/26/24. She is WBAT with knee in extension brace.  -PT and OT for mobility and ADLs. Per guidelines, 15 hours per week between PT, OT and/or SLP.  -Follow-up Dr. Holder      Confusion - Present on admission, slightly worsened. Elevated  LFTs, SLP to evaluate. Metabolic encephalopathy, change [facility] Code / Diagnosis to Support: 0002.1 - Brain Dysfunction: Non-Traumatic  -Recheck CMP - elevated remain elevated but trending down. Repeat 8/6     Left wrist pain - XR without fracture per my read on 8/5     A fib/HTN - History of A fib on ziopatch. On metoprolol 25 mg BID and previously on Nebivolol. HR into 60s, continue Metoprolol 25 mg BID     HLD - Patient with allergy to statins. Diet controlled     Anxiety disorder - Patient on Abilify 30 mg daily and PRN Xanax 1 mg. Requiring PRN Xanax at night. Improving mood/cognition, continue Abilify 30 mg daily, using Xanax at night. Chest tightness on 8/6, continue Abilify, EKG wnl.      Elevated LFTs - on admission, previously normal. Will monitor as may be reactionary from fracture. Bilirubin normal. Repeat improving on 8/1. Repeat 8/5     Pain - Patient on PRN Tylenol and Oxycodone      Vitamin D insufficiency - 27 on admission, start 1000 U      Skin - Patient at risk for skin breakdown due to debility in areas including sacrum, achilles, elbows and head in addition to other sites. Nursing to assess skin daily.      GI Ppx - Patient on Prilosec for GERD prophylaxis. Patient on Senna-docusate for constipation prophylaxis.      DVT Ppx - Patient Lovenox on transfer. Limited mobility, discontinue Lovenox and start 81 mg BID until follow-up with orthopedics    Functional Status at Discharge  Eating:  Independent  Eating Description:     Grooming:  Modified Independent  Grooming Description:  Seated in wheelchair at sink (brushing teeth, washing hands)  Bathing:  Supervision  Bathing Description:  Grab bar, Hand held shower, Tub bench, Supervision for safety, Verbal cueing (Completed seated on fold down shower bench)  Upper Body Dressing:  Independent  Upper Body Dressing Description:     Lower Body Dressing:  Standby Assist  Lower Body Dressing Description:  Reacher, Sock aid, Dressing stick, Increased time,  Supervision for safety, Verbal cueing (Initiated education and handson training for AE use for RLE. Pt completed 2x trials for sock aid, and shorts with cues for sequencing/problem solving both.)  Discharge Location : Home  Patient Discharging with Assist of: Spouse / Significant Other  Level of Supervision Required: Intermittent Supervision  Recommended Equipment for Discharge: Front-Wheeled Walker;Raised Toilet Seat with Arms;Sock Aid;Reacher;Long Handled Shoe Horn;Dressing Stick  Recommended Services Upon Discharge: Home Health Occupational Therapy  Criteria for Termination of Services: Maximum Function Achieved for Inpatient Rehabilitation  Walk:  Supervised  Distance Walked:  120  Number of Times Distance Was Traveled:  1  Assistive Device:  Front Wheel Walker  Gait Deviation:  Bradykinetic, Other (Comment) (RLE immobilized into extension)  Wheelchair:  Modified Independent  Distance Propelled:  150   Wheelchair Description:  Extra time, Impaired coordination, Limited by fatigue (as warm up)  Stairs Standby Assist  Stairs Description Hand rails, Extra time, Safety concerns, Verbal cueing, Supervision for safety (Cues for sequencing, 1 HR + 1 crutch)     Comprehension:  Modified Independent  Comprehension Description:  Hearing aids/amplifiers, Glasses  Expression:  Modified Independent  Expression Description:     Social Interaction:  Modified Independent  Social Interaction Description:     Problem Solving:  Supervision  Problem Solving Description:  Verbal cueing, Increased time, Bed/chair alarm  Memory:  Minimal Assist  Memory Description:  Verbal cueing, Increased time, Bed/chair alarm       Maxwell MCALLISTER M.D., personally performed a complete drug regimen review and no potential clinically significant medication issues were identified.   Discharge Medication:     Medication List        ASK your doctor about these medications        Instructions   ALPRAZolam 0.25 MG Tabs  Commonly known as:  Xanax   Take 1 mg by mouth 2 times a day. Per pt: Taken at 1100 and 1830 daily.  Dose: 1 mg     amphetamine-dextroamphetamine 20 MG Tabs  Commonly known as: Adderall   Take 20 mg by mouth 2 times a day.  Dose: 20 mg     aripiprazole 30 MG tablet  Commonly known as: Abilify   Take 30 mg by mouth every day.  Dose: 30 mg     Bempedoic Acid 180 MG Tabs   Take 180 mg by mouth every day.  Dose: 180 mg     nebivolol 5 MG Tabs tablet  Commonly known as: Bystolic   Take 1 Tablet by mouth 2 times a day.  Dose: 5 mg     nitroglycerin 0.4 MG Subl  Commonly known as: Nitrostat   Place 1 Tablet under the tongue as needed for Chest Pain.  Dose: 0.4 mg              Discharge Diet:  Current Diet Order   Procedures    Diet Order Diet: Regular       Discharge Activity:  As tolerated     Disposition:  Patient to discharge home with family support and community resources.    Equipment:  FWW    Follow-up & Discharge Instructions:  Follow up with your primary care provider (PCP) within 7-10 days of discharge to review your medications and take over your care.     If you develop chest pain, fever, chills, change in neurologic function (weakness, sensation changes, vision changes), or other concerning sxs, seek immediate medical attention or call 911.      Future Appointments   Date Time Provider Department Center   8/6/2024  2:30 PM Kina Prater DPT [facility] None       Condition on Discharge:  Good    More than 32 minutes was spent on discharging this patient, including face-to-face time, prescription management, and the dictation of this note.    Maxwell Theodore M.D.    Date of Service: 8/7/2024

## 2024-08-06 NOTE — CARE PLAN
"  Problem: Pain - Standard  Goal: Alleviation of pain or a reduction in pain to the patient’s comfort goal  Outcome: Progressing   Patient is able to rate pain on a scale of 1-10.           Problem: Fall Risk - Rehab  Goal: Patient will remain free from falls  Outcome: Progressing   Marychuy Son Fall risk Assessment Score: 19    High fall risk Interventions   - Alarming seatbelt  - Wander guard  - Bed and strip alarm   - Yellow sign by the door   - Yellow wrist band \"Fall risk\"  - Room near to the nurse station  - Do not leave patient unattended in the bathroom  - Fall risk education provided               "

## 2024-08-06 NOTE — CARE PLAN
Problem: PT-Long Term Goals  Goal: LTG-By discharge, patient will ambulate greater than 250' Allie using LRAD  Outcome: Progressing  Note: 150 ft, limited by fatigue  Goal: LTG-By discharge, patient will ambulate up/down flight of stairs Allie with unilateral HR  Outcome: Progressing  Note: Recommend SPV     Problem: PT-Long Term Goals  Goal: LTG-By discharge, patient will transfer one surface to another Allie using LRAD  Outcome: Met  Goal: LTG-By discharge, patient will be independent in HEP  Outcome: Met

## 2024-08-06 NOTE — PROGRESS NOTES
NURSING DAILY NOTE    Name: García Cho   Date of Admission: 7/29/2024   Admitting Diagnosis: Fracture of patella with routine healing, right, closed  Attending Physician: Maxwell Theodore M.d.  Allergies: Atorvastatin, Bee pollen, Ibuprofen, Pravastatin, and Other environmental    Safety  Patient Assist  SBA  Patient Precautions  Fall Risk, Weight Bearing As Tolerated Right Upper Extremity, Immobilizer Right Lower Extremity  Precaution Comments  García reports  right knee immobilizer to be on at all times  Bed Transfer Status  Modified Independent  Toilet Transfer Status   Modified Independent  Assistive Devices  Walker - front wheel  Oxygen  None - Room Air  Diet/Therapeutic Dining  Current Diet Order   Procedures    Diet Order Diet: Regular     Pill Administration  whole  Agitated Behavioral Scale     ABS Level of Severity       Fall Risk  Has the patient had a fall this admission?   No  Marychuy Son Fall Risk Scoring  19, HIGH RISK  Fall Risk Safety Measures  bed alarm and chair alarm    Vitals  Temperature: 36.7 °C (98.1 °F)  Temp src: Oral  Pulse: 61  Respiration: 18  Blood Pressure : (!) 132/90  Blood Pressure MAP (Calculated): 104 MM HG  BP Location: Right, Upper Arm  Patient BP Position: Supine     Oxygen  Pulse Oximetry: 93 %  O2 (LPM): 0  O2 Delivery Device: None - Room Air    Bowel and Bladder  Last Bowel Movement  08/05/24  Stool Type  Type 5: Soft blob with clear cut edges (passed easily)  Bowel Device     Continent  Bladder: Continent void   Bowel: Continent movement  Bladder Function  Urine Void (mL):  (large)  Number of Times Voided: 1  Urine Color: Yellow  Number of Times Incontinent of Urine: 0  Genitourinary Assessment   Bladder Assessment (WDL):  Within Defined Limits  Guthrie Catheter: Not Applicable  Urine Color: Yellow  Number of Bladder Accidents: 0  Total Number of Bladder of Accidents in Last 7 Days: 0  Number of  Times Incontinent of Urine: 0  Bladder Device: Bathroom  Bladder Scan: Post Void  $ Bladder Scan Results (mL): 30    Skin  Aurelio Score   18  Sensory Interventions   Bed Types: Standard/Trauma Mattress  Skin Preventative Measures: Pillows in Use for Support / Positioning  Moisture Interventions  Moisturizers/Barriers: Moisturizer       Pain  Pain Rating Scale  8 - Awful, hard to do anything  Pain Location  Leg  Pain Location Orientation  Right  Pain Interventions   Medication (see MAR)    ADLs    Bathing   Partial Bed Bath, Self Care, No Assistance Required  Linen Change      Personal Hygiene  Moist Lynsey Wipes, Perineal Care  Chlorhexidine Bath      Oral Care     Teeth/Dentures     Shave     Nutrition Percentage Eaten  Between 50-75% Consumed  Environmental Precautions  Treaded Slipper Socks on Patient, Personal Belongings, Wastebasket, Call Bell etc. in Easy Reach, Bed in Low Position, Mobility Assessed & Appropriate Sign Placed  Patient Turns/Positioning  Patient Turns Self from Side to Side  Patient Turns Assistance/Tolerance     Bed Positions  Bed Controls On, Bed Locked  Head of Bed Elevated  Self regulated      Psychosocial/Neurologic Assessment  Psychosocial Assessment  Psychosocial (WDL):  WDL Except  Patient Behaviors: Anxious  Neurologic Assessment  Neuro (WDL): Exceptions to WDL  Level of Consciousness: Alert  Orientation Level: Oriented X4  Cognition: Follows commands  Speech: Clear  Motor Function/Sensation Assessment: Sensation, Motor strength  Muscle Strength Right Arm: Good Strength Against Gravity and Moderate Resistance  Muscle Strength Left Arm: Good Strength Against Gravity and Moderate Resistance  RLE Sensation: Pain  Muscle Strength Right Leg: Fair Strength against Gravity but No Resistance  Muscle Strength Left Leg: Good Strength Against Gravity and Moderate Resistance  EENT (WDL):  WDL Except    Cardio/Pulmonary Assessment  Edema      Respiratory Breath Sounds  RUL Breath Sounds: Clear  RML  Breath Sounds: Clear  RLL Breath Sounds: Clear  DOROTHY Breath Sounds: Clear  LLL Breath Sounds: Clear  Cardiac Assessment   Cardiac (WDL):  Within Defined Limits

## 2024-08-06 NOTE — CARE PLAN
Problem: Toileting  Goal: STG-Within one week, patient will complete toileting tasks at SPV level using DME PRN.  Outcome: Met     Problem: Functional Transfers  Goal: STG-Within one week, patient will transfer to toilet at SPV level using DME PRN.  Outcome: Met  Goal: STG-Within one week, patient will transfer to step in shower at SPV level using DME PRN.  Outcome: Met     Problem: IADL's  Goal: STG-Within one week, patient will access kitchen area at SBA level using DME PRN.  Outcome: Met     Problem: OT Long Term Goals  Goal: LTG-By discharge, patient will complete basic self care tasks at SPV-Mod I level using AE/DME PRN.  Outcome: Met  Goal: LTG-By discharge, patient will perform bathroom transfersat SPV-Mod I level using AE/DME PRN.  Outcome: Met     Problem: OT Long Term Goals  Goal: LTG-By discharge, patient will complete basic home management at SPV-Mod I level using AE/DME PRN.at SPV-Mod I level using AE/DME PRN.  Outcome: Discharged - Not Met

## 2024-08-06 NOTE — DISCHARGE INSTRUCTIONS
[facility] NURSING DISCHARGE INSTRUCTIONS    Blood Pressure : (P) 130/50  Weight: 75 kg (165 lb 5.5 oz)  Nursing recommendations for García Cho at time of discharge are as follows:  Client verbalized understanding of all discharge instructions and prescriptions.     Review all your home medications and newly ordered medications with your doctor and/or pharmacist. Follow medication instructions as directed by your doctor and/or pharmacist.    Pain Management:   Discharge Pain Medication Instructions:  Comfort Goal: Comfort with Movement, Perform Activity  Notify your primary care provider if pain is unrelieved with these measures, if the pain is new, or increased in intensity.    Discharge Skin Characteristics: Warm, Dry  Discharge Skin Exam: Other (Comments) (right knee incision with sutures)  Wound 07/26/24 Incision Knee Right xeroform, 4x4, softroll, ace,  knee immobilizer (Active)   Wound Image   08/05/24 1300   Site Assessment INA 08/07/24 0705   Periwound Assessment INA 08/07/24 0705   Margins INA 08/07/24 0705   Closure INA 08/07/24 0705   Drainage Amount INA 08/07/24 0705   Drainage Description Serosanguineous 08/04/24 2115   Treatments Site care;Cleansed 08/05/24 1300   Dressing Status Clean;Intact;Dry 08/07/24 0705   Dressing Changed Observed 08/07/24 0705   Dressing Options Petrolatum Gauze (yellow) 08/06/24 1930   Dressing Change/Treatment Frequency As Needed 08/06/24 1930   NEXT Weekly Photo (Inpatient Only) 08/11/24 08/06/24 1930       Wound 08/01/24 Pressure Injury Thigh Proximal;Posterior Right (Active)   Wound Image   08/05/24 1300   Site Assessment INA 08/07/24 0705   Periwound Assessment INA 08/07/24 0705   Margins INA 08/07/24 0705   Closure INA 08/07/24 0705   Treatments Offloading 08/06/24 1930   Wound Cleansing Approved Wound Cleanser 08/05/24 1300    Dressing Status Clean;Dry;Intact 08/07/24 0705   Dressing Changed Observed 08/07/24 0705   Dressing Options Offloading Dressing - Sacral 08/06/24 1800   Dressing Change/Treatment Frequency Every 48 hrs, and As Needed 08/06/24 1800   NEXT Dressing Change/Treatment Date 08/07/24 08/06/24 1930   NEXT Weekly Photo (Inpatient Only) 08/11/24 08/06/24 1930   ** WOUND NURSE ONLY BELOW THIS LINE** NURSE 08/02/24 1500   Wound Team Following Weekly 08/05/24 1300   WOUND NURSE ONLY - Pressure Injury Stage U 08/02/24 1500   Wound Length (cm) 1.8 cm 08/02/24 1500   Wound Width (cm) 1.1 cm 08/02/24 1500   Wound Surface Area (cm^2) 1.98 cm^2 08/02/24 1500   Wound Bed Slough (%) 99 % 08/02/24 1500   WOUND NURSE ONLY - Time Spent with Patient (mins) 30 08/02/24 1500     Skin / Wound Care Instructions: Please contact your primary care physician for any change in skin integrity. Right knee incision    If You Have Surgical Incisions / Wounds:  Monitor surgical site(s) for signs of increased swelling, redness or symptoms of drainage from the site or fever as this could indicate signs and symptoms of infection. If these symptoms are noted, notifiy your primary care provider.      Discharge Safety Instructions: Should Not Be Left Alone In The House     Discharge Safety Concerns: Unsteady Gait, Balance Problems (Dizziness, Light Headedness), Weakness, History Of Falls  The interdisciplinary team has made recommendation that you should have adult supervision in the house due to balance problem, history of falls, weakness, and unsteady gait  Anti-embolic stockings are not required to increase circulation to the lower extremities.    Discharge Diet: Regular     Discharge Liquids: Thin  Discharge Bowel Function: Continent  Please contact your primary care physician for any changes in bowel habits.  Discharge Bowel Program:    Discharge Bladder Function: Continent  Discharge Urinary Devices: None      Nursing Discharge Plan:        Case  Management Discharge Instructions:   Discharge Location: Home  Agency Name/Address/Phone:    Home Health:    Outpatient Services:    DME Provider/Phone:    Medical Equipment Ordered:    Prescription Faxed to:        Discharge Medication Instructions:  Below are the medications your physician expects you to take upon discharge:    Incision Care, Adult  An incision is a cut that a doctor makes in your skin for surgery. Most times, these cuts are closed after surgery. Your cut from surgery may be closed with:  Stitches (sutures).  Staples.  Skin glue.  Skin tape (adhesive) strips.  You may need to go back to your doctor to have stitches or staples taken out. This may happen many days or many weeks after your surgery. You need to take good care of your cut so it does not get infected. Follow instructions from your doctor about how to care for your cut.  Supplies needed:  Soap and water.  A clean hand towel.  Wound cleanser.  A clean bandage (dressing), if needed.  Cream or ointment, if told by your doctor.  Clean gauze.  How to care for your cut from surgery  Cleaning your cut  Ask your doctor how to clean your cut. You may need to:  Wear medical gloves.  Use mild soap and water, or a wound cleanser.  Use a clean gauze to pat your cut dry after you clean it.  Changing your bandage  Wash your hands with soap and water for at least 20 seconds before and after you change your bandage. If you cannot use soap and water, use hand .  Do not usedisinfectants or antiseptics, such as rubbing alcohol, to clean your wound unless told by your doctor.  Change your bandage as told by your doctor.  Leavestitches or skin glue in place for at least 2 weeks.  Leave tape strips alone unless you are told to take them off. You may trim the edges of the tape strips if they curl up.  Put a cream or ointment on your cut. Do this only as told.  Cover your cut with a clean bandage.  Ask your doctor when you can leave your cut  uncovered.  Checking for infection  Check your cut area every day for signs of infection. Check for:  More redness, swelling, or pain.  More fluid or blood.  New warmth.  Hardness or a new rash around the incision.  Pus or a bad smell.    Follow these instructions at home  Medicines  Take over-the-counter and prescription medicines only as told by your doctor.  If you were prescribed an antibiotic medicine, cream, or ointment, use it as told by your doctor. Do not stop using the antibiotic even if you start to feel better.  Eating and drinking  Eat foods that have a lot of certain nutrients, such as protein, vitamin A, and vitamin C. These foods help your cut heal.  Foods rich in protein include meat, fish, eggs, dairy, beans, nuts, and protein drinks.  Foods rich in vitamin A include carrots and dark green, leafy vegetables.  Foods rich in vitamin C include citrus fruits, tomatoes, broccoli, and peppers.  Drink enough fluid to keep your pee (urine) pale yellow.  General instructions    Do not take baths, swim, or use a hot tub. Ask your doctor about taking showers or sponge baths.  Limit movement around your cut. This helps with healing.  Try not to strain, lift, or exercise for the first 2 weeks, or for as long as told by your doctor.  Return to your normal activities as told by your doctor. Ask your doctor what activities are safe for you.  Do not scratch, scrub, or pick at your cut. Keep it covered as told by your doctor.  Protect your cut from the sun when you are outside for the first 6 months, or for as long as told by your doctor. Cover up the scar area or put on sunscreen that has an SPF of at least 30.  Do not use any products that contain nicotine or tobacco, such as cigarettes, e-cigarettes, and chewing tobacco. These can delay cut healing. If you need help quitting, ask your doctor.  Keep all follow-up visits.  Contact a doctor if:  You have any of these signs of infection around your cut:  More  redness, swelling, or pain.  More fluid or blood.  New warmth or hardness.  Pus or a bad smell.  A new rash.  You have a fever.  You feel like you may vomit (nauseous).  You vomit.  You are dizzy.  Your stitches, staples, skin glue, or tape strips come undone.  Your cut gets bigger.  You have a fever.  Get help right away if:  Your cut bleeds through your bandage, and bleeding does not stop with gentle pressure.  Your cut opens up and comes apart.  These symptoms may be an emergency. Do not wait to see if the symptoms will go away. Get medical help right away. Call your local emergency services (911 in the [place] Do not drive yourself to the hospital.  Summary  Follow instructions from your doctor about how to care for your cut.  Wash your hands with soap and water for at least 20 seconds before and after you change your bandage. If you cannot use soap and water, use hand .  Check your cut area every day for signs of infection.  Keep all follow-up visits.  This information is not intended to replace advice given to you by your health care provider. Make sure you discuss any questions you have with your health care provider.  Understanding Your Risk for Falls  Each year, millions of people have serious injuries from falls. It is important to understand your risk for falling. Talk with your health care provider about your risk and what you can do to lower it. There are actions you can take at home to lower your risk and prevent falls.  If you do have a serious fall, make sure to tell your health care provider. Falling once raises your risk of falling again.  How can falls affect me?  Serious injuries from falls are common. These include:  Broken bones, such as hip fractures.  Head injuries, such as traumatic brain injuries (TBI) or concussion.  A fear of falling can cause you to avoid activities and stay at home. This can make your muscles weaker and actually raise your risk for a fall.  What can increase my  risk?  There are a number of risk factors that increase your risk for falling. The more risk factors you have, the higher your risk of falling. Serious injuries from a fall happen most often to people older than age 65. Children and young adults ages 15-29 are also at higher risk.  Common risk factors include:  Weakness in the lower body.  Lack (deficiency) of vitamin D.  Being generally weak or confused due to long-term (chronic) illness.  Dizziness or balance problems.  Poor vision.  Medicines that cause dizziness or drowsiness. These can include medicines for your blood pressure, heart, anxiety, insomnia, or edema, as well as pain medicines and muscle relaxants.  Other risk factors include:  Drinking alcohol.  Having had a fall in the past.  Having depression.  Having foot pain or wearing improper footwear.  Working at a dangerous job.  Having any of the following in your home:  Tripping hazards, such as floor clutter or loose rugs.  Poor lighting.  Pets.  Having dementia or memory loss.  What actions can I take to lower my risk of falling?         Physical activity  Maintain physical fitness. Do strength and balance exercises. Consider taking a regular class to build strength and balance. Yoga and balbina chi are good options.  Vision  Have your eyes checked every year and your vision prescription updated as needed.  Walking aids and footwear  Wear nonskid shoes. Do not wear high heels.  Do not walk around the house in socks or slippers.  Use a cane or walker as told by your health care provider.  Home safety  Attach secure railings on both sides of your stairs.  Install grab bars for your tub, shower, and toilet. Use a bath mat in your tub or shower.  Use good lighting in all rooms. Keep a flashlight near your bed.  Make sure there is a clear path from your bed to the bathroom. Use night-lights.  Do not use throw rugs. Make sure all carpeting is taped or tacked down securely.  Remove all clutter from walkways and  stairways, including extension cords.  Repair uneven or broken steps.  Avoid walking on icy or slippery surfaces. Walk on the grass instead of on icy or slick sidewalks. Use ice melt to get rid of ice on walkways.  Use a cordless phone.  Questions to ask your health care provider  Can you help me check my risk for a fall?  Do any of my medicines make me more likely to fall?  Should I take a vitamin D supplement?  What exercises can I do to improve my strength and balance?  Should I make an appointment to have my vision checked?  Do I need a bone density test to check for weak bones or osteoporosis?  Would it help to use a cane or a walker?  Where to find more information  Centers for Disease Control and PreventionELIZABETH: www.cdc.gov  Community-Based Fall Prevention Programs: www.cdc.gov  National [place] on Aging: www.shannon.nih.gov  Contact a health care provider if:  You fall at home.  You are afraid of falling at home.  You feel weak, drowsy, or dizzy.  Summary  Serious injuries from a fall happen most often to people older than age 65. Children and young adults ages 15-29 are also at higher risk.  Talk with your health care provider about your risks for falling and how to lower those risks.  Taking certain precautions at home can lower your risk for falling.  If you fall, always tell your health care provider.  This information is not intended to replace advice given to you by your health care provider. Make sure you discuss any questions you have with your health care provider.  Pain Medicine Instructions  You may need pain medicine after an injury or illness. Two common types of pain medicine are:  Non-opioid pain medicine. This includes NSAIDs.  Opioid pain medicine. These may be called opioids.  Pain medicine may not make all of your pain go away. It should make you comfortable enough to move, breathe, and do normal activities.  How can pain medicines affect me?  Pain medicines can cause side effects such  as:  Vomiting or feeling like you may vomit.  Belly pain.  Opioids can cause other side effects, such as:  Trouble pooping (constipation).  Feeling very sleepy.  Confusion.  Trouble breathing.  Addiction to opioids. This means that you will take the medicine even though it hurts your health.  Taking opioids for longer than 3 days raises your risk of these side effects.  Taking opioids for a long time can affect how well you can do daily tasks. It also puts you at risk for:  Car crashes.  Depression.  Suicide.  Heart attack.  If you do not take pain medicines correctly, you may be at risk for:  Liver problems.  Kidney problems.  Taking too much of the medicine (overdose). This can lead to death.  What actions can I take to lower my risk of problems?  Know your treatment plan  Talk about your treatment plan with your doctor. Both you and your doctor should agree on how you should be treated.  Talk about the goals of your treatment, including:  How much pain you might expect to have.  How you will manage the pain.  Ask your doctor if you can see other doctors who can treat your pain without using medicine. This can include physical therapy and counseling.  Talk about the risks and benefits of taking these medicines for your condition.  Tell your doctor about the amount of medicines you take and about any use of drugs or alcohol.  Get your pain medicine prescriptions from only one doctor.  Keep all follow-up visits.  Take your medicine as told    Take pain medicine exactly as told by your doctor. Take it only when you need it.  If your pain is not too bad, you may take less medicine if your doctor allows.  If you have no pain, do not take the medicine unless your doctor tells you to take it.  If your pain is very bad, do not take more medicine than your doctor tells you to take. Call your doctor to know what to do.  If your pain medicine has acetaminophen in it, do not take any other acetaminophen while you are taking  this medicine. Too much can damage the liver.  Write down the times when you take your pain medicine. Look at the times before you take your next dose.  Take other over-the-counter or prescription medicines only as told by your doctor.  Avoid certain activities  While you are taking prescription pain medicine, and for 8 hours after your last dose:  Do not drive.  Do not use machinery.  Do not use power tools.  Do not sign legal documents.  Do not drink alcohol.  Do not take sleeping pills.  Do not take care of children by yourself.  Do not do any activities that involve climbing or being in high places.  Do not go to a lake, river, ocean, spa, or swimming pool unless an adult is nearby who can monitor and help you.    Keep pets and people safe  Store your medicine as told by your doctor. Keep it where children and pets cannot reach it.  Do not share your pain medicine with anyone.  Do not save unused pills. If you have unused pills, you can:  Bring them to a take-back program.  Bring them to a pharmacy that takes back unused pills.  Throw them in the trash. Check the medicine label or package insert to see if it is safe to throw it out. If it is safe, take the medicine out of the container. Mix it with something that makes it unusable, such as pet waste. Then put the medicine in the trash.  Flush them down the toilet only if this is safe to do. To find out:  Check the label or package insert of your medicine.  Read information given by the Food and Drug Administration website: fda.gov  Treat or prevent constipation  You may need to take these actions to prevent or treat constipation:  Drink enough fluid to keep your pee (urine) pale yellow.  Take over-the-counter or prescription medicines.  Eat foods that are high in fiber. These include beans, whole grains, and fresh fruits and vegetables.  Limit foods that are high in fat and sugar. These include fried or sweet foods.  Contact a doctor if:  Your medicine is not  helping with your pain.  You have a rash.  You feel sick to your stomach.  You throw up.  You feel depressed.  Get help right away if:  You have trouble breathing. This means:  Breathing that is slower than normal.  Breathing that is more shallow than normal.  You are confused.  You are sleeping a lot, or you have trouble staying awake.  Your skin or lips turn pale or bluish in color.  You tongue swells.  You have thoughts of harming yourself or harming others.  These symptoms may be an emergency. Get help right away. Call your local emergency services (308 in the [place]  Do not wait to see if the symptoms will go away.  Do not drive yourself to the hospital.  Get help right away if you feel like you may hurt yourself or others, or have thoughts about taking your own life. Go to your nearest emergency room or:  Call your local emergency services (406 in the [place]  Call the National Suicide Prevention Lifeline at 1-694.203.2418 or 432 in the [place] This is open 24 hours a day.  Text the Crisis Text Line at 135107.  Summary  Pain medicine can help lower your pain. It may also cause side effects.  Take your pain medicine exactly as told by your doctor.  Talk with your doctor about other ways to manage your pain.  Ask what activities you should avoid while taking pain medicine.  This information is not intended to replace advice given to you by your health care provider. Make sure you discuss any questions you have with your health care provider.  Depression, Adult  Depression is feeling sad, low, down in the dumps, blue, gloomy, or empty. In general, there are two kinds of depression:  Normal sadness or grief. This can happen after something upsetting. It often goes away on its own within 2 weeks. After losing a loved one (bereavement), normal sadness and grief may last longer than two weeks. It usually gets better with time.  Clinical depression. This kind lasts longer than normal sadness or grief. It keeps you from  doing the things you normally do in life. It is often hard to function at home, work, or at school. It may affect your relationships with others. Treatment is often needed.  GET HELP RIGHT AWAY IF:  You have thoughts about hurting yourself or others.  You lose touch with reality (psychotic symptoms). You may:  See or hear things that are not real.  Have untrue beliefs about your life or people around you.  Your medicine is giving you problems.  MAKE SURE YOU:  Understand these instructions.  Will watch your condition.  Will get help right away if you are not doing well or get worse.     This information is not intended to replace advice given to you by your health care provider. Make sure you discuss any questions you have with your health care provider.    Occupational Therapy Discharge Instructions for García Chappell Tammie    8/6/2024    Level of Assist Required for Eating: Able to Complete Eating without Assist  Level of Assist Required for Grooming: Able to Complete Grooming without Assist  Level of Assist Required for Dressing: Requires Supervision with Dressing  Equipment for Dressing: Reacher, Dressing Stick  Level of Assist Required for Toileting: Able to Complete Toileting without Assist  Level of Assist Required for Toilet Transfer: Requires Supervision with Toilet Transfer  Equipment for Toilet Transfer: Raised Toilet Seat with Arms  Level of Assist Required for Bathing: Requires Supervision with Bathing  Equipment for Bathing: Long Handled Sponge, Hand Held Shower Head  Level of Assist Required for Shower Transfer: Requires Supervision with Shower Transfer  Level of Assist Required for Home Mgmt: Requires Physical Assist with Home Management  Level of Assist Required for Meal Prep: Requires Physical Assist with Meal Preparation  Driving: May not Drive, Please Contact Physician for Further Information  Home Exercise Program: None Issued  [place] You did such an amazing job, and I am so proud of you!  Please stay safe at home and keep getting strong :) Let us know if you need anything! Cielo NORRIS

## 2024-08-06 NOTE — FLOWSHEET NOTE
08/06/24 1048   Patient Events   Interdisciplinary Rounds Attendance at Rounds (30 Min)  (EKG)

## 2024-08-06 NOTE — THERAPY
Physical Therapy   Daily Treatment     Patient Name: García Cho  Age:  68 y.o., Sex:  female  Medical Record #: [id number]  Today's Date: 8/6/2024     Precautions  Precautions: Fall Risk, Weight Bearing As Tolerated Right Upper Extremity, Immobilizer Right Lower Extremity  Comments: García reports  right knee immobilizer to be on at all times    Subjective    Pt reports feeling nervious about going home, but admits that she knows she is ready.      Objective       08/06/24 1401   PT Charge Group   PT Neuromuscular Re-Education / Balance (Units) 1   PT Therapeutic Activities (Units) 3   PT Total Time Spent   PT Individual Total Time Spent (Mins) 60   Precautions   Precautions Fall Risk;Weight Bearing As Tolerated Right Upper Extremity;Immobilizer Right Lower Extremity   Pain 0 - 10 Group   Location Leg   Location Orientation Right   Therapist Pain Assessment 8;Nurse Notified   Gait Functional Level of Assist    Gait Level Of Assist Modified Independent   Assistive Device Front Wheel Walker   Distance (Feet) 150   # of Times Distance was Traveled 1   Deviation Bradykinetic;Antalgic   Wheelchair Functional Level of Assist   Wheelchair Assist Modified Independent   Distance Wheelchair (Feet or Distance) 150   Stairs Functional Level of Assist   Level of Assist with Stairs Standby Assist   # of Stairs Climbed 8   Stairs Description Hand rails;Limited by fatigue  (1 HR + 1 crutch)   Transfer Functional Level of Assist   Bed, Chair, Wheelchair Transfer Modified Independent  (FWW SPT)   Bed Chair Wheelchair Transfer Description Adaptive equipment   Toilet Transfers Modified Independent  (FWW SPT)   Toilet Transfer Description Adaptive equipment   Supine Lower Body Exercise   Comments Pt provided with a new HEP handout after spilling water on the last one provided.   Bed Mobility    Supine to Sit Independent   Sit to Supine Independent   Sit to Stand Modified Independent   Scooting Independent   Rolling  Independent   Neuro-Muscular Treatments   Neuro-Muscular Treatments Weight Shift Right;Weight Shift Left   Comments Review of D/C planning and recommendations.   Interdisciplinary Plan of Care Collaboration   IDT Collaboration with  Nursing;   Collaboration Comments POC, meds requested/ provided by RN. CM notified that patient has equipment questions.   Roll Left and Right   Assistance Needed Independent   CARE Score - Roll Left and Right 6   Sit to Lying   Assistance Needed Independent   CARE Score - Sit to Lying 6   Lying to Sitting on Side of Bed   Assistance Needed Independent   CARE Score - Lying to Sitting on Side of Bed 6   Sit to Stand   Assistance Needed Independent;Adaptive equipment   CARE Score - Sit to Stand 6   Chair/Bed-to-Chair Transfer   Assistance Needed Independent;Adaptive equipment   CARE Score - Chair/Bed-to-Chair Transfer 6   Toilet Transfer   Assistance Needed Adaptive equipment;Independent   CARE Score - Toilet Transfer 6   Car Transfer   Assistance Needed Set-up / clean-up;Adaptive equipment   CARE Score - Car Transfer 5   Walk 10 Feet   Assistance Needed Adaptive equipment;Independent   CARE Score - Walk 10 Feet 6   Walk 50 Feet with Two Turns   Assistance Needed Independent;Adaptive equipment   CARE Score - Walk 50 Feet with Two Turns 6   Walk 150 Feet   Assistance Needed Independent;Adaptive equipment   CARE Score - Walk 150 Feet 6   Walking 10 Feet on Uneven Surfaces   Assistance Needed Set-up / clean-up;Adaptive equipment   CARE Score - Walking 10 Feet on Uneven Surfaces 5   1 Step (Curb)   Assistance Needed Adaptive equipment;Set-up / clean-up   CARE Score - 1 Step (Curb) 5   4 Steps   Assistance Needed Set-up / clean-up;Adaptive equipment   CARE Score - 4 Steps 5   12 Steps   Assistance Needed Adaptive equipment;Set-up / clean-up   CARE Score - 12 Steps 5   Picking Up Object   Assistance Needed Independent;Adaptive equipment   CARE Score - Picking Up Object 6   Wheel 50  Feet with Two Turns   Assistance Needed Independent;Adaptive equipment  (Amb at D/C as primary mode of mobility)   CARE Score - Wheel 50 Feet with Two Turns 6   Type of Wheelchair/Scooter Manual   Wheel 150 Feet   Assistance Needed Independent;Adaptive equipment   CARE Score - Wheel 150 Feet 6   Type of Wheelchair/Scooter Manual   P.T. Discharge Summary   Discharge Location Home   Patient Discharging with Assist of Spouse / Significant Other   Level of Supervision Required Upon Discharge Intermittent Supervision  (SPV on stairs)   Recommended Equipment for Discharge Front-Wheeled Walker   Recommeded Services Upon Discharge Home Health Physical Therapy;Outpatient Physical Therapy   Long Term Goals Met 2:4, partially 1   Long Term Goals Not Met 2:4, See POC   Reason(s) for Goals Not Met Dec activity tolerance, recommend SPV on stairs   Discharge Instructions to Patient   Weight Bearing Status - Patient Should Bear Weight as Tolerated Right Leg   Level of Assist Required for Ambulation No Assist on Flat Surfaces;Supervision on Curbs;Supervision on Stairs   Distance Patient May Ambulate 150 ft or until fatigued   Device Recommended for Ambulation Front-Wheeled Walker   Level of Assist Required to Propel Wheelchair Requires No Assist   Level of Assist Required for Transfers Requires No Assist   Device Recommended for Transfers Front-Wheeled Walker   Home Exercise Program Refer to Home Exercise Program Handout for Details   Prosthesis / Orthosis Recommendation / Location   (Immobilizer on right leg)       Assessment    Pt demonstrated recall of stair sequencing, using 1 crutch + 1 HR. Pt was encouraged by ringing the gong to celebrate her success at rehab and prepare for D/C home tomorrow. Pt was receptive to review of D/C recommendations. Pt was limited by antalgic, bradykinetic gait, and received pain meds during session.     Strengths: Able to follow instructions, Adequate strength, Alert and oriented, Effective  communication skills, Independent prior level of function, Manages pain appropriately, Motivated for self care and independence, Pleasant and cooperative, Willingly participates in therapeutic activities  Barriers: Decreased endurance, Fatigue, Hearing impairment, Impaired activity tolerance, Impaired balance, Pain, Limited mobility    Plan    D/C patient home with home health services and HEP.     DME  PT DME Recommendations  Assistive Device: Front Wheeled Walker    Passport items to be completed: Completed       Physical Therapy Problems (Active)       Problem: Balance       Dates: Start:  07/30/24         Goal: STG-Within one week, patient will maintain dynamic standing balance SPV for 2 minutes or longer       Dates: Start:  07/30/24               Problem: Mobility       Dates: Start:  07/30/24         Goal: STG-Within one week, patient will ambulate community distances of at least 150' using LRAD SPV       Dates: Start:  07/30/24               Problem: Mobility Transfers       Dates: Start:  07/30/24         Goal: STG-Within one week, patient will perform transfers SPV using LRAD       Dates: Start:  07/30/24               Problem: PT-Long Term Goals       Dates: Start:  07/30/24         Goal: LTG-By discharge, patient will ambulate greater than 250' Allie using LRAD       Dates: Start:  07/30/24         Goal Note filed on 08/06/24 1400 by Kina Prater DPT       150 ft, limited by fatigue, SPV recommended              Goal: LTG-By discharge, patient will ambulate up/down flight of stairs Allie with unilateral HR       Dates: Start:  07/30/24         Goal Note filed on 08/06/24 1400 by Kina Prater DPT       Recommend SPV

## 2024-08-06 NOTE — THERAPY
Occupational Therapy   Discharge Summary     Patient Name: García Cho  Age:  68 y.o., Sex:  female  Medical Record #: [id number]  Today's Date: 8/6/2024     Precautions  Precautions: Fall Risk, Weight Bearing As Tolerated Right Upper Extremity, Immobilizer Right Lower Extremity  Comments: García reports  right knee immobilizer to be on at all times         Subjective    Pt agreeable to OT tx. Reported no concerns re:DC tomorrow.     Objective       08/06/24 0831 08/06/24 0852   OT Charge Group   Charges Yes  --    OT Self Care / ADL (Units) 3  --    OT Therapy Activity (Units) 1  --    OT Total Time Spent   OT Individual Total Time Spent (Mins) 60  --    Vitals   Pulse 62 66   Patient BP Position Supine Sitting   Blood Pressure  128/59 137/61   Pulse Oximetry 92 % 91 %   O2 (LPM) 0 0   O2 Delivery Device None - Room Air None - Room Air   Pain   Intervention Emotional Support  --    Pain 0 - 10 Group   Location Foot  --    Location Orientation Left  --    Description Aching  --    Comfort Goal Perform Activity;Comfort with Movement  --    Therapist Pain Assessment During Activity  --    Cognition    Level of Consciousness Alert  --    Safety Awareness Impaired;Impulsive  --    Attention Impaired  --    Comments Pt require cues for proximity to FWW during mobility, multiple times.  --    Functional Level of Assist   Eating Independent  --    Grooming Modified Independent;Standing  --    Bathing Modified Independent  --    Bathing Description Long handled bath tool;Set up for wound protection;Tub bench;Grab bar;Hand held shower;Increased time  (OTR completed waterproofing of RLE with immobilizer donned. Pt able to reach all parts seated on shower bench.)  --    Upper Body Dressing Independent  --    Lower Body Dressing Supervision  --    Lower Body Dressing Description Supervision for safety;Reacher;Increased time;Application of orthotic or brace  (min-mod v/c for remembering to use AE for LBD. Pt able to  don/doff RLE Immobilizer ind)  --    Toileting Modified Independent  --    Toileting Description Adaptive equipment  (FWW for UB support)  --    Bed, Chair, Wheelchair Transfer Modified Independent  --    Bed Chair Wheelchair Transfer Description Adaptive equipment  (FWW for stand step)  --    Toilet Transfers Supervised  --    Toilet Transfer Description Adaptive equipment;Verbal cueing;Supervision for safety  (v/c for maintaining proximity to FWW during stand step transfer.)  --    Tub / Shower Transfers Supervised  --    Tub Shower Transfer Description Adaptive equipment;Shower bench;Supervision for safety  --    Balance   Comments pt completed functional mobility in room at [facility] level with cues for FWW proximity during clothing transport  --    Interdisciplinary Plan of Care Collaboration   Patient Position at End of Therapy In Bed;Call Light within Reach;Tray Table within Reach  --    Eating   Assistance Needed Independent  --    CARE Score - Eating 6  --    Oral Hygiene   Assistance Needed Independent  --    CARE Score - Oral Hygiene 6  --    Toileting Hygiene   Assistance Needed Independent  --    CARE Score - Toileting Hygiene 6  --    Shower/Bathe Self   Assistance Needed Supervision;Adaptive equipment  --    CARE Score - Shower/Bathe Self 4  --    Upper Body Dressing   Assistance Needed Independent  --    CARE Score - Upper Body Dressing 6  --    Lower Body Dressing   Assistance Needed Supervision;Adaptive equipment  --    CARE Score - Lower Body Dressing 4  --    Putting On/Taking Off Footwear   Assistance Needed Independent  --    CARE Score - Putting On/Taking Off Footwear 6  --    Toilet Transfer   Assistance Needed Supervision;Adaptive equipment  --    CARE Score - Toilet Transfer 4  --    Cognitive Pattern Assessment   Cognitive Pattern Assessment Used BIMS  --    Brief Interview for Mental Status (BIMS)   Repetition of Three Words (First Attempt) 3  --    Temporal Orientation: Year Correct  --   "  Temporal Orientation: Month Accurate within 5 days  --    Temporal Orientation: Day Correct  --    Recall: \"Sock\" Yes, no cue required  --    Recall: \"Blue\" Yes, after cueing (\"a color\")  --    Recall: \"Bed\" Yes, no cue required  --    BIMS Summary Score 14  --    Confusion Assessment Method (CAM)   Is there evidence of an acute change in mental status from the patient's baseline? No  --    Inattention Behavior not present  --    Disorganized thinking Behavior not present  --    Altered level of consciousness Behavior not present  --    Discharge Summary    Discharge Location  Home  --    Patient Discharging with Assist of Spouse / Significant Other  --    Level of Supervision Required Intermittent Supervision  --    Recommended Equipment for Discharge Front-Wheeled Walker;Raised Toilet Seat with Arms;Sock Aid;Reacher;Long Handled Shoe Horn;Dressing Stick  --    Recommended Services Upon Discharge Home Health Occupational Therapy  --    Long Term Goals Met 2  --    Long Term Goals Not Met 1  --    Reason(s) for Goals Not Met pt continues to require SBA during IADLs.  --    Criteria for Termination of Services Maximum Function Achieved for Inpatient Rehabilitation  --    Discharge Instructions to Patient   Level of Assist Required for Eating Able to Complete Eating without Assist  --    Level of Assist Required for Grooming Able to Complete Grooming without Assist  --    Level of Assist Required for Dressing Requires Supervision with Dressing  --    Equipment for Dressing Reacher;Dressing Stick  --    Level of Assist Required for Toileting Able to Complete Toileting without Assist  --    Level of Assist Required for Toilet Transfer Requires Supervision with Toilet Transfer  --    Equipment for Toilet Transfer Raised Toilet Seat with Arms  --    Level of Assist Required for Bathing Requires Supervision with Bathing  --    Equipment for Bathing Long Handled Sponge;Hand Held Shower Head  --    Level of Assist Required " for Shower Transfer Requires Supervision with Shower Transfer  --    Level of Assist Required for Home Mgmt Requires Physical Assist with Home Management  --    Level of Assist Required for Meal Prep Requires Physical Assist with Meal Preparation  --    Driving May not Drive, Please Contact Physician for Further Information  --    Home Exercise Program None Issued  --        Assessment    Pt tolerated OT DC shower and functional transfers, though does require at least SPV due to safety issues with maintaining proximity to FWW. Pt has reached max potential with ADLs and is ready for DC. Care Chest ppwk to be faxed in with CM for DME.  Strengths: Independent prior level of function, Making steady progress towards goals, Motivated for self care and independence, Pleasant and cooperative, Supportive family, Willingly participates in therapeutic activities  Barriers: Decreased endurance, Difficulty following instructions, Fatigue, Generalized weakness, Hearing impairment, Home accessibility, Impaired activity tolerance, Impaired balance, Impaired carryover of learning, Impaired insight/denial of deficits, Impaired functional cognition, Limited mobility, Pain, Pain poorly managed    Plan    DC home with  providing 24/7 care.    Passport items to be completed:      Occupational Therapy Goals (Active)       There are no active problems.

## 2024-08-06 NOTE — PROGRESS NOTES
"  Physical Medicine & Rehabilitation Progress Note    Encounter Date: 8/6/2024    Chief Complaint: Decreased mobility, weakness    Interval Events (Subjective):  Patient sitting up in room. She reports she had some chest tightness earlier. She has a history of anxiety but will check EKG. EKG showed sinus bradycardia.  Discussed about discharge medications.     _____________________________________  Interdisciplinary Team Conference   Most recent IDT on 8/1/2024    Discharge Date/Disposition:  8/7/24  _____________________________________    Objective:  VITAL SIGNS: /56   Pulse 62   Temp 36.2 °C (97.2 °F) (Oral)   Resp 20   Ht 1.768 m (5' 9.6\")   Wt 75 kg (165 lb 5.5 oz)   SpO2 91%   BMI 24.00 kg/m²   Gen: NAD  Psych: Mood and affect appropriate  CV: RRR, 0 edema  Resp: CTAB, no upper airway sounds  Abd: NTND  Neuro: AOx4, following commands, tremor RLE  Unchanged from 8/5/24    Laboratory Values:  Recent Results (from the past 72 hour(s))   CBC WITH DIFFERENTIAL    Collection Time: 08/06/24  6:38 AM   Result Value Ref Range    WBC 5.8 4.8 - 10.8 K/uL    RBC 3.95 (L) 4.20 - 5.40 M/uL    Hemoglobin 12.4 12.0 - 16.0 g/dL    Hematocrit 36.4 (L) 37.0 - 47.0 %    MCV 92.2 81.4 - 97.8 fL    MCH 31.4 27.0 - 33.0 pg    MCHC 34.1 32.2 - 35.5 g/dL    RDW 41.9 35.9 - 50.0 fL    Platelet Count 393 164 - 446 K/uL    MPV 9.1 9.0 - 12.9 fL    Neutrophils-Polys 51.70 44.00 - 72.00 %    Lymphocytes 33.60 22.00 - 41.00 %    Monocytes 9.80 0.00 - 13.40 %    Eosinophils 3.10 0.00 - 6.90 %    Basophils 0.90 0.00 - 1.80 %    Immature Granulocytes 0.90 0.00 - 0.90 %    Nucleated RBC 0.00 0.00 - 0.20 /100 WBC    Neutrophils (Absolute) 3.01 1.82 - 7.42 K/uL    Lymphs (Absolute) 1.95 1.00 - 4.80 K/uL    Monos (Absolute) 0.57 0.00 - 0.85 K/uL    Eos (Absolute) 0.18 0.00 - 0.51 K/uL    Baso (Absolute) 0.05 0.00 - 0.12 K/uL    Immature Granulocytes (abs) 0.05 0.00 - 0.11 K/uL    NRBC (Absolute) 0.00 K/uL   Comp Metabolic Panel    " Collection Time: 08/06/24  6:38 AM   Result Value Ref Range    Sodium 137 135 - 145 mmol/L    Potassium 4.0 3.6 - 5.5 mmol/L    Chloride 99 96 - 112 mmol/L    Co2 25 20 - 33 mmol/L    Anion Gap 13.0 7.0 - 16.0    Glucose 102 (H) 65 - 99 mg/dL    Bun 15 8 - 22 mg/dL    Creatinine 0.76 0.50 - 1.40 mg/dL    Calcium 9.5 8.5 - 10.5 mg/dL    Correct Calcium 9.8 8.5 - 10.5 mg/dL    AST(SGOT) 69 (H) 12 - 45 U/L    ALT(SGPT) 77 (H) 2 - 50 U/L    Alkaline Phosphatase 223 (H) 30 - 99 U/L    Total Bilirubin 0.5 0.1 - 1.5 mg/dL    Albumin 3.6 3.2 - 4.9 g/dL    Total Protein 6.4 6.0 - 8.2 g/dL    Globulin 2.8 1.9 - 3.5 g/dL    A-G Ratio 1.3 g/dL   MAGNESIUM    Collection Time: 08/06/24  6:38 AM   Result Value Ref Range    Magnesium 1.8 1.5 - 2.5 mg/dL   PHOSPHORUS    Collection Time: 08/06/24  6:38 AM   Result Value Ref Range    Phosphorus 3.4 2.5 - 4.5 mg/dL   ESTIMATED GFR    Collection Time: 08/06/24  6:38 AM   Result Value Ref Range    GFR (CKD-EPI) 85 >60 mL/min/1.73 m 2         Medications:  Scheduled Medications   Medication Dose Frequency    vitamin D3  1,000 Units DAILY    senna-docusate  2 Tablet BID    omeprazole  20 mg DAILY    ARIPiprazole  30 mg QDAY    enoxaparin (LOVENOX) injection  40 mg DAILY AT 1800    metoprolol tartrate  25 mg BID    polyethylene glycol/lytes  1 Packet DAILY     PRN medications: Respiratory Therapy Consult, hydrALAZINE, acetaminophen, senna-docusate **AND** polyethylene glycol/lytes, docusate sodium, magnesium hydroxide, carboxymethylcellulose, mag hydrox-al hydrox-simeth, ondansetron **OR** ondansetron, traZODone, sodium chloride, oxyCODONE immediate-release **OR** oxyCODONE immediate-release, ALPRAZolam    Diet:  Current Diet Order   Procedures    Diet Order Diet: Regular       Medical Decision Making and Plan:  R patellar fracture - Patient sustained GLF with right knee fracture s/p open repair with Dr. Holder on 7/26/24. She is WBAT with knee in extension brace.  -PT and OT for mobility  and ADLs. Per guidelines, 15 hours per week between PT, OT and/or SLP.  -Follow-up Dr. Holder      Confusion - Present on admission, slightly worsened. Elevated LFTs, SLP to evaluate. Metabolic encephalopathy, change [facility] Code / Diagnosis to Support: 0002.1 - Brain Dysfunction: Non-Traumatic  -Recheck CMP - elevated remain elevated but trending down. Repeat 8/6    Left wrist pain - XR without fracture per my read on 8/5    A fib/HTN - History of A fib on ziopatch. On metoprolol 25 mg BID and previously on Nebivolol. HR into 60s, continue Metoprolol 25 mg BID     HLD - Patient with allergy to statins. Diet controlled     Anxiety disorder - Patient on Abilify 30 mg daily and PRN Xanax 1 mg. Requiring PRN Xanax at night. Improving mood/cognition, continue Abilify 30 mg daily, using Xanax at night. Chest tightness on 8/6, continue Abilify, EKG wnl.      Elevated LFTs - on admission, previously normal. Will monitor as may be reactionary from fracture. Bilirubin normal. Repeat improving on 8/1. Repeat 8/5    Pain - Patient on PRN Tylenol and Oxycodone     Vitamin D insufficiency - 27 on admission, start 1000 U      Skin - Patient at risk for skin breakdown due to debility in areas including sacrum, achilles, elbows and head in addition to other sites. Nursing to assess skin daily.      GI Ppx - Patient on Prilosec for GERD prophylaxis. Patient on Senna-docusate for constipation prophylaxis.      DVT Ppx - Patient Lovenox on transfer. Limited mobility, discontinue Lovenox and start 81 mg BID until follow-up with orthopedics  ____________________________________    T. Jn Theodore MD/PhD  [facility]   [facility]   ____________________________________

## 2024-08-06 NOTE — CARE PLAN
The patient is Watcher - Medium risk of patient condition declining or worsening    Shift Goals  Clinical Goals: Safety, pain management  Patient Goals: Pain management, going home Wed  Family Goals: Education    Problem: Psychosocial  Goal: Patient's level of anxiety will decrease  Outcome: Progressing    Patient reported symptoms of anxiety.  Xanax given in AM.       Problem: Pain - Standard  Goal: Alleviation of pain or a reduction in pain to the patient’s comfort goal  Outcome: Progressing     Patient c/o 9/10 pain.  Medication given per MAR.

## 2024-08-07 VITALS
RESPIRATION RATE: 20 BRPM | OXYGEN SATURATION: 92 % | SYSTOLIC BLOOD PRESSURE: 112 MMHG | HEART RATE: 70 BPM | HEIGHT: 70 IN | DIASTOLIC BLOOD PRESSURE: 52 MMHG | TEMPERATURE: 97.3 F | WEIGHT: 165.34 LBS | BODY MASS INDEX: 23.67 KG/M2

## 2024-08-07 PROCEDURE — A9270 NON-COVERED ITEM OR SERVICE: HCPCS | Performed by: PHYSICAL MEDICINE & REHABILITATION

## 2024-08-07 PROCEDURE — 700102 HCHG RX REV CODE 250 W/ 637 OVERRIDE(OP): Performed by: PHYSICAL MEDICINE & REHABILITATION

## 2024-08-07 PROCEDURE — 99239 HOSP IP/OBS DSCHRG MGMT >30: CPT | Performed by: PHYSICAL MEDICINE & REHABILITATION

## 2024-08-07 RX ADMIN — OMEPRAZOLE 20 MG: 20 CAPSULE, DELAYED RELEASE ORAL at 08:02

## 2024-08-07 RX ADMIN — OXYCODONE HYDROCHLORIDE 10 MG: 10 TABLET ORAL at 01:06

## 2024-08-07 RX ADMIN — OXYCODONE HYDROCHLORIDE 10 MG: 10 TABLET ORAL at 05:12

## 2024-08-07 RX ADMIN — METOPROLOL TARTRATE 25 MG: 25 TABLET, FILM COATED ORAL at 05:12

## 2024-08-07 RX ADMIN — ACETAMINOPHEN 650 MG: 325 TABLET ORAL at 05:12

## 2024-08-07 RX ADMIN — OXYCODONE HYDROCHLORIDE 10 MG: 10 TABLET ORAL at 08:08

## 2024-08-07 RX ADMIN — ASPIRIN 81 MG: 81 TABLET, CHEWABLE ORAL at 08:02

## 2024-08-07 RX ADMIN — ARIPIPRAZOLE 30 MG: 10 TABLET ORAL at 05:12

## 2024-08-07 RX ADMIN — SENNOSIDES AND DOCUSATE SODIUM 2 TABLET: 50; 8.6 TABLET ORAL at 08:02

## 2024-08-07 RX ADMIN — POLYETHYLENE GLYCOL 3350 1 PACKET: 17 POWDER, FOR SOLUTION ORAL at 08:02

## 2024-08-07 RX ADMIN — Medication 1000 UNITS: at 08:02

## 2024-08-07 RX ADMIN — OXYCODONE HYDROCHLORIDE 10 MG: 10 TABLET ORAL at 11:06

## 2024-08-07 ASSESSMENT — PATIENT HEALTH QUESTIONNAIRE - PHQ9
SUM OF ALL RESPONSES TO PHQ9 QUESTIONS 1 AND 2: 0
1. LITTLE INTEREST OR PLEASURE IN DOING THINGS: NOT AT ALL
2. FEELING DOWN, DEPRESSED, IRRITABLE, OR HOPELESS: NOT AT ALL
2. FEELING DOWN, DEPRESSED, IRRITABLE, OR HOPELESS: NOT AT ALL
1. LITTLE INTEREST OR PLEASURE IN DOING THINGS: NOT AT ALL
SUM OF ALL RESPONSES TO PHQ9 QUESTIONS 1 AND 2: 0

## 2024-08-07 ASSESSMENT — PAIN DESCRIPTION - PAIN TYPE: TYPE: ACUTE PAIN

## 2024-08-07 NOTE — PROGRESS NOTES
NURSING DAILY NOTE    Name: García Cho   Date of Admission: 7/29/2024   Admitting Diagnosis: Fracture of patella with routine healing, right, closed  Attending Physician: Maxwell Theodore M.d.  Allergies: Atorvastatin, Bee pollen, Ibuprofen, Pravastatin, and Other environmental    Safety  Patient Assist  SBA  Patient Precautions  Fall Risk, Weight Bearing As Tolerated Right Upper Extremity, Immobilizer Right Lower Extremity  Precaution Comments  García reports  right knee immobilizer to be on at all times  Bed Transfer Status  Modified Independent (FWW SPT)  Toilet Transfer Status   Modified Independent (FWW SPT)  Assistive Devices  Walker - front wheel  Oxygen  (P) None - Room Air  Diet/Therapeutic Dining  Current Diet Order   Procedures    Diet Order Diet: Regular     Pill Administration  whole  Agitated Behavioral Scale     ABS Level of Severity       Fall Risk  Has the patient had a fall this admission?   No  Marychuy Son Fall Risk Scoring  19, HIGH RISK  Fall Risk Safety Measures  bed alarm and chair alarm    Vitals  Temperature: (P) 36.3 °C (97.3 °F)  Temp src: (P) Temporal  Pulse: (P) 61  Respiration: (P) 18  Blood Pressure : (P) 130/50  Blood Pressure MAP (Calculated): (P) 77 MM HG  BP Location: (P) Upper Arm, Left  Patient BP Position: (P) Supine     Oxygen  Pulse Oximetry: (P) 92 %  O2 (LPM): 0  O2 Delivery Device: (P) None - Room Air    Bowel and Bladder  Last Bowel Movement  08/06/24  Stool Type  Type 5: Soft blob with clear cut edges (passed easily)  Bowel Device     Continent  Bladder: Continent void   Bowel: Continent movement  Bladder Function  Urine Void (mL):  (large)  Number of Times Voided: 1  Urine Color: Yellow  Number of Times Incontinent of Urine: 0  Genitourinary Assessment   Bladder Assessment (WDL):  Within Defined Limits  Guthrie Catheter: Not Applicable  Urine Color: Yellow  Number of Bladder Accidents:  0  Total Number of Bladder of Accidents in Last 7 Days: 0  Number of Times Incontinent of Urine: 0  Bladder Device: Bathroom  Bladder Scan: Post Void  $ Bladder Scan Results (mL): 30    Skin  Aurelio Score   18  Sensory Interventions   Bed Types: Standard/Trauma Mattress  Skin Preventative Measures: Pillows in Use for Support / Positioning  Moisture Interventions  Moisturizers/Barriers: Moisturizer       Pain  Pain Rating Scale  10 - As bad as it could be, nothing else matters  Pain Location  Leg  Pain Location Orientation  Right  Pain Interventions   Medication (see MAR)    ADLs    Bathing    (shower with ot)  Linen Change      Personal Hygiene  Moist Lynsey Wipes, Perineal Care  Chlorhexidine Bath      Oral Care     Teeth/Dentures     Shave     Nutrition Percentage Eaten  Between % Consumed  Environmental Precautions  Treaded Slipper Socks on Patient, Personal Belongings, Wastebasket, Call Bell etc. in Easy Reach, Bed in Low Position, Mobility Assessed & Appropriate Sign Placed  Patient Turns/Positioning  Patient Turns Self from Side to Side  Patient Turns Assistance/Tolerance     Bed Positions  Bed Controls On, Bed Locked  Head of Bed Elevated  Self regulated      Psychosocial/Neurologic Assessment  Psychosocial Assessment  Psychosocial (WDL):  WDL Except  Patient Behaviors: Anxious  Neurologic Assessment  Neuro (WDL): Exceptions to WDL  Level of Consciousness: Alert  Orientation Level: Oriented X4  Cognition: Follows commands  Speech: Clear  Motor Function/Sensation Assessment: Sensation, Motor strength  Muscle Strength Right Arm: Good Strength Against Gravity and Moderate Resistance  Muscle Strength Left Arm: Good Strength Against Gravity and Moderate Resistance  RLE Sensation: Pain  Muscle Strength Right Leg: Fair Strength against Gravity but No Resistance  Muscle Strength Left Leg: Good Strength Against Gravity and Moderate Resistance  EENT (WDL):  WDL Except    Cardio/Pulmonary Assessment  Edema       Respiratory Breath Sounds  RUL Breath Sounds: Clear  RML Breath Sounds: Clear  RLL Breath Sounds: Clear  DOROTHY Breath Sounds: Clear  LLL Breath Sounds: Clear  Cardiac Assessment   Cardiac (WDL):  Within Defined Limits

## 2024-08-07 NOTE — PROGRESS NOTES
NURSING DAILY NOTE    Name: García Cho   Date of Admission: 7/29/2024   Admitting Diagnosis: Fracture of patella with routine healing, right, closed  Attending Physician: Maxwell Theodore M.d.  Allergies: Atorvastatin, Bee pollen, Ibuprofen, Pravastatin, and Other environmental    Safety  Patient Assist  SBA  Patient Precautions  Fall Risk, Weight Bearing As Tolerated Right Upper Extremity, Immobilizer Right Lower Extremity  Precaution Comments  García reports  right knee immobilizer to be on at all times  Bed Transfer Status  Modified Independent (FWW SPT)  Toilet Transfer Status   Modified Independent (FWW SPT)  Assistive Devices  Walker - front wheel  Oxygen  None - Room Air  Diet/Therapeutic Dining  Current Diet Order   Procedures    Diet Order Diet: Regular     Pill Administration  whole  Agitated Behavioral Scale     ABS Level of Severity       Fall Risk  Has the patient had a fall this admission?   No  Marychuy Son Fall Risk Scoring  19, HIGH RISK  Fall Risk Safety Measures  bed alarm and chair alarm    Vitals  Temperature: 36.3 °C (97.3 °F)  Temp src: Oral  Pulse: 70  Respiration: 20  Blood Pressure : 112/52  Blood Pressure MAP (Calculated): 72 MM HG  BP Location: Right, Upper Arm  Patient BP Position: Sitting     Oxygen  Pulse Oximetry: 92 %  O2 (LPM): 0  O2 Delivery Device: None - Room Air    Bowel and Bladder  Last Bowel Movement  08/06/24  Stool Type  Type 5: Soft blob with clear cut edges (passed easily)  Bowel Device     Continent  Bladder: Continent void   Bowel: Continent movement  Bladder Function  Urine Void (mL):  (large)  Number of Times Voided: 1  Urine Color: Unable To Evaluate  Number of Times Incontinent of Urine: 0  Genitourinary Assessment   Bladder Assessment (WDL):  Within Defined Limits  Guthrie Catheter: Not Applicable  Urine Color: Unable To Evaluate  Number of Bladder Accidents: 0  Total Number of Bladder  of Accidents in Last 7 Days: 0  Number of Times Incontinent of Urine: 0  Bladder Device: Bathroom  Bladder Scan: Post Void  $ Bladder Scan Results (mL): 30    Skin  Aurelio Score   18  Sensory Interventions   Bed Types: Standard/Trauma Mattress  Skin Preventative Measures: Pillows in Use for Support / Positioning  Moisture Interventions  Moisturizers/Barriers: Moisturizer       Pain  Pain Rating Scale  9 - Can't bear the pain, unable to do anything  Pain Location  Leg  Pain Location Orientation  Right  Pain Interventions   Medication (see MAR)    ADLs    Bathing    (shower with ot)  Linen Change      Personal Hygiene  Moist Lynsey Wipes, Perineal Care  Chlorhexidine Bath      Oral Care     Teeth/Dentures     Shave     Nutrition Percentage Eaten  Between % Consumed  Environmental Precautions  Treaded Slipper Socks on Patient, Personal Belongings, Wastebasket, Call Bell etc. in Easy Reach, Bed in Low Position, Mobility Assessed & Appropriate Sign Placed  Patient Turns/Positioning  Patient Turns Self from Side to Side  Patient Turns Assistance/Tolerance     Bed Positions  Bed Controls On, Bed Locked  Head of Bed Elevated  Self regulated      Psychosocial/Neurologic Assessment  Psychosocial Assessment  Psychosocial (WDL):  WDL Except  Patient Behaviors: Anxious  Neurologic Assessment  Neuro (WDL): Exceptions to WDL  Level of Consciousness: Alert  Orientation Level: Oriented X4  Cognition: Follows commands  Speech: Clear  Motor Function/Sensation Assessment: Sensation, Motor strength  Muscle Strength Right Arm: Good Strength Against Gravity and Moderate Resistance  Muscle Strength Left Arm: Good Strength Against Gravity and Moderate Resistance  RLE Sensation: Pain  Muscle Strength Right Leg: Fair Strength against Gravity but No Resistance  Muscle Strength Left Leg: Good Strength Against Gravity and Moderate Resistance  EENT (WDL):  WDL Except    Cardio/Pulmonary Assessment  Edema      Respiratory Breath Sounds  RUL  Breath Sounds: Clear  RML Breath Sounds: Clear  RLL Breath Sounds: Clear  DOROTHY Breath Sounds: Clear  LLL Breath Sounds: Clear  Cardiac Assessment   Cardiac (WDL):  Within Defined Limits

## 2024-08-07 NOTE — PROGRESS NOTES
Patient discharged to home per order.  Discharge instructions reviewed with patient and ; they verbalize understanding and signed copies placed in chart.  Patient has all belongings; signed copy of form in chart.  Patient left facility at 1130 via w/c accompanied by rehab staff and .  Have enjoyed working with this pleasant patient.

## 2024-08-07 NOTE — DISCHARGE PLANNING
Case Management.   Tc to [facility]; they have accepted application and have all DME available for pt to .      Scripts sent to [facility]; pt to .   Dc home today.

## 2024-08-07 NOTE — DIETARY
Nutrition Update:    Day 9 of admit.  García Cho is a 68 y.o. female with admitting DX of Other fracture of right patella, initial encounter for closed fracture [S82.765R].  Patient being followed to optimize nutrition.    Current Diet: Regular; Ensure Plus BID between meals. PO intake per ADLs has been % x 6 of past 7 meals documented; all meals >50%.  Unstageable pressure injury to R thigh per Wound Team note 8/2. Pt with good PO intake, oral nutrition supplements in place.  Discharge orders in place, plan for DC home today per EMR.    Problem: Nutritional:  Goal: Achieve adequate nutritional intake  Description: Patient will consume >75% of meals or >50% of meals and supplements.   Outcome: Met    RD will screen weekly per department policy; available PRN.

## 2024-08-23 NOTE — THERAPY
Physical Therapy   Daily Treatment     Patient Name: García Cho  Age:  68 y.o., Sex:  female  Medical Record #: [id number]  Today's Date: 8/2/2024     Precautions  Precautions: Fall Risk, Weight Bearing As Tolerated Right Lower Extremity, Immobilizer Right Lower Extremity  Comments: García reports  right knee immobilizer to be on at all times    Subjective    Patient was found in bed and was agreeable to therapy. Patient asked to continue practicing stairs. By end of session patient reported being very fatigued.      Objective       08/02/24 1531   PT Charge Group   PT Gait Training (Units) 1   PT Therapeutic Exercise (Units) 1   PT Total Time Spent   PT Individual Total Time Spent (Mins) 30   Stairs Functional Level of Assist   Level of Assist with Stairs Contact Guard Assist   # of Stairs Climbed 16  (6 4in stairs twice and once up 4 6in stairs)   Stairs Description Hand rails;Supervision for safety;Assist device/equipment  (crutch)   Transfer Functional Level of Assist   Bed, Chair, Wheelchair Transfer Supervised   Bed Chair Wheelchair Transfer Description Supervision for safety   Sitting Lower Body Exercises   Ankle Pumps 2 sets of 10;Bilateral;Medium Resistance Theraband   Long Arc Quad 3 sets of 10;Left  (3lb weight)   Hamstring Curl 3 sets of 10;Left;Medium Resistance Theraband   Bed Mobility    Sit to Supine Independent   Interdisciplinary Plan of Care Collaboration   Patient Position at End of Therapy In Bed;Bed Alarm On;Call Light within Reach;Tray Table within Reach;Phone within Reach         Assessment    Patient struggled to recall how to do stairs (up with the good, down with the bad) and needed frequent reminders on which foot to lead with but was otherwise safe and strong while performing stairs. Patient is very motivated and has good strength and is on a good path to independence    Strengths: Able to follow instructions, Adequate strength, Alert and oriented, Effective communication  [facility]  Office: [phone]  Hilton Frank DO, Tom Mercado DO, Alvin Benites DO, Valdo Villar DO, Amy Kapadia MD, Kiki Patel MD, Antony Kirkpatrick MD, Génesis Benoit MD,  Cuba Griffith MD, Shaila Ashford MD, Kailyn Head MD,  Atiya Leal DO, Teresa Jasmine MD, Miki Peña MD, Juan David Frank DO, Urvashi Romeo MD,  Ashutosh Najera DO, Margarita Montoya MD, Bozena Reyes MD, Mimi Mckeon MD, Anjum Cr MD,  Arley Mcneil MD, Marsha Sheriff MD, Fernandez Chun MD, Magalie Coombs MD, Gerry Gusman MD, Irasema Multani MD, Dutch Rob DO, Nav Strickland DO, Gaurav Manzanares MD,  Arnol Bryson MD, Shirley Waterhouse, CNP,  Jess Lovelace CNP, Cliff Chang, CNP,  Sary Fitzpatrick, JOSE, Mikayla Benavides, CNP, Linette Crespo, CNP, Yne Pena CNP, Tawana Leal CNP, Domenica Camp PA-C, Shasta Goldman PA-C, Coral Vyas, CNP, Sheyla Robles, CNP, Concepción Herr, CNP, Erica Kim, CNP, Jillian Wilson, CNS, Martha Ziegler, CNP, Pauly Brooks CNP, Tracy Schwab, CNP         [facility]   IN-PATIENT SERVICE   [facility]    HISTORY AND PHYSICAL EXAMINATION            Date:   8/23/2024  Patient name:  Guillermina Warner  Date of admission:  8/23/2024  3:24 PM  MRN:   [id number]  Account:  [id number]  YOB: 1998  PCP:    Amanda Enrique APRN - CNP  Room:   06/06  Code Status:    Prior      History Obtained From:     patient, electronic medical record    History of Present Illness:     Guillermina Warner is a 25 y.o. Non- / non  male who presents with Abdominal Pain and Emesis   and is admitted to the hospital for the management of DKA, type 1, not at goal (HCC).    This very pleasant 25-year-old male presents the hospital with nausea, vomiting, abdominal pain.  He is a known type 1 diabetes since age 14.  He is typically managed with a sliding scale and Lantus.  He tells me that he went to the pharmacy and instead of being  skills, Independent prior level of function, Manages pain appropriately, Motivated for self care and independence, Pleasant and cooperative, Willingly participates in therapeutic activities  Barriers: Decreased endurance, Fatigue, Hearing impairment, Impaired activity tolerance, Impaired balance, Pain, Limited mobility    Plan    Gait using FWW  Dual task training   LE strengthening   Endurance training   Continue Stairs with L HR + 1 crutch     DME  PT DME Recommendations  Assistive Device: Front Wheeled Walker    Passport items to be completed:  Get in/out of bed safely, in/out of a vehicle, safely use mobility device, walk or wheel around home/community, navigate up and down stairs, show how to get up/down from the ground, ensure home is accessible, demonstrate HEP, complete caregiver training    Physical Therapy Problems (Active)       Problem: Balance       Dates: Start:  07/30/24         Goal: STG-Within one week, patient will maintain dynamic standing balance SPV for 2 minutes or longer       Dates: Start:  07/30/24               Problem: Mobility       Dates: Start:  07/30/24         Goal: STG-Within one week, patient will ambulate community distances of at least 150' using LRAD SPV       Dates: Start:  07/30/24               Problem: Mobility Transfers       Dates: Start:  07/30/24         Goal: STG-Within one week, patient will perform transfers SPV using LRAD       Dates: Start:  07/30/24               Problem: PT-Long Term Goals       Dates: Start:  07/30/24         Goal: LTG-By discharge, patient will ambulate greater than 250' Allie using LRAD       Dates: Start:  07/30/24            Goal: LTG-By discharge, patient will transfer one surface to another Allie using LRAD       Dates: Start:  07/30/24            Goal: LTG-By discharge, patient will ambulate up/down flight of stairs Allie with unilateral HR       Dates: Start:  07/30/24            Goal: LTG-By discharge, patient will be independent in HEP        Dates: Start:  07/30/24

## 2024-08-26 NOTE — DISCHARGE SUMMARY
Discharge Summary    CHIEF COMPLAINT ON ADMISSION  Chief Complaint   Patient presents with    GLF     Pt was walking dog and tripped on sidewalk. Pt reports pain to R knee, L wrist, L foot. Pt in splint but EMS reports knee deformity to R knee. CSM intact.       Reason for Admission  EMS     Admission Date  7/25/2024    CODE STATUS  Prior    HPI & HOSPITAL COURSE  García Cho is a 68 y.o. female who presented 7/25/2024 status post fall. Patient states that earlier today, she was walking her dog, and she ended up tripping over her sidewalk.  She fell onto her knees and wrist.  She states that she has extreme pain to her right knee along with swelling.  Along with left ankle pain as well.  Imaging was obtained in the emergency department showing an acute displaced fracture of the patella.  All other x-rays were unremarkable.  Orthopedic surgery consulted and recommended admission, n.p.o. at midnight, and surgery tomorrow.      Interval Problem Update  7/27 Went for surgical repair on 7/26, tolerated well, no apparent complications, pain is controlled, all limbs neurovascularly intact.  Cleared for discharge to home versus rehab by Ortho pending medicine clearance, weightbearing as tolerated in knee immobilizer at all times.   7/28 Stable vitals, No significant lab abnormalities shown. Pain controlled.  PT recommending PM&R eval for possible IPR admission.  Consult placed.    7/29 discharged to [facility]    Therefore, she is discharged in good and stable condition to an inpatient rehabilitation hospital.    The patient met 2-midnight criteria for an inpatient stay at the time of discharge.    Discharge Date  7/29/2024    FOLLOW UP ITEMS POST DISCHARGE  PCP, ortho follow up    DISCHARGE DIAGNOSES  Principal Problem:    Fracture of patella with routine healing, right, closed (POA: Yes)  Active Problems:    Leukocytosis (POA: Unknown)    Hyperglycemia (POA: Unknown)  Resolved Problems:    * No resolved hospital  problems. *      FOLLOW UP  Future Appointments   Date Time Provider Department Center   9/10/2024  9:00 AM Lizzie Cottrell P.A.-C. [facility]  [address] [facility] [address] [facility] [address] [phone]        Stephen Holder M.D.  [address] [address] [phone]    Schedule an appointment as soon as possible for a visit in 2 week(s)  Staple removal    Noah Alejandro M.D.  [address] [address] [phone]            MEDICATIONS ON DISCHARGE     Medication List        CONTINUE taking these medications        Instructions   amphetamine-dextroamphetamine 20 MG Tabs  Commonly known as: Adderall   Take 20 mg by mouth 2 times a day.  Dose: 20 mg     Bempedoic Acid 180 MG Tabs   Take 180 mg by mouth every day.  Dose: 180 mg     nitroglycerin 0.4 MG Subl  Commonly known as: Nitrostat   Place 1 Tablet under the tongue as needed for Chest Pain.  Dose: 0.4 mg              Allergies  Allergies   Allergen Reactions    Atorvastatin      diarrhea    Bee Pollen Unspecified    Ibuprofen Nausea    Pravastatin      diarrhea    Other Environmental      Pollen         DIET  No orders of the defined types were placed in this encounter.      ACTIVITY  As tolerated.  Weight bearing as tolerated    CONSULTATIONS  Ortho, PM&R    PROCEDURES   Open treatment of right patella fracture     LABORATORY  Lab Results   Component Value Date    SODIUM 137 08/06/2024    POTASSIUM 4.0 08/06/2024    CHLORIDE 99 08/06/2024    CO2 25 08/06/2024    GLUCOSE 102 (H) 08/06/2024    BUN 15 08/06/2024    CREATININE 0.76 08/06/2024        Lab Results   Component Value Date    WBC 5.8 08/06/2024    HEMOGLOBIN 12.4 08/06/2024    HEMATOCRIT 36.4 (L) 08/06/2024    PLATELETCT 393 08/06/2024        Total time of the discharge process exceeds 35 minutes.

## 2024-12-28 ENCOUNTER — HOSPITAL ENCOUNTER (OUTPATIENT)
Dept: RADIOLOGY | Facility: MEDICAL CENTER | Age: 69
End: 2024-12-28
Attending: FAMILY MEDICINE
Payer: MEDICARE

## 2024-12-28 DIAGNOSIS — M25.552 LEFT HIP PAIN: ICD-10-CM

## 2024-12-28 PROCEDURE — 73721 MRI JNT OF LWR EXTRE W/O DYE: CPT

## 2024-12-30 ENCOUNTER — TELEPHONE (OUTPATIENT)
Dept: CARDIOLOGY | Facility: MEDICAL CENTER | Age: 69
End: 2024-12-30
Payer: MEDICARE

## 2024-12-30 NOTE — TELEPHONE ENCOUNTER
Received Renewal PA request via  Staff Message   for Nexletol 180mg. (Quantity:90, Day Supply:90)     Insurance: [employer]  Member ID:  [id number]   BIN: [id number]  PCN: [id number]  Group: [id number]     Ran Test claim via [place] & medication  Unable to run test claim for [employer].

## 2024-12-30 NOTE — TELEPHONE ENCOUNTER
Prior Authorization for Nexletol 180mg(Quantity: 90, Days: 90) has been submitted via Cover My Meds: Key ([id number])    Insurance: [employer]    Will follow up in 24-48 business hours.

## 2024-12-30 NOTE — TELEPHONE ENCOUNTER
Prior Authorization for NEXLETOL 180MG has been approved for a quantity of 90 , day supply 90    Prior Authorization reference number: KEY: [id number]  Insurance: [employer]  Effective dates: 10/1/24-12/31/25  Copay: $N/A due to insurance lockout       Next Steps:  [facility] has been notified of PA approval.

## 2025-01-02 ENCOUNTER — APPOINTMENT (OUTPATIENT)
Dept: ADMISSIONS | Facility: MEDICAL CENTER | Age: 70
End: 2025-01-02
Attending: UROLOGY
Payer: MEDICARE

## 2025-01-15 ENCOUNTER — PRE-ADMISSION TESTING (OUTPATIENT)
Dept: ADMISSIONS | Facility: MEDICAL CENTER | Age: 70
End: 2025-01-15
Payer: MEDICARE

## 2025-01-15 RX ORDER — FAMOTIDINE 40 MG/1
40 TABLET, FILM COATED ORAL EVERY MORNING
COMMUNITY

## 2025-01-15 NOTE — OR NURSING
Pre admit appointment completed with García for surgery/procedure on 1/21/25.    Medication and fasting instructions given per RN pre procedure protocol. Encouraged patient to increase oral intake the day prior to procedure including intake of electrolyte drinks such as Gatorade or electrolyte water. Patient instructed to stop taking all vitamins and herbal supplements 7 days prior to surgery. Patient instructed to stop any NSAIDS 5 days prior to surgery, unless otherwise instructed by medical provider.     During telephone appointment patient reports that she had a recent stomach flu on 12/25/24. Instructed patient to call and discuss with Dr. Sanchez prior to surgery.     Patient verbalizes understanding of all instructions given. No further questions at this time. Medication instruction sheet stapled to testing passport for patient to receive at testing appointment on 1/17/25.

## 2025-01-15 NOTE — PREADMIT AVS NOTE
Current Medications   Medication Instructions    famotidine (PEPCID) 40 MG Tab Continue taking medication as prescribed, including morning of procedure     ibuprofen (MOTRIN) 400 MG Tab Stop 5 days before surgery    ALPRAZolam (XANAX) 1 MG Tab Continue taking medication as prescribed, including morning of procedure     temazepam (RESTORIL) 30 MG capsule Okay to take night before surgery.     vitamin D3 (CHOLECALCIFEROL) 1000 UNIT Tab Stop 7 days before surgery    nitroglycerin (NITROSTAT) 0.4 MG SL Tab As needed medication, may take if needed, including morning of procedure     Bempedoic Acid (NEXLETOL) 180 MG Tab Hold medication day of procedure

## 2025-01-17 ENCOUNTER — PRE-ADMISSION TESTING (OUTPATIENT)
Dept: ADMISSIONS | Facility: MEDICAL CENTER | Age: 70
End: 2025-01-17
Attending: UROLOGY
Payer: MEDICARE

## 2025-01-17 DIAGNOSIS — Z01.812 PRE-OPERATIVE LABORATORY EXAMINATION: ICD-10-CM

## 2025-01-17 DIAGNOSIS — Z01.810 PRE-OPERATIVE CARDIOVASCULAR EXAMINATION: ICD-10-CM

## 2025-01-17 LAB
ANION GAP SERPL CALC-SCNC: 13 MMOL/L (ref 7–16)
APPEARANCE UR: CLEAR
BACTERIA #/AREA URNS HPF: ABNORMAL /HPF
BILIRUB UR QL STRIP.AUTO: NEGATIVE
BUN SERPL-MCNC: 9 MG/DL (ref 8–22)
CALCIUM SERPL-MCNC: 9.9 MG/DL (ref 8.5–10.5)
CASTS URNS QL MICRO: ABNORMAL /LPF (ref 0–2)
CHLORIDE SERPL-SCNC: 100 MMOL/L (ref 96–112)
CO2 SERPL-SCNC: 24 MMOL/L (ref 20–33)
COLOR UR: YELLOW
CREAT SERPL-MCNC: 0.85 MG/DL (ref 0.5–1.4)
EKG IMPRESSION: NORMAL
EPITHELIAL CELLS 1715: ABNORMAL /HPF (ref 0–5)
ERYTHROCYTE [DISTWIDTH] IN BLOOD BY AUTOMATED COUNT: 44.1 FL (ref 35.9–50)
EST. AVERAGE GLUCOSE BLD GHB EST-MCNC: 120 MG/DL
GFR SERPLBLD CREATININE-BSD FMLA CKD-EPI: 74 ML/MIN/1.73 M 2
GLUCOSE SERPL-MCNC: 96 MG/DL (ref 65–99)
GLUCOSE UR STRIP.AUTO-MCNC: NEGATIVE MG/DL
HBA1C MFR BLD: 5.8 % (ref 4–5.6)
HCT VFR BLD AUTO: 46.4 % (ref 37–47)
HGB BLD-MCNC: 15.4 G/DL (ref 12–16)
KETONES UR STRIP.AUTO-MCNC: NEGATIVE MG/DL
LEUKOCYTE ESTERASE UR QL STRIP.AUTO: ABNORMAL
MCH RBC QN AUTO: 30.8 PG (ref 27–33)
MCHC RBC AUTO-ENTMCNC: 33.2 G/DL (ref 32.2–35.5)
MCV RBC AUTO: 92.8 FL (ref 81.4–97.8)
MICRO URNS: ABNORMAL
NITRITE UR QL STRIP.AUTO: NEGATIVE
PH UR STRIP.AUTO: 6.5 [PH] (ref 5–8)
PLATELET # BLD AUTO: 401 K/UL (ref 164–446)
PMV BLD AUTO: 9.6 FL (ref 9–12.9)
POTASSIUM SERPL-SCNC: 4.3 MMOL/L (ref 3.6–5.5)
PROT UR QL STRIP: NEGATIVE MG/DL
RBC # BLD AUTO: 5 M/UL (ref 4.2–5.4)
RBC # URNS HPF: ABNORMAL /HPF (ref 0–2)
RBC UR QL AUTO: ABNORMAL
SODIUM SERPL-SCNC: 137 MMOL/L (ref 135–145)
SP GR UR STRIP.AUTO: 1.01
UROBILINOGEN UR STRIP.AUTO-MCNC: 0.2 EU/DL
WBC # BLD AUTO: 6 K/UL (ref 4.8–10.8)
WBC #/AREA URNS HPF: ABNORMAL /HPF

## 2025-01-17 PROCEDURE — 80048 BASIC METABOLIC PNL TOTAL CA: CPT

## 2025-01-17 PROCEDURE — 83036 HEMOGLOBIN GLYCOSYLATED A1C: CPT | Mod: GA

## 2025-01-17 PROCEDURE — 81001 URINALYSIS AUTO W/SCOPE: CPT

## 2025-01-17 PROCEDURE — 85027 COMPLETE CBC AUTOMATED: CPT

## 2025-01-17 PROCEDURE — 93005 ELECTROCARDIOGRAM TRACING: CPT | Mod: TC

## 2025-01-17 PROCEDURE — 93010 ELECTROCARDIOGRAM REPORT: CPT | Performed by: INTERNAL MEDICINE

## 2025-01-17 PROCEDURE — 87086 URINE CULTURE/COLONY COUNT: CPT

## 2025-01-17 PROCEDURE — 36415 COLL VENOUS BLD VENIPUNCTURE: CPT

## 2025-01-18 NOTE — OR NURSING
Preadmit EKG testing results of 1-17-25 faxed to Dr. Mio Sanchez's office.  Confirmation in chart.

## 2025-01-19 LAB
BACTERIA UR CULT: NORMAL
SIGNIFICANT IND 70042: NORMAL
SITE SITE: NORMAL
SOURCE SOURCE: NORMAL

## 2025-01-21 ENCOUNTER — ANESTHESIA EVENT (OUTPATIENT)
Dept: SURGERY | Facility: MEDICAL CENTER | Age: 70
End: 2025-01-21
Payer: MEDICARE

## 2025-01-21 ENCOUNTER — PHARMACY VISIT (OUTPATIENT)
Dept: PHARMACY | Facility: MEDICAL CENTER | Age: 70
End: 2025-01-21
Payer: COMMERCIAL

## 2025-01-21 ENCOUNTER — HOSPITAL ENCOUNTER (OUTPATIENT)
Facility: MEDICAL CENTER | Age: 70
End: 2025-01-21
Attending: UROLOGY | Admitting: UROLOGY
Payer: MEDICARE

## 2025-01-21 ENCOUNTER — ANESTHESIA (OUTPATIENT)
Dept: SURGERY | Facility: MEDICAL CENTER | Age: 70
End: 2025-01-21
Payer: MEDICARE

## 2025-01-21 VITALS
TEMPERATURE: 97.5 F | SYSTOLIC BLOOD PRESSURE: 121 MMHG | HEIGHT: 68 IN | OXYGEN SATURATION: 93 % | DIASTOLIC BLOOD PRESSURE: 61 MMHG | BODY MASS INDEX: 23.02 KG/M2 | RESPIRATION RATE: 15 BRPM | WEIGHT: 151.9 LBS | HEART RATE: 54 BPM

## 2025-01-21 DIAGNOSIS — C67.8 MALIGNANT NEOPLASM OF OVERLAPPING SITES OF BLADDER (HCC): Primary | ICD-10-CM

## 2025-01-21 PROCEDURE — 160009 HCHG ANES TIME/MIN: Performed by: UROLOGY

## 2025-01-21 PROCEDURE — 160002 HCHG RECOVERY MINUTES (STAT): Performed by: UROLOGY

## 2025-01-21 PROCEDURE — 160028 HCHG SURGERY MINUTES - 1ST 30 MINS LEVEL 3: Performed by: UROLOGY

## 2025-01-21 PROCEDURE — 88305 TISSUE EXAM BY PATHOLOGIST: CPT | Mod: 26 | Performed by: PATHOLOGY

## 2025-01-21 PROCEDURE — 160035 HCHG PACU - 1ST 60 MINS PHASE I: Performed by: UROLOGY

## 2025-01-21 PROCEDURE — 160046 HCHG PACU - 1ST 60 MINS PHASE II: Performed by: UROLOGY

## 2025-01-21 PROCEDURE — 88305 TISSUE EXAM BY PATHOLOGIST: CPT | Performed by: PATHOLOGY

## 2025-01-21 PROCEDURE — 160025 RECOVERY II MINUTES (STATS): Performed by: UROLOGY

## 2025-01-21 PROCEDURE — 700105 HCHG RX REV CODE 258: Mod: UD | Performed by: UROLOGY

## 2025-01-21 PROCEDURE — 160039 HCHG SURGERY MINUTES - EA ADDL 1 MIN LEVEL 3: Performed by: UROLOGY

## 2025-01-21 PROCEDURE — RXMED WILLOW AMBULATORY MEDICATION CHARGE: Performed by: UROLOGY

## 2025-01-21 PROCEDURE — 700101 HCHG RX REV CODE 250: Mod: UD | Performed by: ANESTHESIOLOGY

## 2025-01-21 PROCEDURE — 160048 HCHG OR STATISTICAL LEVEL 1-5: Performed by: UROLOGY

## 2025-01-21 PROCEDURE — 700111 HCHG RX REV CODE 636 W/ 250 OVERRIDE (IP): Mod: JZ,UD | Performed by: ANESTHESIOLOGY

## 2025-01-21 RX ORDER — ONDANSETRON 2 MG/ML
INJECTION INTRAMUSCULAR; INTRAVENOUS PRN
Status: DISCONTINUED | OUTPATIENT
Start: 2025-01-21 | End: 2025-01-21 | Stop reason: SURG

## 2025-01-21 RX ORDER — LIDOCAINE HYDROCHLORIDE 20 MG/ML
INJECTION, SOLUTION EPIDURAL; INFILTRATION; INTRACAUDAL; PERINEURAL PRN
Status: DISCONTINUED | OUTPATIENT
Start: 2025-01-21 | End: 2025-01-21 | Stop reason: SURG

## 2025-01-21 RX ORDER — OXYCODONE HCL 5 MG/5 ML
10 SOLUTION, ORAL ORAL
Status: DISCONTINUED | OUTPATIENT
Start: 2025-01-21 | End: 2025-01-21 | Stop reason: HOSPADM

## 2025-01-21 RX ORDER — DIPHENHYDRAMINE HYDROCHLORIDE 50 MG/ML
12.5 INJECTION INTRAMUSCULAR; INTRAVENOUS
Status: DISCONTINUED | OUTPATIENT
Start: 2025-01-21 | End: 2025-01-21 | Stop reason: HOSPADM

## 2025-01-21 RX ORDER — CEFAZOLIN SODIUM 1 G/3ML
INJECTION, POWDER, FOR SOLUTION INTRAMUSCULAR; INTRAVENOUS PRN
Status: DISCONTINUED | OUTPATIENT
Start: 2025-01-21 | End: 2025-01-21 | Stop reason: SURG

## 2025-01-21 RX ORDER — HALOPERIDOL 5 MG/ML
1 INJECTION INTRAMUSCULAR
Status: DISCONTINUED | OUTPATIENT
Start: 2025-01-21 | End: 2025-01-21 | Stop reason: HOSPADM

## 2025-01-21 RX ORDER — HYDROCODONE BITARTRATE AND ACETAMINOPHEN 5; 325 MG/1; MG/1
1 TABLET ORAL EVERY 6 HOURS PRN
Qty: 12 TABLET | Refills: 0 | Status: SHIPPED | OUTPATIENT
Start: 2025-01-21 | End: 2025-01-24

## 2025-01-21 RX ORDER — MIDAZOLAM HYDROCHLORIDE 1 MG/ML
INJECTION INTRAMUSCULAR; INTRAVENOUS PRN
Status: DISCONTINUED | OUTPATIENT
Start: 2025-01-21 | End: 2025-01-21 | Stop reason: SURG

## 2025-01-21 RX ORDER — DEXAMETHASONE SODIUM PHOSPHATE 4 MG/ML
INJECTION, SOLUTION INTRA-ARTICULAR; INTRALESIONAL; INTRAMUSCULAR; INTRAVENOUS; SOFT TISSUE PRN
Status: DISCONTINUED | OUTPATIENT
Start: 2025-01-21 | End: 2025-01-21 | Stop reason: SURG

## 2025-01-21 RX ORDER — OXYCODONE HCL 5 MG/5 ML
5 SOLUTION, ORAL ORAL
Status: DISCONTINUED | OUTPATIENT
Start: 2025-01-21 | End: 2025-01-21 | Stop reason: HOSPADM

## 2025-01-21 RX ORDER — CEFUROXIME AXETIL 500 MG/1
500 TABLET ORAL 2 TIMES DAILY
Qty: 14 TABLET | Refills: 0 | Status: SHIPPED | OUTPATIENT
Start: 2025-01-21

## 2025-01-21 RX ORDER — EPHEDRINE SULFATE 50 MG/ML
INJECTION, SOLUTION INTRAVENOUS PRN
Status: DISCONTINUED | OUTPATIENT
Start: 2025-01-21 | End: 2025-01-21 | Stop reason: SURG

## 2025-01-21 RX ORDER — SODIUM CHLORIDE, SODIUM LACTATE, POTASSIUM CHLORIDE, CALCIUM CHLORIDE 600; 310; 30; 20 MG/100ML; MG/100ML; MG/100ML; MG/100ML
INJECTION, SOLUTION INTRAVENOUS CONTINUOUS
Status: ACTIVE | OUTPATIENT
Start: 2025-01-21 | End: 2025-01-21

## 2025-01-21 RX ADMIN — ONDANSETRON 4 MG: 2 INJECTION INTRAMUSCULAR; INTRAVENOUS at 18:37

## 2025-01-21 RX ADMIN — MIDAZOLAM HYDROCHLORIDE 2 MG: 1 INJECTION, SOLUTION INTRAMUSCULAR; INTRAVENOUS at 18:11

## 2025-01-21 RX ADMIN — PROPOFOL 150 MG: 10 INJECTION, EMULSION INTRAVENOUS at 18:17

## 2025-01-21 RX ADMIN — EPHEDRINE SULFATE 20 MG: 50 INJECTION, SOLUTION INTRAVENOUS at 18:20

## 2025-01-21 RX ADMIN — SODIUM CHLORIDE, POTASSIUM CHLORIDE, SODIUM LACTATE AND CALCIUM CHLORIDE: 600; 310; 30; 20 INJECTION, SOLUTION INTRAVENOUS at 18:12

## 2025-01-21 RX ADMIN — CEFAZOLIN 2 G: 1 INJECTION, POWDER, FOR SOLUTION INTRAMUSCULAR; INTRAVENOUS at 18:17

## 2025-01-21 RX ADMIN — PROPOFOL 20 MG: 10 INJECTION, EMULSION INTRAVENOUS at 18:39

## 2025-01-21 RX ADMIN — DEXAMETHASONE SODIUM PHOSPHATE 4 MG: 4 INJECTION INTRA-ARTICULAR; INTRALESIONAL; INTRAMUSCULAR; INTRAVENOUS; SOFT TISSUE at 18:37

## 2025-01-21 RX ADMIN — LIDOCAINE HYDROCHLORIDE 50 MG: 20 INJECTION, SOLUTION EPIDURAL; INFILTRATION; INTRACAUDAL; PERINEURAL at 18:17

## 2025-01-21 ASSESSMENT — PAIN DESCRIPTION - PAIN TYPE: TYPE: SURGICAL PAIN

## 2025-01-21 ASSESSMENT — FIBROSIS 4 INDEX: FIB4 SCORE: 1.35

## 2025-01-22 LAB — PATHOLOGY CONSULT NOTE: NORMAL

## 2025-01-22 NOTE — OR NURSING
Patient awake and alert and tolerating water without issue. VSS. Pt has no dressings. Pt denies nausea and pain at this time. Pt has slight tic of movement throughout body and she states that is her baseline. MD aware.       Pt s/o called and updated on pt status.     Report called to Thu RN. Pt taken to stage 2 in stable condition.

## 2025-01-22 NOTE — ANESTHESIA POSTPROCEDURE EVALUATION
Patient: García Cho    Procedure Summary       Date: 01/21/25 Room / Location: [place] [address] / SURGERY [facility]    Anesthesia Start: 1812 Anesthesia Stop: 1848    Procedures:       CYSTOSCOPY, BLADDER BIOPSY WITH FULGURATION, POSSIBLE RESECTION (Bladder)      EXCISION OR FULGURATION, POLYP, URETHRA, DISTAL (Bladder) Diagnosis: (LESION OF URINARY BLADDER)    Surgeons: Mio Sanchez M.D. Responsible Provider: Wood Case M.D.    Anesthesia Type: general ASA Status: 2            Final Anesthesia Type: general  Last vitals  BP   Blood Pressure : (P) 101/57    Temp   (P) 36.2 °C (97.2 °F)    Pulse   (P) 72   Resp   (P) 12    SpO2   (P) 95 %      Anesthesia Post Evaluation    Patient location during evaluation: PACU  Patient participation: complete - patient participated  Level of consciousness: awake and alert    Airway patency: patent  Anesthetic complications: no  Cardiovascular status: hemodynamically stable  Respiratory status: acceptable  Hydration status: euvolemic    PONV: none          No notable events documented.     Nurse Pain Score: 0 (NPRS)

## 2025-01-22 NOTE — ANESTHESIA PREPROCEDURE EVALUATION
Case: [id number] Date/Time: 01/21/25 8092    Procedures:       CYSTOSCOPY, BLADDER BIOPSY WITH FULGURATION, POSSIBLE RESECTION (Bladder)      EXCISION OR FULGURATION, POLYP, URETHRA, DISTAL (Bladder)    Anesthesia type: General    Pre-op diagnosis: LESION OF URINARY BLADDER    [facility]    Surgeons: Mio Sanchez M.D.            Relevant Problems   PULMONARY   (positive) Asthma      CARDIAC   (positive) Agatston coronary artery calcium score between 200 and 399 - 355 2020   (positive) Coronary atherosclerosis due to lipid rich plaque   (positive) PAF (paroxysmal atrial fibrillation) (HCC) - 2 minutes on Zio      GI   (positive) Hiatal hernia       Physical Exam    Airway   Mallampati: II  TM distance: >3 FB  Neck ROM: full       Cardiovascular - normal exam  Rhythm: regular  Rate: normal  (-) murmur     Dental - normal exam           Pulmonary - normal exam  Breath sounds clear to auscultation     Abdominal    Neurological - normal exam                   Anesthesia Plan    ASA 2       Plan - general       Airway plan will be LMA          Induction: intravenous      Pertinent diagnostic labs and testing reviewed    Informed Consent:    Anesthetic plan and risks discussed with patient.

## 2025-01-22 NOTE — OR NURSING
Arrived from PACU AXO, Pt's VSS; denies N/V; states pain is at tolerable level.    1950: pt able to void with no complaints of pain or complications.     D/c orders received. IV dc'd. Pt changed into clothing with assistance. Discharge reviewed, Pt and family verbalized understanding and questions answered. Patient states ready to d/c home. Pt dc'd in w/c with S/O.

## 2025-01-22 NOTE — ANESTHESIA PROCEDURE NOTES
Airway    Date/Time: 1/21/2025 6:17 PM    Performed by: Erik Bertrand M.D.  Authorized by: Erik Bertrand M.D.    Location:  OR  Urgency:  Elective  Indications for Airway Management:  Anesthesia      Spontaneous Ventilation: absent    Sedation Level:  Deep  Preoxygenated: Yes    Final Airway Type:  Supraglottic airway  Final Supraglottic Airway:  Standard LMA    SGA Size:  3  Number of Attempts at Approach:  1

## 2025-01-22 NOTE — DISCHARGE INSTRUCTIONS
HOME CARE INSTRUCTIONS    ACTIVITY: Rest and take it easy for the first 24 hours.  A responsible adult is recommended to remain with you during that time.  It is normal to feel sleepy.  We encourage you to not do anything that requires balance, judgment or coordination.    FOR 24 HOURS DO NOT:  Drive, operate machinery or run household appliances.  Drink beer or alcoholic beverages.  Make important decisions or sign legal documents.    SPECIAL INSTRUCTIONS:   Bladder Biopsy, Care After  The following information offers guidance on how to care for yourself after your procedure. Your health care provider may also give you more specific instructions. If you have problems or questions, contact your health care provider.  What can I expect after the procedure?  After the procedure, it is common to have:  Mild pain and burning in your bladder or kidney area when you urinate.  Small amounts of blood in your urine.  A sudden urge to urinate.  A need to urinate more often than usual.  Follow these instructions at home:  Medicines  Take over-the-counter and prescription medicines only as told by your health care provider.  If you were prescribed an antibiotic medicine, take it as told by your health care provider. Do not stop using the antibiotic even if you start to feel better.  Activity  Rest as told by your health care provider.  If you were given a sedative during the procedure, it can affect you for several hours. Do not drive or operate machinery until your health care provider says that it is safe.  Return to your normal activities as told by your health care provider. Ask your health care provider what activities are safe for you.  General instructions    Take a warm bath to relieve any burning feeling around your urethra.  Hold a warm, damp washcloth over the urethral area to ease pain.  It is up to you to get the results of your procedure. Ask your health care provider, or the department that is doing the  procedure, when your results will be ready.  Keep all follow-up visits. This is important.  Contact a health care provider if:  Your symptoms do not improve within 24 hours, and you keep having:  Burning when you urinate.  More blood in your urine.  Pain when you urinate.  An urgent need to urinate.  A need to urinate more often than usual.  Get help right away if:  You have a fever.  You have a lot of blood in your urine.  You have bright red blood in your urine.  You are passing blood clots in your urine.  You have severe pain.  You cannot urinate.  Summary  After the procedure, it is common to have mild pain in your bladder or kidney area, mild burning with urination, and some blood.  Take over-the-counter and prescription medicines only as told by your health care provider. If you were prescribed an antibiotic medicine, do not stop using the antibiotic even if you start to feel better.  Rest after the procedure. Follow instructions from your health care provider for home care.  Get help right away if you have a lot of blood, bright red blood, or blood clots in your urine, severe pain, or fever.  This information is not intended to replace advice given to you by your health care provider. Make sure you discuss any questions you have with your health care provider.  Document Revised: 11/27/2022 Document Reviewed: 11/27/2022  [employer] Patient Education © 2023 [employer]      DIET: To avoid nausea, slowly advance diet as tolerated, avoiding spicy or greasy foods for the first day.  Add more substantial food to your diet according to your physician's instructions.  Babies can be fed formula or breast milk as soon as they are hungry.  INCREASE FLUIDS AND FIBER TO AVOID CONSTIPATION.      MEDICATIONS: Resume taking daily medication.  Take prescribed pain medication with food.  If no medication is prescribed, you may take non-aspirin pain medication if needed.  PAIN MEDICATION CAN BE VERY CONSTIPATING.  Take a stool  softener or laxative such as senokot, pericolace, or milk of magnesia if needed.    Prescription given for [place] and Ceftin at [facility].      A follow-up appointment should be arranged with your doctor in 1-2 weeks; call to schedule.    You should CALL YOUR PHYSICIAN if you develop:  Fever greater than 101 degrees F.  Pain not relieved by medication, or persistent nausea or vomiting.  Excessive bleeding (blood soaking through dressing) or unexpected drainage from the wound.  Extreme redness or swelling around the incision site, drainage of pus or foul smelling drainage.  Inability to urinate or empty your bladder within 8 hours.  Problems with breathing or chest pain.    You should call 911 if you develop problems with breathing or chest pain.  If you are unable to contact your doctor or surgical center, you should go to the nearest emergency room or urgent care center.  Physician's telephone #: [phone]    MILD FLU-LIKE SYMPTOMS ARE NORMAL.  YOU MAY EXPERIENCE GENERALIZED MUSCLE ACHES, THROAT IRRITATION, HEADACHE AND/OR SOME NAUSEA.    If any questions arise, call your doctor.  If your doctor is not available, please feel free to call the Surgical Center at [phone].  The Center is open Monday through Friday from 7AM to 7PM.      A registered nurse may call you a few days after your surgery to see how you are doing after your procedure.    You may also receive a survey in the mail within the next two weeks and we ask that you take a few moments to complete the survey and return it to us.  Our goal is to provide you with very good care and we value your comments.     Depression / Suicide Risk    As you are discharged from this [facility], it is important to learn how to keep safe from harming yourself.    Recognize the warning signs:  Abrupt changes in personality, positive or negative- including increase in energy   Giving away possessions  Change in eating patterns- significant weight  changes-  positive or negative  Change in sleeping patterns- unable to sleep or sleeping all the time   Unwillingness or inability to communicate  Depression  Unusual sadness, discouragement and loneliness  Talk of wanting to die  Neglect of personal appearance   Rebelliousness- reckless behavior  Withdrawal from people/activities they love  Confusion- inability to concentrate     If you or a loved one observes any of these behaviors or has concerns about self-harm, here's what you can do:  Talk about it- your feelings and reasons for harming yourself  Remove any means that you might use to hurt yourself (examples: pills, rope, extension cords, firearm)  Get professional help from the community (Mental Health, Substance Abuse, psychological counseling)  Do not be alone:Call your Safe Contact- someone whom you trust who will be there for you.  Call your local CRISIS HOTLINE 120-2271 or 902-206-4477  Call your local Children's Mobile Crisis Response Team [place] (300) 619-3655 or www.[place]  Call the toll free National Suicide Prevention Hotlines   National Suicide Prevention Lifeline 487-372-DERA (3982)  [place] Hope Line Network 800-SUICIDE (625-4873)    I acknowledge receipt and understanding of these Home Care instructions.

## 2025-01-22 NOTE — OP REPORT
Urologic Surgery Operative Report     Pre-operative Diagnosis: 1. Bladder lesion   Post-operative Diagnosis: Same as above   Procedure: Transurethral Resection of Bladder tumor (<2cm in size)   Surgeon Mio Sanchez M.D.    Assistant: None   Anesthesia: General, without paralysis  Anesthesiologist: Wood Case M.D.; Erik Bertrand M.D.   General   Estimated Blood Loss: * No blood loss amount entered *       Specimens: 1. bladder tumor fragment sent for surgical pathology   Drains: 1. none   Complications: None   Condition: Stable, procedure well tolerated    Disposition:  1. PACU, discharge home after recovery, must void prior, f/u 2 wks to discuss pathology,    Findings: 1. small mucosal lesion left wall and dome, previously noted erythematous patch on right wall resolved     INDICATIONS FOR PROCEDURE:  García Cho is a 69-year-old female with history of urothelial cell carcinoma of the bladder who with bladder lesion concerning for bladder cancer. Risks discussed, but not limited to, were infection, sepsis, bleeding, bladder injury, need for secondary procedure, inability to resect all of tumor, injury to ureters, need for butt post op, possible admission post operatively, and the cardiovascular and pulmonary complications of anesthesia/surgery including DVT, Mi, PE, and death    PROCEDURE IN DETAIL:   After informed consent was obtained, the patient was taken to the operating room and given Ancef for preoperative antibiotics.  After satisfactory induction of general anesthesia, she was then placed in the lithotomy position after a time out was called and prepped and draped in the normal sterile fashion.  Cystoscopy was initially performed using 30 degree lens which revealed moderate trabeculations and a bladder lesion at the left wall and dome, as described above.  Urethral sounds were not needed to dilate the urethral meatus.      I used the 26F resectoscope to proceed with excision of  his tumor(s) using bipolar cautery. The lesion was resected down to muscular layer focusing on complete resection of the visualized tumor.  After resection, the bladder fragments were irrigated out with bladder tumor specimens brought out with the [facility] evacuator and sent for surgical pathology.      Hemostasis was assured using coagulation/fulguration over the course of the base of tumor, we also fulgarated a rim of tissue circumferentially to help eliminate any potentially residual tumor. We therefore terminated the case at this time and opted to forego Guthrie catheter placement due to minimal resection area.     The patient was then awakened from anesthesia and brought to recovery in stable condition.  Will need to void prior to discharge, will plan for follow-up in 2 weeks to discuss pathology.

## 2025-01-22 NOTE — ANESTHESIA TIME REPORT
Anesthesia Start and Stop Event Times       Date Time Event    1/21/2025 1812 Anesthesia Start     1848 Anesthesia Stop          Responsible Staff  01/21/25      Name Role Begin End    Erik Bertrand M.D. Anesth 1812 1832    Wood Case M.D. Anesth 1832 1848          Overtime Reason:  no overtime (within assigned shift)    Comments:

## 2025-01-30 ENCOUNTER — TELEPHONE (OUTPATIENT)
Dept: CARDIOLOGY | Facility: MEDICAL CENTER | Age: 70
End: 2025-01-30
Payer: MEDICARE

## 2025-01-30 NOTE — LETTER
PROCEDURE/SURGERY CLEARANCE FORM      Encounter Date: 1/30/2025    Patient: García Cho  YOB: 1955    CARDIOLOGIST:  Cabrera Mendoza MD.    REFERRING DOCTOR:  No ref. provider found      The procedure/surgery: Colonoscopy                                           PROCEDURE/SURGERY CLEARANCE FORM    Date: 1/30/2025   Patient Name: García Cho    Dear Surgeon or Proceduralist,      Thank you for your request for cardiac stratification of our mutual patient García Cho 1955. We have reviewed their [facility] records; and to the best of our understanding this patient has not had stenting, ablation, watchman, cardiothoracic surgery or hospitalization for cardiovascular reasons in the past 6 months.  García Cho has been seen within the past 15 months and is considered to have non-modifiable cardiac risk for this low-risk procedure/surgery. They may proceed from a cardiovascular standpoint and may hold their antiplatelet/anticoagulation as briefly as possible. Please have patient resume this medication when hemodynamically stable to do so.     Aspirin or Prasugrel   - hold 7 days prior to procedure/surgery, resume when hemodynamically stable      Clopidrogrel or Ticagrelor  - hold 7 days for all neurological procedures, hold 5 days prior to all other procedure/surgery,  resume when hemodynamically stable     Warfarin - hold 7 days for all neurological procedures, hold 5 days prior to all other procedure/surgery and coordinate with [facility] ([phone]) INR testing and dose management.      Pradaxa/Xarelto/Eliquis/Savesya - hold 1 day prior to procedure for low bleeding risk procedure, 2 days for high bleeding risk procedure, or consider holding 3 days or longer for patients with reduced kidney function (CrCl <30mL/min) or spinal/cranial surgeries/procedures.      If they have a mechanical heart valve, please coordinate with  [facility] ([phone]) the proper management of their anticoagulant in the periprocedural or perioperative period.      Some patients have higher risk for cardiovascular complications or holding medication. If our patient has had prior complications of holding antiplatelet or anticoagulants in the past and we have seen them after these events, we have addressed these concerns with the patient. They are at an unknown degree of increased risk for recurrent complication.  You may hold anticoagulation/antiplatelets for the procedure or surgery if the benefits of the procedure or surgery outweigh this nonmodifiable risk.      If García Cho 1955 has new symptoms of heart failure decompensation, unstable arrythmia, or angina please reach out and we will assess the patient.      If you have other patient-specific concerns, please feel free to reach out to the patient's cardiologist directly at [phone].     Thank you,       [facility]         Electronically signed      MD Signature  Cabrera Mendoza MD.

## 2025-01-30 NOTE — TELEPHONE ENCOUNTER
Last OV: 4/30/24  Proposed Surgery: Colonoscopy  Surgery Date: 3/18/2025  Requesting Office Name: [facility]  Fax Number: [phone]  Preference of Location (default is surgery center unless specified by Cardiologist or [facility])  Prior Clearance Addressed: No    Is this a general clearance? YES   Anticoags/Antiplatelets: Other NA  Anticoags/Antiplatelet managed by Cardiology? No Provider to advise.    Outstanding Cardiac Imaging : No  Ablation, Cardioversion, Stent, Cardiac Devices, Catheterization, Watchman: No  TAVR/Valve, Mitral Clip, Watchman (including open heart),: N/A   Recent Cardiac Hospitalization: No            When: N/A  History (cardiac history):   Past Medical History:   Diagnosis Date    Abnormal finding on cardiovascular stress test - hypertensive response to low level of exercise - normal stress imaging 12/10/2020    Acute nasopharyngitis 12/25/2024    stomach flu, stomach cramps, chills    Agatston coronary artery calcium score between 200 and 399 - 355 2020     Agoraphobia with panic disorder     anxiety    Allergic rhinitis     Arthritis     all joints    Bowel habit changes     Cataract     Dental disorder     dental implants    Dyslipidemia     Fear of travel with panic attacks     Heart burn     Hiatal hernia     High cholesterol     hx of    NSVT (nonsustained ventricular tachycardia) (HCC) - seen in recovery from stress Echo 12/10/2020    PAF (paroxysmal atrial fibrillation) (HCC) - 2 minutes on Zio 06/15/2024    Schatzki's ring     Snoring     Urinary bladder disorder     bladder tumor           Is this a dental clearance? NO  Ablation, Cardioversion, Watchman, Stents, Cath, Devices within the last 3 months? No   If yes- Send dental wait letter, do not forward to provider for review.     TAVR / Valve, Mitral clip within the last 6 months? No  If yes- Send dental wait letter, do not forward to provider for review.     If completing a general clearance, continue per protocol.            Surgical Clearance Letter Sent: YES   **Scan clearance request letter into [employer].**

## 2025-03-31 ENCOUNTER — HOSPITAL ENCOUNTER (OUTPATIENT)
Dept: RADIOLOGY | Facility: MEDICAL CENTER | Age: 70
End: 2025-03-31
Attending: FAMILY MEDICINE
Payer: MEDICARE

## 2025-03-31 DIAGNOSIS — Z12.31 VISIT FOR SCREENING MAMMOGRAM: ICD-10-CM

## 2025-03-31 PROCEDURE — 77067 SCR MAMMO BI INCL CAD: CPT

## 2025-05-06 DIAGNOSIS — R94.39 ABNORMAL FINDING ON CARDIOVASCULAR STRESS TEST: Chronic | ICD-10-CM

## 2025-05-06 RX ORDER — NITROGLYCERIN 0.4 MG/1
0.4 TABLET SUBLINGUAL PRN
Qty: 25 TABLET | Refills: 0 | Status: SHIPPED | OUTPATIENT
Start: 2025-05-06 | End: 2025-05-15 | Stop reason: SDUPTHER

## 2025-05-06 NOTE — TELEPHONE ENCOUNTER
Is the patient due for a refill? Yes    Was the patient seen the last 15 months? Yes    Date of last office visit: 4/30/2024    Does the patient have an upcoming appointment?  Yes   If yes, When? 5/15/2025    Provider to refill: CW    Does the patient have shelter Plus and need 100-day supply? (This applies to ALL medications) Patient does not have SCP

## 2025-05-11 DIAGNOSIS — I47.29 NSVT (NONSUSTAINED VENTRICULAR TACHYCARDIA) (HCC): Chronic | ICD-10-CM

## 2025-05-12 RX ORDER — NEBIVOLOL 5 MG/1
5 TABLET ORAL DAILY
Qty: 90 TABLET | Refills: 0 | OUTPATIENT
Start: 2025-05-12

## 2025-05-15 ENCOUNTER — OFFICE VISIT (OUTPATIENT)
Dept: CARDIOLOGY | Facility: MEDICAL CENTER | Age: 70
End: 2025-05-15
Attending: INTERNAL MEDICINE
Payer: MEDICARE

## 2025-05-15 VITALS
WEIGHT: 151 LBS | OXYGEN SATURATION: 95 % | SYSTOLIC BLOOD PRESSURE: 132 MMHG | HEART RATE: 50 BPM | DIASTOLIC BLOOD PRESSURE: 79 MMHG | BODY MASS INDEX: 22.88 KG/M2 | RESPIRATION RATE: 15 BRPM | HEIGHT: 68 IN

## 2025-05-15 DIAGNOSIS — Z78.9 STATIN INTOLERANCE: ICD-10-CM

## 2025-05-15 DIAGNOSIS — I25.83 CORONARY ATHEROSCLEROSIS DUE TO LIPID RICH PLAQUE: ICD-10-CM

## 2025-05-15 DIAGNOSIS — E78.1 HYPERTRIGLYCERIDEMIA: ICD-10-CM

## 2025-05-15 DIAGNOSIS — E78.49 FAMILIAL HYPERLIPIDEMIA, HIGH LDL: ICD-10-CM

## 2025-05-15 DIAGNOSIS — R94.39 ABNORMAL FINDING ON CARDIOVASCULAR STRESS TEST: Chronic | ICD-10-CM

## 2025-05-15 DIAGNOSIS — I48.0 PAF (PAROXYSMAL ATRIAL FIBRILLATION) (HCC): Chronic | ICD-10-CM

## 2025-05-15 DIAGNOSIS — R93.1 AGATSTON CORONARY ARTERY CALCIUM SCORE BETWEEN 200 AND 399: Chronic | ICD-10-CM

## 2025-05-15 DIAGNOSIS — E78.5 DYSLIPIDEMIA: Primary | ICD-10-CM

## 2025-05-15 DIAGNOSIS — I47.29 NSVT (NONSUSTAINED VENTRICULAR TACHYCARDIA) (HCC): Chronic | ICD-10-CM

## 2025-05-15 PROCEDURE — 3075F SYST BP GE 130 - 139MM HG: CPT | Performed by: INTERNAL MEDICINE

## 2025-05-15 PROCEDURE — 99214 OFFICE O/P EST MOD 30 MIN: CPT | Performed by: INTERNAL MEDICINE

## 2025-05-15 PROCEDURE — 3078F DIAST BP <80 MM HG: CPT | Performed by: INTERNAL MEDICINE

## 2025-05-15 PROCEDURE — 99212 OFFICE O/P EST SF 10 MIN: CPT | Performed by: INTERNAL MEDICINE

## 2025-05-15 PROCEDURE — G2211 COMPLEX E/M VISIT ADD ON: HCPCS | Performed by: INTERNAL MEDICINE

## 2025-05-15 RX ORDER — NITROGLYCERIN 0.4 MG/1
0.4 TABLET SUBLINGUAL PRN
Qty: 25 TABLET | Refills: 11 | Status: SHIPPED | OUTPATIENT
Start: 2025-05-15

## 2025-05-15 RX ORDER — DEXTROAMPHETAMINE SACCHARATE, AMPHETAMINE ASPARTATE, DEXTROAMPHETAMINE SULFATE AND AMPHETAMINE SULFATE 5; 5; 5; 5 MG/1; MG/1; MG/1; MG/1
TABLET ORAL
COMMUNITY
Start: 2025-04-25

## 2025-05-15 RX ORDER — CHLORAL HYDRATE 500 MG
2000 CAPSULE ORAL 2 TIMES DAILY
COMMUNITY
Start: 2025-05-15

## 2025-05-15 RX ORDER — ISOSORBIDE MONONITRATE 30 MG/1
30 TABLET, EXTENDED RELEASE ORAL EVERY MORNING
Qty: 90 TABLET | Refills: 3 | Status: SHIPPED | OUTPATIENT
Start: 2025-05-15

## 2025-05-15 ASSESSMENT — FIBROSIS 4 INDEX: FIB4 SCORE: 1.35

## 2025-05-15 NOTE — PROGRESS NOTES
Chief Complaint   Patient presents with    Paroxysmal Supraventricular Tachycardia (PSVT)       Subjective     García Cho is a 69 y.o. female who presents today for follow-up of her history of hypertension with stress test and SVT     Still has class II dyspnea    Past Medical History[1]  Past Surgical History[2]  Family History   Problem Relation Age of Onset    Cancer Brother         pancreatic cancer    Arthritis Mother     Cancer Mother         lung cancer with mets     Arthritis Father     Alcohol/Drug Brother         drug abuse     Social History     Socioeconomic History    Marital status: Single     Spouse name: Not on file    Number of children: Not on file    Years of education: Not on file    Highest education level: Not on file   Occupational History    Occupation:      Employer: OTHER   Tobacco Use    Smoking status: Former     Current packs/day: 0.00     Average packs/day: 0.1 packs/day for 44.0 years (4.4 ttl pk-yrs)     Types: Cigarettes     Start date: 3/17/1970     Quit date: 3/17/2014     Years since quittin.1     Passive exposure: Past    Smokeless tobacco: Never   Vaping Use    Vaping status: Never Used   Substance and Sexual Activity    Alcohol use: No     Comment: 10 drinks/week quit , quit     Drug use: No     Comment: used marijuana and cocaine in the past, no IVDU    Sexual activity: Yes     Partners: Male     Birth control/protection: Surgical   Other Topics Concern    Not on file   Social History Narrative    Not on file     Social Drivers of Health     Financial Resource Strain: Not on file   Food Insecurity: No Food Insecurity (2024)    Hunger Vital Sign     Worried About Running Out of Food in the Last Year: Never true     Ran Out of Food in the Last Year: Never true   Transportation Needs: No Transportation Needs (2024)    PRAPARE - Transportation     Lack of Transportation (Medical): No     Lack of Transportation (Non-Medical): No  "  Physical Activity: Not on file   Stress: Not on file   Social Connections: Not on file   Intimate Partner Violence: Not At Risk (7/26/2024)    Humiliation, Afraid, Rape, and Kick questionnaire     Fear of Current or Ex-Partner: No     Emotionally Abused: No     Physically Abused: No     Sexually Abused: No   Housing Stability: Low Risk  (7/26/2024)    Housing Stability Vital Sign     Unable to Pay for Housing in the Last Year: No     Number of Places Lived in the Last Year: 1     Unstable Housing in the Last Year: No     Allergies[3]  Encounter Medications[4]  ROS           Objective     /79 (BP Location: Right arm, Patient Position: Sitting, BP Cuff Size: Child)   Pulse (!) 50   Resp 15   Ht 1.727 m (5' 8\")   Wt 68.5 kg (151 lb)   SpO2 95%   BMI 22.96 kg/m²     Physical Exam  Constitutional:       General: She is not in acute distress.     Appearance: She is not diaphoretic.   Eyes:      General: No scleral icterus.  Neck:      Vascular: No JVD.   Cardiovascular:      Rate and Rhythm: Normal rate.      Heart sounds: Normal heart sounds. No murmur heard.     No friction rub. No gallop.   Pulmonary:      Effort: No respiratory distress.      Breath sounds: No wheezing or rales.   Abdominal:      General: Bowel sounds are normal.      Palpations: Abdomen is soft.   Musculoskeletal:      Right lower leg: No edema.      Left lower leg: No edema.   Skin:     Findings: No rash.   Neurological:      Mental Status: She is alert. Mental status is at baseline.   Psychiatric:         Mood and Affect: Mood normal.            We reviewed in person the most recent labs  Recent Results (from the past 30 weeks)   CBC Without Differential    Collection Time: 01/17/25 11:04 AM   Result Value Ref Range    WBC 6.0 4.8 - 10.8 K/uL    RBC 5.00 4.20 - 5.40 M/uL    Hemoglobin 15.4 12.0 - 16.0 g/dL    Hematocrit 46.4 37.0 - 47.0 %    MCV 92.8 81.4 - 97.8 fL    MCH 30.8 27.0 - 33.0 pg    MCHC 33.2 32.2 - 35.5 g/dL    RDW 44.1 " 35.9 - 50.0 fL    Platelet Count 401 164 - 446 K/uL    MPV 9.6 9.0 - 12.9 fL   Basic Metabolic Panel    Collection Time: 01/17/25 11:04 AM   Result Value Ref Range    Sodium 137 135 - 145 mmol/L    Potassium 4.3 3.6 - 5.5 mmol/L    Chloride 100 96 - 112 mmol/L    Co2 24 20 - 33 mmol/L    Glucose 96 65 - 99 mg/dL    Bun 9 8 - 22 mg/dL    Creatinine 0.85 0.50 - 1.40 mg/dL    Calcium 9.9 8.5 - 10.5 mg/dL    Anion Gap 13.0 7.0 - 16.0   Hemoglobin A1c    Collection Time: 01/17/25 11:04 AM   Result Value Ref Range    Glycohemoglobin 5.8 (H) 4.0 - 5.6 %    Est Avg Glucose 120 mg/dL   URINE CULTURE    Collection Time: 01/17/25 11:04 AM    Specimen: Urine   Result Value Ref Range    Significant Indicator NEG     Source UR     Site -     Culture Result Usual urogenital arturo >100,000 cfu/mL    URINALYSIS    Collection Time: 01/17/25 11:04 AM    Specimen: Urine   Result Value Ref Range    Color Yellow     Character Clear     Specific Gravity 1.007 <1.035    Ph 6.5 5.0 - 8.0    Glucose Negative Negative mg/dL    Ketones Negative Negative mg/dL    Protein Negative Negative mg/dL    Bilirubin Negative Negative    Urobilinogen, Urine 0.2 <=1.0 EU/dL    Nitrite Negative Negative    Leukocyte Esterase Moderate (A) Negative    Occult Blood Moderate (A) Negative    Micro Urine Req Microscopic    URINE MICROSCOPIC (W/UA)    Collection Time: 01/17/25 11:04 AM   Result Value Ref Range    WBC 6-10 (A) /hpf    RBC 0-2 0 - 2 /hpf    Bacteria Rare (A) None /hpf    Epithelial Cells 6-10 (A) 0 - 5 /hpf    Urine Casts 0-2 0 - 2 /lpf   ESTIMATED GFR    Collection Time: 01/17/25 11:04 AM   Result Value Ref Range    GFR (CKD-EPI) 74 >60 mL/min/1.73 m 2   EKG    Collection Time: 01/17/25 12:18 PM   Result Value Ref Range    Report       [facility]    Test Date:  2025-01-17  Pt Name:    PAPO RODAS            Department: [facility]  MRN:        [id number]                      Room:  Gender:     Female                       Technician:  AJW  :        [id number]                   Requested By:ANJU ROBERTS  Order #:    [id number]                    Reading MD: Tani Sanders MD    Measurements  Intervals                                Axis  Rate:       56                           P:          47  WY:         143                          QRS:        -34  QRSD:       79                           T:          -5  QT:         577  QTc:        558    Interpretive Statements  Sinus bradycardia  Left atrial enlargement  Left axis deviation  RSR' in V1 or V2, probably normal variant  Borderline T abnormalities, lateral leads  Prolonged QT interval  Compared to ECG 2024 10:47:41  Atrial abnormality now present  T-wave abnormality now present  Prolonged QT interval now present  Ventricular premature complex(es)  no longer present  Right ventricular hypertrophy no longer present  Electronically Signed On 2025 12:17:59 PST by Tani Sanders MD     Histology Request    Collection Time: 25  6:37 PM   Result Value Ref Range    Pathology Request Sent to Histo          Assessment & Plan     1. Dyslipidemia  Lipid Profile      2. Coronary atherosclerosis due to lipid rich plaque  Bempedoic Acid 180 MG Tab    isosorbide mononitrate SR (IMDUR) 30 MG TABLET SR 24 HR      3. Familial hyperlipidemia, high LDL  Coenzyme Q10 300 MG Cap      4. Agatston coronary artery calcium score between 200 and 399 - 355         5. NSVT (nonsustained ventricular tachycardia) (HCC) - seen in recovery from stress Echo        6. Abnormal finding on cardiovascular stress test - hypertensive response to low level of exercise - normal stress imaging  nitroglycerin (NITROSTAT) 0.4 MG SL Tab      7. Statin intolerance        8. PAF (paroxysmal atrial fibrillation) (HCC) - 2 minutes on Zio        9. Hypertriglyceridemia  Omega-3 Fatty Acids (FISH OIL) 1000 MG Cap capsule          Medical Decision Making: Today's Assessment/Status/Plan:        It was my pleasure  to meet with Ms. Cho.    We addressed the management of hypertension at today's visit. Blood pressure is well controlled.  We specifically assessed the labs on hypertension treatment    We addressed the management of dyslipidemia and atherosclerosis at today's visit. She is on appropriate lipid lowering medication.    We addressed the management of atherosclerotic artery disease.  She is on proper antiplatelet, cholesterol management as appropriate.  We addressed the potential side effects and laboratory follow-up for these medications.    I will see Ms. Cho back in 1 year time and encouraged her to follow up with us over the phone or electronically using my MyChart as issues arise.    It is my pleasure to participate in the care of Ms. Coh.  Please do not hesitate to contact me with questions or concerns.    Cabrera Mendoza MD PhD [facility]  Cardiologist [facility]    Please note that this dictation was created using voice recognition software. There may be errors I did not discover before finalizing the note.     () Today's E/M visit is associated with medical care services that serve as the continuing focal point for all needed health care services and/or with medical care services that  are part of ongoing care related to a patient's single, serious condition, or a complex condition: This includes  furnishing services to patients on an ongoing basis that result in care that is personalized  to the patient. The services result in a comprehensive, longitudinal, and continuous  relationship with the patient and involve delivery of team-based care that is accessible, coordinated with other practitioners and providers, and integrated with the broader health  care landscape.                        [1]   Past Medical History:  Diagnosis Date    Abnormal finding on cardiovascular stress test - hypertensive response to low level of exercise - normal stress imaging  12/10/2020    Acute nasopharyngitis 12/25/2024    stomach flu, stomach cramps, chills    Agatston coronary artery calcium score between 200 and 399 - 355 2020     Agoraphobia with panic disorder     anxiety    Allergic rhinitis     Arthritis     all joints    Bowel habit changes     Cataract     Dental disorder     dental implants    Dyslipidemia     Fear of travel with panic attacks     Heart burn     Hiatal hernia     High cholesterol     hx of    NSVT (nonsustained ventricular tachycardia) (HCC) - seen in recovery from stress Echo 12/10/2020    PAF (paroxysmal atrial fibrillation) (HCC) - 2 minutes on Zio 06/15/2024    Schatzki's ring     Snoring     Urinary bladder disorder     bladder tumor   [2]   Past Surgical History:  Procedure Laterality Date    NE CYSTOURETHROSCOPY,BIOPSIES  1/21/2025    Procedure: CYSTOSCOPY, BLADDER BIOPSY WITH FULGURATION, POSSIBLE RESECTION;  Surgeon: Mio Sanchez M.D.;  Location: [facility];  Service: Urology    NE CYSTOURETHROSCOPY,FULGURATN  1/21/2025    Procedure: EXCISION OR FULGURATION, POLYP, URETHRA, DISTAL;  Surgeon: Mio Sanchez M.D.;  Location: [facility];  Service: Urology    ORIF, PATELLA Right 7/26/2024    Procedure: ORIF, PATELLA;  Surgeon: Stephen Holder M.D.;  Location: [facility];  Service: Orthopedics    ALEX BY LAPAROSCOPY  1/19/2018    Procedure: ALEX BY LAPAROSCOPY/SURGICAL;  Surgeon: Keren Henderson M.D.;  Location: [facility];  Service: General    EGD WITH ASP/BX  11/26/12    distal esophageal stricture secondary to reflux status post dilation to 51 Danish, mild erosive esophagitis, mild gastric erythema, small sliding hiatal hernia, mild chronic gastritis, negative H. pylori, no dysplasia or malignancy    COLONOSCOPY WITH BIOPSY  7/08    hemorrhoids, no malignancy    EGD WITH ASP/BX  7/08    hiatal hernia, schatzki's ring    ABDOMINAL HYSTERECTOMY TOTAL      with BSO due to infection    OTHER       I  &D rectal abscess   [3]   Allergies  Allergen Reactions    Atorvastatin      diarrhea    Ibuprofen Nausea    Pravastatin      diarrhea    Other Environmental      Pollen     [4]   Outpatient Encounter Medications as of 5/15/2025   Medication Sig Dispense Refill    amphetamine-dextroamphetamine (ADDERALL) 20 MG Tab TAKE 1 TABLET BY MOUTH TWICE DAILY BEGINNING ON AWAKENING      Bempedoic Acid 180 MG Tab Take 180 mg by mouth every day. 90 Tablet 3    nitroglycerin (NITROSTAT) 0.4 MG SL Tab Place 1 Tablet under the tongue as needed for Chest Pain. DISSOLVE ONE TABLET UNDER THE TONGUE EVERY 5 MINUTES AS NEEDED FOR CHEST PAIN DO NOT EXCEED A TOTAL OF 3 DOSES IN 15 MINUTES 25 Tablet 11    famotidine (PEPCID) 40 MG Tab Take 40 mg by mouth every morning.      ibuprofen (MOTRIN) 400 MG Tab Take 400 mg by mouth every 6 hours as needed.      ALPRAZolam (XANAX) 1 MG Tab Take 1 mg by mouth 2 times a day.      temazepam (RESTORIL) 30 MG capsule Take 30 mg by mouth at bedtime as needed.      aripiprazole (ABILIFY) 30 MG tablet Take 1 Tablet by mouth every day. 30 Tablet 2    vitamin D3 (CHOLECALCIFEROL) 1000 UNIT Tab Take 1 Tablet by mouth every day.      [DISCONTINUED] nitroglycerin (NITROSTAT) 0.4 MG SL Tab DISSOLVE ONE TABLET UNDER THE TONGUE EVERY 5 MINUTES AS NEEDED FOR CHEST PAIN.  DO NOT EXCEED A TOTAL OF 3 DOSES IN 15 MINUTES 25 Tablet 0    [DISCONTINUED] cefUROXime (CEFTIN) 500 MG Tab Take 1 Tablet by mouth 2 times a day. 14 Tablet 0    [DISCONTINUED] Bempedoic Acid 180 MG Tab Take 180 mg by mouth every day. 90 Tablet 3     No facility-administered encounter medications on file as of 5/15/2025.

## 2025-05-15 NOTE — PATIENT INSTRUCTIONS
BEMPADOIC ACID (NEXETOL) IS FOR CHOLESTEROL    ISOSORBIDE (IMDUR) LONG ACTING NITROGLYCERINE MAY HELP WITH BREATHING, watch for headaches and dizzy spells    .NATURAL SUPPLEMENTS  A total of 884 randomized controlled intervention trials evaluating 27 types of micronutrients among [id number],627 participants (4,895,544 person-years) were identified. Supplementation with n-3 fatty acid, n-6 fatty acid, l-arginine, l-citrulline, folic acid, vitamin D, magnesium, zinc, ?-lipoic acid, coenzyme Q10, melatonin, catechin, curcumin, flavanol, genistein, and quercetin showed moderate- to high-quality evidence for reducing CVD risk factors. Specifically, n-3 fatty acid supplementation decreased CVD mortality (relative risk [RR]: 0.93; 95% CI: 0.88-0.97), myocardial infarction (RR: 0.85; 95% CI: 0.78-0.92), and coronary heart disease events (RR: 0.86; 95% CI: 0.80-0.93). Folic acid supplementation decreased stroke risk (RR: 0.84; 95% CI: 0.72-0.97), and coenzyme Q10 supplementation decreased all-cause mortality events (RR: 0.68; 95% CI: 0.49-0.94). Vitamin C, vitamin D, vitamin E, and selenium showed no effect on CVD or type 2 diabetes risk. ?-carotene supplementation increased all-cause mortality (RR: 1.10; 95% CI: 1.05-1.15), CVD mortality events (RR: 1.12; 95% CI: 1.06-1.18), and stroke risk (RR: 1.09; 95% CI: 1.01-1.17).           [email or web address]

## 2025-05-20 NOTE — PROGRESS NOTES
NURSING DAILY NOTE    Name: García Cho   Date of Admission: 7/29/2024   Admitting Diagnosis: Fracture of patella with routine healing, right, closed  Attending Physician: Maxwell Theodore M.d.  Allergies: Atorvastatin, Bee pollen, Ibuprofen, Pravastatin, and Other environmental    Safety  Patient Assist  Min Assist  Patient Precautions  Fall Risk, Weight Bearing As Tolerated Right Lower Extremity, Immobilizer Right Lower Extremity  Precaution Comments  García reports  right knee immobilizer to be on at all times  Bed Transfer Status  Standby Assist  Toilet Transfer Status   Standby Assist  Assistive Devices  Rails, Wheelchair  Oxygen  None - Room Air  Diet/Therapeutic Dining  Current Diet Order   Procedures    Diet Order Diet: Regular     Pill Administration  whole  Agitated Behavioral Scale     ABS Level of Severity       Fall Risk  Has the patient had a fall this admission?   No  Marychuy Son Fall Risk Scoring  19, HIGH RISK  Fall Risk Safety Measures  bed alarm, seatbelt alarm, posey bed , and fall during this hospitalization    Vitals  Temperature: 36 °C (96.8 °F)  Temp src: Oral  Pulse: 82  Respiration: 18  Blood Pressure : 125/66  Blood Pressure MAP (Calculated): 86 MM HG  BP Location: Left, Upper Arm  Patient BP Position: Melo's Position     Oxygen  Pulse Oximetry: 95 %  O2 (LPM): 0  O2 Delivery Device: None - Room Air    Bowel and Bladder  Last Bowel Movement  07/31/24 (2X BM today per pt. Refused bowel meds tonight)  Stool Type  Type 4: Like a sausage or snake, smooth and soft  Bowel Device     Continent  Bladder: Continent void   Bowel: Continent movement  Bladder Function  Number of Times Voided: 1  Urine Color: Unable To Evaluate  Genitourinary Assessment   Bladder Assessment (WDL):  WDL Except  Guthrie Catheter: Not Applicable  Urine Color: Unable To Evaluate  Bladder Device: Bathroom  Bladder Scan: Post Void  $ Bladder  HEARING AID FOLLOW-UP    Jackelyn Kendrick was seen today for a hearing aid follow-up visit.     Mrs. Kendrick's right hearing aid was cleaned and checked; listening check was good.  Hearing aid was connected to Handy software, and a firmware update was completed successfully. Mrs. Kendrick's new audiogram was applied to settings.     She and I reviewed proper insertion of the device; Mrs. Kendrick demonstrated understanding of how to pull her ear out to enlarge the ear canal when inserting the hearing aid.     She will return to clinic after seeing ENT regarding a change in hearing and debris build up in her right ear if she would like to get a hearing aid for her left ear.        Scan Results (mL): 30    Skin  Aurelio Score   18  Sensory Interventions   Bed Types: Standard/Trauma Mattress  Skin Preventative Measures: Pillows in Use for Support / Positioning  Moisture Interventions  Moisturizers/Barriers: Barrier Wipes      Pain  Pain Rating Scale  0 - No Pain  Pain Location  Knee  Pain Location Orientation  Right  Pain Interventions   Medication (see MAR)    ADLs    Bathing      Linen Change      Personal Hygiene  Perineal Care  Chlorhexidine Bath      Oral Care     Teeth/Dentures     Shave     Nutrition Percentage Eaten     Environmental Precautions  Treaded Slipper Socks on Patient  Patient Turns/Positioning  Patient Turns Self from Side to Side  Patient Turns Assistance/Tolerance     Bed Positions  Bed Controls On, Bed Locked  Head of Bed Elevated  Self regulated      Psychosocial/Neurologic Assessment  Psychosocial Assessment  Psychosocial (WDL):  WDL Except  Patient Behaviors: Anxious  Neurologic Assessment  Neuro (WDL): Exceptions to WDL  Level of Consciousness: Alert  Orientation Level: Oriented X4  Cognition: Follows commands  Speech: Clear  Motor Function/Sensation Assessment: Sensation, Motor strength  Muscle Strength Right Arm: Good Strength Against Gravity and Moderate Resistance  Muscle Strength Left Arm: Good Strength Against Gravity and Moderate Resistance  RLE Sensation: Pain  Muscle Strength Right Leg: Fair Strength against Gravity but No Resistance  Muscle Strength Left Leg: Good Strength Against Gravity and Moderate Resistance  EENT (WDL):  WDL Except    Cardio/Pulmonary Assessment  Edema      Respiratory Breath Sounds  RUL Breath Sounds: Clear  RML Breath Sounds: Clear  RLL Breath Sounds: Clear  DOROTHY Breath Sounds: Clear  LLL Breath Sounds: Clear  Cardiac Assessment   Cardiac (WDL):  WDL Except

## 2025-05-23 ENCOUNTER — APPOINTMENT (OUTPATIENT)
Dept: RADIOLOGY | Facility: MEDICAL CENTER | Age: 70
End: 2025-05-23
Attending: UROLOGY
Payer: MEDICARE

## 2025-05-30 ENCOUNTER — TELEPHONE (OUTPATIENT)
Dept: CARDIOLOGY | Facility: MEDICAL CENTER | Age: 70
End: 2025-05-30
Payer: MEDICARE

## 2025-05-30 NOTE — TELEPHONE ENCOUNTER
CW    Caller: García Moyaakil    Reported Symptoms:   Reporting bad headaches since taking the :   Bempedoic Acid 180 MG Tab [[id number]]     Recent Blood Pressure/Heart Rate Reading: N/A    Callback Number:   [phone]    Thank you,   Amrita BLUE

## 2025-06-02 NOTE — TELEPHONE ENCOUNTER
CW: Headache from isosorbide mononitrate not bempedoic acid. Please advise any alternative options.    Spoke with patient:     Pt stopped taking isosorbide mononitrate after the first day due to severe headache. She then took the bempedoic acid, which I explained is a cholesterol medication and is an active rx in her chart. She says she has some slight chest pain pressure, we discussed nitroglycerin as well and potential headache.

## 2025-06-02 NOTE — TELEPHONE ENCOUNTER
Agree the isosorbide is the cause of headaches would stop and continue other medications, an alternative would be ranolazine 500 BID if she wants to try

## 2025-06-04 ENCOUNTER — HOSPITAL ENCOUNTER (OUTPATIENT)
Dept: LAB | Facility: MEDICAL CENTER | Age: 70
End: 2025-06-04
Attending: UROLOGY
Payer: MEDICARE

## 2025-06-04 LAB
BUN SERPL-MCNC: 20 MG/DL (ref 8–22)
CREAT SERPL-MCNC: 0.85 MG/DL (ref 0.5–1.4)
GFR SERPLBLD CREATININE-BSD FMLA CKD-EPI: 74 ML/MIN/1.73 M 2

## 2025-06-04 PROCEDURE — 84520 ASSAY OF UREA NITROGEN: CPT

## 2025-06-04 PROCEDURE — 82565 ASSAY OF CREATININE: CPT

## 2025-06-04 PROCEDURE — 36415 COLL VENOUS BLD VENIPUNCTURE: CPT

## 2025-06-18 ENCOUNTER — HOSPITAL ENCOUNTER (OUTPATIENT)
Dept: RADIOLOGY | Facility: MEDICAL CENTER | Age: 70
End: 2025-06-18
Attending: UROLOGY
Payer: MEDICARE

## 2025-06-18 DIAGNOSIS — Z85.51 PERSONAL HISTORY OF MALIGNANT NEOPLASM OF BLADDER: ICD-10-CM

## 2025-06-18 PROCEDURE — 700117 HCHG RX CONTRAST REV CODE 255: Mod: UD | Performed by: UROLOGY

## 2025-06-18 PROCEDURE — 74178 CT ABD&PLV WO CNTR FLWD CNTR: CPT

## 2025-06-18 RX ADMIN — IOHEXOL 100 ML: 350 INJECTION, SOLUTION INTRAVENOUS at 15:21

## 2025-07-02 DIAGNOSIS — R94.39 ABNORMAL FINDING ON CARDIOVASCULAR STRESS TEST: Chronic | ICD-10-CM

## 2025-07-02 RX ORDER — NITROGLYCERIN 0.4 MG/1
0.4 TABLET SUBLINGUAL PRN
Qty: 25 TABLET | Refills: 0 | OUTPATIENT
Start: 2025-07-02

## (undated) DEVICE — TUBE CONNECT SUCTION CLEAR 120 X 1/4" (50EA/CA)"

## (undated) DEVICE — LACTATED RINGERS INJ 1000 ML - (14EA/CA 60CA/PF)

## (undated) DEVICE — CONNECTOR HOSE NEPTUNE FOR CYSTO ROOM

## (undated) DEVICE — TUBE E-T HI-LO CUFF 7.0MM (10EA/PK)

## (undated) DEVICE — SCISSORS 5MM CVD (6EA/BX)

## (undated) DEVICE — TUBING CLEARLINK DUO-VENT - C-FLO (48EA/CA)

## (undated) DEVICE — BAG RETRIEVAL 10ML (10EA/BX)

## (undated) DEVICE — SPONGE GAUZESTER 4 X 4 4PLY - (128PK/CA)

## (undated) DEVICE — SET LEADWIRE 5 LEAD BEDSIDE DISPOSABLE ECG (1SET OF 5/EA)

## (undated) DEVICE — GLOVE BIOGEL SZ 6.5 SURGICAL PF LTX (50PR/BX 4BX/CA)

## (undated) DEVICE — PACK LAP CHOLE OR - (2EA/CA)

## (undated) DEVICE — NEEDLE NON SAFETY HYPO 22 GA X 1 1/2 IN (100/BX)

## (undated) DEVICE — PAD PREP 24 X 48 CUFFED - (100/CA)

## (undated) DEVICE — SODIUM CHL IRRIGATION 0.9% 1000ML (12EA/CA)

## (undated) DEVICE — WATER IRRIG. STER. 1500 ML - (9/CA)

## (undated) DEVICE — FIBERWIRE 5.0 - 12/BX ORDER IN MULTIPLES OF 12

## (undated) DEVICE — KIT ANESTHESIA W/CIRCUIT & 3/LT BAG W/FILTER (20EA/CA)

## (undated) DEVICE — DRAPE LARGE 3 QUARTER - (20/CA)

## (undated) DEVICE — ELECTRODE 850 FOAM ADHESIVE - HYDROGEL RADIOTRNSPRNT (50/PK)

## (undated) DEVICE — SUTURE GENERAL

## (undated) DEVICE — SUCTION INSTRUMENT YANKAUER BULBOUS TIP W/O VENT (50EA/CA)

## (undated) DEVICE — GLOVE BIOGEL SZ 7 SURGICAL PF LTX - (50PR/BX 4BX/CA)

## (undated) DEVICE — BLADE SURGICAL #10 - (50/BX)

## (undated) DEVICE — PADDING CAST 6 IN STERILE - 6 X 4 YDS (24/CA)

## (undated) DEVICE — SYSTEM CLEARIFY VISUALIZATION (10EA/PK)

## (undated) DEVICE — PACK CYSTO III (2EA/CA)

## (undated) DEVICE — SUTURE RETRIEVER HEWSON LIGA - 6/BX

## (undated) DEVICE — DRAPE 36X28IN RAD CARM BND BG - (25/CA) O

## (undated) DEVICE — GLOVE SZ 7.5 BIOGEL PI MICRO - PF LF (50PR/BX)

## (undated) DEVICE — SYRINGE 30 ML LL (56/BX)

## (undated) DEVICE — BANDAGE ELASTIC 6 HONEYCOMB - 6X5YD LF (20/CA)"

## (undated) DEVICE — CANISTER SUCTION 3000ML MECHANICAL FILTER AUTO SHUTOFF MEDI-VAC NONSTERILE LF DISP (40EA/CA)

## (undated) DEVICE — PACK LOWER EXTREMITY - (2/CA)

## (undated) DEVICE — SENSOR OXIMETER ADULT SPO2 RD SET (20EA/BX)

## (undated) DEVICE — SENSOR SPO2 NEO LNCS ADHESIVE (20/BX) SEE USER NOTES

## (undated) DEVICE — PAD SANITARY 11IN MAXI IND WRAPPED (12EA/PK 24PK/CA)

## (undated) DEVICE — BRIEF STRETCH MATERNITY M/L - FITS 20-60IN (5EA/BG 20BG/CA)

## (undated) DEVICE — IMMOBILIZER KNEE 24 INCH

## (undated) DEVICE — SUTURE 4-0 MONOCRYL PLUS PS-2 - 27 INCH (36/BX)

## (undated) DEVICE — MASK ANESTHESIA ADULT  - (100/CA)

## (undated) DEVICE — PROTECTOR ULNA NERVE - (36PR/CA)

## (undated) DEVICE — SUTURE 2-0 VICRYL PLUS CT-1 - 8 X 18 INCH(12/BX)

## (undated) DEVICE — GOWN WARMING STANDARD FLEX - (30/CA)

## (undated) DEVICE — DRESSING TRANSPARENT FILM TEGADERM 2.375 X 2.75"  (100EA/BX)"

## (undated) DEVICE — TRAY SRGPRP PVP IOD WT PRP - (20/CA)

## (undated) DEVICE — COVER LIGHT HANDLE ALC PLUS DISP (18EA/BX)

## (undated) DEVICE — TUBING INSUFFLATION - (10/BX)

## (undated) DEVICE — SLEEVE VASO DVT COMPRESSION CALF MED - (10PR/CA)

## (undated) DEVICE — GUIDE PIN 1.25X150 THRD OIC - (6EA/BX) (5TX6=30)

## (undated) DEVICE — GLOVE BIOGEL PI INDICATOR SZ 8.0 SURGICAL PF LF -(50/BX 4BX/CA)

## (undated) DEVICE — ELECTRODE BIPOLAR REGULAR CUTTING LOOP URO 24/26 FR (6EA/PK)

## (undated) DEVICE — GLOVE BIOGEL PI ORTHO SZ 7.5 PF LF (40PR/BX)

## (undated) DEVICE — GLOVE BIOGEL PI INDICATOR SZ 6.0 SURGICAL PF LF -(200PR/CA)

## (undated) DEVICE — DRAPE LOWER EXTREMETY - (6/CA)

## (undated) DEVICE — ELECTRODE DUAL RETURN W/ CORD - (50/PK)

## (undated) DEVICE — COVER FOOT UNIVERSAL DISP. - (25EA/CA)

## (undated) DEVICE — NEEDLE INSUFFLATION FOR STEP - (12/BX)

## (undated) DEVICE — GLOVE BIOGEL INDICATOR SZ 8 SURGICAL PF LTX - (50/BX 4BX/CA)

## (undated) DEVICE — SPONGE GAUZESTER. 2X2 4-PL - (2/PK 50PK/BX 30BX/CS)

## (undated) DEVICE — SLEEVE, VASO, THIGH, MED

## (undated) DEVICE — DRILL BIT 2.7 X 160 CANN OIC - (5TX2=10)

## (undated) DEVICE — CHLORAPREP 26 ML APPLICATOR - ORANGE TINT(25/CA)

## (undated) DEVICE — HEAD HOLDER JUNIOR/ADULT

## (undated) DEVICE — CANNULA W/SEAL 5X100 Z-THRE - ADED KII (12/BX)

## (undated) DEVICE — CLIP MED LG INTNL HRZN TI ESCP - (20/BX)

## (undated) DEVICE — SET EXTENSION WITH 2 PORTS (48EA/CA) ***PART #2C8610 IS A SUBSTITUTE*****

## (undated) DEVICE — SUTURE 0 VICRYL PLUS UR-6 - 27 INCH (36/BX)

## (undated) DEVICE — GLOVE BIOGEL SZ 7.5 SURGICAL PF LTX - (50PR/BX 4BX/CA)

## (undated) DEVICE — KIT ROOM DECONTAMINATION

## (undated) DEVICE — NEEDLE INSFL 120MM 14GA VRRS - (20/BX)

## (undated) DEVICE — NEPTUNE 4 PORT MANIFOLD - (20/PK)

## (undated) DEVICE — CANISTER SUCTION 3000ML MECHANICAL FILTER AUTO SHUTOFF MEDI-VAC NONSTERILE LF DISP  (40EA/CA)

## (undated) DEVICE — GLOVE BIOGEL PI ORTHO SZ 8 PF LF (40PR/BX)

## (undated) DEVICE — TROCAR Z THREAD 12 X 100 - BLADED (6/BX)

## (undated) DEVICE — DETERGENT RENUZYME PLUS 10 OZ PACKET (50/BX)

## (undated) DEVICE — SUTURE 0 VICRYL PLUS CT-1 - 8 X 18 INCH (12/BX)

## (undated) DEVICE — TROCAR 5X100 BLADED Z-THREAD - KII (6/BX)